# Patient Record
Sex: MALE | Race: BLACK OR AFRICAN AMERICAN | Employment: OTHER | URBAN - METROPOLITAN AREA
[De-identification: names, ages, dates, MRNs, and addresses within clinical notes are randomized per-mention and may not be internally consistent; named-entity substitution may affect disease eponyms.]

---

## 2017-02-09 ENCOUNTER — TELEPHONE (OUTPATIENT)
Dept: FAMILY MEDICINE CLINIC | Age: 62
End: 2017-02-09

## 2017-02-09 NOTE — TELEPHONE ENCOUNTER
Referral Request Telephone Call      Insurance Name:     Phani Ricks [address] ID:  [id number]   Specialist Name: Dr. Effie Gilbert   Type of Specialty:  [facility] Urology    Address of Specialist:  [facility] [address] [place] [address],[address] [address]   Phone/Fax Number of Specialist: [phone]  [facility] [phone]   Diagnosis: 790.93   Appointment Date: 2-14-17 @ 10:00   NPI    Tax ID

## 2017-02-21 ENCOUNTER — OFFICE VISIT (OUTPATIENT)
Dept: FAMILY MEDICINE CLINIC | Age: 62
End: 2017-02-21

## 2017-02-21 VITALS
BODY MASS INDEX: 33.59 KG/M2 | WEIGHT: 209 LBS | RESPIRATION RATE: 18 BRPM | HEIGHT: 66 IN | DIASTOLIC BLOOD PRESSURE: 82 MMHG | OXYGEN SATURATION: 96 % | SYSTOLIC BLOOD PRESSURE: 136 MMHG | HEART RATE: 59 BPM | TEMPERATURE: 97.2 F

## 2017-02-21 DIAGNOSIS — Z11.59 NEED FOR HEPATITIS C SCREENING TEST: ICD-10-CM

## 2017-02-21 DIAGNOSIS — N40.1 BENIGN PROSTATIC HYPERPLASIA WITH LOWER URINARY TRACT SYMPTOMS, UNSPECIFIED MORPHOLOGY: ICD-10-CM

## 2017-02-21 DIAGNOSIS — E66.9 OBESITY (BMI 30.0-34.9): ICD-10-CM

## 2017-02-21 DIAGNOSIS — Z23 ENCOUNTER FOR IMMUNIZATION: ICD-10-CM

## 2017-02-21 DIAGNOSIS — J34.89 DRY NOSE: ICD-10-CM

## 2017-02-21 DIAGNOSIS — G47.33 OSA (OBSTRUCTIVE SLEEP APNEA): ICD-10-CM

## 2017-02-21 DIAGNOSIS — E78.49 FAMILIAL HYPERLIPIDEMIA: ICD-10-CM

## 2017-02-21 DIAGNOSIS — Z78.9 VEGETARIAN: ICD-10-CM

## 2017-02-21 DIAGNOSIS — Z00.00 MEDICARE ANNUAL WELLNESS VISIT, SUBSEQUENT: Primary | ICD-10-CM

## 2017-02-21 DIAGNOSIS — I10 ESSENTIAL HYPERTENSION: ICD-10-CM

## 2017-02-21 PROBLEM — Z55.0 ILLITERACY: Status: ACTIVE | Noted: 2017-02-21

## 2017-02-21 RX ORDER — VALACYCLOVIR HYDROCHLORIDE 500 MG/1
TABLET, FILM COATED ORAL
Qty: 30 TAB | Refills: 1 | Status: SHIPPED | OUTPATIENT
Start: 2017-02-21 | End: 2017-06-12 | Stop reason: SDUPTHER

## 2017-02-21 NOTE — MR AVS SNAPSHOT
Visit Information Date & Time Provider Department Dept. Phone Encounter #  
 2/21/2017  1[phone] [id number] Follow-up Instructions Return in about 6 months (around 8/21/2017) for chronic problems. Upcoming Health Maintenance Date Due Hepatitis C Screening 1955 ZOSTER VACCINE AGE 60> 5/4/2016 COLONOSCOPY 4/9/2017 DTaP/Tdap/Td series (2 - Td) 3/19/2022 Allergies as of 2/21/2017  Review Complete On: 2/21/2017 By: Anuj Foster MD  
 No Known Allergies Current Immunizations  Reviewed on 9/26/2013 Name Date Influenza Vaccine 9/26/2013 Influenza Vaccine Aime Rueda) 10/21/2015 Influenza Vaccine (Quad) PF 2/21/2017 Influenza Vaccine Split 11/14/2012, 10/24/2011 TDAP Vaccine 3/19/2012 Not reviewed this visit You Were Diagnosed With   
  
 Codes Comments Medicare annual wellness visit, subsequent    -  Primary ICD-10-CM: Z00.00 ICD-9-CM: V70.0 Encounter for immunization     ICD-10-CM: W30 ICD-9-CM: V03.89   
 NESTOR (obstructive sleep apnea)     ICD-10-CM: G47.33 
ICD-9-CM: 327.23 Obesity (BMI 30.0-34.9)     ICD-10-CM: M60.7 ICD-9-CM: 278.00 Benign prostatic hyperplasia with lower urinary tract symptoms, unspecified morphology     ICD-10-CM: N40.1 ICD-9-CM: 600.01 Vegetarian     ICD-10-CM: Z78.9 ICD-9-CM: V49.89 Need for hepatitis C screening test     ICD-10-CM: Z11.59 
ICD-9-CM: V73.89 Familial hyperlipidemia     ICD-10-CM: E78.4 ICD-9-CM: 272.4 Vitals BP Pulse Temp Resp Height(growth percentile) Weight(growth percentile) 136/82 (BP 1 Location: Left arm, BP Patient Position: Sitting) (!) 59 97.2 °F (36.2 °C) (Oral) 18 5' 6\" (1.676 m) 209 lb (94.8 kg) SpO2 BMI Smoking Status 96% 33.73 kg/m2 Never Smoker Vitals History BMI and BSA Data  Body Mass Index Body Surface Area  
 33.73 kg/m 2 2.1 m 2  
  
  
 Preferred Pharmacy Pharmacy Name Phone [facility], [address] [place] [address] [phone] Your Updated Medication List  
  
   
This list is accurate as of: 17  1:25 PM.  Always use your most recent med list. amLODIPine 10 mg tablet Commonly known as:  Anupam Evangelistachristine TAKE 1 TABLET EVERY DAY  
  
 aspirin 81 mg chewable tablet Take 1 Tab by mouth daily. Indications: MYOCARDIAL INFARCTION PREVENTION  
  
 cholecalciferol (vitamin D3) 2,000 unit Tab Take  by mouth daily. cyanocobalamin 1,000 mcg tablet Take 1,000 mcg by mouth daily. fluticasone 50 mcg/actuation nasal spray Commonly known as:  FLONASE  
  
 losartan 100 mg tablet Commonly known as:  COZAAR  
TAKE 1 TABLET EVERY DAY FOR BLOOD PRESSURE MEN'S DAILY MULTIVIT-MINERAL 0.4-600 mg-mcg Tab Generic drug:  Multivits with Min-FA-Lycopene Take  by mouth daily. metoprolol succinate 100 mg tablet Commonly known as:  TOPROL-XL  
TAKE 1 TABLET EVERY DAY  
  
 tamsulosin 0.4 mg capsule Commonly known as:  FLOMAX TAKE 2 CAPSULES EVERY DAY  FOR  PROSTATE  
  
 valACYclovir 500 mg tablet Commonly known as:  VALTREX  
TAKE 1 TABLET BY MOUTH EVERY DAY AS NEEDED FOR BREAKOUT UNDER EYES  
  
 varicella zoster vacine live 19,400 unit/0.65 mL Susr injection Commonly known as:  varicella-zoster vacine live 1 Vial by SubCUTAneous route once for 1 dose. Prescriptions Sent to Pharmacy Refills  
 valACYclovir (VALTREX) 500 mg tablet 1 Sig: TAKE 1 TABLET BY MOUTH EVERY DAY AS NEEDED FOR BREAKOUT UNDER EYES  
 Class: Normal  
 Pharmacy: [facility], [address] Ph #: [phone]  
 varicella zoster vacine live (VARICELLA-ZOSTER VACINE LIVE) 19,400 unit/0.65 mL susr injection 0 Si Vial by SubCUTAneous route once for 1 dose.   
 Class: Normal  
 Pharmacy: [id number], [address] #: [phone] Route: SubCUTAneous We Performed the Following HEPATITIS C AB [52701 CPT(R)] INFLUENZA VIRUS VAC QUAD,SPLIT,PRESV FREE SYRINGE 3/> YRS IM [id number] CPT(R)] LIPID PANEL [51287 CPT(R)] METABOLIC PANEL, COMPREHENSIVE [77804 CPT(R)] VITAMIN B12 [id number] CPT(R)] Follow-up Instructions Return in about 6 months (around 8/21/2017) for chronic problems. Patient Instructions Well Visit, Men 48 to 72: Care Instructions Your Care Instructions Physical exams can help you stay healthy. Your doctor has checked your overall health and may have suggested ways to take good care of yourself. He or she also may have recommended tests. At home, you can help prevent illness with healthy eating, regular exercise, and other steps. Follow-up care is a key part of your treatment and safety. Be sure to make and go to all appointments, and call your doctor if you are having problems. It's also a good idea to know your test results and keep a list of the medicines you take. How can you care for yourself at home? · Reach and stay at a healthy weight. This will lower your risk for many problems, such as obesity, diabetes, heart disease, and high blood pressure. · Get at least 30 minutes of exercise on most days of the week. Walking is a good choice. You also may want to do other activities, such as running, swimming, cycling, or playing tennis or team sports. · Do not smoke. Smoking can make health problems worse. If you need help quitting, talk to your doctor about stop-smoking programs and medicines. These can increase your chances of quitting for good. · Protect your skin from too much sun.  When you're outdoors from 10 a.m. to 4 p.m., stay in the shade or cover up with clothing and a hat with a wide brim. Wear sunglasses that block UV rays. Even when it's cloudy, put broad-spectrum sunscreen (SPF 30 or higher) on any exposed skin. · See a dentist one or two times a year for checkups and to have your teeth cleaned. · Wear a seat belt in the car. · Limit alcohol to 2 drinks a day. Too much alcohol can cause health problems. Follow your doctor's advice about when to have certain tests. These tests can spot problems early. · Cholesterol. Your doctor will tell you how often to have this done based on your overall health and other things that can increase your risk for heart attack and stroke. · Blood pressure. Have your blood pressure checked during a routine doctor visit. Your doctor will tell you how often to check your blood pressure based on your age, your blood pressure results, and other factors. · Prostate exam. Talk to your doctor about whether you should have a blood test (called a PSA test) for prostate cancer. Experts disagree on whether men should have this test. Some experts recommend that you discuss the benefits and risks of the test with your doctor. · Diabetes. Ask your doctor whether you should have tests for diabetes. · Vision. Some experts recommend that you have yearly exams for glaucoma and other age-related eye problems starting at age 48. · Hearing. Tell your doctor if you notice any change in your hearing. You can have tests to find out how well you hear. · Colon cancer. You should begin tests for colon cancer at age 48. You may have one of several tests. Your doctor will tell you how often to have tests based on your age and risk. Risks include whether you already had a precancerous polyp removed from your colon or whether your parent, brother, sister, or child has had colon cancer. · Heart attack and stroke risk. At least every 4 to 6 years, you should have your risk for heart attack and stroke assessed.  Your doctor uses factors such as your age, blood pressure, cholesterol, and whether you smoke or have diabetes to show what your risk for a heart attack or stroke is over the next 10 years. · Abdominal aortic aneurysm. Ask your doctor whether you should have a test to check for an aneurysm. You may need a test if you ever smoked or if your parent, brother, sister, or child has had an aneurysm. When should you call for help? Watch closely for changes in your health, and be sure to contact your doctor if you have any problems or symptoms that concern you. Where can you learn more? Go to http://ambrose-vince.info/. Enter T370 in the search box to learn more about \"Well Visit, Men 48 to 72: Care Instructions. \" Current as of: July 19, 2016 Content Version: 11.1 © 4157-0828 [employer]. Care instructions adapted under license by [employer] (which disclaims liability or warranty for this information). If you have questions about a medical condition or this instruction, always ask your healthcare professional. [place] [address] any warranty or liability for your use of this information. (Preventive care checklist to be included in patient instructions) Discussed today Done Previously Not Needed   
  x Pneumococcal vaccines  
x   Flu vaccine  
  x Hepatitis B vaccine (if at risk) x   Shingles vaccine  
  x TDAP vaccine X urology  Digital rectal exam  
 x  PSA  
x   Colorectal cancer screening  
  x Low-dose CT for lung cancer screening  
  x Bone density test  
 x  Glaucoma screening  
x   Cholesterol test  
  x Diabetes screening test   
  x Diabetes self-management class  
  x Nutritionist referral for diabetes or renal disease Introducing [facility]! [employer] introduces [employer] patient portal. Now you can access parts of your medical record, email your doctor's office, and request medication refills online. 1. In your internet browser, go to [email or web address] 2. Click on the First Time User? Click Here link in the Sign In box. You will see the New Member Sign Up page. 3. Enter your ThinkLink Access Code exactly as it appears below. You will not need to use this code after youve completed the sign-up process. If you do not sign up before the expiration date, you must request a new code. · ThinkLink Access Code: [email or web address] Expires: 5/22/2017  1:25 PM 
 
4. Enter the last four digits of your Social Security Number (xxxx) and Date of Birth (mm/dd/yyyy) as indicated and click Submit. You will be taken to the next sign-up page. 5. Create a AskUt ID. This will be your ThinkLink login ID and cannot be changed, so think of one that is secure and easy to remember. 6. Create a ThinkLink password. You can change your password at any time. 7. Enter your Password Reset Question and Answer. This can be used at a later time if you forget your password. 8. Enter your e-mail address. You will receive e-mail notification when new information is available in [address] 9. Click Sign Up. You can now view and download portions of your medical record. 10. Click the Download Summary menu link to download a portable copy of your medical information. If you have questions, please visit the Frequently Asked Questions section of the ThinkLink website. Remember, ThinkLink is NOT to be used for urgent needs. For medical emergencies, dial 911. Now available from your iPhone and Android! Please provide this summary of care documentation to your next provider. Your primary care clinician is listed as Zhane Espinal. If you have any questions after today's visit, please call [phone].

## 2017-02-21 NOTE — PATIENT INSTRUCTIONS
Well Visit, Men 48 to 72: Care Instructions  Your Care Instructions  Physical exams can help you stay healthy. Your doctor has checked your overall health and may have suggested ways to take good care of yourself. He or she also may have recommended tests. At home, you can help prevent illness with healthy eating, regular exercise, and other steps. Follow-up care is a key part of your treatment and safety. Be sure to make and go to all appointments, and call your doctor if you are having problems. It's also a good idea to know your test results and keep a list of the medicines you take. How can you care for yourself at home? · Reach and stay at a healthy weight. This will lower your risk for many problems, such as obesity, diabetes, heart disease, and high blood pressure. · Get at least 30 minutes of exercise on most days of the week. Walking is a good choice. You also may want to do other activities, such as running, swimming, cycling, or playing tennis or team sports. · Do not smoke. Smoking can make health problems worse. If you need help quitting, talk to your doctor about stop-smoking programs and medicines. These can increase your chances of quitting for good. · Protect your skin from too much sun. When you're outdoors from 10 a.m. to 4 p.m., stay in the shade or cover up with clothing and a hat with a wide brim. Wear sunglasses that block UV rays. Even when it's cloudy, put broad-spectrum sunscreen (SPF 30 or higher) on any exposed skin. · See a dentist one or two times a year for checkups and to have your teeth cleaned. · Wear a seat belt in the car. · Limit alcohol to 2 drinks a day. Too much alcohol can cause health problems. Follow your doctor's advice about when to have certain tests. These tests can spot problems early. · Cholesterol.  Your doctor will tell you how often to have this done based on your overall health and other things that can increase your risk for heart attack and stroke. · Blood pressure. Have your blood pressure checked during a routine doctor visit. Your doctor will tell you how often to check your blood pressure based on your age, your blood pressure results, and other factors. · Prostate exam. Talk to your doctor about whether you should have a blood test (called a PSA test) for prostate cancer. Experts disagree on whether men should have this test. Some experts recommend that you discuss the benefits and risks of the test with your doctor. · Diabetes. Ask your doctor whether you should have tests for diabetes. · Vision. Some experts recommend that you have yearly exams for glaucoma and other age-related eye problems starting at age 48. · Hearing. Tell your doctor if you notice any change in your hearing. You can have tests to find out how well you hear. · Colon cancer. You should begin tests for colon cancer at age 48. You may have one of several tests. Your doctor will tell you how often to have tests based on your age and risk. Risks include whether you already had a precancerous polyp removed from your colon or whether your parent, brother, sister, or child has had colon cancer. · Heart attack and stroke risk. At least every 4 to 6 years, you should have your risk for heart attack and stroke assessed. Your doctor uses factors such as your age, blood pressure, cholesterol, and whether you smoke or have diabetes to show what your risk for a heart attack or stroke is over the next 10 years. · Abdominal aortic aneurysm. Ask your doctor whether you should have a test to check for an aneurysm. You may need a test if you ever smoked or if your parent, brother, sister, or child has had an aneurysm. When should you call for help? Watch closely for changes in your health, and be sure to contact your doctor if you have any problems or symptoms that concern you. Where can you learn more? Go to http://ambrose-vince.info/.   Enter V531 in the search box to learn more about \"Well Visit, Men 48 to 72: Care Instructions. \"  Current as of: July 19, 2016  Content Version: 11.1  © 0926-2077 [employer]. Care instructions adapted under license by [employer] (which disclaims liability or warranty for this information). If you have questions about a medical condition or this instruction, always ask your healthcare professional. [place] [address] any warranty or liability for your use of this information.       (Preventive care checklist to be included in patient instructions)  Discussed today Done Previously Not Needed      x Pneumococcal vaccines   x   Flu vaccine     x Hepatitis B vaccine (if at risk)   x   Shingles vaccine     x TDAP vaccine    X urology  Digital rectal exam    x  PSA   x   Colorectal cancer screening     x Low-dose CT for lung cancer screening     x Bone density test    x  Glaucoma screening   x   Cholesterol test     x Diabetes screening test      x Diabetes self-management class     x Nutritionist referral for diabetes or renal disease

## 2017-02-21 NOTE — PROGRESS NOTES
Chief Complaint   Patient presents with   [facility] Annual Wellness Visit     fasting other than some OJ at 7 this am    Nasal Pain     nostrils dry and sore, have bled little at times due to dryness        Reviewed Record in preparation for visit and have obtained necessary documentation. Identified pt with two pt identifiers (Name @ )    Health Maintenance Due   Topic    Hepatitis C Screening     ZOSTER VACCINE AGE 60>     INFLUENZA AGE 9 TO ADULT     COLONOSCOPY          1. Have you been to the ER, urgent care clinic since your last visit? Hospitalized since your last visit? No    2. Have you seen or consulted any other health care providers outside of the [address] [place] [address] since your last visit? Include any pap smears or colon screening.  No

## 2017-02-21 NOTE — PROGRESS NOTES
Chris [address]  [address] [facility] [address]  [phone]             Date of visit: 2/21/2017       This is a Subsequent Medicare Annual Wellness Visit (AWV), (Performed more than 12 months after effective date of Medicare Part B enrollment and 12 months after last preventive visit, Once in a lifetime)    I have reviewed the patient's medical history in detail and updated the computerized patient record. Apple Wong is a 62 y.o. male   History obtained from: patient. Concerns today   -nose with sores that never heal, both sides, does have runny nose, sometimes bleeding. Doesn't use sprays, previous allergy spray  Made him worse  -used to be on statin, not in years, does vegetarian diet  -bp controlled on usual med  -recently dx NESTOR, wearing the cpap faithfully 10-8, getting used to it, feels a little better  -scheduled for colonoscopy in March  -plans to schedule with urology soon. flomax working well to control symptoms    History     Patient Active Problem List   Diagnosis Code    Vegetarian Z78.9    Allergic rhinitis J30.9    Familial hyperlipidemia E78.4    BPH (benign prostatic hyperplasia) N40.0    Right inguinal hernia K40.90    NESTOR (obstructive sleep apnea) G47.33    Obesity (BMI 30.0-34. 9) E66.9    Illiteracy Z55.0    Essential hypertension I10     Past Medical History   Diagnosis Date    Adenomatous polyp of colon 2012    Angina pectoris     Bleeding ulcer     BPH (benign prostatic hyperplasia) 2011    Cancer [facility]) 2013     PROSTATE     Hypertension     Kidney infection     Right inguinal hernia 7/11/2016    Vegetarian       Past Surgical History   Procedure Laterality Date    Hx orthopaedic       Back surgery    Colonoscopy  2012     adenomatous polyp    Hx cholecystectomy  2010    Hx other surgical  2011     prostate biopsy - normal    Hx other surgical  2009     FATTY TUMOR REMOVED FROM BACK    Hx hernia repair Right 7/29/16     inguinal w/mesh by Dr. Nancy Spurling     No Known Allergies  Current Outpatient Prescriptions   Medication Sig Dispense Refill    valACYclovir (VALTREX) 500 mg tablet TAKE 1 TABLET BY MOUTH EVERY DAY AS NEEDED FOR BREAKOUT UNDER EYES 30 Tab 1    varicella zoster vacine live (VARICELLA-ZOSTER VACINE LIVE) 19,400 unit/0.65 mL susr injection 1 Vial by SubCUTAneous route once for 1 dose. 0.65 mL 0    metoprolol succinate (TOPROL-XL) 100 mg tablet TAKE 1 TABLET EVERY DAY 90 Tab 1    amLODIPine (NORVASC) 10 mg tablet TAKE 1 TABLET EVERY DAY 90 Tab 1    losartan (COZAAR) 100 mg tablet TAKE 1 TABLET EVERY DAY FOR BLOOD PRESSURE 90 Tab 1    tamsulosin (FLOMAX) 0.4 mg capsule TAKE 2 CAPSULES EVERY DAY  FOR  PROSTATE 180 Cap 1    fluticasone (FLONASE) 50 mcg/actuation nasal spray       cyanocobalamin 1,000 mcg tablet Take 1,000 mcg by mouth daily.  cholecalciferol, vitamin D3, 2,000 unit tab Take  by mouth daily.  aspirin 81 mg chewable tablet Take 1 Tab by mouth daily. Indications: MYOCARDIAL INFARCTION PREVENTION 90 Tab 0    Multivits with Min-FA-Lycopene (MEN'S DAILY MULTIVIT-MINERAL) 0.4-600 mg-mcg Tab Take  by mouth daily. Family History   Problem Relation Age of Onset    Hypertension Mother     Hypertension Maternal Grandmother     Hypertension Brother     Alcohol abuse Brother     Cancer Brother      pancreatic    Diabetes Brother     Hypertension Brother     Heart Disease Brother     Heart Attack Father     Anesth Problems Neg Hx      Social History   Substance Use Topics    Smoking status: Never Smoker    Smokeless tobacco: Never Used    Alcohol use 0.0 oz/week     0 Standard drinks or equivalent per week      Comment: OCCASIONALLY       Specialists/Care Team   Claudy Kirkland has established care with the following healthcare providers:  Patient Care Team:  Gaurang Siddiqui NP as PCP - General (Nurse Practitioner)  Primo Castaneda MD (General Surgery)  Nae Tsang MD (Gastroenterology)  Greer Anand MD (Urology)    Health Risk Assessment     Demographics   male  62 y.o. General Health Questions   -During the past 4 weeks:   -how would you rate your health in general? Good   -how often have you been bothered by feeling dizzy when standing up? never   -how much have you been bothered by bodily pain? not   -Have you noticed any hearing difficulties? no   -has your physical and emotional health limited your social activities with family or friends? no    Emotional Health Questions   -Do you have a history of depression, anxiety, or emotional problems? no  -Over the past 2 weeks, have you felt down, depressed or hopeless? no  -Over the past 2 weeks, have you felt little interest or pleasure in doing things? no    Health Habits   Please describe your diet habits: vegetarian, lots of veggies, tofu, almond milk, water, no soda or sugar  Do you get 5 servings of fruits or vegetables daily? yes  Do you exercise regularly? yes    Activities of Daily Living and Functional Status   -Do you need help with eating, walking, dressing, bathing, toileting, the phone, transportation, shopping, preparing meals, housework, laundry, medications or managing money? no  -In the past four weeks, was someone available to help you if you needed and wanted help with anything? yes  -Are you confident are you that you can control and manage most of your health problems? yes  -Have you been given information to help you keep track of your medications? yes  -How often do you have trouble taking your medications as prescribed? never    Fall Risk and Home Safety   Have you fallen 2 or more times in the past year? no  Does your home have rugs in the hallway, lack grab bars in the bathroom, lack handrails on the stairs or have poor lighting? no  Do you have smoke detectors and check them regularly? yes  Do you have difficulties driving a car? no  Do you always fasten your seat belt when you are in a car? yes    Review of Systems (if indicated for problems addressed today)   Card: denies chest pain  Pulm: denies shortness of breath  GI: denies hematochezia     Physical Examination     Vitals:    02/21/17 1254   BP: 136/82   Pulse: (!) 59   Resp: 18   Temp: 97.2 °F (36.2 °C)   TempSrc: Oral   SpO2: 96%   Weight: 209 lb (94.8 kg)   Height: 5' 6\" (1.676 m)     Body mass index is 33.73 kg/(m^2). No exam data present  Was the patient's timed Up & Go test unsteady or longer than 30 seconds? no    Evaluation of Cognitive Function   Mood/affect:  neutral  Orientation: normal  Appearance: age appropriate  Family member/caregiver input: none    Additional exam if indicated for problems addressed today:  General: stated age, well developed, well nourished and in NAD  Nose: turbinates normal, little dried blood on septum on right, no lesions  Neck: supple, symmetrical, trachea midline, no adenopathy and thyroid: not enlarged, symmetric, no tenderness/mass/nodules  Lungs:  clear to auscultation w/o rales, rhonchi, wheezes w/normal effort and no use of accessory muscles of respiration   Heart: regular rate and rhythm, S1, S2 normal, no murmur, click, rub or gallop  Abdomen: soft, nontender  Ext:  No edema noted.    Lymph: no cervical adenopathy appreciated  Skin:  Normal. and no rash or abnormalities   Psych: alert and oriented to person, place, time and situation and Speech: appropriate quality, quantity and organization of sentences      Advice/Referrals/Counseling (as indicated)   Education and counseling provided for any problems identified above: diet, exercise    Preventive Services     (Preventive care checklist to be included in patient instructions)  Discussed today Done Previously Not Needed      x Pneumococcal vaccines   x   Flu vaccine     x Hepatitis B vaccine (if at risk)   x   Shingles vaccine     x TDAP vaccine    X urology  Digital rectal exam    x  PSA   x   Colorectal cancer screening     x Low-dose CT for lung cancer screening     x Bone density test    x  Glaucoma screening   x   Cholesterol test     x Diabetes screening test      x Diabetes self-management class     x Nutritionist referral for diabetes or renal disease     Discussion of Advance Directive   Discussed with Carlos Hull. Keenan Mejiae his ability to prepare and advance directive in the case that an injury or illness causes him to be unable to make health care decisions. Says he already has plans to get one    Assessment/Plan   Z00.00    ICD-10-CM ICD-9-CM    1. Medicare annual wellness visit, subsequent Z00.00 V70.0    2. Encounter for immunization Z23 V03.89 INFLUENZA VIRUS VAC QUAD,SPLIT,PRESV FREE SYRINGE 3/> YRS IM   3. NESTOR (obstructive sleep apnea) G47.33 327.23    4. Obesity (BMI 30.0-34. 9) E66.9 278.00    5. Benign prostatic hyperplasia with lower urinary tract symptoms, unspecified morphology N40.1 600.01    6. Vegetarian Z78.9 V49.89 VITAMIN B12   7. Need for hepatitis C screening test Z11.59 V73.89 HEPATITIS C AB   8. Familial hyperlipidemia E78.4 272.4 LIPID PANEL      METABOLIC PANEL, COMPREHENSIVE   9. Dry nose J34.89 478.19    10. Essential hypertension I10 401.9        Orders Placed This Encounter    Influenza virus vaccine (QUADRIVALENT PRES FREE SYRINGE) IM 3 years and older    VITAMIN B12    LIPID PANEL    METABOLIC PANEL, COMPREHENSIVE    HEPATITIS C AB    valACYclovir (VALTREX) 500 mg tablet    varicella zoster vacine live (VARICELLA-ZOSTER VACINE LIVE) 19,400 unit/0.65 mL susr injection     -no change to bp meds  Advised nasal saline for nose, vaseline at bedtime  Tolerating cpap, continue, discused its importance  encoruaged continued vegetarian diet as well a walking regularly, BMI is high but doesn't actually appear overweight    Follow-up Disposition:  Return in about 6 months (around 8/21/2017) for chronic problems.     Reinaldo Rabago MD

## 2017-02-22 LAB
ALBUMIN SERPL-MCNC: 4.7 G/DL (ref 3.6–4.8)
ALBUMIN/GLOB SERPL: 1.6 {RATIO} (ref 1.1–2.5)
ALP SERPL-CCNC: 99 IU/L (ref 39–117)
ALT SERPL-CCNC: 19 IU/L (ref 0–44)
AST SERPL-CCNC: 26 IU/L (ref 0–40)
BILIRUB SERPL-MCNC: 1 MG/DL (ref 0–1.2)
BUN SERPL-MCNC: 7 MG/DL (ref 8–27)
BUN/CREAT SERPL: 8 (ref 10–22)
CALCIUM SERPL-MCNC: 10.5 MG/DL (ref 8.6–10.2)
CHLORIDE SERPL-SCNC: 101 MMOL/L (ref 96–106)
CHOLEST SERPL-MCNC: 178 MG/DL (ref 100–199)
CO2 SERPL-SCNC: 29 MMOL/L (ref 18–29)
CREAT SERPL-MCNC: 0.87 MG/DL (ref 0.76–1.27)
GLOBULIN SER CALC-MCNC: 2.9 G/DL (ref 1.5–4.5)
GLUCOSE SERPL-MCNC: 90 MG/DL (ref 65–99)
HCV AB S/CO SERPL IA: <0.1 S/CO RATIO (ref 0–0.9)
HDLC SERPL-MCNC: 51 MG/DL
INTERPRETATION, 910389: NORMAL
LDLC SERPL CALC-MCNC: 103 MG/DL (ref 0–99)
POTASSIUM SERPL-SCNC: 4.2 MMOL/L (ref 3.5–5.2)
PROT SERPL-MCNC: 7.6 G/DL (ref 6–8.5)
SODIUM SERPL-SCNC: 142 MMOL/L (ref 134–144)
TRIGL SERPL-MCNC: 119 MG/DL (ref 0–149)
VIT B12 SERPL-MCNC: 1536 PG/ML (ref 211–946)
VLDLC SERPL CALC-MCNC: 24 MG/DL (ref 5–40)

## 2017-02-23 NOTE — PROGRESS NOTES
All labs were good, including b-12, cholesterol, kidney, liver, sugar, electrolytes and routine hep C screening test. Anjali De La Fuente MD 2/23/2017 11:19 AM

## 2017-03-15 ENCOUNTER — TELEPHONE (OUTPATIENT)
Dept: FAMILY MEDICINE CLINIC | Age: 62
End: 2017-03-15

## 2017-03-15 NOTE — TELEPHONE ENCOUNTER
Referral Request Telephone Call      Insurance Name:     Mandy Robertson ID:  [id number]   Specialist Name: Erwin Madison   Type of Specialty:  Arun Garcia    Address of Specialist:  [address]   Phone/Fax Number of Specialist: Phone # [phone]  Fax # [phone]   Diagnosis: Colonoscopy   Appointment Date: 4/17/17   NPI    Tax ID

## 2017-04-18 RX ORDER — LOSARTAN POTASSIUM 100 MG/1
TABLET ORAL
Qty: 90 TAB | Refills: 1 | Status: SHIPPED | OUTPATIENT
Start: 2017-04-18 | End: 2017-08-22 | Stop reason: SDUPTHER

## 2017-04-18 RX ORDER — AMLODIPINE BESYLATE 10 MG/1
TABLET ORAL
Qty: 90 TAB | Refills: 1 | Status: SHIPPED | OUTPATIENT
Start: 2017-04-18 | End: 2017-08-22 | Stop reason: SDUPTHER

## 2017-04-18 RX ORDER — FLUTICASONE PROPIONATE 50 MCG
SPRAY, SUSPENSION (ML) NASAL
Qty: 48 G | Refills: 1 | Status: SHIPPED | OUTPATIENT
Start: 2017-04-18 | End: 2017-05-01

## 2017-04-18 RX ORDER — METOPROLOL SUCCINATE 100 MG/1
TABLET, EXTENDED RELEASE ORAL
Qty: 90 TAB | Refills: 1 | Status: SHIPPED | OUTPATIENT
Start: 2017-04-18 | End: 2017-08-22 | Stop reason: SDUPTHER

## 2017-04-18 RX ORDER — TAMSULOSIN HYDROCHLORIDE 0.4 MG/1
CAPSULE ORAL
Qty: 180 CAP | Refills: 1 | Status: SHIPPED | OUTPATIENT
Start: 2017-04-18 | End: 2017-08-24 | Stop reason: SDUPTHER

## 2017-05-01 RX ORDER — ACETAMINOPHEN 500 MG
2000 TABLET ORAL DAILY
COMMUNITY
End: 2019-08-02 | Stop reason: SDUPTHER

## 2017-05-03 ENCOUNTER — ANESTHESIA EVENT (OUTPATIENT)
Dept: ENDOSCOPY | Age: 62
End: 2017-05-03
Payer: MEDICARE

## 2017-05-03 ENCOUNTER — HOSPITAL ENCOUNTER (OUTPATIENT)
Age: 62
Setting detail: OUTPATIENT SURGERY
Discharge: HOME | End: 2017-05-03
Attending: INTERNAL MEDICINE | Admitting: INTERNAL MEDICINE
Payer: MEDICARE

## 2017-05-03 ENCOUNTER — ANESTHESIA (OUTPATIENT)
Dept: ENDOSCOPY | Age: 62
End: 2017-05-03
Payer: MEDICARE

## 2017-05-03 VITALS
WEIGHT: 200 LBS | OXYGEN SATURATION: 96 % | RESPIRATION RATE: 8 BRPM | DIASTOLIC BLOOD PRESSURE: 95 MMHG | TEMPERATURE: 97.2 F | HEIGHT: 66 IN | BODY MASS INDEX: 32.14 KG/M2 | HEART RATE: 51 BPM | SYSTOLIC BLOOD PRESSURE: 145 MMHG

## 2017-05-03 PROCEDURE — 74011000250 HC RX REV CODE- 250

## 2017-05-03 PROCEDURE — 88305 TISSUE EXAM BY PATHOLOGIST: CPT | Performed by: INTERNAL MEDICINE

## 2017-05-03 PROCEDURE — 77030027957 HC TBNG IRR ENDOGTR BUSS -B: Performed by: INTERNAL MEDICINE

## 2017-05-03 PROCEDURE — 74011250636 HC RX REV CODE- 250/636

## 2017-05-03 PROCEDURE — 76040000019: Performed by: INTERNAL MEDICINE

## 2017-05-03 PROCEDURE — 77030013992 HC SNR POLYP ENDOSC BSC -B: Performed by: INTERNAL MEDICINE

## 2017-05-03 PROCEDURE — 77030009426 HC FCPS BIOP ENDOSC BSC -B: Performed by: INTERNAL MEDICINE

## 2017-05-03 PROCEDURE — 76060000031 HC ANESTHESIA FIRST 0.5 HR: Performed by: INTERNAL MEDICINE

## 2017-05-03 RX ORDER — EPINEPHRINE 0.1 MG/ML
1 INJECTION INTRACARDIAC; INTRAVENOUS
Status: DISCONTINUED | OUTPATIENT
Start: 2017-05-03 | End: 2017-05-03 | Stop reason: HOSPADM

## 2017-05-03 RX ORDER — PROPOFOL 10 MG/ML
INJECTION, EMULSION INTRAVENOUS AS NEEDED
Status: DISCONTINUED | OUTPATIENT
Start: 2017-05-03 | End: 2017-05-03 | Stop reason: HOSPADM

## 2017-05-03 RX ORDER — DEXTROMETHORPHAN/PSEUDOEPHED 2.5-7.5/.8
1.2 DROPS ORAL
Status: DISCONTINUED | OUTPATIENT
Start: 2017-05-03 | End: 2017-05-03 | Stop reason: HOSPADM

## 2017-05-03 RX ORDER — LIDOCAINE HYDROCHLORIDE 20 MG/ML
INJECTION, SOLUTION EPIDURAL; INFILTRATION; INTRACAUDAL; PERINEURAL AS NEEDED
Status: DISCONTINUED | OUTPATIENT
Start: 2017-05-03 | End: 2017-05-03 | Stop reason: HOSPADM

## 2017-05-03 RX ORDER — SODIUM CHLORIDE 9 MG/ML
100 INJECTION, SOLUTION INTRAVENOUS CONTINUOUS
Status: DISCONTINUED | OUTPATIENT
Start: 2017-05-03 | End: 2017-05-03 | Stop reason: HOSPADM

## 2017-05-03 RX ORDER — SODIUM CHLORIDE 0.9 % (FLUSH) 0.9 %
5-10 SYRINGE (ML) INJECTION AS NEEDED
Status: DISCONTINUED | OUTPATIENT
Start: 2017-05-03 | End: 2017-05-03 | Stop reason: HOSPADM

## 2017-05-03 RX ORDER — NALOXONE HYDROCHLORIDE 0.4 MG/ML
0.4 INJECTION, SOLUTION INTRAMUSCULAR; INTRAVENOUS; SUBCUTANEOUS
Status: DISCONTINUED | OUTPATIENT
Start: 2017-05-03 | End: 2017-05-03 | Stop reason: HOSPADM

## 2017-05-03 RX ORDER — ATROPINE SULFATE 0.1 MG/ML
0.5 INJECTION INTRAVENOUS
Status: DISCONTINUED | OUTPATIENT
Start: 2017-05-03 | End: 2017-05-03 | Stop reason: HOSPADM

## 2017-05-03 RX ORDER — SODIUM CHLORIDE 9 MG/ML
INJECTION, SOLUTION INTRAVENOUS
Status: DISCONTINUED | OUTPATIENT
Start: 2017-05-03 | End: 2017-05-03 | Stop reason: HOSPADM

## 2017-05-03 RX ORDER — MIDAZOLAM HYDROCHLORIDE 1 MG/ML
.25-1 INJECTION, SOLUTION INTRAMUSCULAR; INTRAVENOUS
Status: DISCONTINUED | OUTPATIENT
Start: 2017-05-03 | End: 2017-05-03 | Stop reason: HOSPADM

## 2017-05-03 RX ORDER — GLYCOPYRROLATE 0.2 MG/ML
INJECTION INTRAMUSCULAR; INTRAVENOUS AS NEEDED
Status: DISCONTINUED | OUTPATIENT
Start: 2017-05-03 | End: 2017-05-03 | Stop reason: HOSPADM

## 2017-05-03 RX ORDER — SODIUM CHLORIDE 0.9 % (FLUSH) 0.9 %
5-10 SYRINGE (ML) INJECTION EVERY 8 HOURS
Status: DISCONTINUED | OUTPATIENT
Start: 2017-05-03 | End: 2017-05-03 | Stop reason: HOSPADM

## 2017-05-03 RX ORDER — FLUMAZENIL 0.1 MG/ML
0.2 INJECTION INTRAVENOUS
Status: DISCONTINUED | OUTPATIENT
Start: 2017-05-03 | End: 2017-05-03 | Stop reason: HOSPADM

## 2017-05-03 RX ORDER — FENTANYL CITRATE 50 UG/ML
200 INJECTION, SOLUTION INTRAMUSCULAR; INTRAVENOUS
Status: DISCONTINUED | OUTPATIENT
Start: 2017-05-03 | End: 2017-05-03 | Stop reason: HOSPADM

## 2017-05-03 RX ADMIN — PROPOFOL 50 MG: 10 INJECTION, EMULSION INTRAVENOUS at 08:41

## 2017-05-03 RX ADMIN — PROPOFOL 50 MG: 10 INJECTION, EMULSION INTRAVENOUS at 08:40

## 2017-05-03 RX ADMIN — GLYCOPYRROLATE 0.2 MG: 0.2 INJECTION INTRAMUSCULAR; INTRAVENOUS at 08:43

## 2017-05-03 RX ADMIN — PROPOFOL 50 MG: 10 INJECTION, EMULSION INTRAVENOUS at 08:43

## 2017-05-03 RX ADMIN — LIDOCAINE HYDROCHLORIDE 40 MG: 20 INJECTION, SOLUTION EPIDURAL; INFILTRATION; INTRACAUDAL; PERINEURAL at 08:40

## 2017-05-03 RX ADMIN — PROPOFOL 50 MG: 10 INJECTION, EMULSION INTRAVENOUS at 08:45

## 2017-05-03 RX ADMIN — SODIUM CHLORIDE: 9 INJECTION, SOLUTION INTRAVENOUS at 08:30

## 2017-05-03 NOTE — ANESTHESIA POSTPROCEDURE EVALUATION
Post-Anesthesia Evaluation and Assessment    Patient: Carlee Asencio MRN: [id number]  SSN: [id number]    YOB: 1955  Age: 62 y.o. Sex: male       Cardiovascular Function/Vital Signs  Visit Vitals    BP (!) 145/95    Pulse (!) 51    Temp 36.2 °C (97.2 °F)    Resp 8    Ht 5' 6\" (1.676 m)    Wt 90.7 kg (200 lb)    SpO2 96%    BMI 32.28 kg/m2       Patient is status post general, total IV anesthesia anesthesia for Procedure(s):  COLONOSCOPY  ENDOSCOPIC POLYPECTOMY. Nausea/Vomiting: None    Postoperative hydration reviewed and adequate. Pain:  Pain Scale 1: Numeric (0 - 10) (05/03/17 0925)  Pain Intensity 1: 0 (05/03/17 0925)   Managed    Neurological Status: At baseline    Mental Status and Level of Consciousness: Arousable    Pulmonary Status:   O2 Device: Room air (05/03/17 0925)   Adequate oxygenation and airway patent    Complications related to anesthesia: None    Post-anesthesia assessment completed.  No concerns    Signed By: Tracy Bassett MD     May 3, 2017

## 2017-05-03 NOTE — IP AVS SNAPSHOT
[address] 
[phone] Patient: Celia Bryant MRN: [id number] DOJ:[birth year] You are allergic to the following No active allergies Recent Documentation Height Weight BMI Smoking Status 1.676 m 90.7 kg 32.28 kg/m2 Never Smoker Emergency Contacts Name Discharge Info Relation Home Work Mobile Elaine Guerrero  Other Relative [6] [phone] Jerrell Mccoy [5] [phone] Anahi English DISCHARGE CAREGIVER [3] Spouse [3] [phone] About your hospitalization You were admitted on:  May 3, 2017 You last received care in the:  [facility] ENDOSCOPY You were discharged on:  May 3, 2017 Unit phone number:  [phone] Why you were hospitalized Your primary diagnosis was:  Not on File Providers Seen During Your Hospitalizations Provider Role Specialty Primary office phone Iris Casanova MD Attending Provider Gastroenterology [phone] Your Primary Care Physician (PCP) Primary Care Physician Office Phone Office Fax Jackie Dozier [phone] Follow-up Information None Current Discharge Medication List  
  
CONTINUE these medications which have NOT CHANGED Dose & Instructions Dispensing Information Comments Morning Noon Evening Bedtime  
 amLODIPine 10 mg tablet Commonly known as:  Cesario Shawmut Your last dose was: Your next dose is: TAKE 1 TABLET BY MOUTH EVERY DAY Quantity:  90 Tab Refills:  1  
     
   
   
   
  
 aspirin 81 mg chewable tablet Your last dose was: Your next dose is:    
   
   
 Dose:  81 mg Take 1 Tab by mouth daily. Indications: MYOCARDIAL INFARCTION PREVENTION Quantity:  90 Tab Refills:  0 Cholecalciferol (Vitamin D3) 2,000 unit Cap capsule Commonly known as:  VITAMIN D3  
   
 Your last dose was: Your next dose is:    
   
   
 Dose:  2000 Units Take 2,000 Units by mouth daily. Refills:  0  
     
   
   
   
  
 losartan 100 mg tablet Commonly known as:  COZAAR Your last dose was: Your next dose is: TAKE 1 TABLET EVERY DAY FOR BLOOD PRESSURE Quantity:  90 Tab Refills:  1  
     
   
   
   
  
 metoprolol succinate 100 mg tablet Commonly known as:  TOPROL-XL Your last dose was: Your next dose is: TAKE 1 TABLET EVERY DAY Quantity:  90 Tab Refills:  1  
     
   
   
   
  
 tamsulosin 0.4 mg capsule Commonly known as:  FLOMAX Your last dose was: Your next dose is: TAKE 2 CAPSULES EVERY DAY  FOR  PROSTATE Quantity:  180 Cap Refills:  1  
     
   
   
   
  
 valACYclovir 500 mg tablet Commonly known as:  VALTREX Your last dose was: Your next dose is: TAKE 1 TABLET BY MOUTH EVERY DAY AS NEEDED FOR BREAKOUT UNDER EYES  
 Quantity:  30 Tab Refills:  1 VITAMIN B-12 PO Your last dose was: Your next dose is:    
   
   
 Dose:  2000 mcg Take 2,000 mcg by mouth daily. Refills:  0 Discharge Instructions [address] 
[address] COLON DISCHARGE INSTRUCTIONS Salvador Herring [id number] 1955 Discomfort: 
Redness at IV site- apply warm compress to area; if redness or soreness persist- contact your physician There may be a slight amount of blood passed from the rectum Gaseous discomfort- walking, belching will help relieve any discomfort You may not operate a vehicle for 12 hours You may not engage in an occupation involving machinery or appliances for rest of today You may not drink alcoholic beverages for at least 12 hours Avoid making any critical decisions for at least 24 hour DIET: 
 You may resume your regular diet  however -  remember your colon is empty and a heavy meal will produce gas. Avoid these foods:  vegetables, fried / greasy foods, carbonated drinks ACTIVITY: 
You may  resume your normal daily activities it is recommended that you spend the remainder of the day resting -  avoid any strenuous activity. CALL M.D. ANY SIGN OF: Increasing pain, nausea, vomiting Abdominal distension (swelling) New increased bleeding (oral or rectal) Fever (chills) Pain in chest area Bloody discharge from nose or mouth Shortness of breath Follow-up Instructions: 
 Call Dr. Maggie Trejo for any questions or problems at [phone] ENDOSCOPY FINDINGS: 
 Your colonoscopy showed 4 benign looking polyps i removed. Telephone # [phone] Signed By: Maggie Trejo MD   
 5/3/2017  9:01 AM 
  
 
DISCHARGE SUMMARY from Nurse The following personal items collected during your admission are returned to you:  
Dental Appliance: Dental Appliances: None Vision: Visual Aid: None Hearing Aid:   
Jewelry:   
Clothing:   
Other Valuables:   
Valuables sent to safe:   
 
 
 
Discharge Orders None Introducing [facility]! Jorgekrissy Porras introduces [employer] patient portal. Now you can access parts of your medical record, email your doctor's office, and request medication refills online. 1. In your internet browser, go to [email or web address] 2. Click on the First Time User? Click Here link in the Sign In box. You will see the New Member Sign Up page. 3. Enter your [employer] Access Code exactly as it appears below. You will not need to use this code after youve completed the sign-up process. If you do not sign up before the expiration date, you must request a new code. · [employer] Access Code: [email or web address] Expires: 5/22/2017  2:25 PM 
 
4.  Enter the last four digits of your Social Security Number (xxxx) and Date of Birth (mm/dd/yyyy) as indicated and click Submit. You will be taken to the next sign-up page. 5. Create a [employer] ID. This will be your [employer] login ID and cannot be changed, so think of one that is secure and easy to remember. 6. Create a [employer] password. You can change your password at any time. 7. Enter your Password Reset Question and Answer. This can be used at a later time if you forget your password. 8. Enter your e-mail address. You will receive e-mail notification when new information is available in [address] 9. Click Sign Up. You can now view and download portions of your medical record. 10. Click the Download Summary menu link to download a portable copy of your medical information. If you have questions, please visit the Frequently Asked Questions section of the [employer] website. Remember, [employer] is NOT to be used for urgent needs. For medical emergencies, dial 911. Now available from your iPhone and Android! General Information Please provide this summary of care documentation to your next provider. Patient Signature:  ____________________________________________________________ Date:  ____________________________________________________________  
  
Peng Jose Provider Signature:  ____________________________________________________________ Date:  ____________________________________________________________

## 2017-05-03 NOTE — PROCEDURES
[address]  [address] [facility], [address] [place] [address]      Colonoscopy Operative Report    Raghu Reyes  [id number]  1955      Procedure Type:   Colonoscopy with polypectomy (snare cautery), polypectomy (hot biopsy)     Indications:    Personal history of colon polyps (screening only)   Date of last colonoscopy: 5 years ago, Polyps  Yes    Pre-operative Diagnosis: see indication above    Post-operative Diagnosis:  See findings below    :  Favoi Weeks MD      Referring Provider: Da Bernal NP      Sedation:  MAC anesthesia Propofol      Procedure Details:  After informed consent was obtained with all risks and benefits of procedure explained and preoperative exam completed, the patient was taken to the endoscopy suite and placed in the left lateral decubitus position. Upon sequential sedation as per above, a digital rectal exam was performed demonstrating internal hemorrhoids. The Olympus videocolonoscope  was inserted in the rectum and carefully advanced to the cecum, which was identified by the ileocecal valve and appendiceal orifice. The cecum was identified by the ileocecal valve and appendiceal orifice. The quality of preparation was good. The colonoscope was slowly withdrawn with careful evaluation between folds. Retroflexion in the rectum was completed . Findings:   Rectum: normal  Sigmoid: normal  Descending Colon: normal  Transverse Colon: 15 mm polyp removed by hot snaring  Ascending Colon: 9 mm polyp in proximal ascending colon removed by hot biopsy    2 cm pedunculated polyp removed by hot snaring  Cecum: 9 mm polyp removed by hot biopsy        Specimen Removed:  as above    Complications: None. EBL:  None. Impression:    see findings    Recommendations: --Await pathology.       Recommendation for next colonscopy in 3 years      Signed By: Favio Weeks MD     5/3/2017  8:58 AM

## 2017-05-03 NOTE — ANESTHESIA PREPROCEDURE EVALUATION
Anesthetic History               Review of Systems / Medical History  Patient summary reviewed, nursing notes reviewed and pertinent labs reviewed    Pulmonary        Sleep apnea: CPAP           Neuro/Psych              Cardiovascular    Hypertension              Exercise tolerance: >4 METS     GI/Hepatic/Renal           PUD    Comments: screening Endo/Other        Obesity     Other Findings            Physical Exam    Airway  Mallampati: II  TM Distance: > 6 cm  Neck ROM: normal range of motion   Mouth opening: Normal     Cardiovascular    Rhythm: regular  Rate: normal         Dental  No notable dental hx       Pulmonary  Breath sounds clear to auscultation               Abdominal  Abdominal exam normal       Other Findings            Anesthetic Plan    ASA: 3  Anesthesia type: MAC          Induction: Intravenous  Anesthetic plan and risks discussed with: Patient

## 2017-05-03 NOTE — DISCHARGE INSTRUCTIONS
[address] [facility]    COLON DISCHARGE INSTRUCTIONS    Kathy Underwood  [id number]  1955    Discomfort:  Redness at IV site- apply warm compress to area; if redness or soreness persist- contact your physician  There may be a slight amount of blood passed from the rectum  Gaseous discomfort- walking, belching will help relieve any discomfort  You may not operate a vehicle for 12 hours  You may not engage in an occupation involving machinery or appliances for rest of today  You may not drink alcoholic beverages for at least 12 hours  Avoid making any critical decisions for at least 24 hour  DIET:  You may resume your regular diet - however -  remember your colon is empty and a heavy meal will produce gas. Avoid these foods:  vegetables, fried / greasy foods, carbonated drinks     ACTIVITY:  You may  resume your normal daily activities it is recommended that you spend the remainder of the day resting -  avoid any strenuous activity. CALL M.D. ANY SIGN OF:   Increasing pain, nausea, vomiting  Abdominal distension (swelling)  New increased bleeding (oral or rectal)  Fever (chills)  Pain in chest area  Bloody discharge from nose or mouth  Shortness of breath      Follow-up Instructions:   Call Dr. Radha Palomino for any questions or problems at [address] [place] [address] St:   Your colonoscopy showed 4 benign looking polyps i removed.   Telephone # [phone]      Signed By: Radha Palomino MD     5/3/2017  9:01 AM       DISCHARGE SUMMARY from Nurse    The following personal items collected during your admission are returned to you:   Dental Appliance: Dental Appliances: None  Vision: Visual Aid: None  Hearing Aid:    Jewelry:    Clothing:    Other Valuables:    Valuables sent to safe:

## 2017-05-03 NOTE — H&P
[address], [address] [place] [address]      History and Physical       NAME:  Marleni Sandoval   :   1955   MRN:   [id number]             History of Present Illness:  Patient is a 62 y.o. who is seen for h/o colonic polyps. PMH:  Past Medical History:   Diagnosis Date    Adenomatous polyp of colon     Angina pectoris     Bleeding ulcer     BPH (benign prostatic hyperplasia)     Cancer [facility]) 2013    PROSTATE     Hypertension     Kidney infection     pt states he is unaware of having had this    Right inguinal hernia 2016    Sleep apnea     uses CPAP    Vegetarian        PSH:  Past Surgical History:   Procedure Laterality Date    COLONOSCOPY  2012    adenomatous polyp    HX CHOLECYSTECTOMY      HX HERNIA REPAIR Right 16    inguinal w/mesh by Dr. Man Wapiti surgery    [facility]      prostate biopsy - normal/second bx 2017 - \"tip had cancer\" - another bx is planned    HX OTHER SURGICAL  2009    FATTY TUMOR REMOVED FROM BACK    HX TONSILLECTOMY         Allergies:  No Known Allergies    Home Medications:  Prior to Admission Medications   Prescriptions Last Dose Informant Patient Reported? Taking? CYANOCOBALAMIN, VITAMIN B-12, (VITAMIN B-12 PO) 2017 at Unknown time  Yes Yes   Sig: Take 2,000 mcg by mouth daily. Cholecalciferol, Vitamin D3, (VITAMIN D3) 2,000 unit cap capsule 2017 at Unknown time  Yes Yes   Sig: Take 2,000 Units by mouth daily. amLODIPine (NORVASC) 10 mg tablet 2017 at Unknown time  No Yes   Sig: TAKE 1 TABLET BY MOUTH EVERY DAY   aspirin 81 mg chewable tablet 2017 at Unknown time  No Yes   Sig: Take 1 Tab by mouth daily.  Indications: MYOCARDIAL INFARCTION PREVENTION   losartan (COZAAR) 100 mg tablet 2017 at Unknown time  No Yes   Sig: TAKE 1 TABLET EVERY DAY FOR BLOOD PRESSURE   metoprolol succinate (TOPROL-XL) 100 mg tablet 2017 at Unknown time  No Yes Sig: TAKE 1 TABLET EVERY DAY   tamsulosin (FLOMAX) 0.4 mg capsule 5/2/2017 at Unknown time  No Yes   Sig: TAKE 2 CAPSULES EVERY DAY  FOR  PROSTATE   valACYclovir (VALTREX) 500 mg tablet Unknown at Unknown time  No No   Sig: TAKE 1 TABLET BY MOUTH EVERY DAY AS NEEDED FOR BREAKOUT UNDER EYES      Facility-Administered Medications: None       Hospital Medications:  Current Facility-Administered Medications   Medication Dose Route Frequency    0.9% sodium chloride infusion  100 mL/hr IntraVENous CONTINUOUS    sodium chloride (NS) flush 5-10 mL  5-10 mL IntraVENous Q8H    sodium chloride (NS) flush 5-10 mL  5-10 mL IntraVENous PRN    midazolam (VERSED) injection 0.25-10 mg  0.25-10 mg IntraVENous Multiple    fentaNYL citrate (PF) injection 200 mcg  200 mcg IntraVENous Multiple    naloxone (NARCAN) injection 0.4 mg  0.4 mg IntraVENous Multiple    flumazenil (ROMAZICON) 0.1 mg/mL injection 0.2 mg  0.2 mg IntraVENous Multiple    simethicone (MYLICON) 22XF/5.7XZ oral drops 80 mg  1.2 mL Oral Multiple    atropine injection 0.5 mg  0.5 mg IntraVENous ONCE PRN    EPINEPHrine (ADRENALIN) 0.1 mg/mL syringe 1 mg  1 mg Endoscopically ONCE PRN       Social History:  Social History   Substance Use Topics    Smoking status: Never Smoker    Smokeless tobacco: Never Used    Alcohol use 0.0 oz/week     0 Standard drinks or equivalent per week      Comment: OCCASIONALLY       Family History:  Family History   Problem Relation Age of Onset    Hypertension Mother     Hypertension Maternal Grandmother     Hypertension Brother     Alcohol abuse Brother     Cancer Brother      pancreatic    Diabetes Brother     Hypertension Brother     Heart Disease Brother     Heart Attack Father     Anesth Problems Neg Hx              Review of Systems:      Constitutional: negative fever, negative chills, negative weight loss  Eyes:   negative visual changes  ENT:   negative sore throat, tongue or lip swelling  Respiratory: negative cough, negative dyspnea  Cards:  negative for chest pain, palpitations, lower extremity edema  GI:   See HPI  :  negative for frequency, dysuria  Integument:  negative for rash and pruritus  Heme:  negative for easy bruising and gum/nose bleeding  Musculoskel: negative for myalgias,  back pain and muscle weakness  Neuro: negative for headaches, dizziness, vertigo  Psych:  negative for feelings of anxiety, depression       Objective:   Patient Vitals for the past 8 hrs:   BP Temp Pulse Resp SpO2 Height Weight   05/03/17 0822 (!) 145/95 97.7 °F (36.5 °C) (!) 51 18 100 % - -   05/03/17 0752 - - - - - 5' 6\" (1.676 m) 90.7 kg (200 lb)             EXAM:     NEURO-a&o   HEENT-wnl   LUNGS-clear    COR-regular rate and rhythym     ABD-soft , no tenderness, no rebound, good bs     EXT-no edema     Data Review     No results for input(s): WBC, HGB, HCT, PLT, HGBEXT, HCTEXT, PLTEXT in the last 72 hours. No results for input(s): NA, K, CL, CO2, BUN, CREA, GLU, PHOS, CA in the last 72 hours. No results for input(s): SGOT, GPT, AP, TBIL, TP, ALB, GLOB, GGT, AML, LPSE in the last 72 hours. No lab exists for component: AMYP, HLPSE  No results for input(s): INR, PTP, APTT in the last 72 hours. No lab exists for component: INREXT       Assessment:   · H/o colonic polyps     Patient Active Problem List   Diagnosis Code    Vegetarian Z78.9    Allergic rhinitis J30.9    Familial hyperlipidemia E78.4    BPH (benign prostatic hyperplasia) N40.0    Right inguinal hernia K40.90    NESTOR (obstructive sleep apnea) G47.33    Obesity (BMI 30.0-34. 9) E66.9    Illiteracy Z55.0    Essential hypertension I10           Plan:   · Endoscopic procedure with sedation     Signed By: Juan Cruz MD     5/3/2017  8:35 AM

## 2017-05-03 NOTE — ROUTINE PROCESS
Alysha Dasilva  1955  [id number]    Situation:  Verbal report received from: Corrine  Procedure: Procedure(s):  COLONOSCOPY  ENDOSCOPIC POLYPECTOMY    Background:    Preoperative diagnosis: HISTORY OF POLYPS  Postoperative diagnosis: Colon polyps    :  Dr. Tom Arora  Assistant(s): Endoscopy Technician-1: Jose Zarco  Endoscopy RN-1: Tracy Gonzales RN    Specimens:   ID Type Source Tests Collected by Time Destination   1 : Polyp - cecum Preservative   Saida Bender MD 5/3/2017 0850 Pathology   2 : Polyp - ascending colon Preservative   Saida Bender MD 5/3/2017 0850 Pathology   3 : Polyp - proximal ascending colon Preservative   Saida Bender MD 5/3/2017 0850 Pathology   4 : Polyp - transverse colon Preservative   Saida Bendre MD 5/3/2017 7697 Pathology     H. Pylori  no    Assessment:  Intra-procedure medications     Anesthesia gave intra-procedure sedation and medications, see anesthesia flow sheet yes    Intravenous fluids: NS@ KVO     Vital signs stable     Abdominal assessment: round and soft     Recommendation:  Discharge patient per MD order.   Return to floor  Family or Friend  Permission to share finding with family or friend yes

## 2017-06-12 ENCOUNTER — TELEPHONE (OUTPATIENT)
Dept: SLEEP MEDICINE | Age: 62
End: 2017-06-12

## 2017-06-12 NOTE — TELEPHONE ENCOUNTER
Adrianna Kramer at [facility] is requesting a copy of patients f/up notes after being set up on CPAP in November (any office notes after 12/22/16) be faxed to [phone]. She left a voice mail requesting these. If we do not have them, she requests a call back at [phone] to let her know we do not have them.

## 2017-06-13 RX ORDER — VALACYCLOVIR HYDROCHLORIDE 500 MG/1
TABLET, FILM COATED ORAL
Qty: 30 TAB | Refills: 1 | Status: SHIPPED | OUTPATIENT
Start: 2017-06-13 | End: 2018-03-01 | Stop reason: SDUPTHER

## 2017-06-22 ENCOUNTER — OFFICE VISIT (OUTPATIENT)
Dept: SLEEP MEDICINE | Age: 62
End: 2017-06-22

## 2017-06-22 VITALS
SYSTOLIC BLOOD PRESSURE: 142 MMHG | BODY MASS INDEX: 32.3 KG/M2 | WEIGHT: 201 LBS | TEMPERATURE: 97.8 F | OXYGEN SATURATION: 96 % | DIASTOLIC BLOOD PRESSURE: 88 MMHG | HEART RATE: 62 BPM | HEIGHT: 66 IN

## 2017-06-22 DIAGNOSIS — E66.9 OBESITY (BMI 30.0-34.9): ICD-10-CM

## 2017-06-22 DIAGNOSIS — Z87.09 H/O ALLERGIC RHINITIS: ICD-10-CM

## 2017-06-22 DIAGNOSIS — G47.33 OSA (OBSTRUCTIVE SLEEP APNEA): Primary | ICD-10-CM

## 2017-06-22 DIAGNOSIS — I10 ESSENTIAL HYPERTENSION: ICD-10-CM

## 2017-06-22 NOTE — PATIENT INSTRUCTIONS
[address] [facility] [place]. [place], [address]  Tel.  [phone]  Fax. 100 [facility] [address]  [place], [address] [facility]  Tel.  [phone]  Fax. [phone] [address]  Tel.  [phone]  Fax. [phone]     Learning About CPAP for Sleep Apnea  What is CPAP? CPAP is a small machine that you use at home every night while you sleep. It increases air pressure in your throat to keep your airway open. When you have sleep apnea, this can help you sleep better so you feel much better. CPAP stands for \"continuous positive airway pressure. \"  The CPAP machine will have one of the following:  · A mask that covers your nose and mouth  · Prongs that fit into your nose  · A mask that covers your nose only, the most common type. This type is called NCPAP. The N stands for \"nasal.\"  Why is it done? CPAP is usually the best treatment for obstructive sleep apnea. It is the first treatment choice and the most widely used. Your doctor may suggest CPAP if you have:  · Moderate to severe sleep apnea. · Sleep apnea and coronary artery disease (CAD) or heart failure. How does it help? · CPAP can help you have more normal sleep, so you feel less sleepy and more alert during the daytime. · CPAP may help keep heart failure or other heart problems from getting worse. · NCPAP may help lower your blood pressure. · If you use CPAP, your bed partner may also sleep better because you are not snoring or restless. What are the side effects? Some people who use CPAP have:  · A dry or stuffy nose and a sore throat. · Irritated skin on the face. · Sore eyes. · Bloating. If you have any of these problems, work with your doctor to fix them. Here are some things you can try:  · Be sure the mask or nasal prongs fit well. · See if your doctor can adjust the pressure of your CPAP. · If your nose is dry, try a humidifier.   · If your nose is runny or stuffy, try decongestant medicine or a steroid nasal spray. If these things do not help, you might try a different type of machine. Some machines have air pressure that adjusts on its own. Others have air pressures that are different when you breathe in than when you breathe out. This may reduce discomfort caused by too much pressure in your nose. Where can you learn more? Go to [employer]  Enter Santi Pratergiovanna in the search box to learn more about \"Learning About CPAP for Sleep Apnea. \"   © 4714-5383 Healthwise, [employer]. Care instructions adapted under license by [employer] (which disclaims liability or warranty for this information). This care instruction is for use with your licensed healthcare professional. If you have questions about a medical condition or this instruction, always ask your healthcare professional. [address] any warranty or liability for your use of this information. Content Version: 6.0.66098; Last Revised: January 11, 2010  PROPER SLEEP HYGIENE    What to avoid  · Do not have drinks with caffeine, such as coffee or black tea, for 8 hours before bed. · Do not smoke or use other types of tobacco near bedtime. Nicotine is a stimulant and can keep you awake. · Avoid drinking alcohol late in the evening, because it can cause you to wake in the middle of the night. · Do not eat a big meal close to bedtime. If you are hungry, eat a light snack. · Do not drink a lot of water close to bedtime, because the need to urinate may wake you up during the night. · Do not read or watch TV in bed. Use the bed only for sleeping and sexual activity. What to try  · Go to bed at the same time every night, and wake up at the same time every morning. Do not take naps during the day. · Keep your bedroom quiet, dark, and cool. · Get regular exercise, but not within 3 to 4 hours of your bedtime. .  · Sleep on a comfortable pillow and mattress.   · If watching the clock makes you anxious, turn it facing away from you so you cannot see the time. · If you worry when you lie down, start a worry book. Well before bedtime, write down your worries, and then set the book and your concerns aside. · Try meditation or other relaxation techniques before you go to bed. · If you cannot fall asleep, get up and go to another room until you feel sleepy. Do something relaxing. Repeat your bedtime routine before you go to bed again. · Make your house quiet and calm about an hour before bedtime. Turn down the lights, turn off the TV, log off the computer, and turn down the volume on music. This can help you relax after a busy day. Drowsy Driving: The [employer] cites drowsiness as a causing factor in more than 764,464 police reported crashes annually, resulting in 76,000 injuries and 1,500 deaths. Other surveys suggest 55% of people polled have driven while drowsy in the past year, 23% had fallen asleep but not crashed, 3% crashed, and 2% had and accident due to drowsy driving. Who is at risk? Young Drivers: One study of drowsy driving accidents states that 55% of the drivers were under 25 years. Of those, 75% were male. Shift Workers and Travelers: People who work overnight or travel across time zones frequently are at higher risk of experiencing Circadian Rhythm Disorders. They are trying to work and function when their body is programed to sleep. Sleep Deprived: Lack of sleep has a serious impact on your ability to pay attention or focus on a task. Consistently getting less than the average of 8 hours your body needs creates partial or cumulative sleep deprivation. Untreated Sleep Disorders: Sleep Apnea, Narcolepsy, R.L.S., and other sleep disorders (untreated) prevent a person from getting enough restful sleep. This leads to excessive daytime sleepiness and increases the risk for drowsy driving accidents by up to 7 times.   Medications / Alcohol: Even over the counter medications can cause drowsiness. Medications that impair a drivers attention should have a warning label. Alcohol naturally makes you sleepy and on its own can cause accidents. Combined with excessive drowsiness its effects are amplified. Signs of Drowsy Driving:   * You don't remember driving the last few miles   * You may drift out of your koko   * You are unable to focus and your thoughts wander   * You may yawn more often than normal   * You have difficulty keeping your eyes open / nodding off   * Missing traffic signs, speeding, or tailgating  Prevention-   Good sleep hygiene, lifestyle and behavioral choices have the most impact on drowsy driving. There is no substitute for sleep and the average person requires 8 hours nightly. If you find yourself driving drowsy, stop and sleep. Consider the sleep hygiene tips provided during your visit as well. Medication Refill Policy: Refills for all medications require 1 week advance notice. Please have your pharmacy fax a refill request. We are unable to fax, or call in \"controled substance\" medications and you will need to pick these prescriptions up from our office. [employer] Activation    Thank you for requesting access to [employer]. Please follow the instructions below to securely access and download your online medical record. [employer] allows you to send messages to your doctor, view your test results, renew your prescriptions, schedule appointments, and more. How Do I Sign Up? 1. In your internet browser, go to [email or web address]. 2. Click on the First Time User? Click Here link in the Sign In box. You will see the New Member Sign Up page. 3. Enter your [employer] Access Code exactly as it appears below. You will not need to use this code after youve completed the sign-up process. If you do not sign up before the expiration date, you must request a new code.     [employer] Access Code: [email or web address]  Expires: 2017 10:29 AM (This is the date your MD Insider access code will )    4. Enter the last four digits of your Social Security Number (xxxx) and Date of Birth (mm/dd/yyyy) as indicated and click Submit. You will be taken to the next sign-up page. 5. Create a MD Insider ID. This will be your MD Insider login ID and cannot be changed, so think of one that is secure and easy to remember. 6. Create a MD Insider password. You can change your password at any time. 7. Enter your Password Reset Question and Answer. This can be used at a later time if you forget your password. 8. Enter your e-mail address. You will receive e-mail notification when new information is available in [address] 9. Click Sign Up. You can now view and download portions of your medical record. 10. Click the Download Summary menu link to download a portable copy of your medical information. Additional Information    If you have questions, please call [phone]. Remember, MD Insider is NOT to be used for urgent needs. For medical emergencies, dial 911.

## 2017-06-22 NOTE — PROGRESS NOTES
217 [facility]., [facility]. [place], [address]  Tel.  [phone]  Fax. 100 [facility] 60  [place], [address] [facility]  Tel.  [phone]  Fax. [phone] [address] [address] [address]   Tel.  [phone]  Fax. [phone]     S>Colby Courtney is a 65 y.o. male seen for a positive airway pressure follow-up. He reports major problems using the device. He is 33% compliant over the past 30 days. The following problems are identified:    Drowsiness yes Problems exhaling no   Snoring yes Forget to put on no   Mask Comfortable yes Can't fall asleep no   Dry Mouth yes Mask falls off no   Air Leaking yes Frequent awakenings no       He admits that his sleep has improved on CPAP but he reports of snoring on current APAP therapy and feels that may benefit from a higher pressure. He has not used his CPAP device since 06-03-17 due to device malfunction. No Known Allergies    He has a current medication list which includes the following prescription(s): valacyclovir, cholecalciferol (vitamin d3), cyanocobalamin (vitamin b-12), amlodipine, losartan, metoprolol succinate, tamsulosin, and aspirin. Jefflucinda Vencesel He  has a past medical history of Adenomatous polyp of colon (2012); Angina pectoris; Bleeding ulcer; BPH (benign prostatic hyperplasia) (2011); Cancer [facility]) (2013); Colon polyps (05/03/2017); Hypertension; Kidney infection; Right inguinal hernia (7/11/2016); Sleep apnea; and Vegetarian. He also has no past medical history of Adverse effect of anesthesia. [place] Sleepiness Score: 14   and Modified F.O.S.Q. Score Total / 2: 20   which reflect improved sleep quality over therapy time.     O>    Visit Vitals    /88    Pulse 62    Temp 97.8 °F (36.6 °C)    Ht 5' 6\" (1.676 m)    Wt 201 lb (91.2 kg)    SpO2 96%    BMI 32.44 kg/m2         General:   Not in acute distress   Eyes:  Anicteric sclerae, no obvious strabismus   Nose:  No obvious nasal septum deviation Oropharynx:   Class 4 oropharyngeal outlet, thick tongue base, uvula not seen due to low-lying soft palate, narrow tonsilo-pharyngeal pilars   Tonsils:   tonsils are not visualized due to low-lying soft palate   Neck:   midline trachea   Chest/Lungs:  Equal lung expansion, clear on auscultation    CVS:  Normal rate, regular rhythm; no JVD   Skin:  Warm to touch; no obvious rashes   Neuro:  No focal deficits ; no obvious tremor    Psych:  Normal affect,  normal countenance;           A>    ICD-10-CM ICD-9-CM    1. NESTOR (obstructive sleep apnea) G47.33 327.23 SLEEP LAB (PAP TITRATION)      AMB SUPPLY ORDER      AMB SUPPLY ORDER   2. Obesity (BMI 30.0-34. 9) E66.9 278.00    3. Essential hypertension I10 401.9    4. H/O allergic rhinitis Z87.09 V12.69      AHI = 5.6. On CPAP :  4-20 cmH2O. Compliant:      no    Therapeutic Response:  Negative    P>  * PAP titration due to break through snoring and sensation of insufficient pressure on APAP therapy. * We have recommended a dedicated weight loss through appropriate diet and an exercise regiment as significant weight reduction has been shown to reduce severity of obstructive sleep apnea. * Follow-up Disposition:  Return if symptoms worsen or fail to improve. * He was asked to contact our office for any problems regarding PAP therapy. * Counseling was provided regarding the importance of regular PAP use and on proper sleep hygiene and safe driving. * Re-enforced proper and regular cleaning for the device. Thank you for allowing us to participate in your patient's medical care. Lotus Trinidad MD, FAASM  Electronically signed.  June 22, 2017

## 2017-06-28 ENCOUNTER — DOCUMENTATION ONLY (OUTPATIENT)
Dept: SLEEP MEDICINE | Age: 62
End: 2017-06-28

## 2017-08-22 RX ORDER — METOPROLOL SUCCINATE 100 MG/1
TABLET, EXTENDED RELEASE ORAL
Qty: 90 TAB | Refills: 0 | Status: SHIPPED | OUTPATIENT
Start: 2017-08-22 | End: 2017-08-24 | Stop reason: SDUPTHER

## 2017-08-22 RX ORDER — LOSARTAN POTASSIUM 100 MG/1
TABLET ORAL
Qty: 90 TAB | Refills: 0 | Status: SHIPPED | OUTPATIENT
Start: 2017-08-22 | End: 2017-08-24 | Stop reason: SDUPTHER

## 2017-08-22 RX ORDER — AMLODIPINE BESYLATE 10 MG/1
TABLET ORAL
Qty: 90 TAB | Refills: 0 | Status: SHIPPED | OUTPATIENT
Start: 2017-08-22 | End: 2017-08-24 | Stop reason: SDUPTHER

## 2017-08-25 RX ORDER — AMLODIPINE BESYLATE 10 MG/1
TABLET ORAL
Qty: 90 TAB | Refills: 1 | Status: SHIPPED | OUTPATIENT
Start: 2017-08-25 | End: 2018-03-30 | Stop reason: SDUPTHER

## 2017-08-25 RX ORDER — METOPROLOL SUCCINATE 100 MG/1
TABLET, EXTENDED RELEASE ORAL
Qty: 90 TAB | Refills: 1 | Status: SHIPPED | OUTPATIENT
Start: 2017-08-25 | End: 2018-03-30 | Stop reason: SDUPTHER

## 2017-08-25 RX ORDER — LOSARTAN POTASSIUM 100 MG/1
TABLET ORAL
Qty: 90 TAB | Refills: 1 | Status: SHIPPED | OUTPATIENT
Start: 2017-08-25 | End: 2018-03-30 | Stop reason: SDUPTHER

## 2017-08-25 RX ORDER — FLUTICASONE PROPIONATE 50 MCG
SPRAY, SUSPENSION (ML) NASAL
Qty: 48 G | Refills: 1 | Status: SHIPPED | OUTPATIENT
Start: 2017-08-25 | End: 2017-09-07

## 2017-08-25 RX ORDER — TAMSULOSIN HYDROCHLORIDE 0.4 MG/1
CAPSULE ORAL
Qty: 180 CAP | Refills: 1 | Status: SHIPPED | OUTPATIENT
Start: 2017-08-25 | End: 2018-03-30 | Stop reason: SDUPTHER

## 2017-09-07 ENCOUNTER — OFFICE VISIT (OUTPATIENT)
Dept: FAMILY MEDICINE CLINIC | Age: 62
End: 2017-09-07

## 2017-09-07 VITALS
TEMPERATURE: 98.2 F | WEIGHT: 199.6 LBS | BODY MASS INDEX: 37.69 KG/M2 | HEIGHT: 61 IN | RESPIRATION RATE: 18 BRPM | HEART RATE: 53 BPM | SYSTOLIC BLOOD PRESSURE: 122 MMHG | OXYGEN SATURATION: 98 % | DIASTOLIC BLOOD PRESSURE: 78 MMHG

## 2017-09-07 DIAGNOSIS — I10 ESSENTIAL HYPERTENSION: Primary | ICD-10-CM

## 2017-09-07 DIAGNOSIS — L98.9 SKIN LESIONS: ICD-10-CM

## 2017-09-07 DIAGNOSIS — Z91.89 AT RISK FOR FERTILITY PROBLEMS: ICD-10-CM

## 2017-09-07 DIAGNOSIS — R09.81 NASAL CONGESTION: ICD-10-CM

## 2017-09-07 DIAGNOSIS — Z65.8 INTERPERSONAL PROBLEM: ICD-10-CM

## 2017-09-07 RX ORDER — AZELASTINE 1 MG/ML
1 SPRAY, METERED NASAL 2 TIMES DAILY
Qty: 1 BOTTLE | Refills: 0 | Status: SHIPPED | OUTPATIENT
Start: 2017-09-07 | End: 2019-08-02 | Stop reason: SDUPTHER

## 2017-09-07 NOTE — MR AVS SNAPSHOT
Visit Information Date & Time Provider Department Dept. Phone Encounter #  
 9/7/2017  8:15 AM Florentin Simon  [facility] [phone] [id number] Follow-up Instructions Return if symptoms worsen or fail to improve. Upcoming Health Maintenance Date Due ZOSTER VACCINE AGE 60> 3/4/2016 INFLUENZA AGE 9 TO ADULT 8/1/2017 DTaP/Tdap/Td series (2 - Td) 3/19/2022 COLONOSCOPY 5/3/2022 Allergies as of 9/7/2017  Review Complete On: 9/7/2017 By: Zoran Mclean No Known Allergies Current Immunizations  Reviewed on 9/26/2013 Name Date Influenza Vaccine 9/26/2013 Influenza Vaccine Redia Rodgers) 10/21/2015 Influenza Vaccine (Quad) PF 2/21/2017 Influenza Vaccine Split 11/14/2012, 10/24/2011 TDAP Vaccine 3/19/2012 Not reviewed this visit You Were Diagnosed With   
  
 Codes Comments Nasal congestion    -  Primary ICD-10-CM: R09.81 ICD-9-CM: 478.19 Vitals BP Pulse Temp Resp Height(growth percentile) Weight(growth percentile) 122/78 (BP 1 Location: Left arm, BP Patient Position: Sitting) (!) 53 98.2 °F (36.8 °C) (Oral) 18 5' 1\" (1.549 m) 199 lb 9.6 oz (90.5 kg) SpO2 BMI Smoking Status 98% 37.71 kg/m2 Never Smoker Vitals History BMI and BSA Data Body Mass Index Body Surface Area  
 37.71 kg/m 2 1.97 m 2 Preferred Pharmacy Pharmacy Name Phone [facility] [address] [phone] Your Updated Medication List  
  
   
This list is accurate as of: 9/7/17  8:41 AM.  Always use your most recent med list. amLODIPine 10 mg tablet Commonly known as:  aitainment Bars TAKE 1 TABLET EVERY DAY  
  
 aspirin 81 mg chewable tablet Take 1 Tab by mouth daily. Indications: MYOCARDIAL INFARCTION PREVENTION  
  
 azelastine 137 mcg (0.1 %) nasal spray Commonly known as:  ASTELIN  
 1 [place] by Both Nostrils route two (2) times a day. Use in each nostril as directed Cholecalciferol (Vitamin D3) 2,000 unit Cap capsule Commonly known as:  VITAMIN D3 Take 2,000 Units by mouth daily. fluticasone 50 mcg/actuation nasal spray Commonly known as:  FLONASE  
USE 2 SPRAYS IN EACH NOSTRIL EVERY DAY  
  
 losartan 100 mg tablet Commonly known as:  COZAAR  
TAKE 1 TABLET EVERY DAY FOR BLOOD PRESSURE  
  
 metoprolol succinate 100 mg tablet Commonly known as:  TOPROL-XL  
TAKE 1 TABLET EVERY DAY  
  
 tamsulosin 0.4 mg capsule Commonly known as:  FLOMAX TAKE 2 CAPSULES EVERY DAY  FOR  PROSTATE  
  
 valACYclovir 500 mg tablet Commonly known as:  VALTREX  
TAKE 1 TABLET EVERY DAY AS NEEDED FOR BREAKOUT UNDER EYES  
  
 VITAMIN B-12 PO Take 2,000 mcg by mouth daily. Prescriptions Sent to Pharmacy Refills  
 azelastine (ASTELIN) 137 mcg (0.1 %) nasal spray 0 [place] by Both Nostrils route two (2) times a day. Use in each nostril as directed Class: Normal  
 Pharmacy: "[facility] [address] #: [phone] Route: Both Nostrils Follow-up Instructions Return if symptoms worsen or fail to improve. Patient Instructions DASH Diet: Care Instructions Your Care Instructions The DASH diet is an eating plan that can help lower your blood pressure. DASH stands for Dietary Approaches to Stop Hypertension. Hypertension is high blood pressure. The DASH diet focuses on eating foods that are high in calcium, potassium, and magnesium. These nutrients can lower blood pressure. The foods that are highest in these nutrients are fruits, vegetables, low-fat dairy products, nuts, seeds, and legumes.  But taking calcium, potassium, and magnesium supplements instead of eating foods that are high in those nutrients does not have the same effect. The DASH diet also includes whole grains, fish, and poultry. The DASH diet is one of several lifestyle changes your doctor may recommend to lower your high blood pressure. Your doctor may also want you to decrease the amount of sodium in your diet. Lowering sodium while following the DASH diet can lower blood pressure even further than just the DASH diet alone. Follow-up care is a key part of your treatment and safety. Be sure to make and go to all appointments, and call your doctor if you are having problems. It's also a good idea to know your test results and keep a list of the medicines you take. How can you care for yourself at home? Following the DASH diet · Eat 4 to 5 servings of fruit each day. A serving is 1 medium-sized piece of fruit, ½ cup chopped or canned fruit, 1/4 cup dried fruit, or 4 ounces (½ cup) of fruit juice. Choose fruit more often than fruit juice. · Eat 4 to 5 servings of vegetables each day. A serving is 1 cup of lettuce or raw leafy vegetables, ½ cup of chopped or cooked vegetables, or 4 ounces (½ cup) of vegetable juice. Choose vegetables more often than vegetable juice. · Get 2 to 3 servings of low-fat and fat-free dairy each day. A serving is 8 ounces of milk, 1 cup of yogurt, or 1 ½ ounces of cheese. · Eat 6 to 8 servings of grains each day. A serving is 1 slice of bread, 1 ounce of dry cereal, or ½ cup of cooked rice, pasta, or cooked cereal. Try to choose whole-grain products as much as possible. · Limit lean meat, poultry, and fish to 2 servings each day. A serving is 3 ounces, about the size of a deck of cards. · Eat 4 to 5 servings of nuts, seeds, and legumes (cooked dried beans, lentils, and split peas) each week. A serving is 1/3 cup of nuts, 2 tablespoons of seeds, or ½ cup of cooked beans or peas. · Limit fats and oils to 2 to 3 servings each day. A serving is 1 teaspoon of vegetable oil or 2 tablespoons of salad dressing. · Limit sweets and added sugars to 5 servings or less a week. A serving is 1 tablespoon jelly or jam, ½ cup sorbet, or 1 cup of lemonade. · Eat less than 2,300 milligrams (mg) of sodium a day. If you limit your sodium to 1,500 mg a day, you can lower your blood pressure even more. Tips for success · Start small. Do not try to make dramatic changes to your diet all at once. You might feel that you are missing out on your favorite foods and then be more likely to not follow the plan. Make small changes, and stick with them. Once those changes become habit, add a few more changes. · Try some of the following: ¨ Make it a goal to eat a fruit or vegetable at every meal and at snacks. This will make it easy to get the recommended amount of fruits and vegetables each day. ¨ Try yogurt topped with fruit and nuts for a snack or healthy dessert. ¨ Add lettuce, tomato, cucumber, and onion to sandwiches. ¨ Combine a ready-made pizza crust with low-fat mozzarella cheese and lots of vegetable toppings. Try using tomatoes, squash, spinach, broccoli, carrots, cauliflower, and onions. ¨ Have a variety of cut-up vegetables with a low-fat dip as an appetizer instead of chips and dip. ¨ Sprinkle sunflower seeds or chopped almonds over salads. Or try adding chopped walnuts or almonds to cooked vegetables. ¨ Try some vegetarian meals using beans and peas. Add garbanzo or kidney beans to salads. Make burritos and tacos with mashed corona beans or black beans. Where can you learn more? Go to http://ambrose-vince.info/. Enter O763 in the search box to learn more about \"DASH Diet: Care Instructions. \" Current as of: April 3, 2017 Content Version: 11.3 © 8987-7164 [employer]. Care instructions adapted under license by [employer] (which disclaims liability or warranty for this information).  If you have questions about a medical condition or this instruction, always ask your healthcare professional. [address]  any warranty or liability for your use of this information. Introducing [facility]! [facility] introduces [employer] patient portal. Now you can access parts of your medical record, email your doctor's office, and request medication refills online. 1. In your internet browser, go to [email or web address] 2. Click on the First Time User? Click Here link in the Sign In box. You will see the New Member Sign Up page. 3. Enter your [employer] Access Code exactly as it appears below. You will not need to use this code after youve completed the sign-up process. If you do not sign up before the expiration date, you must request a new code. · [employer] Access Code: [email or web address] Expires: 9/20/2017 10:29 AM 
 
4. Enter the last four digits of your Social Security Number (xxxx) and Date of Birth (mm/dd/yyyy) as indicated and click Submit. You will be taken to the next sign-up page. 5. Create a [employer] ID. This will be your [employer] login ID and cannot be changed, so think of one that is secure and easy to remember. 6. Create a [employer] password. You can change your password at any time. 7. Enter your Password Reset Question and Answer. This can be used at a later time if you forget your password. 8. Enter your e-mail address. You will receive e-mail notification when new information is available in [address]. 9. Click Sign Up. You can now view and download portions of your medical record. 10. Click the Download Summary menu link to download a portable copy of your medical information. If you have questions, please visit the Frequently Asked Questions section of the [employer] website. Remember, [employer] is NOT to be used for urgent needs. For medical emergencies, dial 911. Now available from your iPhone and Android! Please provide this summary of care documentation to your next provider. Your primary care clinician is listed as Shannon Darling. If you have any questions after today's visit, please call [phone].

## 2017-09-07 NOTE — PATIENT INSTRUCTIONS

## 2017-09-07 NOTE — PROGRESS NOTES
Chief Complaint   Patient presents with    Complete Physical     fasting    Rash     white spots spread all over body        1. Have you been to the ER, urgent care clinic since your last visit? Hospitalized since your last visit? No    2. Have you seen or consulted any other health care providers outside of the [address] [place] [address] since your last visit? Include any pap smears or colon screening.  No

## 2017-09-07 NOTE — Clinical Note
Emilee Henriquez is a patient who is having some relationship problems but main concern is his lack of housing. He has SSD but I'm not sure what resources he can explore that maybe helpful. He is not being abused. Would this be more appropriate to relay to social work (I'm not sure who our SW is)? Thank you!

## 2017-09-07 NOTE — PROGRESS NOTES
Patient Name: Abdi Torres   MRN: [id number]    Zaida Becerril is a 65 y.o. male who presents with the following: The patient has hypertension. He reports taking medications as instructed, no medication side effects noted, patient does not perform home BP monitoring, no TIA's, no chest pain on exertion, no dyspnea on exertion, no swelling of ankles. Diet and Lifestyle: generally follows a low fat low cholesterol diet, generally follows a low sodium diet, exercises sporadically. Lab review: no lab studies available for review at time of visit. Reports 3 year history of persistently runny nose and occasional nosebleeds. Has been taking Flonase every day and applying Vaseline at night which has not improved symptoms. Does report some nasal congestion which seems to be worse when indoors. Has concerned about his fertility. States that he had a procedure done when he was in a mental institute as a child in the 1960s but is unsure what exactly was done to him. Believes that they may have sterilized him. A former female partner of his claims that her child is his but he is unsure if that is possible. Would like to discuss his fertility potential with a specialist.    Reports several year history of lighter colored spots on his lower extremities. Denies itching, pain, prior scarring. Believes that it is spreading. Currently living at his sister's home. Is  to a woman from [facility] who has her own children. She reports that she spreads lies about and therefore he moved out. Denies any history of verbal or physical abuse but believes that she is spreading rumors about him. Is wondering if he could talk to someone regarding resources. Review of Systems   Constitutional: Negative for fever, malaise/fatigue and weight loss. HENT: Positive for congestion and nosebleeds. Negative for ear pain, hearing loss and tinnitus.     Respiratory: Negative for cough, hemoptysis, shortness of breath and wheezing. Cardiovascular: Negative for chest pain, palpitations, leg swelling and PND. Gastrointestinal: Negative for abdominal pain, constipation, diarrhea, nausea and vomiting. Skin: Positive for rash. The patient's medications, allergies, past medical history, surgical history, family history and social history were reviewed and updated where appropriate. Prior to Admission medications    Medication Sig Start Date End Date Taking? Authorizing Provider   fluticasone (FLONASE) 50 mcg/actuation nasal spray USE 2 SPRAYS IN EACH NOSTRIL EVERY DAY 8/25/17  Yes Geneva Valente NP   tamsulosin (FLOMAX) 0.4 mg capsule TAKE 2 CAPSULES EVERY DAY  FOR  PROSTATE 8/25/17  Yes Geneva Valente NP   amLODIPine (NORVASC) 10 mg tablet TAKE 1 TABLET EVERY DAY 8/25/17  Yes Geneva Valente NP   losartan (COZAAR) 100 mg tablet TAKE 1 TABLET EVERY DAY FOR BLOOD PRESSURE 8/25/17  Yes Geneva Valente NP   metoprolol succinate (TOPROL-XL) 100 mg tablet TAKE 1 TABLET EVERY DAY 8/25/17  Yes Geneva Valente NP   valACYclovir (VALTREX) 500 mg tablet TAKE 1 TABLET EVERY DAY AS NEEDED FOR BREAKOUT UNDER EYES 6/13/17  Yes Geneva Valente NP   Cholecalciferol, Vitamin D3, (VITAMIN D3) 2,000 unit cap capsule Take 2,000 Units by mouth daily. Yes Historical Provider   CYANOCOBALAMIN, VITAMIN B-12, (VITAMIN B-12 PO) Take 2,000 mcg by mouth daily. Yes Historical Provider   aspirin 81 mg chewable tablet Take 1 Tab by mouth daily. Indications: MYOCARDIAL INFARCTION PREVENTION 5/15/13  Yes Kaylin Hercules MD       No Known Allergies          OBJECTIVE      Visit Vitals    /78 (BP 1 Location: Left arm, BP Patient Position: Sitting)    Pulse (!) 53    Temp 98.2 °F (36.8 °C) (Oral)    Resp 18    Ht 5' 1\" (1.549 m)    Wt 199 lb 9.6 oz (90.5 kg)    SpO2 98%    BMI 37.71 kg/m2       Physical Exam   Constitutional: He is well-developed, well-nourished, and in no distress. No distress. HENT:   Head: Normocephalic and atraumatic. Right Ear: External ear normal.   Left Ear: External ear normal.   Nose: Nose normal. No mucosal edema, rhinorrhea, sinus tenderness, septal deviation or nasal septal hematoma. No epistaxis. Eyes: Conjunctivae and EOM are normal. Pupils are equal, round, and reactive to light. Cardiovascular: Normal rate, regular rhythm and normal heart sounds. Exam reveals no gallop and no friction rub. No murmur heard. Pulmonary/Chest: Effort normal and breath sounds normal. No respiratory distress. He has no wheezes. Skin: He is not diaphoretic. Scattered hypopigmented macules along bilateral lower extremities. No evidence of erythema, excoriation, hyperkeratosis. Psychiatric: Mood, memory, affect and judgment normal.   Nursing note and vitals reviewed. ASSESSMENT AND PLAN  Jina Kelley is a 65 y.o. male who presents today for:    1. Essential hypertension  Stable, continue current treatment. 2. Nasal congestion  Will switch from Flonase to Astelin  - azelastine (ASTELIN) 137 mcg (0.1 %) nasal spray; 1 [place] by Both Nostrils route two (2) times a day. Use in each nostril as directed  Dispense: 1 Bottle; Refill: 0    3. At risk for fertility problems  - REFERRAL TO UROLOGY    4. Skin lesions  Differential includes tinea versicolor and/or vitiligo. Recommend dermatology referral given persistence of symptoms.  - REFERRAL TO DERMATOLOGY    5. Interpersonal problem  Patiently currently is not being abused. Main concern of patient is lack of permanent housing. Will reach out to [facility] to see if there are any resources pt can explore. Medications Discontinued During This Encounter   Medication Reason    fluticasone (FLONASE) 50 mcg/actuation nasal spray Not A Current Medication       Follow-up Disposition:  Return if symptoms worsen or fail to improve.     Time: 25 minutes was spent with this patient face to face discussing test results, follow up visits, and when repeat testing. I discussed diagnoses, risk factors and treatment for each based on current recommendations and literature. Greater than 50% of total visit time was spent in counseling and coordination of care. Medication risks/benefits/costs/interactions/alternatives discussed with patient. Advised patient to call back or return to office if symptoms worsen/change/persist. If patient cannot reach us or should anything more severe/urgent arise he/she should proceed directly to the nearest emergency department. Discussed expected course/resolution/complications of diagnosis in detail with patient. Patient given a written after visit summary which includes his/her diagnoses, current medications and vitals. Patient expressed understanding with the diagnosis and plan.      Vero Kan M.D.

## 2017-09-11 ENCOUNTER — TELEPHONE (OUTPATIENT)
Dept: FAMILY MEDICINE CLINIC | Age: 62
End: 2017-09-11

## 2017-09-14 ENCOUNTER — TELEPHONE (OUTPATIENT)
Dept: FAMILY MEDICINE CLINIC | Age: 62
End: 2017-09-14

## 2017-09-14 NOTE — TELEPHONE ENCOUNTER
11:49 am, Outbound call to patient, identifiers ( & address-sisters) verified per HIPAA policy. Identified self, role and nature of the call. Provided pt w/the information for [facility] @ [phone]. City bus stop located right outside of the property; [facility] also has a courtesy Peri maryse that shuttles individuals to run errands (i.e. [employer]). Currently has a 2 bedroom apartment available for rent @ $805/month. Annual income can't exceed $27,100 (max) and $18K (minimum). Patient receives $1846/mo and meets the income requirement. Encouraged pt to call and speak w/staff @ [facility]. Provided telephone # to [facility] @ [phone]. Adra Leyden, MSW         10:41 am, Outbound call to [facility]. Identified self/role and nature of the call.  provided the following information re: their sister [facility] complex. -[facility], off of [address] directly across from [facility] @ [phone]. City bus stop located right outside of the property; [facility] also has a courtesy Peri maryse that shuttles individuals to run errands (i.e. [employer]). Currently has a 2 bedroom apartment available for rent @ $805/month. Annual income can't exceed $27,100 (max) and $18K (minimum).       Adra Leyden, MSW

## 2017-09-28 ENCOUNTER — TELEPHONE (OUTPATIENT)
Dept: FAMILY MEDICINE CLINIC | Age: 62
End: 2017-09-28

## 2017-09-28 NOTE — TELEPHONE ENCOUNTER
----- Message from Mercedes Olivo sent at 9/28/2017  3:16 PM EDT -----  Regarding: Heather Brizuela/[facility] requesting a referral for pt. An appt is scheduled for 10/4/17 Referral can be fax to [phone]. Best contact number [phone].

## 2017-09-28 NOTE — TELEPHONE ENCOUNTER
Referral Request Telephone Call      Insurance Name:     Tuan Tyler (Medicare Replacement/Advantage - HMO) (#[id number])      Insurance ID:  [id number]   Specialist Name: Dr. Sirisha Phillips of Specialty:  dermatology    Address of Specialist:  [address] [place] [address], [address], [facility] [address]   Phone/Fax Number of Specialist: [phone]  [phone]   Diagnosis: Skin lesions   Appointment Date: 10/4/2017   NPI    Tax ID                  *eduin states that patient will be calling insurance company to change PCP

## 2017-09-29 ENCOUNTER — TELEPHONE (OUTPATIENT)
Dept: FAMILY MEDICINE CLINIC | Age: 62
End: 2017-09-29

## 2017-09-29 NOTE — TELEPHONE ENCOUNTER
Outbound call to patient, identified self, role and nature of the call. Pt acknowledged understanding. Pt identifiers ( & address) verified per HIPAA policy. Pt informed writer of the updated address change:  [address] 60, [address] [facility]. Still residing w/his sister in [facility]. Chiquiblaine Askew of the call: housing. Clinician asked pt if he has submitted an application to [employer]. Pt voiced \"yes, I did submit my application w/supporting documentation. I will need to go and pay the application fee and security deposit beginning of next month\". Writer acknowledged understanding.       CAT Choi

## 2018-01-19 ENCOUNTER — TELEPHONE (OUTPATIENT)
Dept: FAMILY MEDICINE CLINIC | Age: 63
End: 2018-01-19

## 2018-01-19 NOTE — TELEPHONE ENCOUNTER
Patient states that he did not need anything anymore, and that he has an appointment tomorrow at 8:15am.

## 2018-01-19 NOTE — TELEPHONE ENCOUNTER
Patient is calling requesting medication. He states that the is very sick and wanted to be seen by a doctor today but there are no openings with anyone. Patient is requesting that NP Jad Burns prescribes him meds for sore throat, runny nose, seeing stars, wheezing in nose. Advised patient that if not feeling well he can go to Urgent Care on [address] [place] [address], Patient still wants to see if NP Jad Burns can prescribe meds. Patient would like a call back. Pharmacy on file verified.      Patients number: [id number]  1/19/18  [facility]

## 2018-01-19 NOTE — TELEPHONE ENCOUNTER
----- Message from Nadia Cruz Page sent at 1/19/2018 12:50 PM EST -----  Regarding: KENNETH Brizuela/Telephone  Pt is requesting a return call. Best contact number is [phone].

## 2018-01-19 NOTE — TELEPHONE ENCOUNTER
[phone] notified patient needs office visit to rule out Flu patient has appointment 1/20/2018 with Dr Karsten Andrews at 8:15A

## 2018-01-20 ENCOUNTER — OFFICE VISIT (OUTPATIENT)
Dept: FAMILY MEDICINE CLINIC | Age: 63
End: 2018-01-20

## 2018-01-20 VITALS
TEMPERATURE: 97.8 F | HEIGHT: 61 IN | SYSTOLIC BLOOD PRESSURE: 138 MMHG | BODY MASS INDEX: 38.29 KG/M2 | DIASTOLIC BLOOD PRESSURE: 85 MMHG | HEART RATE: 74 BPM | WEIGHT: 202.8 LBS | OXYGEN SATURATION: 96 % | RESPIRATION RATE: 18 BRPM

## 2018-01-20 DIAGNOSIS — E66.9 OBESITY (BMI 30.0-34.9): ICD-10-CM

## 2018-01-20 DIAGNOSIS — R05.9 COUGH: ICD-10-CM

## 2018-01-20 DIAGNOSIS — J06.9 UPPER RESPIRATORY TRACT INFECTION, UNSPECIFIED TYPE: Primary | ICD-10-CM

## 2018-01-20 DIAGNOSIS — R52 BODY ACHES: ICD-10-CM

## 2018-01-20 LAB
QUICKVUE INFLUENZA TEST: NEGATIVE
VALID INTERNAL CONTROL?: YES

## 2018-01-20 RX ORDER — AZITHROMYCIN 250 MG/1
TABLET, FILM COATED ORAL
Qty: 6 TAB | Refills: 0 | Status: SHIPPED | OUTPATIENT
Start: 2018-01-20 | End: 2018-01-25

## 2018-01-20 RX ORDER — BENZONATATE 200 MG/1
200 CAPSULE ORAL
Qty: 21 CAP | Refills: 0 | Status: SHIPPED | OUTPATIENT
Start: 2018-01-20 | End: 2018-01-27

## 2018-01-20 RX ORDER — ALBUTEROL SULFATE 90 UG/1
2 AEROSOL, METERED RESPIRATORY (INHALATION)
Qty: 1 INHALER | Refills: 0 | Status: SHIPPED | OUTPATIENT
Start: 2018-01-20 | End: 2018-08-14 | Stop reason: SDUPTHER

## 2018-01-20 NOTE — MR AVS SNAPSHOT
[facility] 
 
 
 [address] [place] [address] 
[phone] Patient: Polina Hernandez MRN: [id number] OQQ:[birth year] Visit Information Date & Time Provider Department Dept. Phone Encounter #  
 1/20/2018  8:15 AM Rosa Maria Corey  [facility] [phone] [id number] Follow-up Instructions Return in about 1 week (around 1/27/2018), or if symptoms worsen or fail to improve, for URI. Your Appointments 3/7/2018  2:30 PM  
ROUTINE CARE with King Gonzalez  [facility] ([facility]) Appt Note: 6 month follow up appointment/0cp 0pb clg 9/11/2017  
 [address] [address]  
[phone]  
  
   
 [address] [address] Upcoming Health Maintenance Date Due ZOSTER VACCINE AGE 60> 3/4/2016 DTaP/Tdap/Td series (2 - Td) 3/19/2022 COLONOSCOPY 5/3/2022 Allergies as of 1/20/2018  Review Complete On: 1/20/2018 By: Rosa Maria Corey MD  
 No Known Allergies Current Immunizations  Reviewed on 10/4/2017 Name Date Influenza Vaccine 10/3/2017 12:00 AM, 9/26/2013 Influenza Vaccine Viola Senna) 10/21/2015 Influenza Vaccine (Quad) PF 2/21/2017 Influenza Vaccine Split 11/14/2012, 10/24/2011 TDAP Vaccine 3/19/2012 Not reviewed this visit You Were Diagnosed With   
  
 Codes Comments Upper respiratory tract infection, unspecified type    -  Primary ICD-10-CM: J06.9 ICD-9-CM: 465.9 Cough     ICD-10-CM: R05 ICD-9-CM: 786.2 Body aches     ICD-10-CM: R52 ICD-9-CM: 780.96 Obesity (BMI 30.0-34.9)     ICD-10-CM: K59.1 ICD-9-CM: 278.00 Vitals BP Pulse Temp Resp Height(growth percentile) Weight(growth percentile) 138/85 (BP 1 Location: Left arm, BP Patient Position: Sitting) 74 97.8 °F (36.6 °C) (Oral) 18 5' 1\" (1.549 m) 202 lb 12.8 oz (92 kg) SpO2 BMI Smoking Status 96% 38.32 kg/m2 Never Smoker Vitals History BMI and BSA Data Body Mass Index Body Surface Area  
 38.32 kg/m 2 1.99 m 2 Preferred Pharmacy Pharmacy Name Phone [facility], [address] [place] [address] [phone] Your Updated Medication List  
  
   
This list is accurate as of: 1/20/18  8:48 AM.  Always use your most recent med list.  
  
  
  
  
 albuterol 90 mcg/actuation inhaler Commonly known as:  PROVENTIL HFA, VENTOLIN HFA, PROAIR HFA Take 2 Puffs by inhalation every six (6) hours as needed for Wheezing. amLODIPine 10 mg tablet Commonly known as:  Clari Fabian TAKE 1 TABLET EVERY DAY  
  
 aspirin 81 mg chewable tablet Take 1 Tab by mouth daily. Indications: MYOCARDIAL INFARCTION PREVENTION  
  
 azelastine 137 mcg (0.1 %) nasal spray Commonly known as:  ASTELIN  
1 Spray by Both Nostrils route two (2) times a day. Use in each nostril as directed  
  
 azithromycin 250 mg tablet Commonly known as:  Yvrose Wang Take 2 tablets today, then take 1 tablet daily  
  
 benzonatate 200 mg capsule Commonly known as:  TESSALON Take 1 Cap by mouth three (3) times daily as needed for Cough for up to 7 days. Cholecalciferol (Vitamin D3) 2,000 unit Cap capsule Commonly known as:  VITAMIN D3 Take 2,000 Units by mouth daily. losartan 100 mg tablet Commonly known as:  COZAAR  
TAKE 1 TABLET EVERY DAY FOR BLOOD PRESSURE  
  
 metoprolol succinate 100 mg tablet Commonly known as:  TOPROL-XL  
TAKE 1 TABLET EVERY DAY  
  
 tamsulosin 0.4 mg capsule Commonly known as:  FLOMAX TAKE 2 CAPSULES EVERY DAY  FOR  PROSTATE  
  
 valACYclovir 500 mg tablet Commonly known as:  VALTREX  
TAKE 1 TABLET EVERY DAY AS NEEDED FOR BREAKOUT UNDER EYES  
  
 VITAMIN B-12 PO Take 2,000 mcg by mouth daily. Prescriptions Sent to Pharmacy Refills azithromycin (ZITHROMAX) 250 mg tablet 0 Sig: Take 2 tablets today, then take 1 tablet daily Class: Normal  
 Pharmacy: [address] Ph #: [phone]  
 benzonatate (TESSALON) 200 mg capsule 0 Sig: Take 1 Cap by mouth three (3) times daily as needed for Cough for up to 7 days. Class: Normal  
 Pharmacy: [address] Ph #: [phone] Route: Oral  
 albuterol (PROVENTIL HFA, VENTOLIN HFA, PROAIR HFA) 90 mcg/actuation inhaler 0 Sig: Take 2 Puffs by inhalation every six (6) hours as needed for Wheezing. Class: Normal  
 Pharmacy: [address] Ph #: [phone] Route: Inhalation We Performed the Following AMB POC RAPID INFLUENZA TEST [52370 CPT(R)] Follow-up Instructions Return in about 1 week (around 1/27/2018), or if symptoms worsen or fail to improve, for URI. Patient Instructions Cough: Care Instructions Your Care Instructions A cough is your body's response to something that bothers your throat or airways. Many things can cause a cough. You might cough because of a cold or the flu, bronchitis, or asthma. Smoking, postnasal drip, allergies, and stomach acid that backs up into your throat also can cause coughs. A cough is a symptom, not a disease. Most coughs stop when the cause, such as a cold, goes away. You can take a few steps at home to cough less and feel better. Follow-up care is a key part of your treatment and safety. Be sure to make and go to all appointments, and call your doctor if you are having problems. It's also a good idea to know your test results and keep a list of the medicines you take. How can you care for yourself at home? · Drink lots of water and other fluids. This helps thin the mucus and soothes a dry or sore throat. Honey or lemon juice in hot water or tea may ease a dry cough. · Take cough medicine as directed by your doctor. · Prop up your head on pillows to help you breathe and ease a dry cough. · Try cough drops to soothe a dry or sore throat. Cough drops don't stop a cough. Medicine-flavored cough drops are no better than candy-flavored drops or hard candy. · Do not smoke. Avoid secondhand smoke. If you need help quitting, talk to your doctor about stop-smoking programs and medicines. These can increase your chances of quitting for good. When should you call for help? Call 911 anytime you think you may need emergency care. For example, call if: 
? · You have severe trouble breathing. ?Call your doctor now or seek immediate medical care if: 
? · You cough up blood. ? · You have new or worse trouble breathing. ? · You have a new or higher fever. ? · You have a new rash. ? Watch closely for changes in your health, and be sure to contact your doctor if: 
? · You cough more deeply or more often, especially if you notice more mucus or a change in the color of your mucus. ? · You have new symptoms, such as a sore throat, an earache, or sinus pain. ? · You do not get better as expected. Where can you learn more? Go to http://ambrose-vince.info/. Enter D279 in the search box to learn more about \"Cough: Care Instructions. \" Current as of: May 12, 2017 Content Version: 11.4 © 0358-6897 [employer]. Care instructions adapted under license by [employer] (which disclaims liability or warranty for this information). If you have questions about a medical condition or this instruction, always ask your healthcare professional. [place] [address] any warranty or liability for your use of this information. Introducing [address]! [facility] introduces YouRenew patient portal. Now you can access parts of your medical record, email your doctor's office, and request medication refills online. 1. In your internet browser, go to [email or web address]/Site Intelligence 2. Click on the First Time User? Click Here link in the Sign In box. You will see the New Member Sign Up page. 3. Enter your YouRenew Access Code exactly as it appears below. You will not need to use this code after youve completed the sign-up process. If you do not sign up before the expiration date, you must request a new code. · YouRenew Access Code: [email or web address] Expires: 4/20/2018  7:59 AM 
 
4. Enter the last four digits of your Social Security Number (xxxx) and Date of Birth (mm/dd/yyyy) as indicated and click Submit. You will be taken to the next sign-up page. 5. Create a YouRenew ID. This will be your YouRenew login ID and cannot be changed, so think of one that is secure and easy to remember. 6. Create a YouRenew password. You can change your password at any time. 7. Enter your Password Reset Question and Answer. This can be used at a later time if you forget your password. 8. Enter your e-mail address. You will receive e-mail notification when new information is available in [address] 9. Click Sign Up. You can now view and download portions of your medical record. 10. Click the Download Summary menu link to download a portable copy of your medical information. If you have questions, please visit the Frequently Asked Questions section of the YouRenew website. Remember, YouRenew is NOT to be used for urgent needs. For medical emergencies, dial 911. Now available from your iPhone and Android! Please provide this summary of care documentation to your next provider. Your primary care clinician is listed as Lazaro Neal. If you have any questions after today's visit, please call [phone].

## 2018-01-20 NOTE — PROGRESS NOTES
Chief Complaint   Patient presents with    Cough     x  1 week    Nasal Congestion     x 1 week    Generalized Body Aches     x 1 week    Sore Throat     x 1 week     1. Have you been to the ER, urgent care clinic since your last visit? Hospitalized since your last visit? No    2. Have you seen or consulted any other health care providers outside of the [address] [place] [address] since your last visit? Include any pap smears or colon screening.  No

## 2018-01-20 NOTE — PROGRESS NOTES
HISTORY OF PRESENT ILLNESS  Krys Dickson is a 65 y.o. male. Blood pressure 138/85, pulse 74, temperature 97.8 °F (36.6 °C), temperature source Oral, resp. rate 18, height 5' 1\" (1.549 m), weight 202 lb 12.8 oz (92 kg), SpO2 96 %. Body mass index is 38.32 kg/(m^2). Chief Complaint   Patient presents with    Cough     x  1 week    Nasal Congestion     x 1 week    Generalized Body Aches     x 1 week    Sore Throat     x 1 week        HPI  Krys Dickson 65 y.o. male  presents to the office today for cold symptoms. Cold symptoms: Pt states that for the past week he has been having a dry cough, nasal congestion, fever, chills, body aches, wheezing and a sore throat. He denies any sinus pain, chest pain, shortness of breath. He states that occasionally when he stands up he will \"see stars\" and notes that his blood pressure has been elevated recently. Will give pt Azithromycin 250mg and Tessalon for his cough. Will also give pt Proair to use 2 puffs every 6 hours PRN for his wheezes. Advised pt to follow up if his symptoms worsen or fail to improve. Health maintenance: Pt states that he has been trying to work on his weight. Current Outpatient Prescriptions   Medication Sig Dispense Refill    azithromycin (ZITHROMAX) 250 mg tablet Take 2 tablets today, then take 1 tablet daily 6 Tab 0    benzonatate (TESSALON) 200 mg capsule Take 1 Cap by mouth three (3) times daily as needed for Cough for up to 7 days. 21 Cap 0    albuterol (PROVENTIL HFA, VENTOLIN HFA, PROAIR HFA) 90 mcg/actuation inhaler Take 2 Puffs by inhalation every six (6) hours as needed for Wheezing. 1 Inhaler 0    azelastine (ASTELIN) 137 mcg (0.1 %) nasal spray 1 [place] by Both Nostrils route two (2) times a day.  Use in each nostril as directed 1 Bottle 0    tamsulosin (FLOMAX) 0.4 mg capsule TAKE 2 CAPSULES EVERY DAY  FOR  PROSTATE 180 Cap 1    amLODIPine (NORVASC) 10 mg tablet TAKE 1 TABLET EVERY DAY 90 Tab 1    losartan (COZAAR) 100 mg tablet TAKE 1 TABLET EVERY DAY FOR BLOOD PRESSURE 90 Tab 1    metoprolol succinate (TOPROL-XL) 100 mg tablet TAKE 1 TABLET EVERY DAY 90 Tab 1    valACYclovir (VALTREX) 500 mg tablet TAKE 1 TABLET EVERY DAY AS NEEDED FOR BREAKOUT UNDER EYES 30 Tab 1    Cholecalciferol, Vitamin D3, (VITAMIN D3) 2,000 unit cap capsule Take 2,000 Units by mouth daily.  CYANOCOBALAMIN, VITAMIN B-12, (VITAMIN B-12 PO) Take 2,000 mcg by mouth daily.  aspirin 81 mg chewable tablet Take 1 Tab by mouth daily.  Indications: MYOCARDIAL INFARCTION PREVENTION 90 Tab 0     No Known Allergies  Past Medical History:   Diagnosis Date    Adenomatous polyp of colon 2012    Angina pectoris     Bleeding ulcer     BPH (benign prostatic hyperplasia) 2011    Cancer [facility]) 2013    PROSTATE     Colon polyps 05/03/2017    Hypertension     Kidney infection     pt states he is unaware of having had this    Right inguinal hernia 7/11/2016    Sleep apnea     uses CPAP    Vegetarian      Past Surgical History:   Procedure Laterality Date    COLONOSCOPY  2012, 2017    adenomatous polyp    COLONOSCOPY N/A 5/3/2017    COLONOSCOPY performed [facility]evon [facility]illa [facility] at [facility] ENDOSCOPY    HX CHOLECYSTECTOMY  2010    HX HERNIA REPAIR Right 7/29/16    inguinal w/mesh by Dr. Wilfrido Hirsch surgery    HX OTHER SURGICAL  2011    prostate biopsy - normal/second bx 2/2017 - \"tip had cancer\" - another bx is planned    HX OTHER SURGICAL  2009    FATTY TUMOR REMOVED FROM BACK    HX TONSILLECTOMY       Family History   Problem Relation Age of Onset    Hypertension Mother     Hypertension Maternal Grandmother     Hypertension Brother     Alcohol abuse Brother     Cancer Brother      pancreatic    Diabetes Brother     Hypertension Brother     Heart Disease Brother     Heart Attack Father     Anesth Problems Neg Hx      Social History   Substance Use Topics    Smoking status: Never Smoker    Smokeless tobacco: Never Used  Alcohol use 0.0 oz/week     0 Standard drinks or equivalent per week      Comment: OCCASIONALLY        Review of Systems   Constitutional: Positive for chills and fever. Negative for malaise/fatigue. HENT: Positive for congestion and sore throat. Eyes: Negative for blurred vision. Respiratory: Positive for cough and wheezing. Negative for sputum production and shortness of breath. Cardiovascular: Negative for chest pain and leg swelling. Musculoskeletal: Negative. Neurological: Negative. Negative for dizziness and headaches. All other systems reviewed and are negative. Physical Exam   Constitutional: He is oriented to person, place, and time. He appears well-developed and well-nourished. HENT:   Head: Normocephalic and atraumatic. Right Ear: Hearing, tympanic membrane, external ear and ear canal normal.   Left Ear: Hearing, tympanic membrane, external ear and ear canal normal.   Nose: Nose normal. Right sinus exhibits no maxillary sinus tenderness and no frontal sinus tenderness. Left sinus exhibits no maxillary sinus tenderness and no frontal sinus tenderness. Mouth/Throat: Posterior oropharyngeal erythema present. No oropharyngeal exudate. Neck: Carotid bruit is not present. Cardiovascular: Normal rate, regular rhythm, normal heart sounds and intact distal pulses. Exam reveals no gallop and no friction rub. No murmur heard. Pulmonary/Chest: Effort normal. No respiratory distress. He has wheezes (scattered). He has no rales. He exhibits no tenderness. Lymphadenopathy:     He has no cervical adenopathy. Neurological: He is alert and oriented to person, place, and time. Psychiatric: He has a normal mood and affect. His behavior is normal. Judgment and thought content normal.   Nursing note and vitals reviewed. ASSESSMENT and PLAN  Diagnoses and all orders for this visit:    1.  Upper respiratory tract infection, unspecified type  -     azithromycin (ZITHROMAX) 250 mg tablet; Take 2 tablets today, then take 1 tablet daily  -     albuterol (PROVENTIL HFA, VENTOLIN HFA, PROAIR HFA) 90 mcg/actuation inhaler; Take 2 Puffs by inhalation every six (6) hours as needed for Wheezing.  - Will give pt Azithromycin 250mg and Tessalon for his cough. - Will also give pt Proair to use 2 puffs every 6 hours PRN for his wheezes. - Advised pt to follow up if his symptoms worsen or fail to improve. 2. Cough  -     benzonatate (TESSALON) 200 mg capsule; Take 1 Cap by mouth three (3) times daily as needed for Cough for up to 7 days. -     albuterol (PROVENTIL HFA, VENTOLIN HFA, PROAIR HFA) 90 mcg/actuation inhaler; Take 2 Puffs by inhalation every six (6) hours as needed for Wheezing.  - See above    3. Body aches  -     AMB POC RAPID INFLUENZA TEST  - Pt's rapid flu test was negative. 4. Obesity (BMI 30.0-34.9)         - I have reviewed/discussed the above normal BMI with the patient. I have recommended the following interventions: dietary management education, guidance, and counseling, encourage exercise and monitor weight . Follow-up Disposition:  Return in about 1 week (around 1/27/2018), or if symptoms worsen or fail to improve, for URI. Medication risks/benefits/costs/interactions/alternatives discussed with patient. Advised patient to call back or return to office if symptoms worsen/change/persist.  If patient cannot reach us or should anything more severe/urgent arise he/she should proceed directly to the nearest emergency department. Discussed expected course/resolution/complications of diagnosis in detail with patient. Patient given a written after visit summary which includes her diagnoses, current medications and vitals. Patient expressed understanding with the diagnosis and plan. Written by henri Kapoor, as dictated by Elyssa Robles M.D.   I have reviewed and agree with the above note and have made corrections where appropriate, Dr. Tree Marc, MD

## 2018-01-20 NOTE — PATIENT INSTRUCTIONS

## 2018-01-31 ENCOUNTER — OFFICE VISIT (OUTPATIENT)
Dept: FAMILY MEDICINE CLINIC | Age: 63
End: 2018-01-31

## 2018-01-31 VITALS
BODY MASS INDEX: 38.33 KG/M2 | TEMPERATURE: 98.6 F | SYSTOLIC BLOOD PRESSURE: 138 MMHG | RESPIRATION RATE: 18 BRPM | OXYGEN SATURATION: 97 % | WEIGHT: 203 LBS | DIASTOLIC BLOOD PRESSURE: 89 MMHG | HEART RATE: 74 BPM | HEIGHT: 61 IN

## 2018-01-31 DIAGNOSIS — M54.5 LOW BACK PAIN, UNSPECIFIED BACK PAIN LATERALITY, UNSPECIFIED CHRONICITY, WITH SCIATICA PRESENCE UNSPECIFIED: Primary | ICD-10-CM

## 2018-01-31 DIAGNOSIS — R68.83 CHILLS (WITHOUT FEVER): ICD-10-CM

## 2018-01-31 DIAGNOSIS — R13.10 DYSPHAGIA, UNSPECIFIED TYPE: ICD-10-CM

## 2018-01-31 LAB
BILIRUB UR QL STRIP: NORMAL
GLUCOSE UR-MCNC: NEGATIVE MG/DL
KETONES P FAST UR STRIP-MCNC: NEGATIVE MG/DL
PH UR STRIP: 7 [PH] (ref 4.6–8)
PROT UR QL STRIP: NORMAL
SP GR UR STRIP: 1.01 (ref 1–1.03)
UA UROBILINOGEN AMB POC: NORMAL (ref 0.2–1)
URINALYSIS CLARITY POC: CLEAR
URINALYSIS COLOR POC: NORMAL
URINE BLOOD POC: NEGATIVE
URINE LEUKOCYTES POC: NORMAL
URINE NITRITES POC: NEGATIVE

## 2018-01-31 RX ORDER — FLUTICASONE PROPIONATE 50 MCG
SPRAY, SUSPENSION (ML) NASAL
COMMUNITY
Start: 2018-01-25 | End: 2018-03-30 | Stop reason: SDUPTHER

## 2018-01-31 RX ORDER — AZITHROMYCIN 250 MG/1
250 TABLET, FILM COATED ORAL DAILY
COMMUNITY
End: 2018-01-31

## 2018-01-31 RX ORDER — BENZONATATE 200 MG/1
200 CAPSULE ORAL
COMMUNITY
End: 2018-01-31

## 2018-01-31 NOTE — PATIENT INSTRUCTIONS
Learning About Swallowing Problems  What are swallowing problems? Certain health problems that affect the nervous system can cause trouble swallowing. These conditions include stroke, ALS (also known as Krystal Gehrig's disease), Parkinson's disease, and multiple sclerosis. The muscles and nerves that help move food through the throat and esophagus may not work right. Growths, such as cancer, and other problems with your esophagus can also make it hard to swallow. The esophagus is the tube that leads from your throat to your stomach. How are swallowing problems diagnosed? A doctor or speech therapist will examine you to check for swallowing problems. You may get swallowing tests to check how well your throat muscles work. For these tests, you swallow a special liquid that helps the doctor see your throat and esophagus on an X-ray or video screen. Other tests use a thin, flexible tube called a scope to check for problems with your esophagus. The doctor puts the scope in your mouth and down your throat to look at your esophagus. What are the symptoms? Symptoms of swallowing problems may include:  · Trouble getting food or liquids to go down on the first try. · Gagging, choking, or coughing when you swallow. · Having food or liquids come back up through your throat, mouth, or nose after you swallow. · Feeling like foods or liquids are stuck in some part of your throat or chest.  · Pain when you swallow. How are swallowing problems treated? How swallowing problems are treated depends on the cause. The main goals of treatment will be to help you eat and swallow safely and get good nutrition. This is important for your health and quality of life. You may learn exercises to train your throat muscles to work together so you're able to swallow better. Learning certain ways to put food in your mouth or to position your head while eating may also help.   Your doctor or a speech therapist may recommend changes to your diet to help make it easier to swallow. You may need to avoid certain foods or liquids. You also may need to change the thickness of foods or liquids in your diet. To eat and swallow safely, follow any instructions you get from your doctor or therapist. These ideas may help:  · Sit upright when eating, drinking, and taking pills. · Take small bites of food. Chew completely and swallow before taking another bite. · Take small sips of liquids. Hold the liquid in your mouth as you prepare to swallow. · If eating makes you tired, eat smaller but more frequent meals. · If you cough or choke, lean forward and keep your chin tipped downward while you cough. Where can you learn more? Go to http://ambrose-vince.info/. Enter 054 [id number] 0218 in the search box to learn more about \"Learning About Swallowing Problems. \"  Current as of: March 20, 2017  Content Version: 11.4  © 3634-3782 [employer], [employer]. Care instructions adapted under license by [employer] (which disclaims liability or warranty for this information). If you have questions about a medical condition or this instruction, always ask your healthcare professional. [place] [address] any warranty or liability for your use of this information.

## 2018-01-31 NOTE — MR AVS SNAPSHOT
[address] [facility] 
 
 
 [address] 
[phone] Patient: Nayeli German MRN: [id number] PRH:[birth year] Visit Information Date & Time Provider Department Dept. Phone Encounter #  
 1/31/2018 11:15 AM Miko Zimmerman  [facility] [phone] [id number] Follow-up Instructions Return if symptoms worsen or fail to improve. Your Appointments 3/7/2018  2:30 PM  
ROUTINE CARE with Kevin Figueroa  [facility] ([address] [facility]) Appt Note: 6 month follow up appointment/0cp 0pb clg 9/11/2017  
 [address] [facility] [address]  
[phone]  
  
   
 [address] [facility] [address] Upcoming Health Maintenance Date Due ZOSTER VACCINE AGE 60> 3/4/2016 DTaP/Tdap/Td series (2 - Td) 3/19/2022 COLONOSCOPY 5/3/2022 Allergies as of 1/31/2018  Review Complete On: 1/31/2018 By: Miko Zimmerman NP No Known Allergies Current Immunizations  Reviewed on 10/4/2017 Name Date Influenza Vaccine 10/3/2017 12:00 AM, 9/26/2013 Influenza Vaccine Jaz Grounds) 10/21/2015 Influenza Vaccine (Quad) PF 2/21/2017 Influenza Vaccine Split 11/14/2012, 10/24/2011 TDAP Vaccine 3/19/2012 Not reviewed this visit You Were Diagnosed With   
  
 Codes Comments Low back pain, unspecified back pain laterality, unspecified chronicity, with sciatica presence unspecified    -  Primary ICD-10-CM: M54.5 ICD-9-CM: 724.2 Dysphagia, unspecified type     ICD-10-CM: R13.10 ICD-9-CM: 787.20 Chills (without fever)     ICD-10-CM: G77.14 ICD-9-CM: 780.64 Vitals BP Pulse Temp Resp Height(growth percentile) Weight(growth percentile) 138/89 (BP 1 Location: Left arm, BP Patient Position: Sitting) 74 98.6 °F (37 °C) (Oral) 18 5' 1\" (1.549 m) 203 lb (92.1 kg) SpO2 BMI Smoking Status 97% 38.36 kg/m2 Never Smoker Vitals History BMI and BSA Data Body Mass Index Body Surface Area  
 38.36 kg/m 2 1.99 m 2 Preferred Pharmacy Pharmacy Name Phone [facility], [address] [place] [address] [phone] Your Updated Medication List  
  
   
This list is accurate as of: 1/31/18 12:26 PM.  Always use your most recent med list.  
  
  
  
  
 albuterol 90 mcg/actuation inhaler Commonly known as:  PROVENTIL HFA, VENTOLIN HFA, PROAIR HFA Take 2 Puffs by inhalation every six (6) hours as needed for Wheezing. amLODIPine 10 mg tablet Commonly known as:  Ardeen Squire TAKE 1 TABLET EVERY DAY  
  
 aspirin 81 mg chewable tablet Take 1 Tab by mouth daily. Indications: MYOCARDIAL INFARCTION PREVENTION  
  
 azelastine 137 mcg (0.1 %) nasal spray Commonly known as:  ASTELIN  
1 Spray by Both Nostrils route two (2) times a day. Use in each nostril as directed Cholecalciferol (Vitamin D3) 2,000 unit Cap capsule Commonly known as:  VITAMIN D3 Take 2,000 Units by mouth daily. fluticasone 50 mcg/actuation nasal spray Commonly known as:  FLONASE  
  
 losartan 100 mg tablet Commonly known as:  COZAAR  
TAKE 1 TABLET EVERY DAY FOR BLOOD PRESSURE  
  
 metoprolol succinate 100 mg tablet Commonly known as:  TOPROL-XL  
TAKE 1 TABLET EVERY DAY  
  
 tamsulosin 0.4 mg capsule Commonly known as:  FLOMAX TAKE 2 CAPSULES EVERY DAY  FOR  PROSTATE  
  
 valACYclovir 500 mg tablet Commonly known as:  VALTREX  
TAKE 1 TABLET EVERY DAY AS NEEDED FOR BREAKOUT UNDER EYES  
  
 VITAMIN B-12 PO Take 2,000 mcg by mouth daily. We Performed the Following AMB POC URINALYSIS DIP STICK MANUAL W/O MICRO [15470 CPT(R)] CBC WITH AUTOMATED DIFF [22339 CPT(R)] METABOLIC PANEL, COMPREHENSIVE [53105 CPT(R)] REFERRAL TO GASTROENTEROLOGY [[id number] Custom] Follow-up Instructions Return if symptoms worsen or fail to improve. Referral Information Referral ID Referred By Referred To [id number] Shawna Hearn Visits Status Start Date End Date 1 New Request 1/31/18 1/31/19 If your referral has a status of pending review or denied, additional information will be sent to support the outcome of this decision. Patient Instructions Learning About Swallowing Problems What are swallowing problems? Certain health problems that affect the nervous system can cause trouble swallowing. These conditions include stroke, ALS (also known as Krystal Gehrig's disease), Parkinson's disease, and multiple sclerosis. The muscles and nerves that help move food through the throat and esophagus may not work right. Growths, such as cancer, and other problems with your esophagus can also make it hard to swallow. The esophagus is the tube that leads from your throat to your stomach. How are swallowing problems diagnosed? A doctor or speech therapist will examine you to check for swallowing problems. You may get swallowing tests to check how well your throat muscles work. For these tests, you swallow a special liquid that helps the doctor see your throat and esophagus on an X-ray or video screen. Other tests use a thin, flexible tube called a scope to check for problems with your esophagus. The doctor puts the scope in your mouth and down your throat to look at your esophagus. What are the symptoms? Symptoms of swallowing problems may include: · Trouble getting food or liquids to go down on the first try. · Gagging, choking, or coughing when you swallow. · Having food or liquids come back up through your throat, mouth, or nose after you swallow. · Feeling like foods or liquids are stuck in some part of your throat or chest. 
· Pain when you swallow. How are swallowing problems treated? How swallowing problems are treated depends on the cause. The main goals of treatment will be to help you eat and swallow safely and get good nutrition. This is important for your health and quality of life. You may learn exercises to train your throat muscles to work together so you're able to swallow better. Learning certain ways to put food in your mouth or to position your head while eating may also help. Your doctor or a speech therapist may recommend changes to your diet to help make it easier to swallow. You may need to avoid certain foods or liquids. You also may need to change the thickness of foods or liquids in your diet. To eat and swallow safely, follow any instructions you get from your doctor or therapist. These ideas may help: 
· Sit upright when eating, drinking, and taking pills. · Take small bites of food. Chew completely and swallow before taking another bite. · Take small sips of liquids. Hold the liquid in your mouth as you prepare to swallow. · If eating makes you tired, eat smaller but more frequent meals. · If you cough or choke, lean forward and keep your chin tipped downward while you cough. Where can you learn more? Go to http://ambrose-vince.info/. Enter [id number] in the search box to learn more about \"Learning About Swallowing Problems. \" Current as of: March 20, 2017 Content Version: 11.4 © 1283-9153 Healthwise, Incorporated. Care instructions adapted under license by [employer] (which disclaims liability or warranty for this information). If you have questions about a medical condition or this instruction, always ask your healthcare professional. [place] [address] any warranty or liability for your use of this information. Introducing [facility]!    
 Edgar Mccullough introduces [employer] patient portal. Now you can access parts of your medical record, email your doctor's office, and request medication refills online. 1. In your internet browser, go to [email or web address]/[email or web address] 2. Click on the First Time User? Click Here link in the Sign In box. You will see the New Member Sign Up page. 3. Enter your qualifyor Access Code exactly as it appears below. You will not need to use this code after youve completed the sign-up process. If you do not sign up before the expiration date, you must request a new code. · qualifyor Access Code: [email or web address] Expires: 4/20/2018  7:59 AM 
 
4. Enter the last four digits of your Social Security Number (xxxx) and Date of Birth (mm/dd/yyyy) as indicated and click Submit. You will be taken to the next sign-up page. 5. Create a qualifyor ID. This will be your qualifyor login ID and cannot be changed, so think of one that is secure and easy to remember. 6. Create a qualifyor password. You can change your password at any time. 7. Enter your Password Reset Question and Answer. This can be used at a later time if you forget your password. 8. Enter your e-mail address. You will receive e-mail notification when new information is available in [address] 9. Click Sign Up. You can now view and download portions of your medical record. 10. Click the Download Summary menu link to download a portable copy of your medical information. If you have questions, please visit the Frequently Asked Questions section of the qualifyor website. Remember, qualifyor is NOT to be used for urgent needs. For medical emergencies, dial 911. Now available from your iPhone and Android! Please provide this summary of care documentation to your next provider. Your primary care clinician is listed as Lazaro Neal. If you have any questions after today's visit, please call [phone].

## 2018-01-31 NOTE — PROGRESS NOTES
Assessment/Plan:     Diagnoses and all orders for this visit:    1. Low back pain, unspecified back pain laterality, unspecified chronicity, with sciatica presence unspecified  -     AMB POC URINALYSIS DIP STICK MANUAL W/O MICRO  UA unremarkable. Likely musculoskeletal.  Labs pending. 2. Dysphagia, unspecified type  -     [facility]  He will return to GI for evaluation and likely endoscopy as it appears he may have some esophageal stricture. Start otc Prilosec and take for 14 days. 3. Chills (without fever)  -     CBC WITH AUTOMATED DIFF  -     METABOLIC PANEL, COMPREHENSIVE  Unclear etiology. Labs pending. Possibly viral.  Continue symptomatic care. Follow-up Disposition:  Return if symptoms worsen or fail to improve. Discussed expected course/resolution/complications of diagnosis in detail with patient.    Medication risks/benefits/costs/interactions/alternatives discussed with patient.    Pt was given after visit summary which includes diagnoses, current medications & vitals. Pt expressed understanding with the diagnosis and plan          Subjective:      Esther Rockwell is a 65 y.o. male who presents for had concerns including Chills; LOW BACK PAIN; and Sore Throat. Here to discuss a multitude of symptoms today. Reports a recent URI which was treated by Dr. Lilli Ventura with Benzonatate and Merl Chick. He continues with low left sided back pain, worse in AM.  Reports chills and body aches, but denies fever. Denies overt cough and shortness of breath, but reports no improvement from prior exam and treatment. Reports general malaise. Appetite is normal and tolerating meals without difficulty. No recent medication changes. Reports difficulty recently with swallowing and feeling like food is \"stuck\". This happened yesterday with a turkey sandwich and was unable to swallow with water. This episode led to vomiting which resolved the sensation.   Denies recent nausea or vomiting otherwise. Bowel movements are regular. No sick contacts. Current Outpatient Prescriptions   Medication Sig Dispense Refill    fluticasone (FLONASE) 50 mcg/actuation nasal spray       albuterol (PROVENTIL HFA, VENTOLIN HFA, PROAIR HFA) 90 mcg/actuation inhaler Take 2 Puffs by inhalation every six (6) hours as needed for Wheezing. 1 Inhaler 0    azelastine (ASTELIN) 137 mcg (0.1 %) nasal spray 1 [place] by Both Nostrils route two (2) times a day. Use in each nostril as directed 1 Bottle 0    tamsulosin (FLOMAX) 0.4 mg capsule TAKE 2 CAPSULES EVERY DAY  FOR  PROSTATE 180 Cap 1    amLODIPine (NORVASC) 10 mg tablet TAKE 1 TABLET EVERY DAY 90 Tab 1    losartan (COZAAR) 100 mg tablet TAKE 1 TABLET EVERY DAY FOR BLOOD PRESSURE 90 Tab 1    metoprolol succinate (TOPROL-XL) 100 mg tablet TAKE 1 TABLET EVERY DAY 90 Tab 1    valACYclovir (VALTREX) 500 mg tablet TAKE 1 TABLET EVERY DAY AS NEEDED FOR BREAKOUT UNDER EYES 30 Tab 1    Cholecalciferol, Vitamin D3, (VITAMIN D3) 2,000 unit cap capsule Take 2,000 Units by mouth daily.  CYANOCOBALAMIN, VITAMIN B-12, (VITAMIN B-12 PO) Take 2,000 mcg by mouth daily.  aspirin 81 mg chewable tablet Take 1 Tab by mouth daily. Indications: MYOCARDIAL INFARCTION PREVENTION 90 Tab 0       No Known Allergies    ROS:   Complete review of systems was reviewed with pertinent information listed in HPI. Objective:     Visit Vitals    /89 (BP 1 Location: Left arm, BP Patient Position: Sitting)    Pulse 74    Temp 98.6 °F (37 °C) (Oral)    Resp 18    Ht 5' 1\" (1.549 m)    Wt 203 lb (92.1 kg)    SpO2 97%    BMI 38.36 kg/m2       Vitals and Nurse Documentation reviewed. Physical Exam   Constitutional: No distress. HENT:   Right Ear: Tympanic membrane is not erythematous and not bulging. No middle ear effusion. Left Ear: Tympanic membrane is not erythematous and not bulging. No middle ear effusion. Nose: No rhinorrhea.  Right sinus exhibits no maxillary sinus tenderness and no frontal sinus tenderness. Left sinus exhibits no maxillary sinus tenderness and no frontal sinus tenderness. Mouth/Throat: No oropharyngeal exudate or posterior oropharyngeal erythema. Cardiovascular: S1 normal and S2 normal.  Exam reveals no gallop and no friction rub. No murmur heard. Pulmonary/Chest: Breath sounds normal. He has no wheezes. Abdominal: Bowel sounds are normal. He exhibits no mass. There is no hepatosplenomegaly. There is no rigidity, no rebound, no guarding and no CVA tenderness. Lymphadenopathy:     He has no cervical adenopathy.    Psychiatric: Mood and affect normal.

## 2018-01-31 NOTE — LETTER
2/5/2018 11:43 AM 
 
Mr. Krys Dickson [phone] [facility] [address] Dear Krys Dickson: Please find your most recent results below. Resulted Orders AMB POC URINALYSIS DIP STICK MANUAL W/O MICRO Result Value Ref Range Color (UA POC) LoÃ­za Jf Clarity (UA POC) Clear Glucose (UA POC) Negative Negative Bilirubin (UA POC) 1+ Negative Ketones (UA POC) Negative Negative Specific gravity (UA POC) 1.010 1.001 - 1.035 Blood (UA POC) Negative Negative pH (UA POC) 7.0 4.6 - 8.0 Protein (UA POC) 1+ Negative Urobilinogen (UA POC) 0.2 mg/dL 0.2 - 1 Nitrites (UA POC) Negative Negative Leukocyte esterase (UA POC) Trace Negative CBC WITH AUTOMATED DIFF Result Value Ref Range WBC 8.7 3.4 - 10.8 x10E3/uL  
 RBC 4.54 4.14 - 5.80 x10E6/uL HGB 14.1 13.0 - 17.7 g/dL HCT 42.3 37.5 - 51.0 % MCV 93 79 - 97 fL  
 MCH 31.1 26.6 - 33.0 pg  
 MCHC 33.3 31.5 - 35.7 g/dL  
 RDW 15.0 12.3 - 15.4 % PLATELET 704 794 - 102 x10E3/uL NEUTROPHILS 81 Not Estab. % Lymphocytes 12 Not Estab. % MONOCYTES 6 Not Estab. % EOSINOPHILS 1 Not Estab. % BASOPHILS 0 Not Estab. %  
 ABS. NEUTROPHILS 7.1 (H) 1.4 - 7.0 x10E3/uL Abs Lymphocytes 1.1 0.7 - 3.1 x10E3/uL  
 ABS. MONOCYTES 0.5 0.1 - 0.9 x10E3/uL  
 ABS. EOSINOPHILS 0.1 0.0 - 0.4 x10E3/uL  
 ABS. BASOPHILS 0.0 0.0 - 0.2 x10E3/uL IMMATURE GRANULOCYTES 0 Not Estab. %  
 ABS. IMM. GRANS. 0.0 0.0 - 0.1 x10E3/uL Narrative Performed at:  [address]  [id number] : Desmond Beard MD, Phone:  [phone] METABOLIC PANEL, COMPREHENSIVE Result Value Ref Range Glucose 92 65 - 99 mg/dL BUN 11 8 - 27 mg/dL Creatinine 0.96 0.76 - 1.27 mg/dL GFR est non-AA 85 >59 mL/min/1.73 GFR est AA 98 >59 mL/min/1.73  
 BUN/Creatinine ratio 11 10 - 24 Sodium 140 134 - 144 mmol/L Potassium 3.5 3.5 - 5.2 mmol/L  Chloride 98 96 - 106 mmol/L  
 CO2 27 18 - 29 mmol/L Calcium 9.5 8.6 - 10.2 mg/dL Protein, total 7.5 6.0 - 8.5 g/dL Albumin 4.2 3.6 - 4.8 g/dL GLOBULIN, TOTAL 3.3 1.5 - 4.5 g/dL A-G Ratio 1.3 1.2 - 2.2 Bilirubin, total 1.1 0.0 - 1.2 mg/dL Alk. phosphatase 86 39 - 117 IU/L  
 AST (SGOT) 28 0 - 40 IU/L  
 ALT (SGPT) 24 0 - 44 IU/L Narrative Performed at:  [address]  [id number] : Anthony Hernadez MD, Phone:  [phone] RECOMMENDATIONS: 
The labs are normal.  Likely viral.  Let's continue with the Prilosec treatment and a bland diet.  Follow up with gastroenterology. Please call me if you have any questions: [phone] Sincerely, Елена Guthrie NP

## 2018-01-31 NOTE — PROGRESS NOTES
Chief Complaint   Patient presents with    Chills    LOW BACK PAIN     left side x 5 days    Sore Throat     x 5 days     1. Have you been to the ER, urgent care clinic since your last visit? Hospitalized since your last visit? No    2. Have you seen or consulted any other health care providers outside of the [address] [place] [address] since your last visit? Include any pap smears or colon screening.  No

## 2018-01-31 NOTE — LETTER
1/31/2018 Mr. Brittany Wang [phone] [facility] [address] Dear Brittany Wang This letter is to inform you that you have been scheduled for an appointment with Gastroenterologist per the request of Solomon Nelson NP. Your appointment is scheduled with Rick Ron NP Your appointment date is Thursday March 15, 2018 Your appointment time is 9:00am   
The practice phone number is [phone] The practice address is :  
[address] [place], [place] [id number] Please call to confirm your appointment, you can call periodically to check for cancellations to get a sooner appointment at number provided. Please bring with you a photo ID, insurance card, co-pay and if required a list of all medications you are currently taking. If for any reason you need to cancel or reschedule your appointment, please contact the specialists office and they will assist you. Sincerely, Michaela Rodriguez NP    By Willem Holguin

## 2018-02-01 LAB
ALBUMIN SERPL-MCNC: 4.2 G/DL (ref 3.6–4.8)
ALBUMIN/GLOB SERPL: 1.3 {RATIO} (ref 1.2–2.2)
ALP SERPL-CCNC: 86 IU/L (ref 39–117)
ALT SERPL-CCNC: 24 IU/L (ref 0–44)
AST SERPL-CCNC: 28 IU/L (ref 0–40)
BASOPHILS # BLD AUTO: 0 X10E3/UL (ref 0–0.2)
BASOPHILS NFR BLD AUTO: 0 %
BILIRUB SERPL-MCNC: 1.1 MG/DL (ref 0–1.2)
BUN SERPL-MCNC: 11 MG/DL (ref 8–27)
BUN/CREAT SERPL: 11 (ref 10–24)
CALCIUM SERPL-MCNC: 9.5 MG/DL (ref 8.6–10.2)
CHLORIDE SERPL-SCNC: 98 MMOL/L (ref 96–106)
CO2 SERPL-SCNC: 27 MMOL/L (ref 18–29)
CREAT SERPL-MCNC: 0.96 MG/DL (ref 0.76–1.27)
EOSINOPHIL # BLD AUTO: 0.1 X10E3/UL (ref 0–0.4)
EOSINOPHIL NFR BLD AUTO: 1 %
ERYTHROCYTE [DISTWIDTH] IN BLOOD BY AUTOMATED COUNT: 15 % (ref 12.3–15.4)
GFR SERPLBLD CREATININE-BSD FMLA CKD-EPI: 85 ML/MIN/1.73
GFR SERPLBLD CREATININE-BSD FMLA CKD-EPI: 98 ML/MIN/1.73
GLOBULIN SER CALC-MCNC: 3.3 G/DL (ref 1.5–4.5)
GLUCOSE SERPL-MCNC: 92 MG/DL (ref 65–99)
HCT VFR BLD AUTO: 42.3 % (ref 37.5–51)
HGB BLD-MCNC: 14.1 G/DL (ref 13–17.7)
IMM GRANULOCYTES # BLD: 0 X10E3/UL (ref 0–0.1)
IMM GRANULOCYTES NFR BLD: 0 %
LYMPHOCYTES # BLD AUTO: 1.1 X10E3/UL (ref 0.7–3.1)
LYMPHOCYTES NFR BLD AUTO: 12 %
MCH RBC QN AUTO: 31.1 PG (ref 26.6–33)
MCHC RBC AUTO-ENTMCNC: 33.3 G/DL (ref 31.5–35.7)
MCV RBC AUTO: 93 FL (ref 79–97)
MONOCYTES # BLD AUTO: 0.5 X10E3/UL (ref 0.1–0.9)
MONOCYTES NFR BLD AUTO: 6 %
NEUTROPHILS # BLD AUTO: 7.1 X10E3/UL (ref 1.4–7)
NEUTROPHILS NFR BLD AUTO: 81 %
PLATELET # BLD AUTO: 363 X10E3/UL (ref 150–379)
POTASSIUM SERPL-SCNC: 3.5 MMOL/L (ref 3.5–5.2)
PROT SERPL-MCNC: 7.5 G/DL (ref 6–8.5)
RBC # BLD AUTO: 4.54 X10E6/UL (ref 4.14–5.8)
SODIUM SERPL-SCNC: 140 MMOL/L (ref 134–144)
WBC # BLD AUTO: 8.7 X10E3/UL (ref 3.4–10.8)

## 2018-02-02 ENCOUNTER — TELEPHONE (OUTPATIENT)
Dept: FAMILY MEDICINE CLINIC | Age: 63
End: 2018-02-02

## 2018-02-02 NOTE — TELEPHONE ENCOUNTER
Patient is calling in regards to a missed call from [facility], tried contacting nurse, no success.     Best call back # for patient: [phone]

## 2018-02-05 NOTE — PROGRESS NOTES
Attempted to reach patient x 2. Line busy. Will send letter in mail with results and recommendations.

## 2018-03-07 ENCOUNTER — OFFICE VISIT (OUTPATIENT)
Dept: FAMILY MEDICINE CLINIC | Age: 63
End: 2018-03-07

## 2018-03-07 VITALS
HEART RATE: 62 BPM | SYSTOLIC BLOOD PRESSURE: 139 MMHG | DIASTOLIC BLOOD PRESSURE: 89 MMHG | TEMPERATURE: 98.3 F | HEIGHT: 61 IN | RESPIRATION RATE: 16 BRPM | WEIGHT: 206 LBS | OXYGEN SATURATION: 96 % | BODY MASS INDEX: 38.89 KG/M2

## 2018-03-07 DIAGNOSIS — G47.33 OSA (OBSTRUCTIVE SLEEP APNEA): ICD-10-CM

## 2018-03-07 DIAGNOSIS — R73.02 IGT (IMPAIRED GLUCOSE TOLERANCE): ICD-10-CM

## 2018-03-07 DIAGNOSIS — E78.49 FAMILIAL HYPERLIPIDEMIA: ICD-10-CM

## 2018-03-07 DIAGNOSIS — J34.89 NOSTRIL SORE: ICD-10-CM

## 2018-03-07 DIAGNOSIS — R35.0 BENIGN PROSTATIC HYPERPLASIA WITH URINARY FREQUENCY: ICD-10-CM

## 2018-03-07 DIAGNOSIS — E66.9 OBESITY (BMI 30-39.9): ICD-10-CM

## 2018-03-07 DIAGNOSIS — N40.1 BENIGN PROSTATIC HYPERPLASIA WITH URINARY FREQUENCY: ICD-10-CM

## 2018-03-07 DIAGNOSIS — I10 ESSENTIAL HYPERTENSION: Primary | ICD-10-CM

## 2018-03-07 RX ORDER — MUPIROCIN 20 MG/G
OINTMENT TOPICAL 2 TIMES DAILY
Qty: 22 G | Refills: 0 | Status: SHIPPED | OUTPATIENT
Start: 2018-03-07 | End: 2020-01-08 | Stop reason: SDUPTHER

## 2018-03-07 NOTE — MR AVS SNAPSHOT
[address] [place] [address] 
[phone] Patient: Polina Hernandez MRN: [id number] DTS:9/5/0639 Visit Information Date & Time Provider Department Dept. Phone Encounter #  
 3/7/2018 10:45 AM King Gonzalez  [place] [phone] [id number] Follow-up Instructions Return in about 6 months (around 9/7/2018) for blood pressure. Upcoming Health Maintenance Date Due ZOSTER VACCINE AGE 60> 3/4/2016 DTaP/Tdap/Td series (2 - Td) 3/19/2022 COLONOSCOPY 5/3/2022 Allergies as of 3/7/2018  Review Complete On: 3/7/2018 By: Bailey Judge LPN No Known Allergies Current Immunizations  Reviewed on 10/4/2017 Name Date Influenza Vaccine 10/3/2017 12:00 AM, 9/26/2013 Influenza Vaccine Viola Senna) 10/21/2015 Influenza Vaccine (Quad) PF 2/21/2017 Influenza Vaccine Split 11/14/2012, 10/24/2011 TDAP Vaccine 3/19/2012 Not reviewed this visit You Were Diagnosed With   
  
 Codes Comments Essential hypertension    -  Primary ICD-10-CM: I10 
ICD-9-CM: 401.9 Obesity (BMI 30-39. 9)     ICD-10-CM: E66.9 ICD-9-CM: 278.00   
 NESTOR (obstructive sleep apnea)     ICD-10-CM: G47.33 
ICD-9-CM: 327.23 Familial hyperlipidemia     ICD-10-CM: E78.4 ICD-9-CM: 272.4 Benign prostatic hyperplasia with urinary frequency     ICD-10-CM: N40.1, R35.0 ICD-9-CM: 600.01, 788.41 IGT (impaired glucose tolerance)     ICD-10-CM: R73.02 
ICD-9-CM: 790.22 Vitals BP Pulse Temp Resp Height(growth percentile) Weight(growth percentile) 139/89 (BP 1 Location: Left arm, BP Patient Position: Sitting) 62 98.3 °F (36.8 °C) (Oral) 16 5' 1\" (1.549 m) 206 lb (93.4 kg) SpO2 BMI Smoking Status 96% 38.92 kg/m2 Never Smoker Vitals History BMI and BSA Data Body Mass Index Body Surface Area  
 38.92 kg/m 2 2 m 2 Preferred Pharmacy Pharmacy Name Phone [facility], [address] [place] [address] [phone] Your Updated Medication List  
  
   
This list is accurate as of 3/7/18 11:23 AM.  Always use your most recent med list.  
  
  
  
  
 albuterol 90 mcg/actuation inhaler Commonly known as:  PROVENTIL HFA, VENTOLIN HFA, PROAIR HFA Take 2 Puffs by inhalation every six (6) hours as needed for Wheezing. amLODIPine 10 mg tablet Commonly known as:  Mirta Sanchez TAKE 1 TABLET EVERY DAY  
  
 aspirin 81 mg chewable tablet Take 1 Tab by mouth daily. Indications: MYOCARDIAL INFARCTION PREVENTION  
  
 azelastine 137 mcg (0.1 %) nasal spray Commonly known as:  ASTELIN  
1 Spray by Both Nostrils route two (2) times a day. Use in each nostril as directed Cholecalciferol (Vitamin D3) 2,000 unit Cap capsule Commonly known as:  VITAMIN D3 Take 2,000 Units by mouth daily. fluticasone 50 mcg/actuation nasal spray Commonly known as:  FLONASE  
  
 losartan 100 mg tablet Commonly known as:  COZAAR  
TAKE 1 TABLET EVERY DAY FOR BLOOD PRESSURE  
  
 metoprolol succinate 100 mg tablet Commonly known as:  TOPROL-XL  
TAKE 1 TABLET EVERY DAY  
  
 mupirocin 2 % ointment Commonly known as:  [facility] Apply  to affected area two (2) times a day. tamsulosin 0.4 mg capsule Commonly known as:  FLOMAX TAKE 2 CAPSULES EVERY DAY  FOR  PROSTATE  
  
 valACYclovir 500 mg tablet Commonly known as:  VALTREX  
TAKE 1 TABLET BY MOUTH EVERY DAY AS NEEDED FOR BREAKOUT UNDER EYES  
  
 VITAMIN B-12 PO Take 2,000 mcg by mouth daily. Prescriptions Sent to Pharmacy Refills  
 mupirocin (BACTROBAN) 2 % ointment 0 Sig: Apply  to affected area two (2) times a day. Class: Normal  
 Pharmacy: [facility], [facility] [address] Ph #: [phone]  Route: Topical  
  
 We Performed the Following HEMOGLOBIN A1C W/O EAG [14026 CPT(R)] LIPID PANEL [81937 CPT(R)] METABOLIC PANEL, COMPREHENSIVE [77623 CPT(R)] TSH AND FREE T4 [59109 CPT(R)] Follow-up Instructions Return in about 6 months (around 9/7/2018) for blood pressure. Patient Instructions Starting a Weight Loss Plan: Care Instructions Your Care Instructions If you are thinking about losing weight, it can be hard to know where to start. Your doctor can help you set up a weight loss plan that best meets your needs. You may want to take a class on nutrition or exercise, or join a weight loss support group. If you have questions about how to make changes to your eating or exercise habits, ask your doctor about seeing a registered dietitian or an exercise specialist. 
It can be a big challenge to lose weight. But you do not have to make huge changes at once. Make small changes, and stick with them. When those changes become habit, add a few more changes. If you do not think you are ready to make changes right now, try to pick a date in the future. Make an appointment to see your doctor to discuss whether the time is right for you to start a plan. Follow-up care is a key part of your treatment and safety. Be sure to make and go to all appointments, and call your doctor if you are having problems. It's also a good idea to know your test results and keep a list of the medicines you take. How can you care for yourself at home? · Set realistic goals. Many people expect to lose much more weight than is likely. A weight loss of 5% to 10% of your body weight may be enough to improve your health. · Get family and friends involved to provide support. Talk to them about why you are trying to lose weight, and ask them to help. They can help by participating in exercise and having meals with you, even if they may be eating something different. · Find what works best for you. If you do not have time or do not like to cook, a program that offers meal replacement bars or shakes may be better for you. Or if you like to prepare meals, finding a plan that includes daily menus and recipes may be best. 
· Ask your doctor about other health professionals who can help you achieve your weight loss goals. ¨ A dietitian can help you make healthy changes in your diet. ¨ An exercise specialist or  can help you develop a safe and effective exercise program. 
¨ A counselor or psychiatrist can help you cope with issues such as depression, anxiety, or family problems that can make it hard to focus on weight loss. · Consider joining a support group for people who are trying to lose weight. Your doctor can suggest groups in your area. Where can you learn more? Go to http://ambrose-vince.info/. Enter T877 in the search box to learn more about \"Starting a Weight Loss Plan: Care Instructions. \" Current as of: October 13, 2016 Content Version: 11.4 © 1963-2471 [employer]. Care instructions adapted under license by [employer] (which disclaims liability or warranty for this information). If you have questions about a medical condition or this instruction, always ask your healthcare professional. [address] any warranty or liability for your use of this information. Introducing [facility]! Edgar Mccullough introduces [facility] patient portal. Now you can access parts of your medical record, email your doctor's office, and request medication refills online. 1. In your internet browser, go to [email or web address] 2. Click on the First Time User? Click Here link in the Sign In box. You will see the New Member Sign Up page. 3. Enter your [facility] Access Code exactly as it appears below.  You will not need to use this code after youve completed the sign-up process. If you do not sign up before the expiration date, you must request a new code. · [employer] Access Code: [email or web address] Expires: 4/20/2018  7:59 AM 
 
4. Enter the last four digits of your Social Security Number (xxxx) and Date of Birth (mm/dd/yyyy) as indicated and click Submit. You will be taken to the next sign-up page. 5. Create a [employer] ID. This will be your [employer] login ID and cannot be changed, so think of one that is secure and easy to remember. 6. Create a [employer] password. You can change your password at any time. 7. Enter your Password Reset Question and Answer. This can be used at a later time if you forget your password. 8. Enter your e-mail address. You will receive e-mail notification when new information is available in [address] 9. Click Sign Up. You can now view and download portions of your medical record. 10. Click the Download Summary menu link to download a portable copy of your medical information. If you have questions, please visit the Frequently Asked Questions section of the [employer] website. Remember, [employer] is NOT to be used for urgent needs. For medical emergencies, dial 911. Now available from your iPhone and Android! Please provide this summary of care documentation to your next provider. Your primary care clinician is listed as Sylvie Grossman. If you have any questions after today's visit, please call [phone].

## 2018-03-07 NOTE — PATIENT INSTRUCTIONS

## 2018-03-07 NOTE — PROGRESS NOTES
Chief Complaint   Patient presents with    Follow-up     6 month follow up     1. Have you been to the ER, urgent care clinic since your last visit? Hospitalized since your last visit? No    2. Have you seen or consulted any other health care providers outside of the [address] [place] [address] since your last visit? Include any pap smears or colon screening.  No

## 2018-03-07 NOTE — PROGRESS NOTES
[facility] Note    Zander Arreola is a 65 y.o. male who was seen in clinic today (3/7/2018). Subjective:  Cardiovascular Review:  The patient has hypertension and hyperlipidemia. Patient has sleep apnea but not currently using CPAP. Diet and Lifestyle: generally follows a low fat low cholesterol diet, generally follows a low sodium diet, exercises regularly, nonsmoker  Home BP Monitoring: is well controlled at home, ranging 120's/80's. Pertinent ROS: taking medications as instructed, no medication side effects noted, no TIA's, no chest pain on exertion, no dyspnea on exertion, no swelling of ankles. Patient reports non-healing scabs of internal nare. Prior to Admission medications    Medication Sig Start Date End Date Taking? Authorizing Provider   mupirocin (BACTROBAN) 2 % ointment Apply  to affected area two (2) times a day. 3/7/18  Yes Chen Kang NP   valACYclovir (VALTREX) 500 mg tablet TAKE 1 TABLET BY MOUTH EVERY DAY AS NEEDED FOR BREAKOUT UNDER EYES 3/1/18  Yes Chen Kang NP   fluticasone (FLONASE) 50 mcg/actuation nasal spray  1/25/18  Yes Historical Provider   albuterol (PROVENTIL HFA, VENTOLIN HFA, PROAIR HFA) 90 mcg/actuation inhaler Take 2 Puffs by inhalation every six (6) hours as needed for Wheezing. 1/20/18  Yes Henry Palumbo MD   azelastine (ASTELIN) 137 mcg (0.1 %) nasal spray 1 [place]roix by Both Nostrils route two (2) times a day.  Use in each nostril as directed 9/7/17  Yes Osvaldo Alegria MD   tamsulosin (FLOMAX) 0.4 mg capsule TAKE 2 CAPSULES EVERY DAY  FOR  PROSTATE 8/25/17  Yes Chen Kang NP   amLODIPine (NORVASC) 10 mg tablet TAKE 1 TABLET EVERY DAY 8/25/17  Yes Chen Kang NP   losartan (COZAAR) 100 mg tablet TAKE 1 TABLET EVERY DAY FOR BLOOD PRESSURE 8/25/17  Yes Chen Kang NP   metoprolol succinate (TOPROL-XL) 100 mg tablet TAKE 1 TABLET EVERY DAY 8/25/17  Yes Chen Kang NP   Cholecalciferol, Vitamin D3, (VITAMIN D3) 2,000 unit cap capsule Take 2,000 Units by mouth daily. Yes Historical Provider   CYANOCOBALAMIN, VITAMIN B-12, (VITAMIN B-12 PO) Take 2,000 mcg by mouth daily. Yes Historical Provider   aspirin 81 mg chewable tablet Take 1 Tab by mouth daily. Indications: MYOCARDIAL INFARCTION PREVENTION 5/15/13  Yes Casey Michael MD          No Known Allergies        ROS  See HPI    Objective:   Physical Exam   Constitutional: He is oriented to person, place, and time. He appears well-developed and well-nourished. HENT:   Nose:       Neck: Normal range of motion. Neck supple. No JVD present. Carotid bruit is not present. No thyromegaly present. Cardiovascular: Normal rate, regular rhythm and intact distal pulses. Exam reveals no gallop and no friction rub. No murmur heard. Pulmonary/Chest: Effort normal and breath sounds normal. No respiratory distress. Musculoskeletal: He exhibits no edema. Lymphadenopathy:     He has no cervical adenopathy. Neurological: He is alert and oriented to person, place, and time. Psychiatric: He has a normal mood and affect. His behavior is normal.   Nursing note and vitals reviewed. Visit Vitals    /89 (BP 1 Location: Left arm, BP Patient Position: Sitting)    Pulse 62    Temp 98.3 °F (36.8 °C) (Oral)    Resp 16    Ht 5' 1\" (1.549 m)    Wt 206 lb (93.4 kg)    SpO2 96%    BMI 38.92 kg/m2       Assessment & Plan:  Diagnoses and all orders for this visit:    1. Essential hypertension  Stable, no changes to current therapy  -     TSH AND FREE T4    2. Obesity (BMI 30-39. 9)  Well controlled, no medication changes. 3. NESTOR (obstructive sleep apnea)  Discussed importance of CPAP compliance. 4. Familial hyperlipidemia  -     METABOLIC PANEL, COMPREHENSIVE  -     LIPID PANEL    5. Benign prostatic hyperplasia with urinary frequency  Managed by urology, no changes. 6. IGT (impaired glucose tolerance)  -     HEMOGLOBIN A1C W/O EAG    7. Nostril sore  -     mupirocin (BACTROBAN) 2 % ointment; Apply  to affected area two (2) times a day. I have discussed the diagnosis with the patient and the intended plan as seen in the above orders. The patient has received an after-visit summary along with patient information handout. I have discussed medication side effects and warnings with the patient as well. Follow-up Disposition:  Return in about 6 months (around 9/7/2018) for blood pressure.         Kevin Benedict NP

## 2018-03-08 LAB
ALBUMIN SERPL-MCNC: 4.3 G/DL (ref 3.6–4.8)
ALBUMIN/GLOB SERPL: 1.5 {RATIO} (ref 1.2–2.2)
ALP SERPL-CCNC: 89 IU/L (ref 39–117)
ALT SERPL-CCNC: 28 IU/L (ref 0–44)
AST SERPL-CCNC: 30 IU/L (ref 0–40)
BILIRUB SERPL-MCNC: 0.9 MG/DL (ref 0–1.2)
BUN SERPL-MCNC: 13 MG/DL (ref 8–27)
BUN/CREAT SERPL: 16 (ref 10–24)
CALCIUM SERPL-MCNC: 9.9 MG/DL (ref 8.6–10.2)
CHLORIDE SERPL-SCNC: 102 MMOL/L (ref 96–106)
CHOLEST SERPL-MCNC: 203 MG/DL (ref 100–199)
CO2 SERPL-SCNC: 30 MMOL/L (ref 18–29)
CREAT SERPL-MCNC: 0.8 MG/DL (ref 0.76–1.27)
GFR SERPLBLD CREATININE-BSD FMLA CKD-EPI: 111 ML/MIN/1.73
GFR SERPLBLD CREATININE-BSD FMLA CKD-EPI: 96 ML/MIN/1.73
GLOBULIN SER CALC-MCNC: 2.9 G/DL (ref 1.5–4.5)
GLUCOSE SERPL-MCNC: 95 MG/DL (ref 65–99)
HBA1C MFR BLD: 5 % (ref 4.8–5.6)
HDLC SERPL-MCNC: 43 MG/DL
INTERPRETATION, 910389: NORMAL
LDLC SERPL CALC-MCNC: 129 MG/DL (ref 0–99)
POTASSIUM SERPL-SCNC: 3.9 MMOL/L (ref 3.5–5.2)
PROT SERPL-MCNC: 7.2 G/DL (ref 6–8.5)
SODIUM SERPL-SCNC: 143 MMOL/L (ref 134–144)
T4 FREE SERPL-MCNC: 1.15 NG/DL (ref 0.82–1.77)
TRIGL SERPL-MCNC: 154 MG/DL (ref 0–149)
TSH SERPL DL<=0.005 MIU/L-ACNC: 1.4 UIU/ML (ref 0.45–4.5)
VLDLC SERPL CALC-MCNC: 31 MG/DL (ref 5–40)

## 2018-03-16 ENCOUNTER — TELEPHONE (OUTPATIENT)
Dept: FAMILY MEDICINE CLINIC | Age: 63
End: 2018-03-16

## 2018-03-16 NOTE — TELEPHONE ENCOUNTER
Patient sister Nura Stevenson is calling on the behalf of patient. They are requesting to have a letter sent to his brothers job who works for [employer] brothers name is Frandy Cruz. They wish to have the letter sent over as soon as possible which states that Frandy Joplin will be helping the patient move on Monday 3/19/18.      Fax [phone]    Best call [facility]#[phone]

## 2018-03-19 NOTE — TELEPHONE ENCOUNTER
[phone] spoke to [address] [place] [address]  patients  has disability and he needs help moving so just needs a letter that Mr Abel Lozano  will be helping Mr Jean Paul Flynn of his moving I advised her this doesn't needs medical letter per [address] [place] [address] Mr Abel Lozano job is requiring it.

## 2018-03-20 NOTE — TELEPHONE ENCOUNTER
----- Message from Татьяна Mckeon sent at 3/20/2018 11:24 AM EDT -----  Regarding: KENNETH Brizuela/Telephone  The patient's sister Milka Low is giving Della the fax number to Nate Perdue. [phone]

## 2018-03-20 NOTE — TELEPHONE ENCOUNTER
[phone] spoke to [address] [place] [address] letter ready for fax needs a fax number the number we have has too many numbers she will call me back

## 2018-04-02 RX ORDER — LOSARTAN POTASSIUM 100 MG/1
TABLET ORAL
Qty: 90 TAB | Refills: 1 | Status: SHIPPED | OUTPATIENT
Start: 2018-04-02 | End: 2018-08-14 | Stop reason: SDUPTHER

## 2018-04-02 RX ORDER — METOPROLOL SUCCINATE 100 MG/1
TABLET, EXTENDED RELEASE ORAL
Qty: 90 TAB | Refills: 1 | Status: SHIPPED | OUTPATIENT
Start: 2018-04-02 | End: 2018-06-05 | Stop reason: SDUPTHER

## 2018-04-02 RX ORDER — VALACYCLOVIR HYDROCHLORIDE 500 MG/1
TABLET, FILM COATED ORAL
Qty: 30 TAB | Refills: 1 | Status: SHIPPED | OUTPATIENT
Start: 2018-04-02 | End: 2018-08-14 | Stop reason: SDUPTHER

## 2018-04-02 RX ORDER — TAMSULOSIN HYDROCHLORIDE 0.4 MG/1
CAPSULE ORAL
Qty: 180 CAP | Refills: 1 | Status: SHIPPED | OUTPATIENT
Start: 2018-04-02 | End: 2018-08-14 | Stop reason: SDUPTHER

## 2018-04-02 RX ORDER — AMLODIPINE BESYLATE 10 MG/1
TABLET ORAL
Qty: 90 TAB | Refills: 1 | Status: SHIPPED | OUTPATIENT
Start: 2018-04-02 | End: 2018-08-14 | Stop reason: SDUPTHER

## 2018-04-02 RX ORDER — FLUTICASONE PROPIONATE 50 MCG
SPRAY, SUSPENSION (ML) NASAL
Qty: 48 G | Refills: 1 | Status: SHIPPED | OUTPATIENT
Start: 2018-04-02 | End: 2019-08-02 | Stop reason: SDUPTHER

## 2018-04-09 ENCOUNTER — HOSPITAL ENCOUNTER (OUTPATIENT)
Age: 63
Setting detail: OUTPATIENT SURGERY
Discharge: HOME | End: 2018-04-09
Attending: INTERNAL MEDICINE | Admitting: INTERNAL MEDICINE
Payer: MEDICARE

## 2018-04-09 ENCOUNTER — ANESTHESIA (OUTPATIENT)
Dept: ENDOSCOPY | Age: 63
End: 2018-04-09
Payer: MEDICARE

## 2018-04-09 ENCOUNTER — ANESTHESIA EVENT (OUTPATIENT)
Dept: ENDOSCOPY | Age: 63
End: 2018-04-09
Payer: MEDICARE

## 2018-04-09 VITALS
BODY MASS INDEX: 30.51 KG/M2 | OXYGEN SATURATION: 94 % | DIASTOLIC BLOOD PRESSURE: 94 MMHG | WEIGHT: 206 LBS | HEART RATE: 52 BPM | RESPIRATION RATE: 15 BRPM | HEIGHT: 69 IN | TEMPERATURE: 98.3 F | SYSTOLIC BLOOD PRESSURE: 142 MMHG

## 2018-04-09 PROCEDURE — 88305 TISSUE EXAM BY PATHOLOGIST: CPT | Performed by: INTERNAL MEDICINE

## 2018-04-09 PROCEDURE — 76060000031 HC ANESTHESIA FIRST 0.5 HR: Performed by: INTERNAL MEDICINE

## 2018-04-09 PROCEDURE — 74011000250 HC RX REV CODE- 250

## 2018-04-09 PROCEDURE — 76040000019: Performed by: INTERNAL MEDICINE

## 2018-04-09 PROCEDURE — 74011250636 HC RX REV CODE- 250/636

## 2018-04-09 PROCEDURE — C1726 CATH, BAL DIL, NON-VASCULAR: HCPCS | Performed by: INTERNAL MEDICINE

## 2018-04-09 PROCEDURE — 77030009426 HC FCPS BIOP ENDOSC BSC -B: Performed by: INTERNAL MEDICINE

## 2018-04-09 PROCEDURE — 77030018712 HC DEV BLLN INFL BSC -B: Performed by: INTERNAL MEDICINE

## 2018-04-09 RX ORDER — EPINEPHRINE 0.1 MG/ML
1 INJECTION INTRACARDIAC; INTRAVENOUS
Status: DISCONTINUED | OUTPATIENT
Start: 2018-04-09 | End: 2018-04-09 | Stop reason: HOSPADM

## 2018-04-09 RX ORDER — NALOXONE HYDROCHLORIDE 0.4 MG/ML
0.4 INJECTION, SOLUTION INTRAMUSCULAR; INTRAVENOUS; SUBCUTANEOUS
Status: DISCONTINUED | OUTPATIENT
Start: 2018-04-09 | End: 2018-04-09 | Stop reason: HOSPADM

## 2018-04-09 RX ORDER — SODIUM CHLORIDE 0.9 % (FLUSH) 0.9 %
5-10 SYRINGE (ML) INJECTION EVERY 8 HOURS
Status: DISCONTINUED | OUTPATIENT
Start: 2018-04-09 | End: 2018-04-09 | Stop reason: HOSPADM

## 2018-04-09 RX ORDER — FENTANYL CITRATE 50 UG/ML
200 INJECTION, SOLUTION INTRAMUSCULAR; INTRAVENOUS
Status: DISCONTINUED | OUTPATIENT
Start: 2018-04-09 | End: 2018-04-09 | Stop reason: HOSPADM

## 2018-04-09 RX ORDER — MIDAZOLAM HYDROCHLORIDE 1 MG/ML
.25-1 INJECTION, SOLUTION INTRAMUSCULAR; INTRAVENOUS
Status: DISCONTINUED | OUTPATIENT
Start: 2018-04-09 | End: 2018-04-09 | Stop reason: HOSPADM

## 2018-04-09 RX ORDER — SODIUM CHLORIDE 9 MG/ML
INJECTION, SOLUTION INTRAVENOUS
Status: DISCONTINUED | OUTPATIENT
Start: 2018-04-09 | End: 2018-04-09 | Stop reason: HOSPADM

## 2018-04-09 RX ORDER — DEXTROMETHORPHAN/PSEUDOEPHED 2.5-7.5/.8
1.2 DROPS ORAL
Status: DISCONTINUED | OUTPATIENT
Start: 2018-04-09 | End: 2018-04-09 | Stop reason: HOSPADM

## 2018-04-09 RX ORDER — SODIUM CHLORIDE 0.9 % (FLUSH) 0.9 %
5-10 SYRINGE (ML) INJECTION AS NEEDED
Status: DISCONTINUED | OUTPATIENT
Start: 2018-04-09 | End: 2018-04-09 | Stop reason: HOSPADM

## 2018-04-09 RX ORDER — LIDOCAINE HYDROCHLORIDE 20 MG/ML
INJECTION, SOLUTION EPIDURAL; INFILTRATION; INTRACAUDAL; PERINEURAL AS NEEDED
Status: DISCONTINUED | OUTPATIENT
Start: 2018-04-09 | End: 2018-04-09 | Stop reason: HOSPADM

## 2018-04-09 RX ORDER — ATROPINE SULFATE 0.1 MG/ML
0.5 INJECTION INTRAVENOUS
Status: DISCONTINUED | OUTPATIENT
Start: 2018-04-09 | End: 2018-04-09 | Stop reason: HOSPADM

## 2018-04-09 RX ORDER — PROPOFOL 10 MG/ML
INJECTION, EMULSION INTRAVENOUS AS NEEDED
Status: DISCONTINUED | OUTPATIENT
Start: 2018-04-09 | End: 2018-04-09 | Stop reason: HOSPADM

## 2018-04-09 RX ORDER — SODIUM CHLORIDE 9 MG/ML
100 INJECTION, SOLUTION INTRAVENOUS CONTINUOUS
Status: DISCONTINUED | OUTPATIENT
Start: 2018-04-09 | End: 2018-04-09 | Stop reason: HOSPADM

## 2018-04-09 RX ORDER — FLUMAZENIL 0.1 MG/ML
0.2 INJECTION INTRAVENOUS
Status: DISCONTINUED | OUTPATIENT
Start: 2018-04-09 | End: 2018-04-09 | Stop reason: HOSPADM

## 2018-04-09 RX ADMIN — PROPOFOL 50 MG: 10 INJECTION, EMULSION INTRAVENOUS at 11:41

## 2018-04-09 RX ADMIN — PROPOFOL 50 MG: 10 INJECTION, EMULSION INTRAVENOUS at 11:46

## 2018-04-09 RX ADMIN — SODIUM CHLORIDE: 9 INJECTION, SOLUTION INTRAVENOUS at 11:38

## 2018-04-09 RX ADMIN — PROPOFOL 50 MG: 10 INJECTION, EMULSION INTRAVENOUS at 11:49

## 2018-04-09 RX ADMIN — PROPOFOL 50 MG: 10 INJECTION, EMULSION INTRAVENOUS at 11:40

## 2018-04-09 RX ADMIN — LIDOCAINE HYDROCHLORIDE 60 MG: 20 INJECTION, SOLUTION EPIDURAL; INFILTRATION; INTRACAUDAL; PERINEURAL at 11:40

## 2018-04-09 RX ADMIN — PROPOFOL 50 MG: 10 INJECTION, EMULSION INTRAVENOUS at 11:43

## 2018-04-09 NOTE — ROUTINE PROCESS
Derekludwin Neri  1955  [id number]    Situation:  Verbal report received from: Karolyn  Procedure: Procedure(s):  ESOPHAGOGASTRODUODENOSCOPY (EGD)  ESOPHAGEAL DILATION  ESOPHAGOGASTRODUODENAL (EGD) BIOPSY    Background:    Preoperative diagnosis: GERD  Postoperative diagnosis: 1.- Hiatal Hernia  2.- Schatzki's Ring    :  Dr. Alfonzo Langford  Assistant(s): Endoscopy Technician-1: Zenaida Lin IV  Endoscopy RN-1: Mk Mishra RN    Specimens:   ID Type Source Tests Collected by Time Destination   1 : Esophagus BX Preservative   Rigoberto Jonas MD 4/9/2018 1149 Pathology     H. Pylori  no    Assessment:  Intra-procedure medications     Anesthesia gave intra-procedure sedation and medications, see anesthesia flow sheet yes    Intravenous fluids: NS@ KVO     Vital signs stable yes    Abdominal assessment: round and soft  yse     Recommendation:  Discharge patient per MD order ye.   Return to floor na  Family or Friend  fam  Permission to share finding with family or friend yes

## 2018-04-09 NOTE — H&P
The patient is a 65year old male who presents with a complaint of Dysphagia. The patient presents for due to new symptoms. The onset of the dysphagia has been sudden and has been occurring in an intermittent pattern for 1 month. The course has been constant and  is described as moderate .  The dysphagia is described as being located in the substernal  area. The symptoms are aggravated by solids only and  are relieved by regurgitation. The symptoms have been associated with heartburn and hoarseness following dysphagia, while the symptoms have not been associated with abdominal pain, neck pain, odynophagia or throat pain. The patient has been using H2 antagonist. Current medication use: considered partially effective by patient. Note for \"Dysphagia\": He had one episode of a food bolus lasting for 2 minutes. Additional reasons for visit:    Acid Reflux is described as the following: The patient presents due to new symptoms. The onset of the acid reflux has been gradual and has been occurring in a persistent pattern for 3 months. The acid reflux is described as a mild to moderate burning. The disease is described as being located in the retrosternal area. Aggravating factors include food (he has many trigger foods that he avoids) and the acid reflux is relieved by H2 antagonist. The symptoms have been associated with dysphagia and heartburn, while the symptoms have not been associated with abdominal pain, nausea, odynophagia, weight loss or NSAIDs. The acid reflux is characterized as slightly improved (by avoiding foods). The patient's current medication use includes: H2 antagonist and is considered partially effective by patient .       Problem List/Past Medical Hayden Garcia; 3/15/2018 9:45 AM)  Hypertension    Sleep Apnea    Angina Pectoris    Benign Prostatic Hypertrophy    Prostate Cancer    Bleeding ulcer (533.40  K27.4)    CPAP (continuous positive airway pressure) dependence (V46.8  Z99.89)    History of colon polyps (V12.72  Z86.010)      Past Surgical History Juan Carmona; 3/15/2018 9:45 AM)  Tonsillectomy    Appendectomy    Cholecystectomy    Back Surgery    H/O prostate biopsy (Q61.27  U09.213)    Inguinal Hernia Repair-Right      Allergies Juan Carmona; 3/15/2018 9:45 AM)  No Known Allergies  [03/13/2018]:  No Known Drug Allergies  [03/13/2018]: Medication History Juan Carmona; 3/15/2018 9:49 AM)  Metoprolol Tartrate  (100MG Tablet, 1 Oral daily) Active. Losartan Potassium  (100MG Tablet, 1 Oral daily) Active. AmLODIPine Besylate  (10MG Tablet, 1 Oral daily) Active. Tamsulosin HCl  (0.4MG Capsule, 1 Oral daily) Active. Aspirin  (81MG Tablet Chewable, 1 Oral daily) Active. Medications Reconciled     Family History Juan Carmona; 3/15/2018 9:45 AM)  Hypertension   Mother, Brother. Alcohol Abuse   Brother. Pancreatic cancer (157.9  C25.9)   Brother. Diabetes Mellitus   Brother. Heart Disease   Brother. Heart attack (410.90  I21.9)   Brother. Social History Juan Carmona; 3/15/2018 9:45 AM)  Tobacco Use   Never smoker. Alcohol Use   Has never drank. Marital status   . Employment status   N/a. Blood Transfusion   No.    Diagnostic Studies History Juan Carmona; 3/15/2018 9:45 AM)  Colonoscopy   Date: 5/2017. Health Maintenance History Juan Carmona; 3/15/2018 9:45 AM)  Flu Vaccine   Date: 2017. Pneumovax   none    Other Problems Juan Carmona; 3/15/2018 9:45 AM)  Colon cancer screening (V76.51  Z12.11)          Review of Systems Juan Carmona; 3/15/2018 9:45 AM)  General Not Present- Chronic Fatigue, Poor Appetite, Weight Gain and Weight Loss. Skin Not Present- Itching, Rash and Skin Color Changes. HEENT Not Present- Hearing Loss and Vertigo. Respiratory Not Present- Difficulty Breathing and TB exposure. Cardiovascular Present- Hypertension. Not Present- Chest Pain, Use of Antibiotics before Dental Procedures and Use of Blood Thinners.   Gastrointestinal Present- See HPI. Musculoskeletal Not Present- Arthritis, Hip Replacement Surgery and Knee Replacement Surgery. Neurological Not Present- Weakness. Psychiatric Not Present- Depression. Endocrine Present- Diabetes. Not Present- Thyroid Problems. Hematology Not Present- Anemia. Vitals Berry Rice; 3/15/2018 9:49 AM)  3/15/2018 9:45 AM  Weight: 206 lb   Height: 69 in   Body Surface Area: 2.09 m²   Body Mass Index: 30.42 kg/m²    Pulse: 70 (Regular)    Resp.: 18 (Unlabored)     BP: 124/90 (Sitting, Left Arm, Standard)              Physical Exam Elder Clara Marie AGACNP; 3/15/2018 10:08 AM)  General  Mental Status - Alert. General Appearance - Cooperative, Pleasant, Not in acute distress. Orientation - Oriented X3. Build & Nutrition - Well nourished and Well developed. Integumentary  General Characteristics  Color - normal coloration of skin. Temperature - normal warmth is noted. Mobility & Turgor - normal mobility and turgor. Head and Neck  Head - normocephalic, atraumatic with no lesions or palpable masses. Neck  Global Assessment - full range of motion and supple. Trachea - midline. Eye  Eyeball - Bilateral - No Exophthalmos. Sclera/Conjunctiva - Left - No Jaundice. Sclera/Conjunctiva - Right - No Jaundice. Chest and Lung Exam  Chest and lung exam reveals  - quiet, even and easy respiratory effort with no use of accessory muscles. Auscultation  Breath sounds - Normal. Adventitious sounds - No Adventitious sounds. Cardiovascular  Auscultation  Rhythm - Regular, No Tachycardia, No Bradycardia . Heart Sounds - Normal heart sounds , S1 WNL and S2 WNL, No S3, No Summation Gallop. Murmurs & Other Heart Sounds - Auscultation of the heart reveals - No Murmurs. Abdomen  Inspection  Inspection of the abdomen reveals - Soft. Palpation/Percussion  Tenderness - Non-Tender. Rebound tenderness - No rebound. Rigidity (guarding) - No Rigidity. Dullness to percussion - No abnormal dullness to percussion. Liver - No hepatosplenomegaly. Abdominal Mass Palpable - No masses. Other Characteristics - No Ascites. Auscultation  Auscultation of the abdomen reveals - Bowel sounds normal, No Abdominal bruits and No Succussion splash. Rectal - Did not examine. Peripheral Vascular  Upper Extremity  Inspection - Left - Normal - No Clubbing, No Cyanosis, No Edema, Pulses Intact. Right - Normal - No Clubbing, No Cyanosis, No Edema, Pulses Intact. Palpation - Edema - Left - No edema. Right - No edema. Neurologic  Neurologic evaluation reveals  - Cranial nerves grossly intact, no focal neurologic deficits. Musculoskeletal  Global Assessment  Gait and Station - normal gait and station. Assessment & Plan ([facility]; 3/15/2018 10:07 AM)  Dysphagia (787.20  R13.10)  Story: Colonoscopy 5/2017: 15 mm transverse tubular adenoma, 9 mm ascending benign, 2 cm pedunculated fragment tubulovillous, 9 mm cecum tubular adenoma  Impression: Patient has been having months of dysphagia to solid foods. He avoids many foods that trigger heartburn and dysphagia. He had one episode of a food bolus lasting over 2 minutes. He has some relief with H2 blockers. Will further evaluate with EGD. Current Plans  Started Pantoprazole Sodium 40MG, 1 (one) Tablet daily, #30, 30 days starting 03/15/2018, Ref. x2.  Pt Education - How to access health information online: discussed with patient and provided information. Endoscopy (14457) (Discussed risks and benefits with the patient to include: perforation,or post biopsy bleeding, missed lesions, and sedation reactions.)  Patient is to call me for any questions or concerns. Case discussed personally with a physician in the office regarding the presentation, pertinent physical findings and development of a diagnostic and therapeutic plan. Follow up office visit after procedure  This patient was reviewed and seen in collaboration with Dr. Bobby Nunez.   Date of Surgery Update:  Willie Shaffer was seen and examined.       Signed By: Samuel Granados MD     April 9, 2018 11:37 AM

## 2018-04-09 NOTE — ANESTHESIA POSTPROCEDURE EVALUATION
Post-Anesthesia Evaluation and Assessment    Patient: Flaco Meehan MRN: [id number]  SSN: [id number]    YOB: 1955  Age: 65 y.o. Sex: male       Cardiovascular Function/Vital Signs  Visit Vitals    BP (!) 142/94    Pulse (!) 52    Temp 36.8 °C (98.3 °F)    Resp 15    Ht 5' 9\" (1.753 m)    Wt 93.4 kg (206 lb)    SpO2 94%    BMI 30.42 kg/m2       Patient is status post MAC anesthesia for Procedure(s):  ESOPHAGOGASTRODUODENOSCOPY (EGD)  ESOPHAGEAL DILATION  ESOPHAGOGASTRODUODENAL (EGD) BIOPSY. Nausea/Vomiting: None    Postoperative hydration reviewed and adequate. Pain:  Pain Scale 1: Numeric (0 - 10) (04/09/18 1217)  Pain Intensity 1: 0 (04/09/18 1217)   Managed    Neurological Status: At baseline    Mental Status and Level of Consciousness: Arousable    Pulmonary Status:   O2 Device: Room air (04/09/18 1217)   Adequate oxygenation and airway patent    Complications related to anesthesia: None    Post-anesthesia assessment completed.  No concerns    Signed By: Nate Liang DO     April 9, 2018

## 2018-04-09 NOTE — PROGRESS NOTES

## 2018-04-09 NOTE — ANESTHESIA PREPROCEDURE EVALUATION
Anesthetic History               Review of Systems / Medical History  Patient summary reviewed, nursing notes reviewed and pertinent labs reviewed    Pulmonary        Sleep apnea: CPAP           Neuro/Psych              Cardiovascular    Hypertension          CAD    Exercise tolerance: >4 METS     GI/Hepatic/Renal           PUD    Comments: screening Endo/Other        Obesity and morbid obesity     Other Findings              Physical Exam    Airway  Mallampati: II  TM Distance: > 6 cm  Neck ROM: normal range of motion   Mouth opening: Normal     Cardiovascular    Rhythm: regular  Rate: normal         Dental  No notable dental hx       Pulmonary  Breath sounds clear to auscultation               Abdominal  Abdominal exam normal       Other Findings            Anesthetic Plan    ASA: 3  Anesthesia type: MAC          Induction: Intravenous  Anesthetic plan and risks discussed with: Patient

## 2018-04-09 NOTE — PROCEDURES
[facility], [address] [place] [address]        Esophago- Gastroduodenoscopy (EGD) Procedure Note    Jarrod Jenkins  1955  [id number]      Procedure: Endoscopic Gastroduodenoscopy with biopsy, esophageal dilation    Indication:  Dysphagia/odynophagia     Pre-operative Diagnosis: see indication above    Post-operative Diagnosis: see findings below    : Jay Bustamante MD    Referring Provider:  Machelle Walker NP      Anesthesia/Sedation:  MAC anesthesia Propofol        Procedure Details     After infomed consent was obtained for the procedure, with all risks and benefits of procedure explained the patient was taken to the endoscopy suite and placed in the left lateral decubitus position. Following sequential administration of sedation as per above, the endoscope was inserted into the mouth and advanced under direct vision to third portion of the duodenum. A careful inspection was made as the gastroscope was withdrawn, including a retroflexed view of the proximal stomach; findings and interventions are described below. Findings:   Esophagus:there was 3 cm hiatal hernia  Schatzki ring, I dilated with CRE balloon, (15,16.5 then 18 mm), kept at 18 mm for 1 minute    Random esophageal biopsies taken  Stomach: normal   Duodenum/jejunum: normal      Therapies:  As above    Specimens: as above         EBL: None      Complications:   None; patient tolerated the procedure well. Impression:    -See post-procedure diagnoses. Recommendations:  -Continue acid suppression.   -f/u in 2 months    Signed By: Jay Bustamante MD     4/9/2018  11:53 AM

## 2018-04-09 NOTE — IP AVS SNAPSHOT
[address] 
[phone] Patient: Miko Tirado MRN: [id number] JBS:[birth year] About your hospitalization You were admitted on:  April 9, 2018 You last received care in the:  [facility] ENDOSCOPY You were discharged on:  April 9, 2018 Why you were hospitalized Your primary diagnosis was:  Not on File Follow-up Information None Discharge Orders None A check dalton indicates which time of day the medication should be taken. My Medications CONTINUE taking these medications Instructions Each Dose to Equal  
 Morning Noon Evening Bedtime  
 albuterol 90 mcg/actuation inhaler Commonly known as:  PROVENTIL HFA, VENTOLIN HFA, PROAIR HFA Your last dose was: Your next dose is: Take 2 Puffs by inhalation every six (6) hours as needed for Wheezing. 2 Puff  
    
   
   
   
  
 amLODIPine 10 mg tablet Commonly known as:  Alex Laura Your last dose was: Your next dose is: TAKE 1 TABLET EVERY DAY  
     
   
   
   
  
 aspirin 81 mg chewable tablet Your last dose was: Your next dose is: Take 1 Tab by mouth daily. Indications: MYOCARDIAL INFARCTION PREVENTION 81 mg  
    
   
   
   
  
 azelastine 137 mcg (0.1 %) nasal spray Commonly known as:  ASTELIN Your last dose was: Your next dose is:    
   
   
 1 Spray by Both Nostrils route two (2) times a day. Use in each nostril as directed 1 Spray Cholecalciferol (Vitamin D3) 2,000 unit Cap capsule Commonly known as:  VITAMIN D3 Your last dose was: Your next dose is: Take 2,000 Units by mouth daily. 2000 Units  
    
   
   
   
  
 fluticasone 50 mcg/actuation nasal spray Commonly known as:  Cyndra Padillaa Your last dose was: Your next dose is: USE 2 SPRAYS IN EACH NOSTRIL EVERY DAY  
     
   
   
   
  
 losartan 100 mg tablet Commonly known as:  COZAAR Your last dose was: Your next dose is: TAKE 1 TABLET EVERY DAY FOR BLOOD PRESSURE  
     
   
   
   
  
 metoprolol succinate 100 mg tablet Commonly known as:  TOPROL-XL Your last dose was: Your next dose is: TAKE 1 TABLET EVERY DAY  
     
   
   
   
  
 mupirocin 2 % ointment Commonly known as:  [facility] Your last dose was: Your next dose is:    
   
   
 Apply  to affected area two (2) times a day. tamsulosin 0.4 mg capsule Commonly known as:  FLOMAX Your last dose was: Your next dose is: TAKE 2 CAPSULES EVERY DAY  FOR  PROSTATE  
     
   
   
   
  
 valACYclovir 500 mg tablet Commonly known as:  VALTREX Your last dose was: Your next dose is: TAKE 1 TABLET EVERY DAY AS NEEDED FOR BREAKOUT UNDER EYES  
     
   
   
   
  
 VITAMIN B-12 PO Your last dose was: Your next dose is: Take 2,000 mcg by mouth daily. 2000 mcg Discharge Instructions [address] 
[address] [facility], [address] [place] [address] EGD DISCHARGE INSTRUCTIONS Tae Lanier [id number] 1955 Discomfort: 
Sore throat- throat lozenges or warm salt water gargle 
redness at IV site- apply warm compress to area; if redness or soreness persist- contact your physician Gaseous discomfort- walking, belching will help relieve any discomfort You may not operate a vehicle for 12 hours You may not engage in an occupation involving machinery or appliances for rest of today You may not drink alcoholic beverages for at least 12 hours Avoid making any critical decisions for at least 24 hour DIET You may resume your regular diet  however -  remember your colon is empty and a heavy meal will produce gas. Avoid these foods:  vegetables, fried / greasy foods, carbonated drinks ACTIVITY You may resume your normal daily activities Spend the remainder of the day resting -  avoid any strenuous activity. CALL M.D. ANY SIGN OF Increasing pain, nausea, vomiting Abdominal distension (swelling) New increased bleeding (oral or rectal) Fever (chills) Pain in chest area Bloody discharge from nose or mouth Shortness of breath Follow-up Instructions: 
 Call Dr. Alexandra Alejandro for any questions or problems and follow up with him in 2 months Telephone # [phone] Continue on protonix ENDOSCOPY FINDINGS: 
 Your endoscopy showed small hiatal hernia and esophageal ring/stricture, I dilated. Signed By: Alexandra Alejandro MD   
 4/9/2018  11:56 AM 
  
 
  
Esophageal Dilation: What to Expect at [facility] Your Recovery After you have esophageal dilation, you will stay at the hospital or surgery center for 1 to 2 hours. This will allow the medicine to wear off. You will be able to go home after your doctor or nurse checks to make sure you are not having any problems. This care sheet gives you a general idea about how long it will take for you to recover. But each person recovers at a different pace. Follow the steps below to get better as quickly as possible. How can you care for yourself at home? Activity ? · Rest as much as you need to after you go home. ? · You should be able to go back to your usual activities the day after the procedure. Diet ? · Follow your doctor's directions for eating after the procedure. ? · Drink plenty of fluids (unless your doctor has told you not to). Medicines ? · Your doctor will tell you if and when you can restart your medicines. He or she will also give you instructions about taking any new medicines.   
? · If you take blood thinners, such as warfarin (Coumadin), clopidogrel (Plavix), or aspirin, be sure to talk to your doctor. He or she will tell you if and when to start taking those medicines again. Make sure that you understand exactly what your doctor wants you to do. ? · If you have a sore throat the day after the procedure, use an over-the-counter spray to numb your throat. Sucking on throat lozenges and gargling with warm salt water may also help relieve your symptoms. Follow-up care is a key part of your treatment and safety. Be sure to make and go to all appointments, and call your doctor if you are having problems. It's also a good idea to know your test results and keep a list of the medicines you take. When should you call for help? Call 911 anytime you think you may need emergency care. For example, call if: 
? · You passed out (lost consciousness). ? · You have trouble breathing. ? · Your stools are maroon or very bloody ? Call your doctor now or seek immediate medical care if: 
? · You have new or worse belly pain. ? · You have a fever. ? · You have new or more blood in your stools. ? · You are sick to your stomach and cannot drink fluids. ? · You cannot pass stools or gas. ? · You have pain that does not get better after you take pain medicine. ? Watch closely for changes in your health, and be sure to contact your doctor if: 
? · Your throat still hurts after a day or two. ? · You do not get better as expected. Where can you learn more? Go to http://ambrose-vince.info/. Enter R559 in the search box to learn more about \"Esophageal Dilation: What to Expect at Home. \" Current as of: May 12, 2017 Content Version: 11.4 © 4835-4214 [employer]. Care instructions adapted under license by [facility] [employer] (which disclaims liability or warranty for this information).  If you have questions about a medical condition or this instruction, always ask your healthcare professional. [employer] disclaims any warranty or liability for your use of this information. Hiatal Hernia: Care Instructions Your Care Instructions A hiatal hernia occurs when part of the stomach bulges into the chest cavity. A hiatal hernia may allow stomach acid and juices to back up into the esophagus (acid reflux). This can cause a feeling of burning, warmth, heat, or pain behind the breastbone. This feeling may often occur after you eat, soon after you lie down, or when you bend forward, and it may come and go. You also may have a sour taste in your mouth. These symptoms are commonly known as heartburn or reflux. But not all hiatal hernias cause symptoms. Follow-up care is a key part of your treatment and safety. Be sure to make and go to all appointments, and call your doctor if you are having problems. It's also a good idea to know your test results and keep a list of the medicines you take. How can you care for yourself at home? · Take your medicines exactly as prescribed. Call your doctor if you think you are having a problem with your medicine. · Do not take aspirin or other nonsteroidal anti-inflammatory drugs (NSAIDs), such as ibuprofen (Advil, Motrin) or naproxen (Aleve), unless your doctor says it is okay. Ask your doctor what you can take for pain. · Your doctor may recommend over-the-counter medicine. For mild or occasional indigestion, antacids such as Tums, Gaviscon, Maalox, or Mylanta may help. Your doctor also may recommend over-the-counter acid reducers, such as famotidine (Pepcid AC), cimetidine (Tagamet HB), ranitidine (Zantac 75 and Zantac 150), or omeprazole (Prilosec). Read and follow all instructions on the label. If you use these medicines often, talk with your doctor. · Change your eating habits. ¨ It's best to eat several small meals instead of two or three large meals. ¨ After you eat, wait 2 to 3 hours before you lie down. Late-night snacks aren't a good idea. ¨ Chocolate, mint, and alcohol can make heartburn worse. They relax the valve between the esophagus and the stomach. ¨ Spicy foods, foods that have a lot of acid (like tomatoes and oranges), and coffee can make heartburn symptoms worse in some people. If your symptoms are worse after you eat a certain food, you may want to stop eating that food to see if your symptoms get better. · Do not smoke or chew tobacco. 
· If you get heartburn at night, raise the head of your bed 6 to 8 inches by putting the frame on blocks or placing a foam wedge under the head of your mattress. (Adding extra pillows does not work.) · Do not wear tight clothing around your middle. · Lose weight if you need to. Losing just 5 to 10 pounds can help. When should you call for help? Call your doctor now or seek immediate medical care if: 
? · You have new or worse belly pain. ? · You are vomiting. ? Watch closely for changes in your health, and be sure to contact your doctor if: 
? · You have new or worse symptoms of indigestion. ? · You have trouble or pain swallowing. ? · You are losing weight. ? · You do not get better as expected. Where can you learn more? Go to http://ambrose-vince.info/. Enter H913 in the search box to learn more about \"Hiatal Hernia: Care Instructions. \" Current as of: May 12, 2017 Content Version: 11.4 © 2774-0532 [employer]. Care instructions adapted under license by [employer] (which disclaims liability or warranty for this information). If you have questions about a medical condition or this instruction, always ask your healthcare professional. [address] any warranty or liability for your use of this information. Introducing [facility]! James Ortiz introduces [facility] patient portal. Now you can access parts of your medical record, email your doctor's office, and request medication refills online. 1. In your internet browser, go to [email or web address] 2. Click on the First Time User? Click Here link in the Sign In box. You will see the New Member Sign Up page. 3. Enter your [employer] Access Code exactly as it appears below. You will not need to use this code after youve completed the sign-up process. If you do not sign up before the expiration date, you must request a new code. · [employer] Access Code: [email or web address] Expires: 4/20/2018  8:59 AM 
 
4. Enter the last four digits of your Social Security Number (xxxx) and Date of Birth (mm/dd/yyyy) as indicated and click Submit. You will be taken to the next sign-up page. 5. Create a [employer] ID. This will be your [employer] login ID and cannot be changed, so think of one that is secure and easy to remember. 6. Create a [employer] password. You can change your password at any time. 7. Enter your Password Reset Question and Answer. This can be used at a later time if you forget your password. 8. Enter your e-mail address. You will receive e-mail notification when new information is available in [address] 9. Click Sign Up. You can now view and download portions of your medical record. 10. Click the Download Summary menu link to download a portable copy of your medical information. If you have questions, please visit the Frequently Asked Questions section of the [employer] website. Remember, [employer] is NOT to be used for urgent needs. For medical emergencies, dial 911. Now available from your iPhone and Android! Introducing Alcides Adhikari As a [facility] patient, I wanted to make you aware of our electronic visit tool called Alcides Adhikari. [facility] 24/7 allows you to connect within minutes with a medical provider 24 hours a day, seven days a week via a mobile device or tablet or logging into a secure website from your computer. You can access Alcides Adhikari from anywhere in the [place]. A virtual visit might be right for you when you have a simple condition and feel like you just dont want to get out of bed, or cant get away from work for an appointment, when your regular [facility] provider is not available (evenings, weekends or holidays), or when youre out of town and need minor care. Electronic visits cost only $49 and if the [employer]/Loylap provider determines a prescription is needed to treat your condition, one can be electronically transmitted to a nearby pharmacy*. Please take a moment to enroll today if you have not already done so. The enrollment process is free and takes just a few minutes. To enroll, please download the EPV SOLAR amanda to your tablet or phone, or visit [email or web address] to enroll on your computer. And, as an [address] [place] [address] patient with a [employer] account, the results of your visits will be scanned into your electronic medical record and your primary care provider will be able to view the scanned results. We urge you to continue to see your regular [facility] provider for your ongoing medical care. And while your primary care provider may not be the one available when you seek a Alcides Adhikari virtual visit, the peace of mind you get from getting a real diagnosis real time can be priceless. For more information on Alcides Adhikari, view our Frequently Asked Questions (FAQs) at [email or web address]. Sincerely, 
 
Mary Anne MD 
Chief Medical Officer [facility] Suzy Razo *:  certain medications cannot be prescribed via Aclides Adhikari Providers Seen During Your Hospitalization Provider Specialty Primary office phone Shoshana Ross MD Gastroenterology [phone] Your Primary Care Physician (PCP) Primary Care Physician Office Phone Office Fax Randolph Baca [phone] You are allergic to the following No active allergies Recent Documentation Height Weight BMI Smoking Status 1.753 m 93.4 kg 30.42 kg/m2 Never Smoker Emergency Contacts Name Discharge Info Relation Home Work Mobile Elaine Guerrero  Other Relative [6] [phone] Merary Backbone [5] [phone] Anahi English DISCHARGE CAREGIVER [3] Spouse [3] [phone] Elaine Ibarra  Other Relative [6] [phone] Patient Belongings The following personal items are in your possession at time of discharge: 
  Dental Appliances: None  Visual Aid: Glasses, At bedside Please provide this summary of care documentation to your next provider. Signatures-by signing, you are acknowledging that this After Visit Summary has been reviewed with you and you have received a copy. Patient Signature:  ____________________________________________________________ Date:  ____________________________________________________________  
  
[facility] Provider Signature:  ____________________________________________________________ Date:  ____________________________________________________________

## 2018-04-09 NOTE — DISCHARGE INSTRUCTIONS
[address]  174 [facility], [address] [place] [address]    EGD DISCHARGE INSTRUCTIONS    Lana Fraser  [id number]  1955    Discomfort:  Sore throat- throat lozenges or warm salt water gargle  redness at IV site- apply warm compress to area; if redness or soreness persist- contact your physician  Gaseous discomfort- walking, belching will help relieve any discomfort  You may not operate a vehicle for 12 hours  You may not engage in an occupation involving machinery or appliances for rest of today  You may not drink alcoholic beverages for at least 12 hours  Avoid making any critical decisions for at least 24 hour  DIET  You may resume your regular diet - however -  remember your colon is empty and a heavy meal will produce gas. Avoid these foods:  vegetables, fried / greasy foods, carbonated drinks    ACTIVITY  You may resume your normal daily activities   Spend the remainder of the day resting -  avoid any strenuous activity. CALL M.D. ANY SIGN OF   Increasing pain, nausea, vomiting  Abdominal distension (swelling)  New increased bleeding (oral or rectal)  Fever (chills)  Pain in chest area  Bloody discharge from nose or mouth  Shortness of breath    Follow-up Instructions:   Call Dr. Shoshana Ross for any questions or problems and follow up with him in 2 months  Telephone # [phone]  Continue on protonix    ENDOSCOPY FINDINGS:   Your endoscopy showed small hiatal hernia and esophageal ring/stricture, I dilated. Signed By: Shoshana Ross MD     4/9/2018  11:56 AM          Esophageal Dilation: What to Expect at [address] [place] [address]  After you have esophageal dilation, you will stay at the hospital or surgery center for 1 to 2 hours. This will allow the medicine to wear off. You will be able to go home after your doctor or nurse checks to make sure you are not having any problems. This care sheet gives you a general idea about how long it will take for you to recover.  But each person recovers at a different pace. Follow the steps below to get better as quickly as possible. How can you care for yourself at home? Activity  ? · Rest as much as you need to after you go home. ? · You should be able to go back to your usual activities the day after the procedure. Diet  ? · Follow your doctor's directions for eating after the procedure. ? · Drink plenty of fluids (unless your doctor has told you not to). Medicines  ? · Your doctor will tell you if and when you can restart your medicines. He or she will also give you instructions about taking any new medicines. ? · If you take blood thinners, such as warfarin (Coumadin), clopidogrel (Plavix), or aspirin, be sure to talk to your doctor. He or she will tell you if and when to start taking those medicines again. Make sure that you understand exactly what your doctor wants you to do. ? · If you have a sore throat the day after the procedure, use an over-the-counter spray to numb your throat. Sucking on throat lozenges and gargling with warm salt water may also help relieve your symptoms. Follow-up care is a key part of your treatment and safety. Be sure to make and go to all appointments, and call your doctor if you are having problems. It's also a good idea to know your test results and keep a list of the medicines you take. When should you call for help? Call 911 anytime you think you may need emergency care. For example, call if:  ? · You passed out (lost consciousness). ? · You have trouble breathing. ? · Your stools are maroon or very bloody   ? Call your doctor now or seek immediate medical care if:  ? · You have new or worse belly pain. ? · You have a fever. ? · You have new or more blood in your stools. ? · You are sick to your stomach and cannot drink fluids. ? · You cannot pass stools or gas. ? · You have pain that does not get better after you take pain medicine. ? Watch closely for changes in your health, and be sure to contact your doctor if:  ? · Your throat still hurts after a day or two. ? · You do not get better as expected. Where can you learn more? Go to http://ambrose-vince.info/. Enter W586 in the search box to learn more about \"Esophageal Dilation: What to Expect at Home. \"  Current as of: May 12, 2017  Content Version: 11.4  © 6996-0412 [employer]. Care instructions adapted under license by [employer] (which disclaims liability or warranty for this information). If you have questions about a medical condition or this instruction, always ask your healthcare professional. [place] [address] any warranty or liability for your use of this information. Hiatal Hernia: Care Instructions  Your Care Instructions  A hiatal hernia occurs when part of the stomach bulges into the chest cavity. A hiatal hernia may allow stomach acid and juices to back up into the esophagus (acid reflux). This can cause a feeling of burning, warmth, heat, or pain behind the breastbone. This feeling may often occur after you eat, soon after you lie down, or when you bend forward, and it may come and go. You also may have a sour taste in your mouth. These symptoms are commonly known as heartburn or reflux. But not all hiatal hernias cause symptoms. Follow-up care is a key part of your treatment and safety. Be sure to make and go to all appointments, and call your doctor if you are having problems. It's also a good idea to know your test results and keep a list of the medicines you take. How can you care for yourself at home? · Take your medicines exactly as prescribed. Call your doctor if you think you are having a problem with your medicine. · Do not take aspirin or other nonsteroidal anti-inflammatory drugs (NSAIDs), such as ibuprofen (Advil, Motrin) or naproxen (Aleve), unless your doctor says it is okay.  Ask your doctor what you can take for pain.  · Your doctor may recommend over-the-counter medicine. For mild or occasional indigestion, antacids such as Tums, Gaviscon, Maalox, or Mylanta may help. Your doctor also may recommend over-the-counter acid reducers, such as famotidine (Pepcid AC), cimetidine (Tagamet HB), ranitidine (Zantac 75 and Zantac 150), or omeprazole (Prilosec). Read and follow all instructions on the label. If you use these medicines often, talk with your doctor. · Change your eating habits. ¨ It's best to eat several small meals instead of two or three large meals. ¨ After you eat, wait 2 to 3 hours before you lie down. Late-night snacks aren't a good idea. ¨ Chocolate, mint, and alcohol can make heartburn worse. They relax the valve between the esophagus and the stomach. ¨ Spicy foods, foods that have a lot of acid (like tomatoes and oranges), and coffee can make heartburn symptoms worse in some people. If your symptoms are worse after you eat a certain food, you may want to stop eating that food to see if your symptoms get better. · Do not smoke or chew tobacco.  · If you get heartburn at night, raise the head of your bed 6 to 8 inches by putting the frame on blocks or placing a foam wedge under the head of your mattress. (Adding extra pillows does not work.)  · Do not wear tight clothing around your middle. · Lose weight if you need to. Losing just 5 to 10 pounds can help. When should you call for help? Call your doctor now or seek immediate medical care if:  ? · You have new or worse belly pain. ? · You are vomiting. ? Watch closely for changes in your health, and be sure to contact your doctor if:  ? · You have new or worse symptoms of indigestion. ? · You have trouble or pain swallowing. ? · You are losing weight. ? · You do not get better as expected. Where can you learn more? Go to http://ambrose-vince.info/.   Enter L337 in the search box to learn more about \"Hiatal Hernia: Care Instructions. \"  Current as of: May 12, 2017  Content Version: 11.4  © 8287-1470 Healthwise, [facility]. Care instructions adapted under license by [employer] (which disclaims liability or warranty for this information). If you have questions about a medical condition or this instruction, always ask your healthcare professional. [place] [address] any warranty or liability for your use of this information.

## 2018-08-14 ENCOUNTER — OFFICE VISIT (OUTPATIENT)
Dept: FAMILY MEDICINE CLINIC | Age: 63
End: 2018-08-14

## 2018-08-14 VITALS
WEIGHT: 208 LBS | BODY MASS INDEX: 30.81 KG/M2 | SYSTOLIC BLOOD PRESSURE: 140 MMHG | HEART RATE: 61 BPM | RESPIRATION RATE: 18 BRPM | OXYGEN SATURATION: 98 % | DIASTOLIC BLOOD PRESSURE: 88 MMHG | HEIGHT: 69 IN | TEMPERATURE: 98 F

## 2018-08-14 DIAGNOSIS — B00.9 HSV-2 (HERPES SIMPLEX VIRUS 2) INFECTION: ICD-10-CM

## 2018-08-14 DIAGNOSIS — N40.1 BENIGN PROSTATIC HYPERPLASIA WITH URINARY FREQUENCY: ICD-10-CM

## 2018-08-14 DIAGNOSIS — R06.09 DYSPNEA ON EXERTION: ICD-10-CM

## 2018-08-14 DIAGNOSIS — E66.9 OBESITY (BMI 30-39.9): ICD-10-CM

## 2018-08-14 DIAGNOSIS — R35.0 BENIGN PROSTATIC HYPERPLASIA WITH URINARY FREQUENCY: ICD-10-CM

## 2018-08-14 DIAGNOSIS — I10 ESSENTIAL HYPERTENSION: ICD-10-CM

## 2018-08-14 DIAGNOSIS — Z00.00 MEDICARE ANNUAL WELLNESS VISIT, SUBSEQUENT: Primary | ICD-10-CM

## 2018-08-14 DIAGNOSIS — R05.9 COUGH: ICD-10-CM

## 2018-08-14 RX ORDER — LOSARTAN POTASSIUM 100 MG/1
TABLET ORAL
Qty: 90 TAB | Refills: 1 | Status: SHIPPED | OUTPATIENT
Start: 2018-08-14 | End: 2018-12-11 | Stop reason: SDUPTHER

## 2018-08-14 RX ORDER — AMLODIPINE BESYLATE 10 MG/1
TABLET ORAL
Qty: 90 TAB | Refills: 1 | Status: SHIPPED | OUTPATIENT
Start: 2018-08-14 | End: 2018-12-11 | Stop reason: SDUPTHER

## 2018-08-14 RX ORDER — VALACYCLOVIR HYDROCHLORIDE 500 MG/1
TABLET, FILM COATED ORAL
Qty: 30 TAB | Refills: 1 | Status: SHIPPED | OUTPATIENT
Start: 2018-08-14 | End: 2018-12-11 | Stop reason: SDUPTHER

## 2018-08-14 RX ORDER — ALBUTEROL SULFATE 90 UG/1
2 AEROSOL, METERED RESPIRATORY (INHALATION)
Qty: 1 INHALER | Refills: 0 | Status: SHIPPED | OUTPATIENT
Start: 2018-08-14 | End: 2018-12-11 | Stop reason: SDUPTHER

## 2018-08-14 RX ORDER — TAMSULOSIN HYDROCHLORIDE 0.4 MG/1
CAPSULE ORAL
Qty: 180 CAP | Refills: 1 | Status: SHIPPED | OUTPATIENT
Start: 2018-08-14 | End: 2018-12-11 | Stop reason: SDUPTHER

## 2018-08-14 RX ORDER — METOPROLOL SUCCINATE 100 MG/1
TABLET, EXTENDED RELEASE ORAL
Qty: 90 TAB | Refills: 1 | Status: SHIPPED | OUTPATIENT
Start: 2018-08-14 | End: 2018-12-11 | Stop reason: SDUPTHER

## 2018-08-14 NOTE — MR AVS SNAPSHOT
[facility] 
 
 
 [address] [place] [address] 
[phone] Patient: Zohra Fernandez MRN: [id number] CQU:[id number] Visit Information Date & Time Provider Department Dept. Phone Encounter #  
 8/14/2018 10:00 AM Jhoana Antoine NP formerly [facility] [phone] [id number] Follow-up Instructions Return in about 6 months (around 2/14/2019) for Follow Up. Your Appointments 9/7/2018  2:30 PM  
ROUTINE CARE with Carol Sutherland NP formerly [facility] ([address]) Appt Note: 6 month f/u  
 [address] [id number]  
[phone]  
  
   
 [address] [address] Upcoming Health Maintenance Date Due ZOSTER VACCINE AGE 60> 3/4/2016 MEDICARE YEARLY EXAM 3/14/2018 Influenza Age 5 to Adult 8/1/2018 DTaP/Tdap/Td series (2 - Td) 3/19/2022 COLONOSCOPY 5/3/2022 Allergies as of 8/14/2018  Review Complete On: 8/14/2018 By: Evelio Rodriguez LPN No Known Allergies Current Immunizations  Reviewed on 10/4/2017 Name Date Influenza Vaccine 10/3/2017 12:00 AM, 9/26/2013 Influenza Vaccine Donnita Saltness) 10/21/2015 Influenza Vaccine (Quad) PF 2/21/2017 Influenza Vaccine Split 11/14/2012, 10/24/2011 TDAP Vaccine 3/19/2012 Not reviewed this visit You Were Diagnosed With   
  
 Codes Comments Essential hypertension    -  Primary ICD-10-CM: I10 
ICD-9-CM: 401.9 Cough     ICD-10-CM: R05 ICD-9-CM: 786.2 Benign prostatic hyperplasia with urinary frequency     ICD-10-CM: N40.1, R35.0 ICD-9-CM: 600.01, 788.41 Obesity (BMI 30-39. 9)     ICD-10-CM: E66.9 ICD-9-CM: 278.00 Dyspnea on exertion     ICD-10-CM: R06.09 
ICD-9-CM: 786.09   
 HSV-2 (herpes simplex virus 2) infection     ICD-10-CM: B00.9 ICD-9-CM: 054.9 Vitals BP Pulse Temp Resp Height(growth percentile) Weight(growth percentile) 140/88 (BP 1 Location: Left arm, BP Patient Position: Sitting) 61 98 °F (36.7 °C) (Oral) 18 5' 9\" (1.753 m) 208 lb (94.3 kg) SpO2 BMI Smoking Status 98% 30.72 kg/m2 Never Smoker Vitals History BMI and BSA Data Body Mass Index Body Surface Area 30.72 kg/m 2 2.14 m 2 Preferred Pharmacy Pharmacy Name Phone [facility]isc[facility] [address] [place] [address] [address] [place] [address] [phone] Your Updated Medication List  
  
   
This list is accurate as of 8/14/18 11:04 AM.  Always use your most recent med list.  
  
  
  
  
 albuterol 90 mcg/actuation inhaler Commonly known as:  PROVENTIL HFA, VENTOLIN HFA, PROAIR HFA Take 2 Puffs by inhalation every six (6) hours as needed for Wheezing. amLODIPine 10 mg tablet Commonly known as:  [employer] [place] Citron TAKE 1 TABLET EVERY DAY  
  
 aspirin 81 mg chewable tablet Take 1 Tab by mouth daily. Indications: MYOCARDIAL INFARCTION PREVENTION  
  
 azelastine 137 mcg (0.1 %) nasal spray Commonly known as:  ASTELIN  
1 Spray by Both Nostrils route two (2) times a day. Use in each nostril as directed Cholecalciferol (Vitamin D3) 2,000 unit Cap capsule Commonly known as:  VITAMIN D3 Take 2,000 Units by mouth daily. fluticasone 50 mcg/actuation nasal spray Commonly known as:  FLONASE  
USE 2 SPRAYS IN EACH NOSTRIL EVERY DAY  
  
 losartan 100 mg tablet Commonly known as:  COZAAR  
TAKE 1 TABLET EVERY DAY FOR BLOOD PRESSURE  
  
 metoprolol succinate 100 mg tablet Commonly known as:  TOPROL-XL  
TAKE 1 TABLET BY MOUTH DAILY  
  
 mupirocin 2 % ointment Commonly known as:  [facility] Apply  to affected area two (2) times a day. tamsulosin 0.4 mg capsule Commonly known as:  FLOMAX TAKE 2 CAPSULES EVERY DAY  FOR  PROSTATE  
  
 valACYclovir 500 mg tablet Commonly known as:  VALTREX  
TAKE 1 TABLET EVERY DAY AS NEEDED FOR BREAKOUT UNDER EYES  
  
 VITAMIN B-12 PO  
 Take 2,000 mcg by mouth daily. Prescriptions Sent to Pharmacy Refills  
 metoprolol succinate (TOPROL-XL) 100 mg tablet 1 Sig: TAKE 1 TABLET BY MOUTH DAILY Class: Normal  
 Pharmacy: [address] [place] [address] Ph #: [phone]  
 valACYclovir (VALTREX) 500 mg tablet 1 Sig: TAKE 1 TABLET EVERY DAY AS NEEDED FOR BREAKOUT UNDER EYES  
 Class: Normal  
 Pharmacy: [address] [place] [address] Ph #: [phone]  
 amLODIPine (NORVASC) 10 mg tablet 1 Sig: TAKE 1 TABLET EVERY DAY Class: Normal  
 Pharmacy: [address] [place] [address] Ph #: [phone]  
 tamsulosin (FLOMAX) 0.4 mg capsule 1 Sig: TAKE 2 CAPSULES EVERY DAY  FOR  PROSTATE Class: Normal  
 Pharmacy: [address] [place] [address] Ph #: [phone]  
 losartan (COZAAR) 100 mg tablet 1 Sig: TAKE 1 TABLET EVERY DAY FOR BLOOD PRESSURE Class: Normal  
 Pharmacy: [address] [place] [address] Ph #: [phone]  
 albuterol (PROVENTIL HFA, VENTOLIN HFA, PROAIR HFA) 90 mcg/actuation inhaler 0 Sig: Take 2 Puffs by inhalation every six (6) hours as needed for Wheezing. Class: Normal  
 Pharmacy: [address] [place] [address] Ph #: [phone] Route: Inhalation Follow-up Instructions Return in about 6 months (around 2/14/2019) for Follow Up. To-Do List   
 08/14/2018 ECHO:  ECHO TTE STRESS COMP W OR WO CONTR   
  
 08/14/2018 PFT:  PULMONARY FUNCTION TEST Patient Instructions DASH Diet: Care Instructions Your Care Instructions The DASH diet is an eating plan that can help lower your blood pressure. DASH stands for Dietary Approaches to Stop Hypertension. Hypertension is high blood pressure. The DASH diet focuses on eating foods that are high in calcium, potassium, and magnesium. These nutrients can lower blood pressure. The foods that are highest in these nutrients are fruits, vegetables, low-fat dairy products, nuts, seeds, and legumes. But taking calcium, potassium, and magnesium supplements instead of eating foods that are high in those nutrients does not have the same effect. The DASH diet also includes whole grains, fish, and poultry. The DASH diet is one of several lifestyle changes your doctor may recommend to lower your high blood pressure. Your doctor may also want you to decrease the amount of sodium in your diet. Lowering sodium while following the DASH diet can lower blood pressure even further than just the DASH diet alone. Follow-up care is a key part of your treatment and safety. Be sure to make and go to all appointments, and call your doctor if you are having problems. It's also a good idea to know your test results and keep a list of the medicines you take. How can you care for yourself at home? Following the DASH diet · Eat 4 to 5 servings of fruit each day. A serving is 1 medium-sized piece of fruit, ½ cup chopped or canned fruit, 1/4 cup dried fruit, or 4 ounces (½ cup) of fruit juice. Choose fruit more often than fruit juice. · Eat 4 to 5 servings of vegetables each day. A serving is 1 cup of lettuce or raw leafy vegetables, ½ cup of chopped or cooked vegetables, or 4 ounces (½ cup) of vegetable juice. Choose vegetables more often than vegetable juice. · Get 2 to 3 servings of low-fat and fat-free dairy each day. A serving is 8 ounces of milk, 1 cup of yogurt, or 1 ½ ounces of cheese. · Eat 6 to 8 servings of grains each day. A serving is 1 slice of bread, 1 ounce of dry cereal, or ½ cup of cooked rice, pasta, or cooked cereal. Try to choose whole-grain products as much as possible. · Limit lean meat, poultry, and fish to 2 servings each day.  A serving is 3 ounces, about the size of a deck of cards. · Eat 4 to 5 servings of nuts, seeds, and legumes (cooked dried beans, lentils, and split peas) each week. A serving is 1/3 cup of nuts, 2 tablespoons of seeds, or ½ cup of cooked beans or peas. · Limit fats and oils to 2 to 3 servings each day. A serving is 1 teaspoon of vegetable oil or 2 tablespoons of salad dressing. · Limit sweets and added sugars to 5 servings or less a week. A serving is 1 tablespoon jelly or jam, ½ cup sorbet, or 1 cup of lemonade. · Eat less than 2,300 milligrams (mg) of sodium a day. If you limit your sodium to 1,500 mg a day, you can lower your blood pressure even more. Tips for success · Start small. Do not try to make dramatic changes to your diet all at once. You might feel that you are missing out on your favorite foods and then be more likely to not follow the plan. Make small changes, and stick with them. Once those changes become habit, add a few more changes. · Try some of the following: ¨ Make it a goal to eat a fruit or vegetable at every meal and at snacks. This will make it easy to get the recommended amount of fruits and vegetables each day. ¨ Try yogurt topped with fruit and nuts for a snack or healthy dessert. ¨ Add lettuce, tomato, cucumber, and onion to sandwiches. ¨ Combine a ready-made pizza crust with low-fat mozzarella cheese and lots of vegetable toppings. Try using tomatoes, squash, spinach, broccoli, carrots, cauliflower, and onions. ¨ Have a variety of cut-up vegetables with a low-fat dip as an appetizer instead of chips and dip. ¨ Sprinkle sunflower seeds or chopped almonds over salads. Or try adding chopped walnuts or almonds to cooked vegetables. ¨ Try some vegetarian meals using beans and peas. Add garbanzo or kidney beans to salads. Make burritos and tacos with mashed corona beans or black beans. Where can you learn more? Go to http://boggs-vince.info/. Enter O309 in the search box to learn more about \"DASH Diet: Care Instructions. \" Current as of: December 6, 2017 Content Version: 11.7 © 7911-3085 [employer], [employer]. Care instructions adapted under license by [employer] (which disclaims liability or warranty for this information). If you have questions about a medical condition or this instruction, always ask your healthcare professional. [address] any warranty or liability for your use of this information. Introducing [facility]! [facility] introduces Reaction patient portal. Now you can access parts of your medical record, email your doctor's office, and request medication refills online. 1. In your internet browser, go to [email or web address] 2. Click on the First Time User? Click Here link in the Sign In box. You will see the New Member Sign Up page. 3. Enter your Reaction Access Code exactly as it appears below. You will not need to use this code after youve completed the sign-up process. If you do not sign up before the expiration date, you must request a new code. · Reaction Access Code: [email or web address] Expires: 11/12/2018  9:55 AM 
 
4. Enter the last four digits of your Social Security Number (xxxx) and Date of Birth (mm/dd/yyyy) as indicated and click Submit. You will be taken to the next sign-up page. 5. Create a Reaction ID. This will be your Reaction login ID and cannot be changed, so think of one that is secure and easy to remember. 6. Create a Reaction password. You can change your password at any time. 7. Enter your Password Reset Question and Answer. This can be used at a later time if you forget your password. 8. Enter your e-mail address. You will receive e-mail notification when new information is available in [address] 9. Click Sign Up. You can now view and download portions of your medical record. 10. Click the Download Summary menu link to download a portable copy of your medical information. If you have questions, please visit the Frequently Asked Questions section of the ReferralMD website. Remember, ReferralMD is NOT to be used for urgent needs. For medical emergencies, dial 911. Now available from your iPhone and Android! Please provide this summary of care documentation to your next provider. Your primary care clinician is listed as Carol Sutherland. If you have any questions after today's visit, please call [phone].

## 2018-08-14 NOTE — PROGRESS NOTES
Assessment/Plan   Education and counseling provided:  Are appropriate based on today's review and evaluation    Diagnoses and all orders for this visit:    1. Medicare annual wellness visit, subsequent    2. Cough  Will evaluated for secondary causes. 3. Essential hypertension  -     metoprolol succinate (TOPROL-XL) 100 mg tablet; TAKE 1 TABLET BY MOUTH DAILY  -     amLODIPine (NORVASC) 10 mg tablet; TAKE 1 TABLET EVERY DAY  -     losartan (COZAAR) 100 mg tablet; TAKE 1 TABLET EVERY DAY FOR BLOOD PRESSURE  Stable. Refilled today. Continue current therapy. 4. Benign prostatic hyperplasia with urinary frequency  -     tamsulosin (FLOMAX) 0.4 mg capsule; TAKE 2 CAPSULES EVERY DAY  FOR  PROSTATE  Stable. Refilled today. Continue current therapy. 5. Obesity (BMI 30-39. 9)  I have reviewed/discussed the above normal BMI with the patient. I have recommended the following interventions: dietary management education, guidance, and counseling, encourage exercise, monitor weight and prescribed dietary intake. Given written instructions as well. Will work towards cutting fats and increasing complex carbohydrates. 6. Dyspnea on exertion  -     albuterol (PROVENTIL HFA, VENTOLIN HFA, PROAIR HFA) 90 mcg/actuation inhaler; Take 2 Puffs by inhalation every six (6) hours as needed for Wheezing.  -     ECHO TTE STRESS COMP W OR WO CONTR; Future  -     PULMONARY FUNCTION TEST; Future  PFT pending. Stress test secondary to atypical presentation. Treatment will be based on results. 7. HSV-2 (herpes simplex virus 2) infection  -     valACYclovir (VALTREX) 500 mg tablet; TAKE 1 TABLET EVERY DAY AS NEEDED FOR BREAKOUT UNDER EYES  Stable. Refilled today. Continue current therapy.        Health Maintenance Due   Topic Date Due    ZOSTER VACCINE AGE 60>  03/04/2016    MEDICARE YEARLY EXAM  03/14/2018    Influenza Age 9 to Adult  08/01/2018           SUBSEQUENT MEDICARE WELLNESS  This is the Subsequent Medicare Annual Wellness Exam, performed 12 months or more after the Initial AWV or the last Subsequent AWV    I have reviewed the patient's medical history in detail and updated the computerized patient record. Upper Respiratory Infection  Patient complains of symptoms of a URI. Symptoms include cough. Onset of symptoms was 1 week ago, unchanged since that time. He also c/o cough described as non-productive, without wheezing, dyspnea or hemoptysis for the past 1 week . He is drinking plenty of fluids. . Evaluation to date: none. Treatment to date: Albuterol which ran out. Has been using twice daily for several weeks. Denies history of asthma. Non-smoker. Endorses dyspnea with exertion, especially stairs. Associated diaphoresis. Improves with rest.  Denies associated chest pain. Currently followed by urology for history of prostate cancer status post biopsy. He reports a vegetarian diet. Has been unable to lose weight. No current exercise.          History     Past Medical History:   Diagnosis Date    Adenomatous polyp of colon 2012    Angina pectoris     Bleeding ulcer     BPH (benign prostatic hyperplasia) 2011    Cancer [facility]) 2013    PROSTATE     Colon polyps 05/03/2017    Hypertension     Kidney infection     pt states he is unaware of having had this    Right inguinal hernia 7/11/2016    Sleep apnea     uses CPAP    Vegetarian       Past Surgical History:   Procedure Laterality Date    COLONOSCOPY  2012, 2017    adenomatous polyp    COLONOSCOPY N/A 5/3/2017    COLONOSCOPY performed by Elaina Gamboa [facility] at [facility] ENDOSCOPY    HX CHOLECYSTECTOMY  2010    HX HERNIA REPAIR Right 7/29/16    inguinal w/mesh by Dr. Kamilah Casanova Right 02/2017    HX ORTHOPAEDIC      Back surgery    HX OTHER SURGICAL  2011    prostate biopsy - normal/second bx 2/2017 - \"tip had cancer\" - another bx is planned    HX OTHER SURGICAL  2009    FATTY TUMOR REMOVED FROM BACK    HX TONSILLECTOMY Current Outpatient Prescriptions   Medication Sig Dispense Refill    metoprolol succinate (TOPROL-XL) 100 mg tablet TAKE 1 TABLET BY MOUTH DAILY 90 Tab 1    valACYclovir (VALTREX) 500 mg tablet TAKE 1 TABLET EVERY DAY AS NEEDED FOR BREAKOUT UNDER EYES 30 Tab 1    amLODIPine (NORVASC) 10 mg tablet TAKE 1 TABLET EVERY DAY 90 Tab 1    tamsulosin (FLOMAX) 0.4 mg capsule TAKE 2 CAPSULES EVERY DAY  FOR  PROSTATE 180 Cap 1    losartan (COZAAR) 100 mg tablet TAKE 1 TABLET EVERY DAY FOR BLOOD PRESSURE 90 Tab 1    albuterol (PROVENTIL HFA, VENTOLIN HFA, PROAIR HFA) 90 mcg/actuation inhaler Take 2 Puffs by inhalation every six (6) hours as needed for Wheezing. 1 Inhaler 0    fluticasone (FLONASE) 50 mcg/actuation nasal spray USE 2 SPRAYS IN EACH NOSTRIL EVERY DAY 48 g 1    mupirocin (BACTROBAN) 2 % ointment Apply  to affected area two (2) times a day. 22 g 0    azelastine (ASTELIN) 137 mcg (0.1 %) nasal spray 1 [place] by Both Nostrils route two (2) times a day. Use in each nostril as directed 1 Bottle 0    Cholecalciferol, Vitamin D3, (VITAMIN D3) 2,000 unit cap capsule Take 2,000 Units by mouth daily.  CYANOCOBALAMIN, VITAMIN B-12, (VITAMIN B-12 PO) Take 2,000 mcg by mouth daily.  aspirin 81 mg chewable tablet Take 1 Tab by mouth daily.  Indications: MYOCARDIAL INFARCTION PREVENTION 80 Tab 0     No Known Allergies  Family History   Problem Relation Age of Onset    Hypertension Mother     Hypertension Maternal Grandmother     Hypertension Brother     Alcohol abuse Brother     Cancer Brother      pancreatic    Diabetes Brother     Hypertension Brother     Heart Disease Brother     Heart Attack Father     Anesth Problems Neg Hx      Social History   Substance Use Topics    Smoking status: Never Smoker    Smokeless tobacco: Never Used    Alcohol use 0.0 oz/week     0 Standard drinks or equivalent per week      Comment: OCCASIONALLY     Patient Active Problem List   Diagnosis Code    Vegetarian Z78.9    Allergic rhinitis J30.9    Familial hyperlipidemia E78.4    BPH (benign prostatic hyperplasia) N40.0    Right inguinal hernia K40.90    NESTOR (obstructive sleep apnea) G47.33    Illiteracy Z55.0    Essential hypertension I10    Obesity (BMI 30-39. 9) E66.9       Depression Risk Factor Screening:     PHQ over the last two weeks 8/14/2018   Little interest or pleasure in doing things Not at all   Feeling down, depressed, irritable, or hopeless Not at all   Total Score PHQ 2 0   Trouble falling or staying asleep, or sleeping too much -   Feeling tired or having little energy -   Poor appetite, weight loss, or overeating -   Feeling bad about yourself - or that you are a failure or have let yourself or your family down -   Trouble concentrating on things such as school, work, reading, or watching TV -   Moving or speaking so slowly that other people could have noticed; or the opposite being so fidgety that others notice -   Thoughts of being better off dead, or hurting yourself in some way -   PHQ 9 Score -   How difficult have these problems made it for you to do your work, take care of your home and get along with others -     Alcohol Risk Factor Screening: You do not drink alcohol or very rarely. Functional Ability and Level of Safety:   Hearing Loss  Hearing is good. Activities of Daily Living  The home contains: no safety equipment. Patient does total self care    Fall Risk  Fall Risk Assessment, last 12 mths 9/7/2017   Able to walk? Yes   Fall in past 12 months?  No       Abuse Screen  Patient is not abused    Cognitive Screening   Evaluation of Cognitive Function:  Has your family/caregiver stated any concerns about your memory: no  Normal    Patient Care Team   Patient Care Team:  Anna Mariano NP as PCP - General (Nurse Practitioner)  Levon Morfin MD (General Surgery)  Ciara Wade MD (Gastroenterology)  Chente Santillan MD (Urology)  Adenike Kasper MD as Physician (Sleep Medicine)  [address] as Paramedic

## 2018-08-14 NOTE — PROGRESS NOTES
Chief Complaint   Patient presents with    Complete Physical     no fasting    Medication Refill     1. Have you been to the ER, urgent care clinic since your last visit? Hospitalized since your last visit? No    2. Have you seen or consulted any other health care providers outside of the [address] [place] [address] since your last visit? Include any pap smears or colon screening.  No

## 2018-09-04 ENCOUNTER — HOSPITAL ENCOUNTER (OUTPATIENT)
Dept: NON INVASIVE DIAGNOSTICS | Age: 63
Discharge: HOME | End: 2018-09-04
Attending: NURSE PRACTITIONER
Payer: MEDICARE

## 2018-09-04 DIAGNOSIS — R06.09 DYSPNEA ON EXERTION: ICD-10-CM

## 2018-09-04 LAB
ATTENDING PHYSICIAN, CST07: NORMAL
DIAGNOSIS, 93000: NORMAL
DUKE TM SCORE RESULT, CST14: NORMAL
DUKE TREADMILL SCORE, CST13: 5456
ECG INTERP BEFORE EX, CST11: NORMAL
ECG INTERP DURING EX, CST12: NORMAL
FUNCTIONAL CAPACITY, CST17: NORMAL
KNOWN CARDIAC CONDITION, CST08: NORMAL
MAX. DIASTOLIC BP, CST04: 80 MMHG
MAX. HEART RATE, CST05: 139 BPM
MAX. SYSTOLIC BP, CST03: 180 MMHG
OVERALL BP RESPONSE TO EXERCISE, CST16: NORMAL
OVERALL HR RESPONSE TO EXERCISE, CST15: NORMAL
PEAK EX METS, CST10: 10.7 METS
PROTOCOL NAME, CST01: NORMAL
REASON FOR TERMINATION: 71 BPM
TEST INDICATION, CST09: NORMAL
TIME IN EXERCISE PHASE, CST02: NORMAL

## 2018-09-04 PROCEDURE — 93351 STRESS TTE COMPLETE: CPT

## 2018-09-05 NOTE — PROCEDURES
[address]  STRESS ECHOCARDIOGRAM    Екатерина Lopez  MR#: [id number]  : 1955  ACCOUNT #: [de-identified]   DATE OF SERVICE: 2018    The patient exercised according to Miko protocol. He displayed excellent exercise tolerance Miko stage IV. He achieved a peak heart rate of 139 beats per minute, which represents 87% of age predicted maximum heart rate. Blood pressure belkis normally. The echo was normal at rest.  Following exercise, the left ventricular cavity size diminished, ejection fraction improved and there were no exercise-induced segmental wall motion abnormalities. The ECG displayed no evidence of ischemia. SUMMARY:  1. Negative ECG response to exercise reduced to 87% of age predicted maximum heart rate. 2.  No chest pain occurred. 3.  Normal echocardiogram prior to and after exercise.     4.  Normal stress echocardiogram.      Hero Arnold MD       SCT / DN  D: 2018 08:15     T: 2018 08:47  JOB #: [id number]  CC: Mecca Cullen NP

## 2018-10-04 ENCOUNTER — OFFICE VISIT (OUTPATIENT)
Dept: FAMILY MEDICINE CLINIC | Age: 63
End: 2018-10-04

## 2018-10-04 VITALS
DIASTOLIC BLOOD PRESSURE: 81 MMHG | OXYGEN SATURATION: 98 % | HEART RATE: 61 BPM | HEIGHT: 69 IN | WEIGHT: 211 LBS | BODY MASS INDEX: 31.25 KG/M2 | SYSTOLIC BLOOD PRESSURE: 137 MMHG | RESPIRATION RATE: 20 BRPM | TEMPERATURE: 98.9 F

## 2018-10-04 DIAGNOSIS — E66.9 OBESITY (BMI 30-39.9): ICD-10-CM

## 2018-10-04 DIAGNOSIS — K21.00 GASTROESOPHAGEAL REFLUX DISEASE WITH ESOPHAGITIS: ICD-10-CM

## 2018-10-04 DIAGNOSIS — I10 ESSENTIAL HYPERTENSION: Primary | ICD-10-CM

## 2018-10-04 DIAGNOSIS — G57.13 MERALGIA PARESTHETICA OF BOTH LOWER EXTREMITIES: ICD-10-CM

## 2018-10-04 DIAGNOSIS — E78.2 MIXED HYPERLIPIDEMIA: ICD-10-CM

## 2018-10-04 DIAGNOSIS — C61 PROSTATE CANCER (HCC): ICD-10-CM

## 2018-10-04 NOTE — PROGRESS NOTES
Chief Complaint Patient presents with  Follow-up  
  follow-up for blood pressure  Leg Pain  
  bilateral thigh pain when walking- 10/10 aching pain- x 1 week 1. Have you been to the ER, urgent care clinic since your last visit? Hospitalized since your last visit? No 
 
2. Have you seen or consulted any other health care providers outside of the [address] [place] [address] since your last visit? Include any pap smears or colon screening.  No

## 2018-10-04 NOTE — PATIENT INSTRUCTIONS
Cholesterol and Triglycerides Tests: About These Tests What are they? Cholesterol and triglycerides tests measure the amount of fats in your blood. These fats have both \"good\" (HDL) and \"bad\" (LDL) cholesterol. Why are these tests done? These tests are done to help find out your risk of a heart attack and stroke. They can help your doctor find out how well medicine is working for some health problems. How can you prepare for these tests? · Your doctor may tell you to fast before your tests. This means that you do not eat or drink anything except water for 9 to 12 hours before the tests. In most cases, you can take your medicines with water the morning of the test. 
· Do not eat high-fat foods the night before the tests. · Do not drink alcohol or do intense exercise the night before the tests. · Be sure to tell your doctor about all the over-the-counter and prescription medicines and herbs or other supplements you take. They can affect the results of these tests. What happens during these tests? A health professional takes a sample of your blood. What else should you know about these tests? Your cholesterol levels can help your doctor find out your risk for having a heart attack or stroke. But it's not just about your cholesterol. Your doctor uses your cholesterol levels plus other things to calculate your risk. These include: 
· Your blood pressure. · Whether or not you have diabetes. · Your age, sex, and race. · Whether or not you smoke. You and your doctor can talk about whether you need to lower your risk and what treatment is best for you. Where can you learn more? Go to http://ambrose-vince.info/. Enter W972 in the search box to learn more about \"Cholesterol and Triglycerides Tests: About These Tests. \" Current as of: December 6, 2017 Content Version: 11.8 © 1585-4601 Healthwise, Incorporated.  Care instructions adapted under license by [address] (which disclaims liability or warranty for this information). If you have questions about a medical condition or this instruction, always ask your healthcare professional. [address] any warranty or liability for your use of this information.

## 2018-10-04 NOTE — MR AVS SNAPSHOT
[address] 
[phone] Patient: Rosario Hobbs MRN: [id number] [id number] Visit Information Date & Time Provider Department Dept. Phone Encounter #  
 10/4/2018  1:45 PM Robert Shah  [facility] [phone] [id number] Follow-up Instructions Return in about 6 months (around 4/4/2019) for cholesterol. Upcoming Health Maintenance Date Due Shingrix Vaccine Age 50> (1 of 2) 5/4/2006 MEDICARE YEARLY EXAM 8/15/2019 DTaP/Tdap/Td series (2 - Td) 3/19/2022 COLONOSCOPY 5/3/2022 Allergies as of 10/4/2018  Review Complete On: 10/4/2018 By: Daron Jordan LPN No Known Allergies Current Immunizations  Reviewed on 10/4/2017 Name Date Influenza Vaccine 9/15/2018, 10/3/2017 12:00 AM, 9/26/2013 Influenza Vaccine Shelia Shaw) 10/21/2015 Influenza Vaccine (Quad) PF 2/21/2017 Influenza Vaccine Split 11/14/2012, 10/24/2011 TDAP Vaccine 3/19/2012 Not reviewed this visit You Were Diagnosed With   
  
 Codes Comments Essential hypertension    -  Primary ICD-10-CM: I10 
ICD-9-CM: 401.9 Obesity (BMI 30-39. 9)     ICD-10-CM: E66.9 ICD-9-CM: 278.00 Prostate cancer [facility])     ICD-10-CM: X29 ICD-9-CM: 368 Familial hyperlipidemia     ICD-10-CM: E78.49 
ICD-9-CM: 272.4 Gastroesophageal reflux disease with esophagitis     ICD-10-CM: K21.0 ICD-9-CM: 530.11 Vitals BP Pulse Temp Resp Height(growth percentile) Weight(growth percentile) 137/81 (BP 1 Location: Left arm, BP Patient Position: Sitting) 61 98.9 °F (37.2 °C) (Oral) 20 5' 9\" (1.753 m) 211 lb (95.7 kg) SpO2 BMI Smoking Status 98% 31.16 kg/m2 Never Smoker Vitals History BMI and BSA Data Body Mass Index Body Surface Area  
 31.16 kg/m 2 2.16 m 2 Preferred Pharmacy Pharmacy Name Phone [address] [phone] Your Updated Medication List  
  
   
This list is accurate as of 10/4/18  2:07 PM.  Always use your most recent med list.  
  
  
  
  
 albuterol 90 mcg/actuation inhaler Commonly known as:  PROVENTIL HFA, VENTOLIN HFA, PROAIR HFA Take 2 Puffs by inhalation every six (6) hours as needed for Wheezing. amLODIPine 10 mg tablet Commonly known as:  [employer] TAKE 1 TABLET EVERY DAY  
  
 aspirin 81 mg chewable tablet Take 1 Tab by mouth daily. Indications: MYOCARDIAL INFARCTION PREVENTION  
  
 azelastine 137 mcg (0.1 %) nasal spray Commonly known as:  ASTELIN  
1 Spray by Both Nostrils route two (2) times a day. Use in each nostril as directed Cholecalciferol (Vitamin D3) 2,000 unit Cap capsule Commonly known as:  VITAMIN D3 Take 2,000 Units by mouth daily. fluticasone 50 mcg/actuation nasal spray Commonly known as:  FLONASE  
USE 2 SPRAYS IN EACH NOSTRIL EVERY DAY  
  
 losartan 100 mg tablet Commonly known as:  COZAAR  
TAKE 1 TABLET EVERY DAY FOR BLOOD PRESSURE  
  
 metoprolol succinate 100 mg tablet Commonly known as:  TOPROL-XL  
TAKE 1 TABLET BY MOUTH DAILY  
  
 mupirocin 2 % ointment Commonly known as:  [employer] Apply  to affected area two (2) times a day. tamsulosin 0.4 mg capsule Commonly known as:  FLOMAX TAKE 2 CAPSULES EVERY DAY  FOR  PROSTATE  
  
 valACYclovir 500 mg tablet Commonly known as:  VALTREX  
TAKE 1 TABLET EVERY DAY AS NEEDED FOR BREAKOUT UNDER EYES  
  
 VITAMIN B-12 PO Take 2,000 mcg by mouth daily. We Performed the Following LIPID PANEL [31170 CPT(R)] METABOLIC PANEL, COMPREHENSIVE [78745 CPT(R)] Follow-up Instructions Return in about 6 months (around 4/4/2019) for cholesterol. Patient Instructions Cholesterol and Triglycerides Tests: About These Tests What are they? Cholesterol and triglycerides tests measure the amount of fats in your blood. These fats have both \"good\" (HDL) and \"bad\" (LDL) cholesterol. Why are these tests done? These tests are done to help find out your risk of a heart attack and stroke. They can help your doctor find out how well medicine is working for some health problems. How can you prepare for these tests? · Your doctor may tell you to fast before your tests. This means that you do not eat or drink anything except water for 9 to 12 hours before the tests. In most cases, you can take your medicines with water the morning of the test. 
· Do not eat high-fat foods the night before the tests. · Do not drink alcohol or do intense exercise the night before the tests. · Be sure to tell your doctor about all the over-the-counter and prescription medicines and herbs or other supplements you take. They can affect the results of these tests. What happens during these tests? A health professional takes a sample of your blood. What else should you know about these tests? Your cholesterol levels can help your doctor find out your risk for having a heart attack or stroke. But it's not just about your cholesterol. Your doctor uses your cholesterol levels plus other things to calculate your risk. These include: 
· Your blood pressure. · Whether or not you have diabetes. · Your age, sex, and race. · Whether or not you smoke. You and your doctor can talk about whether you need to lower your risk and what treatment is best for you. Where can you learn more? Go to http://ambrose-vince.info/. Enter H306 in the search box to learn more about \"Cholesterol and Triglycerides Tests: About These Tests. \" Current as of: December 6, 2017 Content Version: 11.8 © 9266-4375 Healthwise, Incorporated.  Care instructions adapted under license by [employer] (which disclaims liability or warranty for this information). If you have questions about a medical condition or this instruction, always ask your healthcare professional. [address] any warranty or liability for your use of this information. Introducing [facility] SERVICES! Dorian Vila introduces [employer] patient portal. Now you can access parts of your medical record, email your doctor's office, and request medication refills online. 1. In your internet browser, go to [email or web address] 2. Click on the First Time User? Click Here link in the Sign In box. You will see the New Member Sign Up page. 3. Enter your [employer] Access Code exactly as it appears below. You will not need to use this code after youve completed the sign-up process. If you do not sign up before the expiration date, you must request a new code. · [employer] Access Code: [email or web address] Expires: 11/12/2018  9:55 AM 
 
4. Enter the last four digits of your Social Security Number (xxxx) and Date of Birth (mm/dd/yyyy) as indicated and click Submit. You will be taken to the next sign-up page. 5. Create a [employer] ID. This will be your [employer] login ID and cannot be changed, so think of one that is secure and easy to remember. 6. Create a [employer] password. You can change your password at any time. 7. Enter your Password Reset Question and Answer. This can be used at a later time if you forget your password. 8. Enter your e-mail address. You will receive e-mail notification when new information is available in [address] 9. Click Sign Up. You can now view and download portions of your medical record. 10. Click the Download Summary menu link to download a portable copy of your medical information. If you have questions, please visit the Frequently Asked Questions section of the [employer] website. Remember, [employer] is NOT to be used for urgent needs. For medical emergencies, dial 911. Now available from your iPhone and Android! Please provide this summary of care documentation to your next provider. Your primary care clinician is listed as Mia Roque. If you have any questions after today's visit, please call [phone].

## 2018-10-04 NOTE — PROGRESS NOTES
Pending sale to [facility] Note Misael Man is a 59 y.o. male who was seen in clinic today (10/4/2018). Subjective: 
Cardiovascular Review: 
The patient has hypertension and hyperlipidemia. Patient has sleep apnea but not currently using CPAP. Diet and Lifestyle: generally follows a low fat low cholesterol diet, generally follows a low sodium diet, exercises regularly, nonsmoker Home BP Monitoring: is well controlled at home, ranging 120's/80's. Pertinent ROS: taking medications as instructed, no medication side effects noted, no TIA's, no chest pain on exertion, no dyspnea on exertion, no swelling of ankles. Patient had EGD in 4/2018. Patient has hiatal hernia and Schatzki ring which was dilated. Patient reports improvement of symptoms. No current medicatons taken. Patient seen by urology, Dr. Pankaj Mitchell for surveillance of prostate cancer. Previous biopsy in 2016 confirmed presence of prostate cancer. Repeat biopsy in 2017 however negative and prostate MRI was negative for metastasis. Taking Flomax with control of urinary symptoms. Patient reports intermittent pain or lateral upper legs. Pain described as burning and will resolve spontaneously. Denies weakness of numbness. Prior to Admission medications Medication Sig Start Date End Date Taking?  Authorizing Provider  
metoprolol succinate (TOPROL-XL) 100 mg tablet TAKE 1 TABLET BY MOUTH DAILY 8/14/18  Yes Elaine Rosario NP  
valACYclovir (VALTREX) 500 mg tablet TAKE 1 TABLET EVERY DAY AS NEEDED FOR BREAKOUT UNDER EYES 8/14/18  Yes Ap Rosario NP  
amLODIPine (NORVASC) 10 mg tablet TAKE 1 TABLET EVERY DAY 8/14/18  Yes Ap Rosario NP  
tamsulosin (FLOMAX) 0.4 mg capsule TAKE 2 CAPSULES EVERY DAY  FOR  PROSTATE 8/14/18  Yes Ap Rosario NP  
losartan (COZAAR) 100 mg tablet TAKE 1 TABLET EVERY DAY FOR BLOOD PRESSURE 8/14/18  Yes Aiyana Crouch NP  
 albuterol (PROVENTIL HFA, VENTOLIN HFA, PROAIR HFA) 90 mcg/actuation inhaler Take 2 Puffs by inhalation every six (6) hours as needed for Wheezing. 8/14/18  Yes Elaine Rosario NP  
fluticasone (FLONASE) 50 mcg/actuation nasal spray USE 2 SPRAYS IN EACH NOSTRIL EVERY DAY 4/2/18  Yes Mia Roque NP  
mupirocin (BACTROBAN) 2 % ointment Apply  to affected area two (2) times a day. 3/7/18  Yes Mia Roque NP  
azelastine (ASTELIN) 137 mcg (0.1 %) nasal spray 1 [place] by Both Nostrils route two (2) times a day. Use in each nostril as directed 9/7/17  Yes Corey Anderson MD  
Cholecalciferol, Vitamin D3, (VITAMIN D3) 2,000 unit cap capsule Take 2,000 Units by mouth daily. Yes Historical Provider CYANOCOBALAMIN, VITAMIN B-12, (VITAMIN B-12 PO) Take 2,000 mcg by mouth daily. Yes Historical Provider  
aspirin 81 mg chewable tablet Take 1 Tab by mouth daily. Indications: MYOCARDIAL INFARCTION PREVENTION 5/15/13  Yes Mikayla Bhandari MD  
  
 
 
No Known Allergies ROS See HPI Objective:  
Physical Exam  
Constitutional: He is oriented to person, place, and time. He appears well-developed and well-nourished. Neck: Normal range of motion. Neck supple. No JVD present. Carotid bruit is not present. No thyromegaly present. Cardiovascular: Normal rate, regular rhythm and intact distal pulses. Exam reveals no gallop and no friction rub. No murmur heard. Pulmonary/Chest: Effort normal and breath sounds normal. No respiratory distress. Musculoskeletal: He exhibits no edema. Lymphadenopathy:  
  He has no cervical adenopathy. Neurological: He is alert and oriented to person, place, and time. Psychiatric: He has a normal mood and affect. His behavior is normal.  
Nursing note and vitals reviewed. Visit Vitals  /81 (BP 1 Location: Left arm, BP Patient Position: Sitting)  Pulse 61  Temp 98.9 °F (37.2 °C) (Oral)  Resp 20  
 Ht 5' 9\" (1.753 m)  Wt 211 lb (95.7 kg)  SpO2 98%  BMI 31.16 kg/m2 Assessment & Plan: 
Diagnoses and all orders for this visit: 1. Essential hypertension Well controlled, no medication changes. 2. Prostate cancer ([address] Managed by urology, no changes. 3. Meralgia paresthetica of both lower extremities Recommended weight loss and loose waist band. Referral to neurology for continued symptoms. 4. Obesity (BMI 30-39. 9) Discussed need for weight loss through diet and exercise. Reviewed decreased caloric intake and increased activity. 5. Mixed hyperlipidemia ACC/AHA 10 year CVD risk calculation: 17.1%. Statin therapy deferred at presnt. Reviewed diet and lifestyle changes. 
-     METABOLIC PANEL, COMPREHENSIVE 
-     LIPID PANEL 6. Gastroesophageal reflux disease with esophagitis Stable, no changes to current therapy I have discussed the diagnosis with the patient and the intended plan as seen in the above orders. The patient has received an after-visit summary along with patient information handout. I have discussed medication side effects and warnings with the patient as well. Follow-up Disposition: 
Return in about 6 months (around 4/4/2019) for cholesterol.  
 
 
 
Gabriella Keating NP

## 2018-10-05 LAB
ALBUMIN SERPL-MCNC: 4.5 G/DL (ref 3.6–4.8)
ALBUMIN/GLOB SERPL: 1.6 {RATIO} (ref 1.2–2.2)
ALP SERPL-CCNC: 112 IU/L (ref 39–117)
ALT SERPL-CCNC: 13 IU/L (ref 0–44)
AST SERPL-CCNC: 19 IU/L (ref 0–40)
BILIRUB SERPL-MCNC: 1.3 MG/DL (ref 0–1.2)
BUN SERPL-MCNC: 8 MG/DL (ref 8–27)
BUN/CREAT SERPL: 9 (ref 10–24)
CALCIUM SERPL-MCNC: 9.8 MG/DL (ref 8.6–10.2)
CHLORIDE SERPL-SCNC: 101 MMOL/L (ref 96–106)
CHOLEST SERPL-MCNC: 170 MG/DL (ref 100–199)
CO2 SERPL-SCNC: 27 MMOL/L (ref 20–29)
CREAT SERPL-MCNC: 0.89 MG/DL (ref 0.76–1.27)
GLOBULIN SER CALC-MCNC: 2.9 G/DL (ref 1.5–4.5)
GLUCOSE SERPL-MCNC: 91 MG/DL (ref 65–99)
HDLC SERPL-MCNC: 43 MG/DL
INTERPRETATION, 910389: NORMAL
LDLC SERPL CALC-MCNC: 111 MG/DL (ref 0–99)
POTASSIUM SERPL-SCNC: 3.6 MMOL/L (ref 3.5–5.2)
PROT SERPL-MCNC: 7.4 G/DL (ref 6–8.5)
SODIUM SERPL-SCNC: 142 MMOL/L (ref 134–144)
TRIGL SERPL-MCNC: 78 MG/DL (ref 0–149)
VLDLC SERPL CALC-MCNC: 16 MG/DL (ref 5–40)

## 2018-12-11 ENCOUNTER — TELEPHONE (OUTPATIENT)
Dept: FAMILY MEDICINE CLINIC | Age: 63
End: 2018-12-11

## 2018-12-11 DIAGNOSIS — R06.09 DYSPNEA ON EXERTION: ICD-10-CM

## 2018-12-11 DIAGNOSIS — N40.1 BENIGN PROSTATIC HYPERPLASIA WITH URINARY FREQUENCY: ICD-10-CM

## 2018-12-11 DIAGNOSIS — B00.9 HSV-2 (HERPES SIMPLEX VIRUS 2) INFECTION: ICD-10-CM

## 2018-12-11 DIAGNOSIS — R35.0 BENIGN PROSTATIC HYPERPLASIA WITH URINARY FREQUENCY: ICD-10-CM

## 2018-12-11 DIAGNOSIS — I10 ESSENTIAL HYPERTENSION: ICD-10-CM

## 2018-12-11 NOTE — TELEPHONE ENCOUNTER
----- Message from Inez Dunlap sent at 12/11/2018  9:03 AM EST -----  Regarding: Catia Brizuela/Refill   Pt is requesting Rx refill for \"Amlodipine 10 mg\", \"Losartan 100 mg\" \"Tamsulosin 0.4 mg\", \"Valacyclocir 500 mg\", \"Metoprolol 100 mg\" and \" Inhaler\" to be called into [facility] on file.      Best [phone]

## 2018-12-11 NOTE — TELEPHONE ENCOUNTER
----- Message from Rogelio Covert sent at 12/11/2018  8:58 AM EST -----  Regarding: Catia Brizuela/Referral   Pt is requesting a referral be sent to [address] [facility] at [phone] prior to schedule appointment on 1/7/19.     Best [phone]

## 2018-12-12 RX ORDER — ALBUTEROL SULFATE 90 UG/1
2 AEROSOL, METERED RESPIRATORY (INHALATION)
Qty: 2 INHALER | Refills: 3 | Status: SHIPPED | COMMUNITY
Start: 2018-12-12 | End: 2019-08-02 | Stop reason: SDUPTHER

## 2018-12-12 RX ORDER — AMLODIPINE BESYLATE 10 MG/1
TABLET ORAL
Qty: 90 TAB | Refills: 1 | Status: SHIPPED | COMMUNITY
Start: 2018-12-12 | End: 2019-08-02 | Stop reason: SDUPTHER

## 2018-12-12 RX ORDER — LOSARTAN POTASSIUM 100 MG/1
TABLET ORAL
Qty: 90 TAB | Refills: 1 | Status: SHIPPED | COMMUNITY
Start: 2018-12-12 | End: 2019-08-02 | Stop reason: SDUPTHER

## 2018-12-12 RX ORDER — METOPROLOL SUCCINATE 100 MG/1
TABLET, EXTENDED RELEASE ORAL
Qty: 90 TAB | Refills: 1 | Status: SHIPPED | COMMUNITY
Start: 2018-12-12 | End: 2019-08-02 | Stop reason: SDUPTHER

## 2018-12-12 RX ORDER — VALACYCLOVIR HYDROCHLORIDE 500 MG/1
TABLET, FILM COATED ORAL
Qty: 90 TAB | Refills: 1 | Status: SHIPPED | COMMUNITY
Start: 2018-12-12 | End: 2019-08-02 | Stop reason: SDUPTHER

## 2018-12-12 RX ORDER — TAMSULOSIN HYDROCHLORIDE 0.4 MG/1
CAPSULE ORAL
Qty: 180 CAP | Refills: 1 | Status: SHIPPED | COMMUNITY
Start: 2018-12-12 | End: 2019-08-02 | Stop reason: SDUPTHER

## 2019-08-02 ENCOUNTER — OFFICE VISIT (OUTPATIENT)
Dept: FAMILY MEDICINE CLINIC | Age: 64
End: 2019-08-02

## 2019-08-02 VITALS
TEMPERATURE: 97.6 F | RESPIRATION RATE: 18 BRPM | SYSTOLIC BLOOD PRESSURE: 140 MMHG | DIASTOLIC BLOOD PRESSURE: 92 MMHG | BODY MASS INDEX: 30.48 KG/M2 | OXYGEN SATURATION: 97 % | HEIGHT: 69 IN | WEIGHT: 205.8 LBS | HEART RATE: 68 BPM

## 2019-08-02 DIAGNOSIS — C61 PROSTATE CANCER (HCC): ICD-10-CM

## 2019-08-02 DIAGNOSIS — R35.0 BENIGN PROSTATIC HYPERPLASIA WITH URINARY FREQUENCY: ICD-10-CM

## 2019-08-02 DIAGNOSIS — E78.2 MIXED HYPERLIPIDEMIA: ICD-10-CM

## 2019-08-02 DIAGNOSIS — Z91.09 ENVIRONMENTAL ALLERGIES: ICD-10-CM

## 2019-08-02 DIAGNOSIS — Z00.00 MEDICARE ANNUAL WELLNESS VISIT, SUBSEQUENT: ICD-10-CM

## 2019-08-02 DIAGNOSIS — E53.8 VITAMIN B12 DEFICIENCY: ICD-10-CM

## 2019-08-02 DIAGNOSIS — E66.9 OBESITY (BMI 30-39.9): ICD-10-CM

## 2019-08-02 DIAGNOSIS — E55.9 VITAMIN D DEFICIENCY: ICD-10-CM

## 2019-08-02 DIAGNOSIS — N40.1 BENIGN PROSTATIC HYPERPLASIA WITH URINARY FREQUENCY: ICD-10-CM

## 2019-08-02 DIAGNOSIS — B00.9 HSV-2 (HERPES SIMPLEX VIRUS 2) INFECTION: ICD-10-CM

## 2019-08-02 DIAGNOSIS — I10 ESSENTIAL HYPERTENSION: Primary | ICD-10-CM

## 2019-08-02 DIAGNOSIS — K21.00 GASTROESOPHAGEAL REFLUX DISEASE WITH ESOPHAGITIS: ICD-10-CM

## 2019-08-02 DIAGNOSIS — J45.20 MILD INTERMITTENT REACTIVE AIRWAY DISEASE WITHOUT COMPLICATION: ICD-10-CM

## 2019-08-02 DIAGNOSIS — Z71.89 ACP (ADVANCE CARE PLANNING): ICD-10-CM

## 2019-08-02 RX ORDER — METOPROLOL SUCCINATE 100 MG/1
TABLET, EXTENDED RELEASE ORAL
Qty: 90 TAB | Refills: 1 | Status: SHIPPED | OUTPATIENT
Start: 2019-08-02 | End: 2020-01-31 | Stop reason: SDUPTHER

## 2019-08-02 RX ORDER — VALACYCLOVIR HYDROCHLORIDE 500 MG/1
TABLET, FILM COATED ORAL
Qty: 90 TAB | Refills: 1 | Status: SHIPPED | OUTPATIENT
Start: 2019-08-02 | End: 2020-01-31 | Stop reason: SDUPTHER

## 2019-08-02 RX ORDER — LOSARTAN POTASSIUM 100 MG/1
TABLET ORAL
Qty: 90 TAB | Refills: 1 | Status: SHIPPED | OUTPATIENT
Start: 2019-08-02 | End: 2020-01-31 | Stop reason: SDUPTHER

## 2019-08-02 RX ORDER — ALBUTEROL SULFATE 90 UG/1
2 AEROSOL, METERED RESPIRATORY (INHALATION)
Qty: 2 INHALER | Refills: 3 | Status: SHIPPED | OUTPATIENT
Start: 2019-08-02 | End: 2020-08-12

## 2019-08-02 RX ORDER — AMLODIPINE BESYLATE 10 MG/1
TABLET ORAL
Qty: 90 TAB | Refills: 1 | Status: SHIPPED | OUTPATIENT
Start: 2019-08-02 | End: 2019-11-21 | Stop reason: SDUPTHER

## 2019-08-02 RX ORDER — WITCH HAZEL 50 %
2000 PADS, MEDICATED (EA) TOPICAL DAILY
Qty: 90 TAB | Refills: 1 | Status: SHIPPED | OUTPATIENT
Start: 2019-08-02 | End: 2020-01-31 | Stop reason: SDUPTHER

## 2019-08-02 RX ORDER — AZELASTINE 1 MG/ML
1 SPRAY, METERED NASAL 2 TIMES DAILY
Qty: 3 BOTTLE | Refills: 4 | Status: SHIPPED | OUTPATIENT
Start: 2019-08-02 | End: 2020-01-31 | Stop reason: SDUPTHER

## 2019-08-02 RX ORDER — TAMSULOSIN HYDROCHLORIDE 0.4 MG/1
CAPSULE ORAL
Qty: 180 CAP | Refills: 1 | Status: SHIPPED | OUTPATIENT
Start: 2019-08-02 | End: 2020-08-12

## 2019-08-02 RX ORDER — ACETAMINOPHEN 500 MG
2000 TABLET ORAL DAILY
Qty: 90 CAP | Refills: 1 | Status: SHIPPED | OUTPATIENT
Start: 2019-08-02 | End: 2020-01-31 | Stop reason: SDUPTHER

## 2019-08-02 RX ORDER — FLUTICASONE PROPIONATE 50 MCG
SPRAY, SUSPENSION (ML) NASAL
Qty: 3 BOTTLE | Refills: 4 | Status: SHIPPED | OUTPATIENT
Start: 2019-08-02 | End: 2020-01-31 | Stop reason: SDUPTHER

## 2019-08-02 NOTE — PROGRESS NOTES
[facility] Note    Montez Stoner is a 62 y.o. male who was seen in clinic today (8/2/2019). Subjective:  Cardiovascular Review:  The patient has hypertension and hyperlipidemia. Patient has sleep apnea but not currently using CPAP. Diet and Lifestyle: generally follows a low fat low cholesterol diet, generally follows a low sodium diet, exercises regularly, nonsmoker  Home BP Monitoring: is well controlled at home, ranging 120's/80's. Pertinent ROS: taking medications as instructed, no medication side effects noted, no TIA's, no chest pain on exertion, no dyspnea on exertion, no swelling of ankles. GERD  Patient had EGD in 4/2018. Patient has hiatal hernia and Schatzki ring which was dilated. Patient reports improvement of symptoms. Taking OTC medications with improvement of symptoms. Prostate Cancer  Patient seen by urology, Dr. Shira Moore for surveillance of prostate cancer. Previous biopsy in 2016 confirmed presence of prostate cancer. Repeat biopsy in 2017 however negative and prostate MRI was negative for metastasis. Taking Flomax with control of urinary symptoms. Prior to Admission medications    Medication Sig Start Date End Date Taking? Authorizing Provider   valACYclovir (VALTREX) 500 mg tablet TAKE 1 TABLET EVERY DAY AS NEEDED FOR BREAKOUT UNDER EYES 8/2/19  Yes Deo Brizuela NP   tamsulosin (FLOMAX) 0.4 mg capsule TAKE 2 CAPSULES EVERY DAY  FOR  PROSTATE 8/2/19  Yes Ambrocio Diego NP   metoprolol succinate (TOPROL-XL) 100 mg tablet TAKE 1 TABLET BY MOUTH DAILY 8/2/19  Yes Ambrocio Diego NP   losartan (COZAAR) 100 mg tablet TAKE 1 TABLET EVERY DAY FOR BLOOD PRESSURE 8/2/19  Yes Deo Brizuela NP   fluticasone propionate (FLONASE) 50 mcg/actuation nasal spray USE 2 SPRAYS IN EACH NOSTRIL EVERY DAY 8/2/19  Yes Deo Brizuela NP   cyanocobalamin (VITAMIN B-12) 2,000 mcg TbER Take 2,000 mcg by mouth daily.  8/2/19  Yes Chata Bruce Callejas NP   cholecalciferol (VITAMIN D3) 2,000 unit cap capsule Take 2,000 Units by mouth daily. 8/2/19  Yes Bruce Brizuela NP   azelastine (ASTELIN) 137 mcg (0.1 %) nasal spray 1 [place] by Both Nostrils route two (2) times a day. Use in each nostril as directed 8/2/19  Yes Bruce Brizuela NP   amLODIPine (NORVASC) 10 mg tablet TAKE 1 TABLET EVERY DAY 8/2/19  Yes Bruce Brizuela NP   albuterol (PROVENTIL HFA, VENTOLIN HFA, PROAIR HFA) 90 mcg/actuation inhaler Take 2 Puffs by inhalation every six (6) hours as needed for Wheezing. 8/2/19  Yes Meryl Voss NP   aspirin 81 mg chewable tablet Take 1 Tab by mouth daily. Indications: MYOCARDIAL INFARCTION PREVENTION 5/15/13  Yes Dianna Michaels MD   mupirocin (BACTROBAN) 2 % ointment Apply  to affected area two (2) times a day. 3/7/18   Meryl Voss NP          No Known Allergies        Review of Systems   Constitutional: Negative for malaise/fatigue and weight loss. HENT: Negative for hearing loss. Eyes: Negative for blurred vision. Respiratory: Negative for shortness of breath. Cardiovascular: Negative for chest pain, palpitations and leg swelling. Gastrointestinal: Negative for abdominal pain, constipation, diarrhea and heartburn. Genitourinary: Negative for frequency and urgency. Musculoskeletal: Negative for back pain, joint pain and myalgias. Neurological: Negative for dizziness, weakness and headaches. Endo/Heme/Allergies: Does not bruise/bleed easily. Psychiatric/Behavioral: Negative for depression. Objective:   Physical Exam   Constitutional: He is oriented to person, place, and time. He appears well-developed and well-nourished. No distress. HENT:   Right Ear: Tympanic membrane and ear canal normal.   Left Ear: Tympanic membrane and ear canal normal.   Nose: No mucosal edema. Right sinus exhibits no maxillary sinus tenderness and no frontal sinus tenderness.  Left sinus exhibits no maxillary sinus tenderness and no frontal sinus tenderness. Mouth/Throat: Oropharynx is clear and moist.   Eyes: Pupils are equal, round, and reactive to light. EOM are normal.   Neck: Normal range of motion. Neck supple. No JVD present. Carotid bruit is not present. No thyromegaly present. Cardiovascular: Normal rate, regular rhythm, normal heart sounds and intact distal pulses. Exam reveals no gallop and no friction rub. No murmur heard. Pulmonary/Chest: Effort normal and breath sounds normal. No respiratory distress. He has no decreased breath sounds. He has no wheezes. He has no rhonchi. Abdominal: Soft. Bowel sounds are normal. He exhibits no distension. There is no tenderness. Musculoskeletal: He exhibits no edema. Lymphadenopathy:     He has no cervical adenopathy. Neurological: He is alert and oriented to person, place, and time. Psychiatric: He has a normal mood and affect. His behavior is normal.   Nursing note and vitals reviewed. Visit Vitals  BP (!) 140/92 (BP 1 Location: Right arm)   Pulse 68   Temp 97.6 °F (36.4 °C) (Oral)   Resp 18   Ht 5' 9\" (1.753 m)   Wt 205 lb 12.8 oz (93.4 kg)   SpO2 97%   BMI 30.39 kg/m²       Assessment & Plan:  Diagnoses and all orders for this visit:    1. Essential hypertension  Borderline control. Resume Amlodipine. Reviewed low sodium diet and weight loss. -     metoprolol succinate (TOPROL-XL) 100 mg tablet; TAKE 1 TABLET BY MOUTH DAILY  -     losartan (COZAAR) 100 mg tablet; TAKE 1 TABLET EVERY DAY FOR BLOOD PRESSURE  -     amLODIPine (NORVASC) 10 mg tablet; TAKE 1 TABLET EVERY DAY  -     CBC W/O DIFF  -     METABOLIC PANEL, COMPREHENSIVE    2. Obesity (BMI 30-39. 9)  Discussed need for weight loss through diet and exercise. Reviewed decreased caloric intake and increased activity. 3. Mixed hyperlipidemia  Recheck labs. -     LIPID PANEL    4. Prostate cancer ([facility] [address]  Managed by urology, no changes    5.  Gastroesophageal reflux disease with esophagitis  Stable, no changes to current therapy    6. HSV-2 (herpes simplex virus 2) infection  -     Refill valACYclovir (VALTREX) 500 mg tablet; TAKE 1 TABLET EVERY DAY AS NEEDED FOR BREAKOUT UNDER EYES    7. Benign prostatic hyperplasia with urinary frequency  Stable, no changes to current therapy  -     tamsulosin (FLOMAX) 0.4 mg capsule; TAKE 2 CAPSULES EVERY DAY  FOR  PROSTATE    8. Environmental allergies  -     fluticasone propionate (FLONASE) 50 mcg/actuation nasal spray; USE 2 SPRAYS IN EACH NOSTRIL EVERY DAY  -     azelastine (ASTELIN) 137 mcg (0.1 %) nasal spray; 1 [place] by Both Nostrils route two (2) times a day. Use in each nostril as directed    9. Mild intermittent reactive airway disease without complication  -     albuterol (PROVENTIL HFA, VENTOLIN HFA, PROAIR HFA) 90 mcg/actuation inhaler; Take 2 Puffs by inhalation every six (6) hours as needed for Wheezing. 10. Vitamin D deficiency  -     cholecalciferol (VITAMIN D3) 2,000 unit cap capsule; Take 2,000 Units by mouth daily. -     VITAMIN D, 25 HYDROXY    11. Vitamin B12 deficiency  -     cyanocobalamin (VITAMIN B-12) 2,000 mcg TbER; Take 2,000 mcg by mouth daily. -     VITAMIN B12    12. Medicare annual wellness visit, subsequent    13. ACP (advance care planning)        I have discussed the diagnosis with the patient and the intended plan as seen in the above orders. The patient has received an after-visit summary along with patient information handout. I have discussed medication side effects and warnings with the patient as well. Follow-up and Dispositions    · Return in about 6 months (around 2/2/2020) for blood pressure, medication management.            Courtney Sotomayor NP

## 2019-08-02 NOTE — PATIENT INSTRUCTIONS
Well Visit, Men 48 to 72: Care Instructions  Your Care Instructions    Physical exams can help you stay healthy. Your doctor has checked your overall health and may have suggested ways to take good care of yourself. He or she also may have recommended tests. At home, you can help prevent illness with healthy eating, regular exercise, and other steps. Follow-up care is a key part of your treatment and safety. Be sure to make and go to all appointments, and call your doctor if you are having problems. It's also a good idea to know your test results and keep a list of the medicines you take. How can you care for yourself at home? · Reach and stay at a healthy weight. This will lower your risk for many problems, such as obesity, diabetes, heart disease, and high blood pressure. · Get at least 30 minutes of exercise on most days of the week. Walking is a good choice. You also may want to do other activities, such as running, swimming, cycling, or playing tennis or team sports. · Do not smoke. Smoking can make health problems worse. If you need help quitting, talk to your doctor about stop-smoking programs and medicines. These can increase your chances of quitting for good. · Protect your skin from too much sun. When you're outdoors from 10 a.m. to 4 p.m., stay in the shade or cover up with clothing and a hat with a wide brim. Wear sunglasses that block UV rays. Even when it's cloudy, put broad-spectrum sunscreen (SPF 30 or higher) on any exposed skin. · See a dentist one or two times a year for checkups and to have your teeth cleaned. · Wear a seat belt in the car. Follow your doctor's advice about when to have certain tests. These tests can spot problems early. · Cholesterol. Your doctor will tell you how often to have this done based on your overall health and other things that can increase your risk for heart attack and stroke. · Blood pressure.  Have your blood pressure checked during a routine doctor visit. Your doctor will tell you how often to check your blood pressure based on your age, your blood pressure results, and other factors. · Prostate exam. Talk to your doctor about whether you should have a blood test (called a PSA test) for prostate cancer. Experts recommend that you discuss the benefits and risks of the test with your doctor before you decide whether to have this test.  · Diabetes. Ask your doctor whether you should have tests for diabetes. · Vision. Some experts recommend that you have yearly exams for glaucoma and other age-related eye problems starting at age 48. · Hearing. Tell your doctor if you notice any change in your hearing. You can have tests to find out how well you hear. · Colorectal cancer. Your risk for colorectal cancer gets higher as you get older. Some experts say that adults should start regular screening at age 48 and stop at age 76. Others say to start before age 48 or continue after age 76. Talk with your doctor about your risk and when to start and stop screening. · Heart attack and stroke risk. At least every 4 to 6 years, you should have your risk for heart attack and stroke assessed. Your doctor uses factors such as your age, blood pressure, cholesterol, and whether you smoke or have diabetes to show what your risk for a heart attack or stroke is over the next 10 years. · Abdominal aortic aneurysm. Ask your doctor whether you should have a test to check for an aneurysm. You may need a test if you ever smoked or if your parent, brother, sister, or child has had an aneurysm. When should you call for help? Watch closely for changes in your health, and be sure to contact your doctor if you have any problems or symptoms that concern you. Where can you learn more? Go to http://ambrose-vince.info/. Enter A898 in the search box to learn more about \"Well Visit, Men 48 to 72: Care Instructions. \"    n

## 2019-08-02 NOTE — PROGRESS NOTES
Chief Complaint   Patient presents with    Cholesterol Problem    Medication Refill       Reviewed Record in preparation for visit and have obtained necessary documentation. Identified pt with two pt identifiers (Name @ )    Health Maintenance Due   Topic    Shingrix Vaccine Age 50> (2 of 2)    Influenza Age 5 to Adult     MEDICARE YEARLY EXAM          1. Have you been to the ER, urgent care clinic since your last visit? Hospitalized since your last visit? no    2. Have you seen or consulted any other health care providers outside of the [address] [place] [address] since your last visit? Include any pap smears or colon screening.   no

## 2019-08-02 NOTE — PROGRESS NOTES
This is the Subsequent Medicare Annual Wellness Exam, performed 12 months or more after the Initial AWV or the last Subsequent AWV    I have reviewed the patient's medical history in detail and updated the computerized patient record. History     Past Medical History:   Diagnosis Date    Adenomatous polyp of colon 2012    Angina pectoris     Bleeding ulcer     BPH (benign prostatic hyperplasia) 2011    Cancer [facility]) 2013    PROSTATE     Colon polyps 05/03/2017    Hypertension     Kidney infection     pt states he is unaware of having had this    Right inguinal hernia 7/11/2016    Sleep apnea     uses CPAP    Vegetarian       Past Surgical History:   Procedure Laterality Date    COLONOSCOPY  2012, 2017    adenomatous polyp    COLONOSCOPY N/A 5/3/2017    COLONOSCOPY performed by Anh Ortiz MD at [address] [place] [address] ENDOSCOPY    HX CHOLECYSTECTOMY  2010    HX HERNIA REPAIR Right 7/29/16    inguinal w/mesh by Dr. Philip Bradford Right 02/2017    HX ORTHOPAEDIC      Back surgery    HX OTHER SURGICAL  2011    prostate biopsy - normal/second bx 2/2017 - \"tip had cancer\" - another bx is planned    HX OTHER SURGICAL  2009    FATTY TUMOR REMOVED FROM BACK    HX TONSILLECTOMY       Current Outpatient Medications   Medication Sig Dispense Refill    valACYclovir (VALTREX) 500 mg tablet TAKE 1 TABLET EVERY DAY AS NEEDED FOR BREAKOUT UNDER EYES 90 Tab 1    tamsulosin (FLOMAX) 0.4 mg capsule TAKE 2 CAPSULES EVERY DAY  FOR  PROSTATE 180 Cap 1    metoprolol succinate (TOPROL-XL) 100 mg tablet TAKE 1 TABLET BY MOUTH DAILY 90 Tab 1    losartan (COZAAR) 100 mg tablet TAKE 1 TABLET EVERY DAY FOR BLOOD PRESSURE 90 Tab 1    fluticasone propionate (FLONASE) 50 mcg/actuation nasal spray USE 2 SPRAYS IN EACH NOSTRIL EVERY DAY 3 Bottle 4    cyanocobalamin (VITAMIN B-12) 2,000 mcg TbER Take 2,000 mcg by mouth daily. 90 Tab 1    cholecalciferol (VITAMIN D3) 2,000 unit cap capsule Take 2,000 Units by mouth daily. 90 Cap 1    azelastine (ASTELIN) 137 mcg (0.1 %) nasal spray 1 [place] by Both Nostrils route two (2) times a day. Use in each nostril as directed 3 Bottle 4    amLODIPine (NORVASC) 10 mg tablet TAKE 1 TABLET EVERY DAY 90 Tab 1    albuterol (PROVENTIL HFA, VENTOLIN HFA, PROAIR HFA) 90 mcg/actuation inhaler Take 2 Puffs by inhalation every six (6) hours as needed for Wheezing. 2 Inhaler 3    aspirin 81 mg chewable tablet Take 1 Tab by mouth daily. Indications: MYOCARDIAL INFARCTION PREVENTION 90 Tab 0    mupirocin (BACTROBAN) 2 % ointment Apply  to affected area two (2) times a day. 22 g 0     No Known Allergies  Family History   Problem Relation Age of Onset    Hypertension Mother     Hypertension Maternal Grandmother     Hypertension Brother     Alcohol abuse Brother     Cancer Brother         pancreatic    Diabetes Brother     Hypertension Brother     Heart Disease Brother     Heart Attack Father     Anesth Problems Neg Hx      Social History     Tobacco Use    Smoking status: Never Smoker    Smokeless tobacco: Never Used   Substance Use Topics    Alcohol use: Yes     Alcohol/week: 0.0 standard drinks     Comment: OCCASIONALLY     Patient Active Problem List   Diagnosis Code    Vegetarian Z78.9    Allergic rhinitis J30.9    BPH (benign prostatic hyperplasia) N40.0    Right inguinal hernia K40.90    NESTOR (obstructive sleep apnea) G47.33    Illiteracy Z55.0    Essential hypertension I10    Obesity (BMI 30-39. 9) E66.9    Prostate cancer ([facility] [address] C61    Gastroesophageal reflux disease with esophagitis K21.0    Mixed hyperlipidemia E78.2       Depression Risk Factor Screening:     3 most recent PHQ Screens 8/2/2019   Little interest or pleasure in doing things Not at all   Feeling down, depressed, irritable, or hopeless Not at all   Total Score PHQ 2 0   Trouble falling or staying asleep, or sleeping too much -   Feeling tired or having little energy -   Poor appetite, weight loss, or overeating -   Feeling bad about yourself - or that you are a failure or have let yourself or your family down -   Trouble concentrating on things such as school, work, reading, or watching TV -   Moving or speaking so slowly that other people could have noticed; or the opposite being so fidgety that others notice -   Thoughts of being better off dead, or hurting yourself in some way -   PHQ 9 Score -   How difficult have these problems made it for you to do your work, take care of your home and get along with others -     Alcohol Risk Factor Screening: On any occasion in the past three months you have had more than 7 drinks containing alcohol    Functional Ability and Level of Safety:   Hearing Loss  Hearing is good. Activities of Daily Living  The home contains: no safety equipment. Patient does total self care    Fall Risk  Fall Risk Assessment, last 12 mths 9/7/2017   Able to walk? Yes   Fall in past 12 months? No       Abuse Screen  Patient is not abused    Cognitive Screening   Evaluation of Cognitive Function:  Has your family/caregiver stated any concerns about your memory: no  Normal    Patient Care Team   Patient Care Team:  Duane Knight NP as PCP - General (Nurse Practitioner)  Kevin Coyne MD (General Surgery)  Carol Lewis MD (Gastroenterology)  Aren Dykes MD (Urology)  Betina Peters MD as Physician (Sleep Medicine)  Chao Redd as Paramedic    Assessment/Plan   Education and counseling provided:  Are appropriate based on today's review and evaluation    Diagnoses and all orders for this visit:    1. Essential hypertension  -     metoprolol succinate (TOPROL-XL) 100 mg tablet; TAKE 1 TABLET BY MOUTH DAILY  -     losartan (COZAAR) 100 mg tablet; TAKE 1 TABLET EVERY DAY FOR BLOOD PRESSURE  -     amLODIPine (NORVASC) 10 mg tablet; TAKE 1 TABLET EVERY DAY  -     CBC W/O DIFF  -     METABOLIC PANEL, COMPREHENSIVE    2. Obesity (BMI 30-39.9)    3.  Mixed hyperlipidemia  -     LIPID PANEL    4. Prostate cancer ([facility] [address]    5. Gastroesophageal reflux disease with esophagitis    6. HSV-2 (herpes simplex virus 2) infection  -     valACYclovir (VALTREX) 500 mg tablet; TAKE 1 TABLET EVERY DAY AS NEEDED FOR BREAKOUT UNDER EYES    7. Benign prostatic hyperplasia with urinary frequency  -     tamsulosin (FLOMAX) 0.4 mg capsule; TAKE 2 CAPSULES EVERY DAY  FOR  PROSTATE    8. Environmental allergies  -     fluticasone propionate (FLONASE) 50 mcg/actuation nasal spray; USE 2 SPRAYS IN EACH NOSTRIL EVERY DAY  -     azelastine (ASTELIN) 137 mcg (0.1 %) nasal spray; 1 [place] by Both Nostrils route two (2) times a day. Use in each nostril as directed    9. Mild intermittent reactive airway disease without complication  -     albuterol (PROVENTIL HFA, VENTOLIN HFA, PROAIR HFA) 90 mcg/actuation inhaler; Take 2 Puffs by inhalation every six (6) hours as needed for Wheezing. 10. Vitamin D deficiency  -     cholecalciferol (VITAMIN D3) 2,000 unit cap capsule; Take 2,000 Units by mouth daily. -     VITAMIN D, 25 HYDROXY    11. Vitamin B12 deficiency  -     cyanocobalamin (VITAMIN B-12) 2,000 mcg TbER; Take 2,000 mcg by mouth daily. -     VITAMIN B12    12. Medicare annual wellness visit, subsequent    13.  ACP (advance care planning)        Health Maintenance Due   Topic Date Due    Pneumococcal 0-64 years (1 of 1 - PPSV23) 05/04/1961    Influenza Age 9 to Adult  08/01/2019    MEDICARE YEARLY EXAM  08/15/2019

## 2019-08-03 LAB
25(OH)D3+25(OH)D2 SERPL-MCNC: 89.2 NG/ML (ref 30–100)
ALBUMIN SERPL-MCNC: 4.6 G/DL (ref 3.6–4.8)
ALBUMIN/GLOB SERPL: 1.5 {RATIO} (ref 1.2–2.2)
ALP SERPL-CCNC: 98 IU/L (ref 39–117)
ALT SERPL-CCNC: 17 IU/L (ref 0–44)
AST SERPL-CCNC: 19 IU/L (ref 0–40)
BILIRUB SERPL-MCNC: 1.2 MG/DL (ref 0–1.2)
BUN SERPL-MCNC: 10 MG/DL (ref 8–27)
BUN/CREAT SERPL: 11 (ref 10–24)
CALCIUM SERPL-MCNC: 9.5 MG/DL (ref 8.6–10.2)
CHLORIDE SERPL-SCNC: 101 MMOL/L (ref 96–106)
CHOLEST SERPL-MCNC: 218 MG/DL (ref 100–199)
CO2 SERPL-SCNC: 23 MMOL/L (ref 20–29)
CREAT SERPL-MCNC: 0.94 MG/DL (ref 0.76–1.27)
ERYTHROCYTE [DISTWIDTH] IN BLOOD BY AUTOMATED COUNT: 14.8 % (ref 12.3–15.4)
GLOBULIN SER CALC-MCNC: 3.1 G/DL (ref 1.5–4.5)
GLUCOSE SERPL-MCNC: 104 MG/DL (ref 65–99)
HCT VFR BLD AUTO: 44.1 % (ref 37.5–51)
HDLC SERPL-MCNC: 47 MG/DL
HGB BLD-MCNC: 14.8 G/DL (ref 13–17.7)
INTERPRETATION, 910389: NORMAL
LDLC SERPL CALC-MCNC: 150 MG/DL (ref 0–99)
MCH RBC QN AUTO: 30.8 PG (ref 26.6–33)
MCHC RBC AUTO-ENTMCNC: 33.6 G/DL (ref 31.5–35.7)
MCV RBC AUTO: 92 FL (ref 79–97)
PLATELET # BLD AUTO: 261 X10E3/UL (ref 150–450)
POTASSIUM SERPL-SCNC: 3.5 MMOL/L (ref 3.5–5.2)
PROT SERPL-MCNC: 7.7 G/DL (ref 6–8.5)
RBC # BLD AUTO: 4.8 X10E6/UL (ref 4.14–5.8)
SODIUM SERPL-SCNC: 141 MMOL/L (ref 134–144)
TRIGL SERPL-MCNC: 107 MG/DL (ref 0–149)
VIT B12 SERPL-MCNC: 1647 PG/ML (ref 232–1245)
VLDLC SERPL CALC-MCNC: 21 MG/DL (ref 5–40)
WBC # BLD AUTO: 6.6 X10E3/UL (ref 3.4–10.8)

## 2019-09-13 ENCOUNTER — OFFICE VISIT (OUTPATIENT)
Dept: FAMILY MEDICINE CLINIC | Age: 64
End: 2019-09-13

## 2019-09-13 VITALS
RESPIRATION RATE: 18 BRPM | TEMPERATURE: 98.1 F | DIASTOLIC BLOOD PRESSURE: 90 MMHG | WEIGHT: 201.4 LBS | BODY MASS INDEX: 29.83 KG/M2 | OXYGEN SATURATION: 92 % | HEIGHT: 69 IN | HEART RATE: 64 BPM | SYSTOLIC BLOOD PRESSURE: 130 MMHG

## 2019-09-13 DIAGNOSIS — R20.2 PARESTHESIA OF LEFT LEG: Primary | ICD-10-CM

## 2019-09-13 DIAGNOSIS — M54.42 ACUTE LEFT-SIDED LOW BACK PAIN WITH LEFT-SIDED SCIATICA: ICD-10-CM

## 2019-09-13 PROBLEM — I73.9 PERIPHERAL VASCULAR DISEASE (HCC): Status: ACTIVE | Noted: 2019-09-13

## 2019-09-13 RX ORDER — PREDNISONE 20 MG/1
TABLET ORAL
Qty: 18 TAB | Refills: 0 | Status: SHIPPED | OUTPATIENT
Start: 2019-09-13 | End: 2019-09-13 | Stop reason: SDUPTHER

## 2019-09-13 RX ORDER — PREDNISONE 20 MG/1
TABLET ORAL
Qty: 18 TAB | Refills: 0 | Status: SHIPPED | OUTPATIENT
Start: 2019-09-13 | End: 2019-10-30 | Stop reason: ALTCHOICE

## 2019-09-13 NOTE — PROGRESS NOTES
Chief Complaint   Patient presents with    Leg Pain     Left leg pain x 3 weeks with consistant with numbness to bottom of foot. 1. Have you been to the ER, urgent care clinic since your last visit? Hospitalized since your last visit? No    2. Have you seen or consulted any other health care providers outside of the [address] [place] [address] since your last visit? Include any pap smears or colon screening.  No

## 2019-09-13 NOTE — PROGRESS NOTES
HISTORY OF PRESENT ILLNESS  Lakshmi Sandhu is a 62 y.o. male. HPI: Patient is complaining of left lower back pain and numbness in left leg X 3 weeks. He states that pain in not bad but is concerned about numbness and tingling. Currently he is not working and denies injury.    Past Medical History:   Diagnosis Date    Adenomatous polyp of colon 2012    Angina pectoris     Bleeding ulcer     BPH (benign prostatic hyperplasia) 2011    Cancer [facility]) 2013    PROSTATE     Colon polyps 05/03/2017    Hypertension     Kidney infection     pt states he is unaware of having had this    Right inguinal hernia 7/11/2016    Sleep apnea     uses CPAP    Vegetarian      Past Surgical History:   Procedure Laterality Date    COLONOSCOPY  2012, 2017    adenomatous polyp    COLONOSCOPY N/A 5/3/2017    COLONOSCOPY performed by Darnell Mayer [facility] at [facility] ENDOSCOPY    HX CHOLECYSTECTOMY  2010    HX HERNIA REPAIR Right 7/29/16    inguinal w/mesh by Dr. Kylie Post Right 02/2017    HX ORTHOPAEDIC      Back surgery    HX OTHER SURGICAL  2011    prostate biopsy - normal/second bx 2/2017 - \"tip had cancer\" - another bx is planned    HX OTHER SURGICAL  2009    FATTY TUMOR REMOVED FROM BACK    HX TONSILLECTOMY     No Known Allergies    Current Outpatient Medications:     predniSONE (DELTASONE) 20 mg tablet, Take 1 tab tid x 3 days, them 1 tab bid x 3 days, 1 tab daily x 3 days, Disp: 18 Tab, Rfl: 0    valACYclovir (VALTREX) 500 mg tablet, TAKE 1 TABLET EVERY DAY AS NEEDED FOR BREAKOUT UNDER EYES, Disp: 90 Tab, Rfl: 1    tamsulosin (FLOMAX) 0.4 mg capsule, TAKE 2 CAPSULES EVERY DAY  FOR  PROSTATE, Disp: 180 Cap, Rfl: 1    metoprolol succinate (TOPROL-XL) 100 mg tablet, TAKE 1 TABLET BY MOUTH DAILY, Disp: 90 Tab, Rfl: 1    losartan (COZAAR) 100 mg tablet, TAKE 1 TABLET EVERY DAY FOR BLOOD PRESSURE, Disp: 90 Tab, Rfl: 1    fluticasone propionate (FLONASE) 50 mcg/actuation nasal spray, USE 2 SPRAYS IN [facility] NOSTRIL EVERY DAY, Disp: 3 Bottle, Rfl: 4    cyanocobalamin (VITAMIN B-12) 2,000 mcg TbER, Take 2,000 mcg by mouth daily. , Disp: 90 Tab, Rfl: 1    cholecalciferol (VITAMIN D3) 2,000 unit cap capsule, Take 2,000 Units by mouth daily. , Disp: 90 Cap, Rfl: 1    azelastine (ASTELIN) 137 mcg (0.1 %) nasal spray, 1 [place] by Both Nostrils route two (2) times a day. Use in each nostril as directed, Disp: 3 Bottle, Rfl: 4    amLODIPine (NORVASC) 10 mg tablet, TAKE 1 TABLET EVERY DAY, Disp: 90 Tab, Rfl: 1    albuterol (PROVENTIL HFA, VENTOLIN HFA, PROAIR HFA) 90 mcg/actuation inhaler, Take 2 Puffs by inhalation every six (6) hours as needed for Wheezing., Disp: 2 Inhaler, Rfl: 3    mupirocin (BACTROBAN) 2 % ointment, Apply  to affected area two (2) times a day., Disp: 22 g, Rfl: 0    aspirin 81 mg chewable tablet, Take 1 Tab by mouth daily. Indications: MYOCARDIAL INFARCTION PREVENTION, Disp: 90 Tab, Rfl: 0  Review of Systems   Constitutional: Negative. Respiratory: Negative. Cardiovascular: Negative. Gastrointestinal: Negative. Musculoskeletal: Positive for back pain. Neurological: Positive for tingling and sensory change. Blood pressure 130/90, pulse 64, temperature 98.1 °F (36.7 °C), temperature source Oral, resp. rate 18, height 5' 9\" (1.753 m), weight 201 lb 6.4 oz (91.4 kg), SpO2 92 %. Physical Exam   Constitutional: No distress. HENT:   Mouth/Throat: Oropharynx is clear and moist.   Neck: Normal range of motion. Neck supple. Cardiovascular: Normal rate and regular rhythm. No murmur heard. Pulmonary/Chest: Effort normal and breath sounds normal.   Abdominal: Soft. Bowel sounds are normal.   Musculoskeletal: He exhibits tenderness. He exhibits no deformity. Left lower back tenderness, NROM   Left with normal strength, able to recognize sharp object for dull, normal pulses   Nursing note and vitals reviewed. ASSESSMENT and PLAN  Diagnoses and all orders for this visit:    1. Paresthesia of left leg  -     predniSONE (DELTASONE) 20 mg tablet; Take 1 tab tid x 3 days, them 1 tab bid x 3 days, 1 tab daily x 3 days    2.  Acute left-sided low back pain with left-sided sciatica  -     XR SPINE LUMB MIN 4 V; Future  Pt was given an after visit summary which includes diagnosis, current medicines and vital and voiced understanding of treatment plan

## 2019-10-30 ENCOUNTER — TELEPHONE (OUTPATIENT)
Dept: FAMILY MEDICINE CLINIC | Age: 64
End: 2019-10-30

## 2019-10-30 ENCOUNTER — OFFICE VISIT (OUTPATIENT)
Dept: FAMILY MEDICINE CLINIC | Age: 64
End: 2019-10-30

## 2019-10-30 VITALS
HEART RATE: 60 BPM | OXYGEN SATURATION: 96 % | SYSTOLIC BLOOD PRESSURE: 138 MMHG | DIASTOLIC BLOOD PRESSURE: 77 MMHG | HEIGHT: 69 IN | BODY MASS INDEX: 30.27 KG/M2 | RESPIRATION RATE: 16 BRPM | WEIGHT: 204.4 LBS | TEMPERATURE: 98.7 F

## 2019-10-30 DIAGNOSIS — Z90.79 STATUS POST PROSTATECTOMY: ICD-10-CM

## 2019-10-30 DIAGNOSIS — K59.00 CONSTIPATION, UNSPECIFIED CONSTIPATION TYPE: ICD-10-CM

## 2019-10-30 DIAGNOSIS — M54.16 LUMBAR BACK PAIN WITH RADICULOPATHY AFFECTING LEFT LOWER EXTREMITY: ICD-10-CM

## 2019-10-30 DIAGNOSIS — E87.5 HYPERKALEMIA: ICD-10-CM

## 2019-10-30 DIAGNOSIS — Z90.79 STATUS POST PROSTATECTOMY: Primary | ICD-10-CM

## 2019-10-30 RX ORDER — OXYCODONE HYDROCHLORIDE 5 MG/1
5 TABLET ORAL
Qty: 15 TAB | Refills: 0 | Status: SHIPPED | OUTPATIENT
Start: 2019-10-30 | End: 2019-10-30 | Stop reason: SDUPTHER

## 2019-10-30 RX ORDER — CEFUROXIME AXETIL 250 MG/1
TABLET ORAL
Refills: 0 | COMMUNITY
Start: 2019-10-23 | End: 2020-01-08 | Stop reason: ALTCHOICE

## 2019-10-30 RX ORDER — OXYCODONE HYDROCHLORIDE 5 MG/1
TABLET ORAL
Refills: 0 | COMMUNITY
Start: 2019-10-23 | End: 2019-10-30 | Stop reason: SDUPTHER

## 2019-10-30 RX ORDER — DOCUSATE SODIUM 100 MG/1
100 CAPSULE, LIQUID FILLED ORAL 2 TIMES DAILY
Qty: 60 CAP | Refills: 2 | Status: SHIPPED | OUTPATIENT
Start: 2019-10-30 | End: 2019-10-30 | Stop reason: SDUPTHER

## 2019-10-30 RX ORDER — OXYCODONE HYDROCHLORIDE 5 MG/1
5 TABLET ORAL
Qty: 15 TAB | Refills: 0 | Status: SHIPPED | OUTPATIENT
Start: 2019-10-30 | End: 2019-11-06

## 2019-10-30 RX ORDER — DOCUSATE SODIUM 100 MG/1
100 CAPSULE, LIQUID FILLED ORAL 2 TIMES DAILY
Qty: 60 CAP | Refills: 2 | Status: SHIPPED | OUTPATIENT
Start: 2019-10-30 | End: 2021-01-27

## 2019-10-30 NOTE — PROGRESS NOTES
[facility] Note    Vince Santacruz is a 62 y.o. male who was seen in clinic today (10/30/2019). Subjective:  Hospital Follow up  Patient underwent DaVinci prostatectomy on 10/22/19 with Dr. Sandy Washington for prostate cancer. Patient reports continued pain and pain with defecation. Denies dysuria, hematuria. Previously taking Oxycodone for pain but has run out of medication. His post op course was complication by hypokalemia. Taking KCl 20 mEq BID. Back Pain  Patient presents for evaluation of low back problems. Symptoms have been present for 4 months and include pain in left lumbar spine (sharp in character; 6/10 in severity), numbness in left leg. Initial inciting event: none. Alleviating factors identifiable by patient are sitting. Exacerbating factors identifiable by patient are standing, walking, recumbency, bending forwards. Treatments so far initiated by patient: none Previous lower back problems: none. Previous workup: none. Prior to Admission medications    Medication Sig Start Date End Date Taking? Authorizing Provider   cefUROXime (CEFTIN) 250 mg tablet TK 1 T PO  BID. STARTING MONDAY 10/23/19  Yes Provider, Historical   valACYclovir (VALTREX) 500 mg tablet TAKE 1 TABLET EVERY DAY AS NEEDED FOR BREAKOUT UNDER EYES 8/2/19  Yes William Brizuela NP   metoprolol succinate (TOPROL-XL) 100 mg tablet TAKE 1 TABLET BY MOUTH DAILY 8/2/19  Yes William Brizuela NP   losartan (COZAAR) 100 mg tablet TAKE 1 TABLET EVERY DAY FOR BLOOD PRESSURE 8/2/19  Yes William Brizuela NP   fluticasone propionate (FLONASE) 50 mcg/actuation nasal spray USE 2 SPRAYS IN EACH NOSTRIL EVERY DAY 8/2/19  Yes William Brizuela NP   cyanocobalamin (VITAMIN B-12) 2,000 mcg TbER Take 2,000 mcg by mouth daily. 8/2/19  Yes Adolfo Nash NP   cholecalciferol (VITAMIN D3) 2,000 unit cap capsule Take 2,000 Units by mouth daily.  8/2/19  Yes Adolfo Nash NP   azelastine (ASTELIN) 137 mcg (0.1 %) nasal spray 1 [place] by Both Nostrils route two (2) times a day. Use in each nostril as directed 8/2/19  Yes Claude Brizuela, NP   amLODIPine (NORVASC) 10 mg tablet TAKE 1 TABLET EVERY DAY 8/2/19  Yes Claude Brizuela NP   albuterol (PROVENTIL HFA, VENTOLIN HFA, PROAIR HFA) 90 mcg/actuation inhaler Take 2 Puffs by inhalation every six (6) hours as needed for Wheezing. 8/2/19  Yes Romy Flynn NP   aspirin 81 mg chewable tablet Take 1 Tab by mouth daily. Indications: MYOCARDIAL INFARCTION PREVENTION 5/15/13  Yes Hardy Segura MD   docusate sodium (COLACE) 100 mg capsule Take 1 Cap by mouth two (2) times a day. For constipation 10/30/19   Romy Flynn NP   oxyCODONE IR (ROXICODONE) 5 mg immediate release tablet Take 1 Tab by mouth every eight (8) hours as needed for Pain for up to 7 days. Max Daily Amount: 15 mg. 10/30/19 11/6/19  Romy Flynn NP   tamsulosin (FLOMAX) 0.4 mg capsule TAKE 2 CAPSULES EVERY DAY  FOR  PROSTATE 8/2/19   Romy Flynn NP   mupirocin (BACTROBAN) 2 % ointment Apply  to affected area two (2) times a day. 3/7/18   Romy Flynn NP          No Known Allergies        ROS  See HPI    Objective:   Physical Exam   Constitutional: He is oriented to person, place, and time. He appears well-developed and well-nourished. Neck: Normal range of motion. Neck supple. No JVD present. Carotid bruit is not present. No thyromegaly present. Cardiovascular: Normal rate, regular rhythm and intact distal pulses. Exam reveals no gallop and no friction rub. No murmur heard. Pulmonary/Chest: Effort normal and breath sounds normal. No respiratory distress. Musculoskeletal: He exhibits no edema. Lymphadenopathy:     He has no cervical adenopathy. Neurological: He is alert and oriented to person, place, and time. Weakness with flexion of left leg and dorsiflexion of left foot   Psychiatric: He has a normal mood and affect.  His behavior is normal.   Nursing note and vitals reviewed. Visit Vitals  /77 (BP 1 Location: Left arm, BP Patient Position: Sitting)   Pulse 60   Temp 98.7 °F (37.1 °C) (Oral)   Resp 16   Ht 5' 9\" (1.753 m)   Wt 204 lb 6.4 oz (92.7 kg)   SpO2 96%   BMI 30.18 kg/m²       Assessment & Plan:  Diagnoses and all orders for this visit:    1. Status post prostatectomy  Recheck labs. Refill pain medication.   -     METABOLIC PANEL, COMPREHENSIVE  -     CBC WITH AUTOMATED DIFF    2. Lumbar back pain with radiculopathy affecting left lower extremity  Patient exhibits weakness in left leg. Evaluate further with lumbar MRI.   -     MRI LUMB SPINE WO CONT; Future    3. Constipation, unspecified constipation type  Colace BID. 4. Hyperkalemia  Etiology unclear. Recheck labs. -     METABOLIC PANEL, COMPREHENSIVE  -     CBC WITH AUTOMATED DIFF        I have discussed the diagnosis with the patient and the intended plan as seen in the above orders. The patient has received an after-visit summary along with patient information handout. I have discussed medication side effects and warnings with the patient as well. Follow-up and Dispositions    · Return if symptoms worsen or fail to improve.            John Wood, KENNETH

## 2019-10-30 NOTE — TELEPHONE ENCOUNTER
[phone] notified patient his medication was resend to [facility] via  and if he has question to call us back

## 2019-10-30 NOTE — TELEPHONE ENCOUNTER
Linus  spoke to pharmacy and cancel the medication with them already.     Please resend to new pharmacy

## 2019-10-30 NOTE — TELEPHONE ENCOUNTER
Patient is calling requesting us to send his new RX with the new meds that was prescribed for him today to the [facility] on [address] Pharm on file switched to the right [facility].     Best call# [phone]

## 2019-10-30 NOTE — PATIENT INSTRUCTIONS
u     Low Back Pain: Exercises  Introduction  Here are some examples of exercises for you to try. The exercises may be suggested for a condition or for rehabilitation. Start each exercise slowly. Ease off the exercises if you start to have pain. You will be told when to start these exercises and which ones will work best for you. How to do the exercises  Press-up    1. Lie on your stomach, supporting your body with your forearms. 2. Press your elbows down into the floor to raise your upper back. As you do this, relax your stomach muscles and allow your back to arch without using your back muscles. As your press up, do not let your hips or pelvis come off the floor. 3. Hold for 15 to 30 seconds, then relax. 4. Repeat 2 to 4 times. Alternate arm and leg (bird dog) exercise    1. Start on the floor, on your hands and knees. 2. Tighten your belly muscles. 3. Raise one leg off the floor, and hold it straight out behind you. Be careful not to let your hip drop down, because that will twist your trunk. 4. Hold for about 6 seconds, then lower your leg and switch to the other leg. 5. Repeat 8 to 12 times on each leg. 6. Over time, work up to holding for 10 to 30 seconds each time. 7. If you feel stable and secure with your leg raised, try raising the opposite arm straight out in front of you at the same time. Knee-to-chest exercise    1. Lie on your back with your knees bent and your feet flat on the floor. 2. Bring one knee to your chest, keeping the other foot flat on the floor (or keeping the other leg straight, whichever feels better on your lower back). 3. Keep your lower back pressed to the floor. Hold for at least 15 to 30 seconds. 4. Relax, and lower the knee to the starting position. 5. Repeat with the other leg. Repeat 2 to 4 times with each leg. 6. To get more stretch, put your other leg flat on the floor while pulling your knee to your chest.    Curl-ups    1.  Lie on the floor on your back with your knees bent at a 90-degree angle. Your feet should be flat on the floor, about 12 inches from your buttocks. 2. Cross your arms over your chest. If this bothers your neck, try putting your hands behind your neck (not your head), with your elbows spread apart. 3. Slowly tighten your belly muscles and raise your shoulder blades off the floor. 4. Keep your head in line with your body, and do not press your chin to your chest.  5. Hold this position for 1 or 2 seconds, then slowly lower yourself back down to the floor. 6. Repeat 8 to 12 times. Pelvic tilt exercise    1. Lie on your back with your knees bent. 2. \"Brace\" your stomach. This means to tighten your muscles by pulling in and imagining your belly button moving toward your spine. You should feel like your back is pressing to the floor and your hips and pelvis are rocking back. 3. Hold for about 6 seconds while you breathe smoothly. 4. Repeat 8 to 12 times. Heel dig bridging    1. Lie on your back with both knees bent and your ankles bent so that only your heels are digging into the floor. Your knees should be bent about 90 degrees. 2. Then push your heels into the floor, squeeze your buttocks, and lift your hips off the floor until your shoulders, hips, and knees are all in a straight line. 3. Hold for about 6 seconds as you continue to breathe normally, and then slowly lower your hips back down to the floor and rest for up to 10 seconds. 4. Do 8 to 12 repetitions. Hamstring stretch in doorway    1. Lie on your back in a doorway, with one leg through the open door. 2. Slide your leg up the wall to straighten your knee. You should feel a gentle stretch down the back of your leg. 3. Hold the stretch for at least 15 to 30 seconds. Do not arch your back, point your toes, or bend either knee. Keep one heel touching the floor and the other heel touching the wall. 4. Repeat with your other leg. 5. Do 2 to 4 times for each leg.     Hip flexor stretch    1. Kneel on the floor with one knee bent and one leg behind you. Place your forward knee over your foot. Keep your other knee touching the floor. 2. Slowly push your hips forward until you feel a stretch in the upper thigh of your rear leg. 3. Hold the stretch for at least 15 to 30 seconds. Repeat with your other leg. 4. Do 2 to 4 times on each side. Wall sit    1. Stand with your back 10 to 12 inches away from a wall. 2. Lean into the wall until your back is flat against it. 3. Slowly slide down until your knees are slightly bent, pressing your lower back into the wall. 4. Hold for about 6 seconds, then slide back up the wall. 5. Repeat 8 to 12 times. Follow-up care is a key part of your treatment and safety. Be sure to make and go to all appointments, and call your doctor if you are having problems. It's also a good idea to know your test results and keep a list of the medicines you take. Where can you learn more? Go to http://ambrose-vince.info/. Enter F642 in the search box to learn more about \"Low Back Pain: Exercises. \"  Current as of: June 26, 2019  Content Version: 12.2  © 1822-0638 [employer]. Care instructions adapted under license by [employer] (which disclaims liability or warranty for this information). If you have questions about a medical condition or this instruction, always ask your healthcare professional. [address] any warranty or liability for your use of this information.

## 2019-10-30 NOTE — PROGRESS NOTES
Chief Complaint   Patient presents with    Labs     potassium level was low at the time of surgery done on 10/22/19 would like it rechecked     Leg Pain     left pain/numbness since before the surgery - leg gives out on paient sometimes when he is walking      1. Have you been to the ER, urgent care clinic since your last visit? Hospitalized since your last visit? No    2. Have you seen or consulted any other health care providers outside of the [address] [place] [address] since your last visit? Include any pap smears or colon screening.  No

## 2019-10-31 LAB
ALBUMIN SERPL-MCNC: 4.2 G/DL (ref 3.6–4.8)
ALBUMIN/GLOB SERPL: 1.5 {RATIO} (ref 1.2–2.2)
ALP SERPL-CCNC: 89 IU/L (ref 39–117)
ALT SERPL-CCNC: 62 IU/L (ref 0–44)
AST SERPL-CCNC: 55 IU/L (ref 0–40)
BASOPHILS # BLD AUTO: 0 X10E3/UL (ref 0–0.2)
BASOPHILS NFR BLD AUTO: 1 %
BILIRUB SERPL-MCNC: 1 MG/DL (ref 0–1.2)
BUN SERPL-MCNC: 8 MG/DL (ref 8–27)
BUN/CREAT SERPL: 9 (ref 10–24)
CALCIUM SERPL-MCNC: 9.5 MG/DL (ref 8.6–10.2)
CHLORIDE SERPL-SCNC: 101 MMOL/L (ref 96–106)
CO2 SERPL-SCNC: 25 MMOL/L (ref 20–29)
CREAT SERPL-MCNC: 0.92 MG/DL (ref 0.76–1.27)
EOSINOPHIL # BLD AUTO: 0.1 X10E3/UL (ref 0–0.4)
EOSINOPHIL NFR BLD AUTO: 2 %
ERYTHROCYTE [DISTWIDTH] IN BLOOD BY AUTOMATED COUNT: 14.3 % (ref 12.3–15.4)
GLOBULIN SER CALC-MCNC: 2.8 G/DL (ref 1.5–4.5)
GLUCOSE SERPL-MCNC: 89 MG/DL (ref 65–99)
HCT VFR BLD AUTO: 39 % (ref 37.5–51)
HGB BLD-MCNC: 13.6 G/DL (ref 13–17.7)
IMM GRANULOCYTES # BLD AUTO: 0 X10E3/UL (ref 0–0.1)
IMM GRANULOCYTES NFR BLD AUTO: 1 %
LYMPHOCYTES # BLD AUTO: 1.2 X10E3/UL (ref 0.7–3.1)
LYMPHOCYTES NFR BLD AUTO: 18 %
MCH RBC QN AUTO: 32.1 PG (ref 26.6–33)
MCHC RBC AUTO-ENTMCNC: 34.9 G/DL (ref 31.5–35.7)
MCV RBC AUTO: 92 FL (ref 79–97)
MONOCYTES # BLD AUTO: 0.5 X10E3/UL (ref 0.1–0.9)
MONOCYTES NFR BLD AUTO: 7 %
NEUTROPHILS # BLD AUTO: 4.7 X10E3/UL (ref 1.4–7)
NEUTROPHILS NFR BLD AUTO: 71 %
PLATELET # BLD AUTO: 264 X10E3/UL (ref 150–450)
POTASSIUM SERPL-SCNC: 4.4 MMOL/L (ref 3.5–5.2)
PROT SERPL-MCNC: 7 G/DL (ref 6–8.5)
RBC # BLD AUTO: 4.24 X10E6/UL (ref 4.14–5.8)
SODIUM SERPL-SCNC: 141 MMOL/L (ref 134–144)
WBC # BLD AUTO: 6.6 X10E3/UL (ref 3.4–10.8)

## 2019-11-05 ENCOUNTER — TELEPHONE (OUTPATIENT)
Dept: FAMILY MEDICINE CLINIC | Age: 64
End: 2019-11-05

## 2019-11-05 DIAGNOSIS — R79.89 ELEVATED LIVER FUNCTION TESTS: Primary | ICD-10-CM

## 2019-11-05 NOTE — TELEPHONE ENCOUNTER
----- Message from Prashant Funez sent at 11/5/2019  9:04 AM EST -----  Regarding: KENNETH Brizuela/Telephone  General Message/Vendor Calls    Caller's first and last name: Izabella Atkinson      Reason for call: lab results      Callback required yes/no and why: yes      Best contact number(s): [phone]      Details to clarify the request: Pt would like a call with lab results.        Prashant Funez

## 2019-11-06 ENCOUNTER — TELEPHONE (OUTPATIENT)
Dept: FAMILY MEDICINE CLINIC | Age: 64
End: 2019-11-06

## 2019-11-06 DIAGNOSIS — M54.16 LUMBAR BACK PAIN WITH RADICULOPATHY AFFECTING LEFT LOWER EXTREMITY: Primary | ICD-10-CM

## 2019-11-06 NOTE — PROGRESS NOTES
Patient's potassium level was normal. He can stop taking the supplement. His blood cells for infection and anemia were normal.    His liver enzymes were slightly elevated. This can be caused by many things including surgery, medications, diet, etc. I recommend rechecking his liver function in 4 weeks. Labs ordered.

## 2019-11-06 NOTE — TELEPHONE ENCOUNTER
Called Mr. Janeen Singh  and was informed of ortho referral to Dr. Thompson Bobo. Address, phone # provided.

## 2019-11-06 NOTE — TELEPHONE ENCOUNTER
Lumbar MRI already ordered to evaluate leg pain. Has this been scheduled? I will place order for him to see orthopedics.

## 2019-11-06 NOTE — TELEPHONE ENCOUNTER
Patient is calling stating that he is having pain in the left hip.  Pt is requesting to be referred to a specialist.      Eunice Sandifer callback:  [phone]      LOV: Wednesday, October 30, 2019

## 2019-11-06 NOTE — PROGRESS NOTES
[phone] Spoke to patient Identified pt with two pt identifiers (Name @ )  Notified patient of his results and understand

## 2019-11-06 NOTE — TELEPHONE ENCOUNTER
[phone] Spoke to patient Identified pt with two pt identifiers (Name @ )  Notified patient of his results and understand

## 2019-11-06 NOTE — TELEPHONE ENCOUNTER
----- Message from Kevin Savage sent at 11/6/2019 10:36 AM EST -----  Regarding: Chata/jamal  Pt is requesting for you to call him in regard to his left thigh pain. Pts number is [phone]. He stated he was sure what was said this morning when you called him.

## 2019-11-07 ENCOUNTER — TELEPHONE (OUTPATIENT)
Dept: FAMILY MEDICINE CLINIC | Age: 64
End: 2019-11-07

## 2019-11-07 DIAGNOSIS — M54.16 LUMBAR BACK PAIN WITH RADICULOPATHY AFFECTING LEFT LOWER EXTREMITY: Primary | ICD-10-CM

## 2019-11-07 NOTE — TELEPHONE ENCOUNTER
Patient walked in having left leg pain wanted to see if he can get pain medication.  Patient was seen 10/30/2019 I advised will send message to NP [facility]

## 2019-11-08 LAB
ALBUMIN SERPL-MCNC: 4.6 G/DL (ref 3.6–4.8)
ALP SERPL-CCNC: 80 IU/L (ref 39–117)
ALT SERPL-CCNC: 21 IU/L (ref 0–44)
AST SERPL-CCNC: 19 IU/L (ref 0–40)
BILIRUB DIRECT SERPL-MCNC: 0.22 MG/DL (ref 0–0.4)
BILIRUB SERPL-MCNC: 0.9 MG/DL (ref 0–1.2)
HAV IGM SERPL QL IA: NEGATIVE
HBV CORE IGM SERPL QL IA: NEGATIVE
HBV SURFACE AG SERPL QL IA: NEGATIVE
HCV AB S/CO SERPL IA: <0.1 S/CO RATIO (ref 0–0.9)
PROT SERPL-MCNC: 7 G/DL (ref 6–8.5)

## 2019-11-08 RX ORDER — HYDROCODONE BITARTRATE AND ACETAMINOPHEN 5; 325 MG/1; MG/1
1 TABLET ORAL
Qty: 12 TAB | Refills: 0 | Status: SHIPPED | OUTPATIENT
Start: 2019-11-08 | End: 2019-11-15

## 2019-11-08 NOTE — TELEPHONE ENCOUNTER
----- Message from Renato Valdes sent at 11/8/2019 10:35 AM EST -----  Regarding: Heather Brizuela/ Refill  Contact: [phone]  Medication Refill    Best contact number(s):[phone]      Name of medication and dosage if known:oxycodone -5mg      Is patient out of this medication (yes/no): Yes      Pharmacy name:[facility] [place], [address] listed in chart? (yes/no): no  Pharmacy phone number: [phone]      Details to clarify the request: Patient stated he put a request in yesterday when he came in to get lab work done on 11/07/2019.       Rui Lopez

## 2019-11-21 DIAGNOSIS — I10 ESSENTIAL HYPERTENSION: ICD-10-CM

## 2019-11-21 RX ORDER — AMLODIPINE BESYLATE 10 MG/1
TABLET ORAL
Qty: 90 TAB | Refills: 1 | Status: SHIPPED | OUTPATIENT
Start: 2019-11-21 | End: 2020-01-31 | Stop reason: SDUPTHER

## 2019-11-25 ENCOUNTER — TELEPHONE (OUTPATIENT)
Dept: FAMILY MEDICINE CLINIC | Age: 64
End: 2019-11-25

## 2019-11-25 NOTE — TELEPHONE ENCOUNTER
----- Message from Preston Dorene sent at 11/25/2019  3:18 PM EST -----  Regarding: HOMERO Brizuela/telephone  Contact: [phone]  Caller's first and last name: n/a  Reason for call: Pt would like nurse to give him a call with his lab results.   Callback required yes/no and why: yes  Best contact number(s): [phone]  Details to clarify the request: n/a

## 2019-11-26 NOTE — TELEPHONE ENCOUNTER
[phone] Spoke to patient Identified pt with two pt identifiers (Name @ )  Notified patient of his lab results and understand

## 2019-11-26 NOTE — TELEPHONE ENCOUNTER
His liver function was normal on repeat testing. Testing for viral hepatitis negative. No further testing needed.

## 2020-01-08 ENCOUNTER — OFFICE VISIT (OUTPATIENT)
Dept: FAMILY MEDICINE CLINIC | Age: 65
End: 2020-01-08

## 2020-01-08 VITALS
OXYGEN SATURATION: 95 % | HEIGHT: 69 IN | HEART RATE: 77 BPM | BODY MASS INDEX: 30.87 KG/M2 | RESPIRATION RATE: 18 BRPM | TEMPERATURE: 98.4 F | DIASTOLIC BLOOD PRESSURE: 92 MMHG | WEIGHT: 208.4 LBS | SYSTOLIC BLOOD PRESSURE: 128 MMHG

## 2020-01-08 DIAGNOSIS — J34.89 NOSTRIL SORE: ICD-10-CM

## 2020-01-08 DIAGNOSIS — R60.9 EDEMA, UNSPECIFIED TYPE: ICD-10-CM

## 2020-01-08 DIAGNOSIS — Z01.818 PRE-OP EXAM: Primary | ICD-10-CM

## 2020-01-08 DIAGNOSIS — Z91.89 PERSONAL HISTORY OF POISONING, PRESENTING HAZARDS TO HEALTH: ICD-10-CM

## 2020-01-08 DIAGNOSIS — R73.02 IGT (IMPAIRED GLUCOSE TOLERANCE): ICD-10-CM

## 2020-01-08 DIAGNOSIS — M48.062 SPINAL STENOSIS OF LUMBAR REGION WITH NEUROGENIC CLAUDICATION: ICD-10-CM

## 2020-01-08 DIAGNOSIS — I10 ESSENTIAL HYPERTENSION: ICD-10-CM

## 2020-01-08 RX ORDER — KETOCONAZOLE 20 MG/G
CREAM TOPICAL DAILY
Qty: 15 G | Refills: 0 | Status: SHIPPED | OUTPATIENT
Start: 2020-01-08 | End: 2020-01-31 | Stop reason: SDUPTHER

## 2020-01-08 RX ORDER — MUPIROCIN 20 MG/G
OINTMENT TOPICAL 2 TIMES DAILY
Qty: 22 G | Refills: 0 | Status: SHIPPED | OUTPATIENT
Start: 2020-01-08 | End: 2021-01-27

## 2020-01-08 RX ORDER — TRAMADOL HYDROCHLORIDE 50 MG/1
50 TABLET ORAL
COMMUNITY
End: 2020-01-23

## 2020-01-08 RX ORDER — CYCLOBENZAPRINE HCL 10 MG
10 TABLET ORAL
COMMUNITY
End: 2020-01-31 | Stop reason: SDUPTHER

## 2020-01-08 NOTE — PROGRESS NOTES
Chief Complaint   Patient presents with    Pre-op Exam     back [facility] 1/16/20     1. Have you been to the ER, urgent care clinic since your last visit? Hospitalized since your last visit? No    2. Have you seen or consulted any other health care providers outside of the [address] [place] [address] since your last visit? Include any pap smears or colon screening.  No

## 2020-01-08 NOTE — PATIENT INSTRUCTIONS
Lumbar Laminectomy: Before Your Surgery  What is a lumbar laminectomy? A lumbar laminectomy is surgery to ease pressure on the spinal cord and nerves of the lower spine. The doctor makes a 3- to 4-inch cut in the lower back. This cut is called an incision. Then the doctor takes out pieces of bone that are squeezing the spinal cord and nerves. He or she may also take out other tissues. Many people have less pain soon after surgery. But your back may feel stiff and sore for a few months. Most people go home 1 to 2 days after surgery. You will likely return to work or your normal routine in 4 to 6 weeks. Follow-up care is a key part of your treatment and safety. Be sure to make and go to all appointments, and call your doctor if you are having problems. It's also a good idea to know your test results and keep a list of the medicines you take. What happens before surgery?   Surgery can be stressful. This information will help you understand what you can expect. And it will help you safely prepare for your surgery.   Preparing for surgery    · Understand exactly what surgery is planned, along with the risks, benefits, and other options. · Tell your doctors ALL the medicines, vitamins, supplements, and herbal remedies you take. Some of these can increase the risk of bleeding or interact with anesthesia.     · If you take blood thinners, such as warfarin (Coumadin), clopidogrel (Plavix), or aspirin, be sure to talk to your doctor. He or she will tell you if you should stop taking these medicines before your surgery. Make sure that you understand exactly what your doctor wants you to do.     · Your doctor will tell you which medicines to take or stop before your surgery. You may need to stop taking certain medicines a week or more before surgery. So talk to your doctor as soon as you can.     · If you have an advance directive, let your doctor know.  It may include a living will and a durable power of  for health care. Bring a copy to the hospital. If you don't have one, you may want to prepare one. It lets your doctor and loved ones know your health care wishes. Doctors advise that everyone prepare these papers before any type of surgery or procedure. What happens on the day of surgery? · Follow the instructions exactly about when to stop eating and drinking. If you don't, your surgery may be canceled. If your doctor has told you to take your medicines on the day of surgery, take them with only a sip of water.     · Take a bath or shower before you come in for your surgery. Do not apply lotions, perfumes, deodorants, or nail polish.     · Do not shave the surgical site yourself.     · Take off all jewelry and piercings. And take out contact lenses, if you wear them.    At the hospital or surgery center   · Bring a picture ID.     · The area for surgery is often marked to make sure there are no errors.     · You will be kept comfortable and safe by your anesthesia provider. You will be asleep during the surgery.     · The surgery will take about 1 to 1½ hours. If a spinal fusion is done at the same time, surgery will last 2 to 4 hours. Going home   · Be sure you have someone to drive you home. Anesthesia and pain medicine make it unsafe for you to drive.     · You will be given more specific instructions about recovering from your surgery. They will cover things like diet, wound care, follow-up care, driving, and getting back to your normal routine. When should you call your doctor? · You have questions or concerns.     · You don't understand how to prepare for your surgery.     · You become ill before the surgery (such as fever, flu, or a cold).     · You need to reschedule or have changed your mind about having the surgery. Where can you learn more? Go to http://ambrose-vince.info/.   Enter W343 in the search box to learn more about \"Lumbar Laminectomy: Before Your Surgery. \"  Current as of: June 26, 2019  Content Version: 12.2  © 6053-4818 [employer]. Care instructions adapted under license by [employer] (which disclaims liability or warranty for this information). If you have questions about a medical condition or this instruction, always ask your healthcare professional. [address] any warranty or liability for your use of this information.

## 2020-01-08 NOTE — PROGRESS NOTES
Preoperative Evaluation    Date of Exam: 2020    Odell Almaguer is a 62 y.o. male (:1955) who presents for preoperative evaluation. Procedure/Surgery: Lumbar laminectomy L4-S1  Date of Procedure/Surgery: 2020  Surgeon: Dr. Donte Stein: [address] [facility]  Primary Physician: Zuleima Villanueva NP  Latex Allergy: no    Medical History:     Past Medical History:   Diagnosis Date    Adenomatous polyp of colon     Angina pectoris     Bleeding ulcer     BPH (benign prostatic hyperplasia)     Cancer [facility]) 2013    PROSTATE     Colon polyps 2017    Hypertension     Kidney infection     pt states he is unaware of having had this    Right inguinal hernia 2016    Sleep apnea     uses CPAP    Vegetarian      Allergies:   No Known Allergies    Medications:     Current Outpatient Medications   Medication Sig    traMADol (ULTRAM) 50 mg tablet Take 50 mg by mouth every six (6) hours as needed for Pain.  cyclobenzaprine (FLEXERIL) 10 mg tablet Take 10 mg by mouth three (3) times daily as needed for Muscle Spasm(s).  amLODIPine (NORVASC) 10 mg tablet TAKE 1 TABLET EVERY DAY    docusate sodium (COLACE) 100 mg capsule Take 1 Cap by mouth two (2) times a day. For constipation    valACYclovir (VALTREX) 500 mg tablet TAKE 1 TABLET EVERY DAY AS NEEDED FOR BREAKOUT UNDER EYES    tamsulosin (FLOMAX) 0.4 mg capsule TAKE 2 CAPSULES EVERY DAY  FOR  PROSTATE    metoprolol succinate (TOPROL-XL) 100 mg tablet TAKE 1 TABLET BY MOUTH DAILY    losartan (COZAAR) 100 mg tablet TAKE 1 TABLET EVERY DAY FOR BLOOD PRESSURE    fluticasone propionate (FLONASE) 50 mcg/actuation nasal spray USE 2 SPRAYS IN EACH NOSTRIL EVERY DAY    cyanocobalamin (VITAMIN B-12) 2,000 mcg TbER Take 2,000 mcg by mouth daily.  cholecalciferol (VITAMIN D3) 2,000 unit cap capsule Take 2,000 Units by mouth daily.     azelastine (ASTELIN) 137 mcg (0.1 %) nasal spray 1 [place] by Both Nostrils route two (2) times a day. Use in each nostril as directed    albuterol (PROVENTIL HFA, VENTOLIN HFA, PROAIR HFA) 90 mcg/actuation inhaler Take 2 Puffs by inhalation every six (6) hours as needed for Wheezing.  mupirocin (BACTROBAN) 2 % ointment Apply  to affected area two (2) times a day.  aspirin 81 mg chewable tablet Take 1 Tab by mouth daily. Indications: MYOCARDIAL INFARCTION PREVENTION     No current facility-administered medications for this visit. Surgical History:     Past Surgical History:   Procedure Laterality Date    COLONOSCOPY  2012, 2017    adenomatous polyp    COLONOSCOPY N/A 5/3/2017    COLONOSCOPY performed by [facility]us Gabriel [facility] at [facility] ENDOSCOPY    HX CHOLECYSTECTOMY  2010    HX HERNIA REPAIR Right 7/29/16    inguinal w/mesh by Dr. 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   Tobacco Use    Smoking status: Never Smoker    Smokeless tobacco: Never Used   Substance and Sexual Activity    Alcohol use:  Yes     Alcohol/week: 0.0 standard drinks     Comment: OCCASIONALLY    Drug use: No    Sexual activity: Never   Social History Narrative    Grew up in group home in [place], doesn't have close family    Active in [address] [place] [address]    Unable to read       Recent use of: No recent use of aspirin (ASA), NSAIDS or steroids    Anesthesia Complications: None  History of abnormal bleeding : None  History of Blood Transfusions: no  Health Care Directive or Living Will: no    REVIEW OF SYSTEMS:  A comprehensive review of systems was negative. Physical Exam  Visit Vitals  BP (!) 128/92 (BP 1 Location: Left arm, BP Patient Position: Sitting)   Pulse 77   Temp 98.4 °F (36.9 °C) (Oral)   Resp 18   Ht 5' 9\" (1.753 m)   Wt 208 lb 6.4 oz (94.5 kg)   SpO2 95%   BMI 30.78 kg/m²     General appearance: alert, well appearing, and in no distress. Chest: clear to auscultation, no wheezes, rales or rhonchi, symmetric air entry. CVS exam: normal rate, regular rhythm, normal S1, S2, no murmurs, rubs, clicks or gallops, normal bilateral carotid upstroke without bruits, no JVD. Exam of extremities: peripheral pulses normal, no pedal edema, no clubbing or cyanosis      DIAGNOSTICS:   1. EKG: EKG FINDINGS - normal EKG, normal sinus rhythm, unchanged from previous tracings  2. Labs: pending      IMPRESSION:   Based on the above evaluation, the patient is stable and cleared for surgery.         Savita Levine NP   1/8/2020

## 2020-01-09 LAB
BASOPHILS # BLD AUTO: 0 X10E3/UL (ref 0–0.2)
BASOPHILS NFR BLD AUTO: 0 %
BUN SERPL-MCNC: 11 MG/DL (ref 8–27)
BUN/CREAT SERPL: 13 (ref 10–24)
CALCIUM SERPL-MCNC: 10.1 MG/DL (ref 8.6–10.2)
CHLORIDE SERPL-SCNC: 102 MMOL/L (ref 96–106)
CO2 SERPL-SCNC: 27 MMOL/L (ref 20–29)
CREAT SERPL-MCNC: 0.88 MG/DL (ref 0.76–1.27)
EOSINOPHIL # BLD AUTO: 0 X10E3/UL (ref 0–0.4)
EOSINOPHIL NFR BLD AUTO: 1 %
ERYTHROCYTE [DISTWIDTH] IN BLOOD BY AUTOMATED COUNT: 13.5 % (ref 11.6–15.4)
GLUCOSE SERPL-MCNC: 84 MG/DL (ref 65–99)
HBA1C MFR BLD: 5 % (ref 4.8–5.6)
HCT VFR BLD AUTO: 41.8 % (ref 37.5–51)
HGB BLD-MCNC: 14.9 G/DL (ref 13–17.7)
IMM GRANULOCYTES # BLD AUTO: 0 X10E3/UL (ref 0–0.1)
IMM GRANULOCYTES NFR BLD AUTO: 0 %
INR PPP: 1.1 (ref 0.8–1.2)
LYMPHOCYTES # BLD AUTO: 1.5 X10E3/UL (ref 0.7–3.1)
LYMPHOCYTES NFR BLD AUTO: 19 %
MCH RBC QN AUTO: 31.5 PG (ref 26.6–33)
MCHC RBC AUTO-ENTMCNC: 35.6 G/DL (ref 31.5–35.7)
MCV RBC AUTO: 88 FL (ref 79–97)
MONOCYTES # BLD AUTO: 0.5 X10E3/UL (ref 0.1–0.9)
MONOCYTES NFR BLD AUTO: 7 %
NEUTROPHILS # BLD AUTO: 5.8 X10E3/UL (ref 1.4–7)
NEUTROPHILS NFR BLD AUTO: 73 %
PLATELET # BLD AUTO: 266 X10E3/UL (ref 150–450)
POTASSIUM SERPL-SCNC: 3.2 MMOL/L (ref 3.5–5.2)
PROTHROMBIN TIME: 11.2 SEC (ref 9.1–12)
RBC # BLD AUTO: 4.73 X10E6/UL (ref 4.14–5.8)
SODIUM SERPL-SCNC: 146 MMOL/L (ref 134–144)
WBC # BLD AUTO: 7.9 X10E3/UL (ref 3.4–10.8)

## 2020-01-15 ENCOUNTER — ANESTHESIA EVENT (OUTPATIENT)
Dept: SURGERY | Age: 65
DRG: 519 | End: 2020-01-15
Payer: MEDICARE

## 2020-01-15 NOTE — PROGRESS NOTES
Labs stable. Please fax to [facility], Attn: Dr. Dione Larson.

## 2020-01-16 ENCOUNTER — HOSPITAL ENCOUNTER (INPATIENT)
Age: 65
LOS: 1 days | Discharge: HOME HEALTH | DRG: 519 | End: 2020-01-23
Attending: ORTHOPAEDIC SURGERY | Admitting: ORTHOPAEDIC SURGERY
Payer: MEDICARE

## 2020-01-16 ENCOUNTER — ANESTHESIA (OUTPATIENT)
Dept: SURGERY | Age: 65
DRG: 519 | End: 2020-01-16
Payer: MEDICARE

## 2020-01-16 ENCOUNTER — APPOINTMENT (OUTPATIENT)
Dept: GENERAL RADIOLOGY | Age: 65
DRG: 519 | End: 2020-01-16
Attending: ORTHOPAEDIC SURGERY
Payer: MEDICARE

## 2020-01-16 DIAGNOSIS — M48.061 SPINAL STENOSIS OF LUMBAR REGION, UNSPECIFIED WHETHER NEUROGENIC CLAUDICATION PRESENT: Primary | ICD-10-CM

## 2020-01-16 PROBLEM — M48.00 SPINAL STENOSIS: Status: ACTIVE | Noted: 2020-01-16

## 2020-01-16 LAB
GLUCOSE BLD STRIP.AUTO-MCNC: 123 MG/DL (ref 65–100)
SERVICE CMNT-IMP: ABNORMAL

## 2020-01-16 PROCEDURE — 74011250636 HC RX REV CODE- 250/636: Performed by: NURSE ANESTHETIST, CERTIFIED REGISTERED

## 2020-01-16 PROCEDURE — 77030040361 HC SLV COMPR DVT MDII -B: Performed by: ORTHOPAEDIC SURGERY

## 2020-01-16 PROCEDURE — 77030013533 HC SEAL FBRN TISSL RDYTUSE 10ML BAXT -E: Performed by: ORTHOPAEDIC SURGERY

## 2020-01-16 PROCEDURE — 77030014007 HC SPNG HEMSTAT J&J -B: Performed by: ORTHOPAEDIC SURGERY

## 2020-01-16 PROCEDURE — 77030031139 HC SUT VCRL2 J&J -A: Performed by: ORTHOPAEDIC SURGERY

## 2020-01-16 PROCEDURE — 99218 HC RM OBSERVATION: CPT

## 2020-01-16 PROCEDURE — 77030038600 HC TU BPLR IRR DISP STRY -B: Performed by: ORTHOPAEDIC SURGERY

## 2020-01-16 PROCEDURE — 74011000250 HC RX REV CODE- 250: Performed by: ORTHOPAEDIC SURGERY

## 2020-01-16 PROCEDURE — 74011000250 HC RX REV CODE- 250: Performed by: NURSE ANESTHETIST, CERTIFIED REGISTERED

## 2020-01-16 PROCEDURE — 77030029099 HC BN WAX SSPC -A: Performed by: ORTHOPAEDIC SURGERY

## 2020-01-16 PROCEDURE — 77030012893

## 2020-01-16 PROCEDURE — 74011250636 HC RX REV CODE- 250/636: Performed by: PHYSICIAN ASSISTANT

## 2020-01-16 PROCEDURE — 76210000016 HC OR PH I REC 1 TO 1.5 HR: Performed by: ORTHOPAEDIC SURGERY

## 2020-01-16 PROCEDURE — 77030026438 HC STYL ET INTUB CARD -A: Performed by: NURSE ANESTHETIST, CERTIFIED REGISTERED

## 2020-01-16 PROCEDURE — 77030008684 HC TU ET CUF COVD -B: Performed by: NURSE ANESTHETIST, CERTIFIED REGISTERED

## 2020-01-16 PROCEDURE — 74011250636 HC RX REV CODE- 250/636: Performed by: ANESTHESIOLOGY

## 2020-01-16 PROCEDURE — 82962 GLUCOSE BLOOD TEST: CPT

## 2020-01-16 PROCEDURE — 74011250637 HC RX REV CODE- 250/637: Performed by: PHYSICIAN ASSISTANT

## 2020-01-16 PROCEDURE — 77030014647 HC SEAL FBRN TISSL BAXT -D: Performed by: ORTHOPAEDIC SURGERY

## 2020-01-16 PROCEDURE — 77030018836 HC SOL IRR NACL ICUM -A: Performed by: ORTHOPAEDIC SURGERY

## 2020-01-16 PROCEDURE — 74011000272 HC RX REV CODE- 272: Performed by: ORTHOPAEDIC SURGERY

## 2020-01-16 PROCEDURE — 72020 X-RAY EXAM OF SPINE 1 VIEW: CPT

## 2020-01-16 PROCEDURE — 76010000172 HC OR TIME 2.5 TO 3 HR INTENSV-TIER 1: Performed by: ORTHOPAEDIC SURGERY

## 2020-01-16 PROCEDURE — 77030039267 HC ADH SKN EXOFIN S2SG -B: Performed by: ORTHOPAEDIC SURGERY

## 2020-01-16 PROCEDURE — 77030038552 HC DRN WND MDII -A: Performed by: ORTHOPAEDIC SURGERY

## 2020-01-16 PROCEDURE — 76060000036 HC ANESTHESIA 2.5 TO 3 HR: Performed by: ORTHOPAEDIC SURGERY

## 2020-01-16 PROCEDURE — 74011000250 HC RX REV CODE- 250: Performed by: PHYSICIAN ASSISTANT

## 2020-01-16 PROCEDURE — 74011250637 HC RX REV CODE- 250/637: Performed by: ANESTHESIOLOGY

## 2020-01-16 PROCEDURE — 77030002924 HC SUT GORTX WLGO -B: Performed by: ORTHOPAEDIC SURGERY

## 2020-01-16 PROCEDURE — 77030005513 HC CATH URETH FOL11 MDII -B: Performed by: ORTHOPAEDIC SURGERY

## 2020-01-16 PROCEDURE — 77030040922 HC BLNKT HYPOTHRM STRY -A

## 2020-01-16 PROCEDURE — 00QT0ZZ REPAIR SPINAL MENINGES, OPEN APPROACH: ICD-10-PCS | Performed by: ORTHOPAEDIC SURGERY

## 2020-01-16 PROCEDURE — 01NB0ZZ RELEASE LUMBAR NERVE, OPEN APPROACH: ICD-10-PCS | Performed by: ORTHOPAEDIC SURGERY

## 2020-01-16 RX ORDER — SODIUM CHLORIDE 9 MG/ML
25 INJECTION, SOLUTION INTRAVENOUS CONTINUOUS
Status: DISCONTINUED | OUTPATIENT
Start: 2020-01-16 | End: 2020-01-16 | Stop reason: HOSPADM

## 2020-01-16 RX ORDER — FENTANYL CITRATE 50 UG/ML
INJECTION, SOLUTION INTRAMUSCULAR; INTRAVENOUS AS NEEDED
Status: DISCONTINUED | OUTPATIENT
Start: 2020-01-16 | End: 2020-01-16 | Stop reason: HOSPADM

## 2020-01-16 RX ORDER — ONDANSETRON 4 MG/1
4 TABLET, ORALLY DISINTEGRATING ORAL
Status: DISCONTINUED | OUTPATIENT
Start: 2020-01-16 | End: 2020-01-23 | Stop reason: HOSPADM

## 2020-01-16 RX ORDER — MIDAZOLAM HYDROCHLORIDE 1 MG/ML
0.5 INJECTION, SOLUTION INTRAMUSCULAR; INTRAVENOUS
Status: DISCONTINUED | OUTPATIENT
Start: 2020-01-16 | End: 2020-01-16 | Stop reason: HOSPADM

## 2020-01-16 RX ORDER — OXYCODONE AND ACETAMINOPHEN 5; 325 MG/1; MG/1
1 TABLET ORAL AS NEEDED
Status: DISCONTINUED | OUTPATIENT
Start: 2020-01-16 | End: 2020-01-16 | Stop reason: HOSPADM

## 2020-01-16 RX ORDER — ALBUTEROL SULFATE 90 UG/1
2 AEROSOL, METERED RESPIRATORY (INHALATION)
Status: DISCONTINUED | OUTPATIENT
Start: 2020-01-16 | End: 2020-01-16 | Stop reason: CLARIF

## 2020-01-16 RX ORDER — DIPHENHYDRAMINE HYDROCHLORIDE 50 MG/ML
12.5 INJECTION, SOLUTION INTRAMUSCULAR; INTRAVENOUS
Status: ACTIVE | OUTPATIENT
Start: 2020-01-16 | End: 2020-01-20

## 2020-01-16 RX ORDER — SODIUM CHLORIDE 0.9 % (FLUSH) 0.9 %
5-40 SYRINGE (ML) INJECTION EVERY 8 HOURS
Status: DISCONTINUED | OUTPATIENT
Start: 2020-01-16 | End: 2020-01-16 | Stop reason: HOSPADM

## 2020-01-16 RX ORDER — SODIUM CHLORIDE, SODIUM LACTATE, POTASSIUM CHLORIDE, CALCIUM CHLORIDE 600; 310; 30; 20 MG/100ML; MG/100ML; MG/100ML; MG/100ML
125 INJECTION, SOLUTION INTRAVENOUS CONTINUOUS
Status: DISCONTINUED | OUTPATIENT
Start: 2020-01-16 | End: 2020-01-16 | Stop reason: HOSPADM

## 2020-01-16 RX ORDER — OXYCODONE HYDROCHLORIDE 5 MG/1
5 TABLET ORAL
Status: DISCONTINUED | OUTPATIENT
Start: 2020-01-16 | End: 2020-01-23 | Stop reason: HOSPADM

## 2020-01-16 RX ORDER — AMLODIPINE BESYLATE 5 MG/1
10 TABLET ORAL DAILY
Status: DISCONTINUED | OUTPATIENT
Start: 2020-01-17 | End: 2020-01-23 | Stop reason: HOSPADM

## 2020-01-16 RX ORDER — SODIUM CHLORIDE 0.9 % (FLUSH) 0.9 %
5-40 SYRINGE (ML) INJECTION EVERY 8 HOURS
Status: DISCONTINUED | OUTPATIENT
Start: 2020-01-16 | End: 2020-01-23 | Stop reason: HOSPADM

## 2020-01-16 RX ORDER — CEFAZOLIN SODIUM 1 G/3ML
INJECTION, POWDER, FOR SOLUTION INTRAMUSCULAR; INTRAVENOUS AS NEEDED
Status: DISCONTINUED | OUTPATIENT
Start: 2020-01-16 | End: 2020-01-16 | Stop reason: HOSPADM

## 2020-01-16 RX ORDER — AMOXICILLIN 250 MG
1 CAPSULE ORAL 2 TIMES DAILY
Status: DISCONTINUED | OUTPATIENT
Start: 2020-01-16 | End: 2020-01-23 | Stop reason: HOSPADM

## 2020-01-16 RX ORDER — METOPROLOL SUCCINATE 50 MG/1
100 TABLET, EXTENDED RELEASE ORAL DAILY
Status: DISCONTINUED | OUTPATIENT
Start: 2020-01-17 | End: 2020-01-23 | Stop reason: HOSPADM

## 2020-01-16 RX ORDER — DEXAMETHASONE SODIUM PHOSPHATE 4 MG/ML
INJECTION, SOLUTION INTRA-ARTICULAR; INTRALESIONAL; INTRAMUSCULAR; INTRAVENOUS; SOFT TISSUE AS NEEDED
Status: DISCONTINUED | OUTPATIENT
Start: 2020-01-16 | End: 2020-01-16 | Stop reason: HOSPADM

## 2020-01-16 RX ORDER — TAMSULOSIN HYDROCHLORIDE 0.4 MG/1
0.4 CAPSULE ORAL DAILY
Status: DISCONTINUED | OUTPATIENT
Start: 2020-01-17 | End: 2020-01-23 | Stop reason: HOSPADM

## 2020-01-16 RX ORDER — DIAZEPAM 5 MG/1
TABLET ORAL
Status: DISPENSED
Start: 2020-01-16 | End: 2020-01-17

## 2020-01-16 RX ORDER — ONDANSETRON 2 MG/ML
INJECTION INTRAMUSCULAR; INTRAVENOUS AS NEEDED
Status: DISCONTINUED | OUTPATIENT
Start: 2020-01-16 | End: 2020-01-16 | Stop reason: HOSPADM

## 2020-01-16 RX ORDER — MUPIROCIN 20 MG/G
OINTMENT TOPICAL 2 TIMES DAILY
Status: DISCONTINUED | OUTPATIENT
Start: 2020-01-16 | End: 2020-01-23 | Stop reason: HOSPADM

## 2020-01-16 RX ORDER — OXYCODONE HYDROCHLORIDE 5 MG/1
10 TABLET ORAL
Status: DISCONTINUED | OUTPATIENT
Start: 2020-01-16 | End: 2020-01-19 | Stop reason: SDUPTHER

## 2020-01-16 RX ORDER — FENTANYL CITRATE 50 UG/ML
25 INJECTION, SOLUTION INTRAMUSCULAR; INTRAVENOUS
Status: DISCONTINUED | OUTPATIENT
Start: 2020-01-16 | End: 2020-01-16 | Stop reason: HOSPADM

## 2020-01-16 RX ORDER — FACIAL-BODY WIPES
10 EACH TOPICAL DAILY PRN
Status: DISCONTINUED | OUTPATIENT
Start: 2020-01-18 | End: 2020-01-23 | Stop reason: HOSPADM

## 2020-01-16 RX ORDER — LOSARTAN POTASSIUM 50 MG/1
100 TABLET ORAL DAILY
Status: DISCONTINUED | OUTPATIENT
Start: 2020-01-17 | End: 2020-01-23 | Stop reason: HOSPADM

## 2020-01-16 RX ORDER — SODIUM CHLORIDE 0.9 % (FLUSH) 0.9 %
5-40 SYRINGE (ML) INJECTION AS NEEDED
Status: DISCONTINUED | OUTPATIENT
Start: 2020-01-16 | End: 2020-01-23 | Stop reason: HOSPADM

## 2020-01-16 RX ORDER — SODIUM CHLORIDE 0.9 % (FLUSH) 0.9 %
5-40 SYRINGE (ML) INJECTION AS NEEDED
Status: DISCONTINUED | OUTPATIENT
Start: 2020-01-16 | End: 2020-01-16 | Stop reason: HOSPADM

## 2020-01-16 RX ORDER — FENTANYL CITRATE 50 UG/ML
50 INJECTION, SOLUTION INTRAMUSCULAR; INTRAVENOUS AS NEEDED
Status: DISCONTINUED | OUTPATIENT
Start: 2020-01-16 | End: 2020-01-16 | Stop reason: HOSPADM

## 2020-01-16 RX ORDER — HYDROMORPHONE HCL/0.9% NACL/PF 0.5 MG/ML
PLASTIC BAG, INJECTION (ML) INTRAVENOUS
Status: DISCONTINUED | OUTPATIENT
Start: 2020-01-16 | End: 2020-01-17

## 2020-01-16 RX ORDER — LIDOCAINE HYDROCHLORIDE 20 MG/ML
INJECTION, SOLUTION EPIDURAL; INFILTRATION; INTRACAUDAL; PERINEURAL AS NEEDED
Status: DISCONTINUED | OUTPATIENT
Start: 2020-01-16 | End: 2020-01-16 | Stop reason: HOSPADM

## 2020-01-16 RX ORDER — HYDROMORPHONE HYDROCHLORIDE 1 MG/ML
0.2 INJECTION, SOLUTION INTRAMUSCULAR; INTRAVENOUS; SUBCUTANEOUS
Status: DISCONTINUED | OUTPATIENT
Start: 2020-01-16 | End: 2020-01-16 | Stop reason: HOSPADM

## 2020-01-16 RX ORDER — AZELASTINE 1 MG/ML
1 SPRAY, METERED NASAL 2 TIMES DAILY
Status: DISCONTINUED | OUTPATIENT
Start: 2020-01-16 | End: 2020-01-23 | Stop reason: HOSPADM

## 2020-01-16 RX ORDER — MORPHINE SULFATE 10 MG/ML
2 INJECTION, SOLUTION INTRAMUSCULAR; INTRAVENOUS
Status: DISCONTINUED | OUTPATIENT
Start: 2020-01-16 | End: 2020-01-16 | Stop reason: HOSPADM

## 2020-01-16 RX ORDER — MIDAZOLAM HYDROCHLORIDE 1 MG/ML
1 INJECTION, SOLUTION INTRAMUSCULAR; INTRAVENOUS AS NEEDED
Status: DISCONTINUED | OUTPATIENT
Start: 2020-01-16 | End: 2020-01-16 | Stop reason: HOSPADM

## 2020-01-16 RX ORDER — PROPOFOL 10 MG/ML
INJECTION, EMULSION INTRAVENOUS AS NEEDED
Status: DISCONTINUED | OUTPATIENT
Start: 2020-01-16 | End: 2020-01-16 | Stop reason: HOSPADM

## 2020-01-16 RX ORDER — DIPHENHYDRAMINE HYDROCHLORIDE 50 MG/ML
12.5 INJECTION, SOLUTION INTRAMUSCULAR; INTRAVENOUS AS NEEDED
Status: DISCONTINUED | OUTPATIENT
Start: 2020-01-16 | End: 2020-01-16 | Stop reason: HOSPADM

## 2020-01-16 RX ORDER — ACETAMINOPHEN 325 MG/1
650 TABLET ORAL EVERY 6 HOURS
Status: DISCONTINUED | OUTPATIENT
Start: 2020-01-16 | End: 2020-01-23 | Stop reason: HOSPADM

## 2020-01-16 RX ORDER — ACETAMINOPHEN 325 MG/1
650 TABLET ORAL ONCE
Status: COMPLETED | OUTPATIENT
Start: 2020-01-16 | End: 2020-01-16

## 2020-01-16 RX ORDER — MIDAZOLAM HYDROCHLORIDE 1 MG/ML
INJECTION, SOLUTION INTRAMUSCULAR; INTRAVENOUS AS NEEDED
Status: DISCONTINUED | OUTPATIENT
Start: 2020-01-16 | End: 2020-01-16 | Stop reason: HOSPADM

## 2020-01-16 RX ORDER — ONDANSETRON 2 MG/ML
4 INJECTION INTRAMUSCULAR; INTRAVENOUS
Status: ACTIVE | OUTPATIENT
Start: 2020-01-16 | End: 2020-01-17

## 2020-01-16 RX ORDER — ALBUTEROL SULFATE 0.83 MG/ML
2.5 SOLUTION RESPIRATORY (INHALATION)
Status: DISCONTINUED | OUTPATIENT
Start: 2020-01-16 | End: 2020-01-23 | Stop reason: HOSPADM

## 2020-01-16 RX ORDER — ROCURONIUM BROMIDE 10 MG/ML
INJECTION, SOLUTION INTRAVENOUS AS NEEDED
Status: DISCONTINUED | OUTPATIENT
Start: 2020-01-16 | End: 2020-01-16 | Stop reason: HOSPADM

## 2020-01-16 RX ORDER — EPHEDRINE SULFATE/0.9% NACL/PF 50 MG/5 ML
SYRINGE (ML) INTRAVENOUS AS NEEDED
Status: DISCONTINUED | OUTPATIENT
Start: 2020-01-16 | End: 2020-01-16 | Stop reason: HOSPADM

## 2020-01-16 RX ORDER — NALOXONE HYDROCHLORIDE 0.4 MG/ML
0.4 INJECTION, SOLUTION INTRAMUSCULAR; INTRAVENOUS; SUBCUTANEOUS AS NEEDED
Status: DISCONTINUED | OUTPATIENT
Start: 2020-01-16 | End: 2020-01-23 | Stop reason: HOSPADM

## 2020-01-16 RX ORDER — ONDANSETRON 2 MG/ML
4 INJECTION INTRAMUSCULAR; INTRAVENOUS AS NEEDED
Status: DISCONTINUED | OUTPATIENT
Start: 2020-01-16 | End: 2020-01-16 | Stop reason: HOSPADM

## 2020-01-16 RX ORDER — CEFAZOLIN SODIUM/WATER 2 G/20 ML
2 SYRINGE (ML) INTRAVENOUS EVERY 8 HOURS
Status: COMPLETED | OUTPATIENT
Start: 2020-01-16 | End: 2020-01-17

## 2020-01-16 RX ORDER — DIAZEPAM 5 MG/1
5 TABLET ORAL
Status: DISCONTINUED | OUTPATIENT
Start: 2020-01-16 | End: 2020-01-23 | Stop reason: HOSPADM

## 2020-01-16 RX ORDER — FLUTICASONE PROPIONATE 50 MCG
2 SPRAY, SUSPENSION (ML) NASAL DAILY
Status: DISCONTINUED | OUTPATIENT
Start: 2020-01-17 | End: 2020-01-23 | Stop reason: HOSPADM

## 2020-01-16 RX ORDER — LIDOCAINE HYDROCHLORIDE 10 MG/ML
0.1 INJECTION, SOLUTION EPIDURAL; INFILTRATION; INTRACAUDAL; PERINEURAL AS NEEDED
Status: DISCONTINUED | OUTPATIENT
Start: 2020-01-16 | End: 2020-01-16 | Stop reason: HOSPADM

## 2020-01-16 RX ORDER — ATROPINE SULFATE 0.4 MG/ML
INJECTION, SOLUTION ENDOTRACHEAL; INTRAMEDULLARY; INTRAMUSCULAR; INTRAVENOUS; SUBCUTANEOUS AS NEEDED
Status: DISCONTINUED | OUTPATIENT
Start: 2020-01-16 | End: 2020-01-16 | Stop reason: HOSPADM

## 2020-01-16 RX ORDER — SODIUM CHLORIDE 9 MG/ML
125 INJECTION, SOLUTION INTRAVENOUS CONTINUOUS
Status: DISPENSED | OUTPATIENT
Start: 2020-01-16 | End: 2020-01-17

## 2020-01-16 RX ORDER — PHENYLEPHRINE HCL IN 0.9% NACL 0.4MG/10ML
SYRINGE (ML) INTRAVENOUS AS NEEDED
Status: DISCONTINUED | OUTPATIENT
Start: 2020-01-16 | End: 2020-01-16 | Stop reason: HOSPADM

## 2020-01-16 RX ADMIN — Medication 2 G: at 16:19

## 2020-01-16 RX ADMIN — CEFAZOLIN 2 G: 330 INJECTION, POWDER, FOR SOLUTION INTRAMUSCULAR; INTRAVENOUS at 08:24

## 2020-01-16 RX ADMIN — ROCURONIUM BROMIDE 30 MG: 10 SOLUTION INTRAVENOUS at 08:13

## 2020-01-16 RX ADMIN — Medication 80 MCG: at 09:11

## 2020-01-16 RX ADMIN — SENNOSIDES AND DOCUSATE SODIUM 1 TABLET: 8.6; 5 TABLET ORAL at 17:01

## 2020-01-16 RX ADMIN — SODIUM CHLORIDE 125 ML/HR: 900 INJECTION, SOLUTION INTRAVENOUS at 11:48

## 2020-01-16 RX ADMIN — ROCURONIUM BROMIDE 10 MG: 10 SOLUTION INTRAVENOUS at 09:32

## 2020-01-16 RX ADMIN — Medication 10 ML: at 22:10

## 2020-01-16 RX ADMIN — ACETAMINOPHEN 650 MG: 325 TABLET ORAL at 06:37

## 2020-01-16 RX ADMIN — MIDAZOLAM 2 MG: 1 INJECTION INTRAMUSCULAR; INTRAVENOUS at 07:57

## 2020-01-16 RX ADMIN — FENTANYL CITRATE 100 MCG: 50 INJECTION, SOLUTION INTRAMUSCULAR; INTRAVENOUS at 08:08

## 2020-01-16 RX ADMIN — SUGAMMADEX 220 MG: 100 INJECTION, SOLUTION INTRAVENOUS at 10:26

## 2020-01-16 RX ADMIN — DIAZEPAM 5 MG: 5 TABLET ORAL at 13:35

## 2020-01-16 RX ADMIN — MUPIROCIN: 20 OINTMENT TOPICAL at 17:13

## 2020-01-16 RX ADMIN — Medication 10 MG: at 08:54

## 2020-01-16 RX ADMIN — FENTANYL CITRATE 50 MCG: 50 INJECTION, SOLUTION INTRAMUSCULAR; INTRAVENOUS at 08:11

## 2020-01-16 RX ADMIN — AZELASTINE HYDROCHLORIDE 1 SPRAY: 137 SPRAY, METERED NASAL at 17:01

## 2020-01-16 RX ADMIN — Medication 0.4 MG: at 08:54

## 2020-01-16 RX ADMIN — Medication 80 MCG: at 09:09

## 2020-01-16 RX ADMIN — Medication 10 ML: at 16:19

## 2020-01-16 RX ADMIN — ACETAMINOPHEN 650 MG: 325 TABLET ORAL at 17:01

## 2020-01-16 RX ADMIN — ROCURONIUM BROMIDE 5 MG: 10 SOLUTION INTRAVENOUS at 08:08

## 2020-01-16 RX ADMIN — SODIUM CHLORIDE, SODIUM LACTATE, POTASSIUM CHLORIDE, AND CALCIUM CHLORIDE 125 ML/HR: 600; 310; 30; 20 INJECTION, SOLUTION INTRAVENOUS at 06:54

## 2020-01-16 RX ADMIN — MIDAZOLAM 1 MG: 1 INJECTION INTRAMUSCULAR; INTRAVENOUS at 07:59

## 2020-01-16 RX ADMIN — DEXAMETHASONE SODIUM PHOSPHATE 4 MG: 4 INJECTION, SOLUTION INTRAMUSCULAR; INTRAVENOUS at 08:42

## 2020-01-16 RX ADMIN — LIDOCAINE HYDROCHLORIDE 80 MG: 20 INJECTION, SOLUTION EPIDURAL; INFILTRATION; INTRACAUDAL; PERINEURAL at 08:08

## 2020-01-16 RX ADMIN — Medication 5 MG: at 08:22

## 2020-01-16 RX ADMIN — ACETAMINOPHEN 650 MG: 325 TABLET ORAL at 23:52

## 2020-01-16 RX ADMIN — ONDANSETRON HYDROCHLORIDE 4 MG: 2 INJECTION, SOLUTION INTRAMUSCULAR; INTRAVENOUS at 08:42

## 2020-01-16 RX ADMIN — MIDAZOLAM 1 MG: 1 INJECTION INTRAMUSCULAR; INTRAVENOUS at 08:04

## 2020-01-16 RX ADMIN — PROPOFOL 50 MG: 10 INJECTION, EMULSION INTRAVENOUS at 08:16

## 2020-01-16 RX ADMIN — PROPOFOL 150 MG: 10 INJECTION, EMULSION INTRAVENOUS at 08:08

## 2020-01-16 RX ADMIN — FENTANYL CITRATE 25 MCG: 50 INJECTION, SOLUTION INTRAMUSCULAR; INTRAVENOUS at 13:40

## 2020-01-16 RX ADMIN — Medication: at 11:23

## 2020-01-16 NOTE — ANESTHESIA PREPROCEDURE EVALUATION
Anesthetic History               Review of Systems / Medical History  Patient summary reviewed, nursing notes reviewed and pertinent labs reviewed    Pulmonary        Sleep apnea: CPAP           Neuro/Psych              Cardiovascular    Hypertension              Exercise tolerance: >4 METS     GI/Hepatic/Renal           PUD    Comments: screening Endo/Other        Obesity and morbid obesity     Other Findings              Physical Exam    Airway  Mallampati: II  TM Distance: > 6 cm  Neck ROM: normal range of motion   Mouth opening: Normal     Cardiovascular    Rhythm: regular  Rate: normal         Dental  No notable dental hx       Pulmonary  Breath sounds clear to auscultation               Abdominal  Abdominal exam normal       Other Findings            Anesthetic Plan    ASA: 2  Anesthesia type: general          Induction: Intravenous  Anesthetic plan and risks discussed with: Patient

## 2020-01-16 NOTE — BRIEF OP NOTE
BRIEF OPERATIVE NOTE    Date of Procedure: 1/16/2020   Preoperative Diagnosis: SEVERE SPINAL STENOSIS  Postoperative Diagnosis: SEVERE SPINAL STENOSIS    Procedure(s):  LUMBAR LAMINECTOMY L4-S1  Surgeon(s) and Role:     * Christy Henry MD - Primary         Surgical Assistant: Nena Ge PA-C    Surgical Staff:  Circ-1: Angel Hart RN  Physician Assistant: Nena Ge PA-C  Scrub Tech-1: Climmie Oiler  Event Time In Time Out   Incision Start 5240    Incision Close 1016      Anesthesia: General   Estimated Blood Loss: 75 ml  Specimens: * No specimens in log *   Findings: Severe spinal stenosis   Complications: Intraoperative dural tear -repaired  Implants: * No implants in log *

## 2020-01-16 NOTE — ANESTHESIA POSTPROCEDURE EVALUATION
Procedure(s):  LUMBAR LAMINECTOMY L4-S1.    general    Anesthesia Post Evaluation        Patient location during evaluation: PACU  Patient participation: complete - patient participated  Level of consciousness: awake  Pain management: adequate  Airway patency: patent  Anesthetic complications: no  Cardiovascular status: hemodynamically stable  Respiratory status: acceptable  Hydration status: acceptable  Comments: I have seen and evaluated the patient. The patient is ready for PACU discharge. [address], DO                         Vitals Value Taken Time   /77 1/16/2020 11:00 AM   Temp     Pulse 63 1/16/2020 11:08 AM   Resp 11 1/16/2020 11:08 AM   SpO2 100 % 1/16/2020 11:08 AM   Vitals shown include unvalidated device data.

## 2020-01-16 NOTE — PERIOP NOTES
TRANSFER - OUT REPORT:    Verbal report given to Klaudia Bell (name) on Gideon Baldwin  being transferred to  (unit) for routine post - op       Report consisted of patients Situation, Background, Assessment and   Recommendations(SBAR). Time Pre op antibiotic given:0824  Anesthesia Stop time: 1847  Hart Present on Transfer to floor:yes  Order for Hart on Chart:yes  Discharge Prescriptions with Chart:no    Information from the following report(s) SBAR, OR Summary, Procedure Summary, Intake/Output, MAR and Cardiac Rhythm NSR; Sinus Griselda Balk was reviewed with the receiving nurse. Opportunity for questions and clarification was provided. Is the patient on 02? YES       L/Min 2    Is the patient on a monitor? NO    Is the nurse transporting with the patient? NO    Surgical Waiting Area notified of patient's transfer from PACU? YES; no one checked in; [facility]" contacted from patient information card. The following personal items collected during your admission accompanied patient upon transfer:   Dental Appliance: Dental Appliances: None  Vision:    Hearing Aid:    Jewelry: Jewelry: None  Clothing: Clothing: With patient  Other Valuables:  Other Valuables: None  Valuables sent to safe:      ** Patient requests items with security to be brought directly to the room upon transfer **

## 2020-01-17 LAB — HGB BLD-MCNC: 13.4 G/DL (ref 12.1–17)

## 2020-01-17 PROCEDURE — 36415 COLL VENOUS BLD VENIPUNCTURE: CPT

## 2020-01-17 PROCEDURE — 74011250637 HC RX REV CODE- 250/637: Performed by: PHYSICIAN ASSISTANT

## 2020-01-17 PROCEDURE — 74011250637 HC RX REV CODE- 250/637: Performed by: NURSE PRACTITIONER

## 2020-01-17 PROCEDURE — 96376 TX/PRO/DX INJ SAME DRUG ADON: CPT

## 2020-01-17 PROCEDURE — 97530 THERAPEUTIC ACTIVITIES: CPT

## 2020-01-17 PROCEDURE — 85018 HEMOGLOBIN: CPT

## 2020-01-17 PROCEDURE — 96375 TX/PRO/DX INJ NEW DRUG ADDON: CPT

## 2020-01-17 PROCEDURE — 74011250636 HC RX REV CODE- 250/636: Performed by: PHYSICIAN ASSISTANT

## 2020-01-17 PROCEDURE — 97161 PT EVAL LOW COMPLEX 20 MIN: CPT

## 2020-01-17 PROCEDURE — 99218 HC RM OBSERVATION: CPT

## 2020-01-17 RX ORDER — CALCIUM CARBONATE 200(500)MG
200 TABLET,CHEWABLE ORAL
Status: DISCONTINUED | OUTPATIENT
Start: 2020-01-17 | End: 2020-01-23 | Stop reason: HOSPADM

## 2020-01-17 RX ORDER — DIAZEPAM 5 MG/1
5 TABLET ORAL
Qty: 20 TAB | Refills: 0 | Status: SHIPPED | OUTPATIENT
Start: 2020-01-17 | End: 2020-01-23

## 2020-01-17 RX ORDER — METOCLOPRAMIDE HYDROCHLORIDE 5 MG/ML
10 INJECTION INTRAMUSCULAR; INTRAVENOUS EVERY 6 HOURS
Status: DISCONTINUED | OUTPATIENT
Start: 2020-01-17 | End: 2020-01-23 | Stop reason: HOSPADM

## 2020-01-17 RX ORDER — OXYCODONE HYDROCHLORIDE 5 MG/1
5 TABLET ORAL
Qty: 40 TAB | Refills: 0 | Status: SHIPPED | OUTPATIENT
Start: 2020-01-17 | End: 2020-01-23 | Stop reason: SDUPTHER

## 2020-01-17 RX ORDER — PANTOPRAZOLE SODIUM 40 MG/1
40 TABLET, DELAYED RELEASE ORAL
Status: DISCONTINUED | OUTPATIENT
Start: 2020-01-17 | End: 2020-01-23 | Stop reason: HOSPADM

## 2020-01-17 RX ADMIN — SENNOSIDES AND DOCUSATE SODIUM 1 TABLET: 8.6; 5 TABLET ORAL at 09:23

## 2020-01-17 RX ADMIN — Medication 2 G: at 00:27

## 2020-01-17 RX ADMIN — OXYCODONE 10 MG: 5 TABLET ORAL at 17:53

## 2020-01-17 RX ADMIN — ACETAMINOPHEN 650 MG: 325 TABLET ORAL at 17:53

## 2020-01-17 RX ADMIN — TAMSULOSIN HYDROCHLORIDE 0.4 MG: 0.4 CAPSULE ORAL at 09:23

## 2020-01-17 RX ADMIN — OXYCODONE 10 MG: 5 TABLET ORAL at 23:50

## 2020-01-17 RX ADMIN — Medication 10 ML: at 21:41

## 2020-01-17 RX ADMIN — SENNOSIDES AND DOCUSATE SODIUM 1 TABLET: 8.6; 5 TABLET ORAL at 17:54

## 2020-01-17 RX ADMIN — MUPIROCIN: 20 OINTMENT TOPICAL at 17:54

## 2020-01-17 RX ADMIN — AZELASTINE HYDROCHLORIDE 1 SPRAY: 137 SPRAY, METERED NASAL at 17:55

## 2020-01-17 RX ADMIN — LOSARTAN POTASSIUM 100 MG: 50 TABLET, FILM COATED ORAL at 09:23

## 2020-01-17 RX ADMIN — SODIUM CHLORIDE 125 ML/HR: 900 INJECTION, SOLUTION INTRAVENOUS at 06:47

## 2020-01-17 RX ADMIN — OXYCODONE 10 MG: 5 TABLET ORAL at 12:46

## 2020-01-17 RX ADMIN — PANTOPRAZOLE SODIUM 40 MG: 40 TABLET, DELAYED RELEASE ORAL at 10:17

## 2020-01-17 RX ADMIN — AMLODIPINE BESYLATE 10 MG: 5 TABLET ORAL at 09:23

## 2020-01-17 RX ADMIN — OXYCODONE 10 MG: 5 TABLET ORAL at 09:23

## 2020-01-17 RX ADMIN — AZELASTINE HYDROCHLORIDE 1 SPRAY: 137 SPRAY, METERED NASAL at 09:23

## 2020-01-17 RX ADMIN — CALCIUM CARBONATE (ANTACID) CHEW TAB 500 MG 200 MG: 500 CHEW TAB at 10:17

## 2020-01-17 RX ADMIN — ACETAMINOPHEN 650 MG: 325 TABLET ORAL at 23:50

## 2020-01-17 RX ADMIN — Medication 10 ML: at 06:42

## 2020-01-17 RX ADMIN — MUPIROCIN: 20 OINTMENT TOPICAL at 10:17

## 2020-01-17 RX ADMIN — ACETAMINOPHEN 650 MG: 325 TABLET ORAL at 06:41

## 2020-01-17 RX ADMIN — METOCLOPRAMIDE 10 MG: 5 INJECTION, SOLUTION INTRAMUSCULAR; INTRAVENOUS at 17:54

## 2020-01-17 RX ADMIN — METOPROLOL SUCCINATE 100 MG: 50 TABLET, EXTENDED RELEASE ORAL at 09:22

## 2020-01-17 RX ADMIN — ACETAMINOPHEN 650 MG: 325 TABLET ORAL at 12:46

## 2020-01-17 RX ADMIN — ONDANSETRON 4 MG: 2 INJECTION INTRAMUSCULAR; INTRAVENOUS at 12:50

## 2020-01-17 RX ADMIN — METOCLOPRAMIDE 10 MG: 5 INJECTION, SOLUTION INTRAMUSCULAR; INTRAVENOUS at 12:46

## 2020-01-17 RX ADMIN — DIAZEPAM 5 MG: 5 TABLET ORAL at 03:32

## 2020-01-17 NOTE — PROGRESS NOTES
Patient discharging to his sister's house, [facility], [address]  Referral sent to [facility], [facility], and [facility] referral pending    Jamel Grey [facility]

## 2020-01-17 NOTE — OP NOTES
[address]  OPERATIVE REPORT    Name:  Marbin Olivo  MR#:  [id number]  :  1955  ACCOUNT #:  [de-identified]  DATE OF SERVICE:  2020      PREOPERATIVE DIAGNOSIS:  Lumbar spinal stenosis, L4 to S1.    POSTOPERATIVE DIAGNOSIS:  Lumbar spinal stenosis, L4 to S1.    PROCEDURES PERFORMED:  1.  Bilateral laminectomy L4, L5, and S1, bilateral decompression of the L4, L5, and S1 nerve roots. 2.  Repair of small dural tear, dorsal surface, L4-L5. SURGEON:  Shelly Malone MD    ASSISTANT:  Milton Darling PA-C    ANESTHESIA:  General.    COMPLICATIONS:  None. SPECIMENS REMOVED:  None. IMPLANTS:  None. ESTIMATED BLOOD LOSS:  75 ml. INDICATIONS:  A 80-year-old gentleman with symptomatic lumbar stenosis and associated radiculopathy. Imaging demonstrates fairly severe narrowing, particularly at the L4-L5 levels and foraminal narrowing at L5-S1. Given the level of symptomatology and failure to improve with conservative measures, lumbar decompression offered. Potential benefits and complications were reviewed. PROCEDURE:  The patient was brought to the operating room in the supine position. General anesthetic administered. The patient was placed in the prone position on the Tra-type frame. Low back region was then prepped and draped in normal fashion. Preoperative antibiotics administered. Thigh-high TERA hose and sequential pumps applied to the patient's lower extremity. An incision was made extending from L4 to S1. Subcutaneous tissues were then divided in line with the incision down to the level of the fascia. The fascia was incised in the midline and a subperiosteal dissection completed over the spinous process and laminar surfaces. An x-ray was taken to verify the level. Complete laminectomy of L4 and L5 completed. Ligamentum flavum resected. Bxjcmhe-gg-iqxmawy decompression ensued allowing full visualization of the L4, L5, and S1 nerve roots. Unfortunately, I did nick the dorsal surface of the dura kind of in the central zone at L4-L5. The nerve rootlet seemed to popup through it. The rent was no more than about 3 mm, but seemed to be an unusual amount of pressure. I was eventually able to get a stay suture into the edges of dural tear and by tenting it, was able to kind of free the rootlets back and eventually parried. A running 6-0 Jackson-Davey suture was used to eventually obtain a watertight seal.  This was tested with the Valsalva maneuver. Tisseel placed over the site of the durotomy. Ball probe was passed well over the nerve roots. There did not appear to be any further nerve root compression. The wound was irrigated. No drain was utilized. The fascia closed using #1 Vicryl . The subcutaneous tissues and skin closed using 2 and 3-0 Vicryl. The patient awoken from the anesthetic, extubated, and taken to the Recovery in stable condition.         MD MAYURI Webb/[email or web address]  D:  01/16/2020 10:07  T:  01/16/2020 19:00  JOB #:  [id number]

## 2020-01-17 NOTE — PROGRESS NOTES
Orders received, chart reviewed and patient is on flat bed rest orders. Will f/u tomorrow. Recommend mobilizing in bed, EOB, OOB, to bathroom per dural tear MD clearance and safety. Thank you for your assistance.

## 2020-01-17 NOTE — PROGRESS NOTES
CYNTHIA:    Discharge home to sister's home in [place] tomorrow. Care Management Interventions  PCP Verified by CM: Yes  Mode of Transport at Discharge: Other (see comment)(Patient's sister)  Transition of Care Consult (CM Consult): [address]: No  MyChart Signup: No  Discharge Durable Medical Equipment: No  Physical Therapy Consult: Yes  Occupational Therapy Consult: Yes  Speech Therapy Consult: No  Current Support Network: Lives Alone(staying in [place] with sister)  Confirm Follow Up Transport: Family  The Plan for Transition of Care is Related to the Following Treatment Goals : Home Health  The Patient and/or Patient Representative was Provided with a Choice of Provider and Agrees with the Discharge Plan?: Yes  Name of the Patient Representative Who was Provided with a Choice of Provider and Agrees with the Discharge Plan: Jacquelin Tijerina  Freedom of Choice List was Provided with Basic Dialogue that Supports the Patient's Individualized Plan of Care/Goals, Treatment Preferences and Shares the Quality Data Associated with the Providers?: Yes  Discharge Location  Discharge Placement: Home with home health(Home Health at [facility])    Reason for Admission:  LUMBAR LAMINECTOMY L4-S1                    RRAT Score:    9                 Plan for utilizing home health:  [facility] PT/OT                        Current Advanced Directive/Advance Care Plan: Not on File                         Transition of Care Plan:        CM met with patient to introduce CM role, verify demographics and begin discharge planning. Patient lives alone in a one story house. He will be staying with his sister for several weeks in [place] as he recovers. He will receive home health services there. Patient does not own any DME. He gets his prescriptions filled at [facility]. Observation notice provided in writing to patient and/or caregiver as well as verbal explanation of the policy.   Patients who are in outpatient status also receive the Observation notice.       Jagdeep Gaming RN/CRM        Jagdeep Gaming RN/CRM

## 2020-01-17 NOTE — OP NOTES
[facility]  OPERATIVE REPORT    Name:  Joy Terrazas  MR#:  [id number]  :  1955  ACCOUNT #:  [de-identified]  DATE OF SERVICE:  2020      ADDENDUM to job [id number]    Milton Darling was my Physician Assistant for the operation on the patient. There are no surgical residents or interns available at this facility, and there were no surgical assistants available to assist me during the operation. Modesto Mckenzie was integral in all aspects of the surgery to include the laminectomy and wound closure and initial surgical exposure.         MD MAYURI Rodriguez/[email or web address]  D:  2020 10:10  T:  2020 19:04  JOB #:  [id number]

## 2020-01-17 NOTE — PROGRESS NOTES
Orders received and acknowledged. Chart reviewed and patient is on bed rest due to a dural tear. Per MD, patient will have to slowly increase supine to sitting 15 deg every 15min if no HA. Will follow back this afternoon for eval if patient is appropriate for upright activity.  Florentina Schilder, PT

## 2020-01-17 NOTE — PROGRESS NOTES
Bedside and Verbal shift change report given to Layo Catherine (oncoming nurse) by Bhanu Liu (offgoing nurse). Report included the following information SBAR, Kardex, OR Summary, Intake/Output, MAR and Recent Results.

## 2020-01-17 NOTE — PROGRESS NOTES
Problem: Mobility Impaired (Adult and Pediatric)  Goal: *Acute Goals and Plan of Care (Insert Text)  Description  FUNCTIONAL STATUS PRIOR TO ADMISSION: Patient was independent and active without use of DME.    HOME SUPPORT PRIOR TO ADMISSION: The patient lived alone with sister to provide assistance. Physical Therapy Goals  Initiated 1/17/2020    1. Patient will move from supine to sit and sit to supine  in bed with modified independence within 4 days. 2. Patient will perform sit to stand with supervision/set-up within 4 days. 3. Patient will ambulate with supervision/set-up for 50 feet with the least restrictive device within 4 days. 4. Patient will ascend/descend 4 stairs with 1 handrail(s) with minimal assistance/contact guard assist within 4 days. 5. Patient will verbalize and demonstrate understanding of spinal precautions (No bending, lifting greater than 5 lbs, or twisting; log-roll technique; frequent repositioning as instructed) within 4 days. Outcome: Progressing Towards Goal     PHYSICAL THERAPY EVALUATION  Patient: Holli Whittaker (25 y.o. male)  Date: 1/17/2020  Primary Diagnosis: Spinal stenosis [M48.00]  Procedure(s) (LRB):  LUMBAR LAMINECTOMY L4-S1 (N/A) 1 Day Post-Op   Precautions:   Fall, Back      ASSESSMENT  Based on the objective data described below, the patient presents with BLE weakness, decreased ROM, impaired balance, pain and overall decreased functional mobility s/p L4-S1 lumbar laminectomy. Patient with dural tear and has slowly been progressing upright position with RN this morning without HA. Cleared by RN to do PT. Educated on spinal precautions and log roll technique. Able to sit up to EOB with mild lightheadedness but no c/o of HA, VSS. Attempted to stand but unable to come to full upright standing due to BLE weakness and pain. Returned to sidelying in position of comfort.     Current Level of Function Impacting Discharge (mobility/balance): min-mod A for bed mobility and transfers, no gait yet    Functional Outcome Measure: The patient scored 20/100 on the Barthel outcome measure which is indicative of 80% impairements. Other factors to consider for discharge: Dural tear, BLE weakness     Patient will benefit from skilled therapy intervention to address the above noted impairments. PLAN :  Recommendations and Planned Interventions: bed mobility training, transfer training, gait training, therapeutic exercises, and patient and family training/education      Frequency/Duration: Patient will be followed by physical therapy:  twice daily to address goals. Recommendation for discharge: (in order for the patient to meet his/her long term goals)  To be determined: This discharge recommendation:  Has been made in collaboration with the attending provider and/or case management    IF patient discharges home will need the following DME: rolling walker         SUBJECTIVE:   Patient stated I'm ok.     OBJECTIVE DATA SUMMARY:   HISTORY:    Past Medical History:   Diagnosis Date    Adenomatous polyp of colon 2012    Angina pectoris     Bleeding ulcer     BPH (benign prostatic hyperplasia) 2011    Cancer [facility]) 2013    PROSTATE     Colon polyps 05/03/2017    Hypertension     Kidney infection     pt states he is unaware of having had this    Right inguinal hernia 7/11/2016    Sleep apnea     uses CPAP    Vegetarian      Past Surgical History:   Procedure Laterality Date    COLONOSCOPY  2012, 2017    adenomatous polyp    COLONOSCOPY N/A 5/3/2017    COLONOSCOPY performed by Katina Abad MD at [facility] ENDOSCOPY    HX CHOLECYSTECTOMY  2010    [address] Right 7/29/16    inguinal w/mesh by Dr. Aguilar Dowd Right 02/2017    HX ORTHOPAEDIC      Back surgery    HX OTHER SURGICAL  2011    prostate biopsy - normal/second bx 2/2017 - \"tip had cancer\" - another bx is planned    HX OTHER SURGICAL  2009    FATTY TUMOR REMOVED FROM BACK    HX PROSTATECTOMY  10/22/2019 HX TONSILLECTOMY         Personal factors and/or comorbidities impacting plan of care: plans to stay with sister at discharge. Home Situation  Home Environment: Apartment  One/Two Story Residence: Other (Comment)  Living Alone: Yes  Support Systems: Family member(s)  Patient Expects to be Discharged to[de-identified] Apartment  Current DME Used/Available at Home: None    EXAMINATION/PRESENTATION/DECISION MAKING:   Critical Behavior:  Neurologic State: Alert  Orientation Level: Oriented X4  Cognition: Appropriate decision making     Hearing: Auditory  Auditory Impairment: None  Skin:  incision bandaged, intact  Edema:   Range Of Motion:  AROM: Generally decreased, functional                       Strength:    Strength: Generally decreased, functional                    Tone & Sensation:                  Sensation: Intact               Coordination:  Coordination: Generally decreased, functional  Vision:      Functional Mobility:  Bed Mobility:  Rolling: Minimum assistance  Supine to Sit: Moderate assistance; Additional time;Assist x1  Sit to Supine: Moderate assistance;Assist x1;Additional time     Transfers:  Sit to Stand: Moderate assistance;Assist x1(B knees buckling)  Stand to Sit: Minimum assistance;Assist x1                       Balance:   Sitting: Impaired  Sitting - Static: Good (unsupported)  Sitting - Dynamic: Fair (occasional)  Standing: Impaired  Standing - Static: Constant support  Standing - Dynamic : Constant support  Ambulation/Gait Training:      Unsafe at this time             Functional Measure:  Barthel Index:    Bathin  Bladder: 0  Bowels: 10  Groomin  Dressin  Feedin  Mobility: 0  Stairs: 0  Toilet Use: 0  Transfer (Bed to Chair and Back): 0  Total: 20/100       The Barthel ADL Index: Guidelines  1. The index should be used as a record of what a patient does, not as a record of what a patient could do.   2. The main aim is to establish degree of independence from any help, physical or verbal, however minor and for whatever reason. 3. The need for supervision renders the patient not independent. 4. A patient's performance should be established using the best available evidence. Asking the patient, friends/relatives and nurses are the usual sources, but direct observation and common sense are also important. However direct testing is not needed. 5. Usually the patient's performance over the preceding 24-48 hours is important, but occasionally longer periods will be relevant. 6. Middle categories imply that the patient supplies over 50 per cent of the effort. 7. Use of aids to be independent is allowed. Ashley Cary., Barthel, D.W. ([id number]). Functional evaluation: the Barthel Index. [address] [facility] (14)2. Santi Moses tai SHAD Velez, Mary Hui, Gary Valente., [place], [address] [place] [address] (). Measuring the change indisability after inpatient rehabilitation; comparison of the responsiveness of the Barthel Index and Functional [place] Measure. Journal of Neurology, Neurosurgery, and Psychiatry, 66(4), 094-471. COLT Bautista, TEJA Moore, & Daniel Perez, MAmyA. (2004.) Assessment of post-stroke quality of life in cost-effectiveness studies: The usefulness of the Barthel Index and the EuroQoL-5D. Quality of Life Research, 13, 548-73          Pain Ratin/10    Activity Tolerance:   Fair  Please refer to the flowsheet for vital signs taken during this treatment. After treatment patient left in no apparent distress:   Supine in bed and Call bell within reach    COMMUNICATION/EDUCATION:   The patients plan of care was discussed with: Registered Nurse. Fall prevention education was provided and the patient/caregiver indicated understanding. and Patient/family have participated as able in goal setting and plan of care.     Thank you for this referral.  Syed Butler, PT   Time Calculation: 22 mins

## 2020-01-18 LAB — HGB BLD-MCNC: 12.9 G/DL (ref 12.1–17)

## 2020-01-18 PROCEDURE — 97110 THERAPEUTIC EXERCISES: CPT

## 2020-01-18 PROCEDURE — 74011250637 HC RX REV CODE- 250/637: Performed by: PHYSICIAN ASSISTANT

## 2020-01-18 PROCEDURE — 36415 COLL VENOUS BLD VENIPUNCTURE: CPT

## 2020-01-18 PROCEDURE — 51798 US URINE CAPACITY MEASURE: CPT

## 2020-01-18 PROCEDURE — 85018 HEMOGLOBIN: CPT

## 2020-01-18 PROCEDURE — 74011250637 HC RX REV CODE- 250/637: Performed by: NURSE PRACTITIONER

## 2020-01-18 PROCEDURE — 96376 TX/PRO/DX INJ SAME DRUG ADON: CPT

## 2020-01-18 PROCEDURE — 74011250636 HC RX REV CODE- 250/636: Performed by: PHYSICIAN ASSISTANT

## 2020-01-18 PROCEDURE — 99218 HC RM OBSERVATION: CPT

## 2020-01-18 PROCEDURE — 97165 OT EVAL LOW COMPLEX 30 MIN: CPT

## 2020-01-18 PROCEDURE — 97116 GAIT TRAINING THERAPY: CPT

## 2020-01-18 RX ADMIN — OXYCODONE 10 MG: 5 TABLET ORAL at 09:56

## 2020-01-18 RX ADMIN — OXYCODONE 10 MG: 5 TABLET ORAL at 04:30

## 2020-01-18 RX ADMIN — Medication 10 ML: at 13:02

## 2020-01-18 RX ADMIN — ACETAMINOPHEN 650 MG: 325 TABLET ORAL at 05:18

## 2020-01-18 RX ADMIN — Medication 10 ML: at 05:18

## 2020-01-18 RX ADMIN — Medication 10 ML: at 21:11

## 2020-01-18 RX ADMIN — OXYCODONE 10 MG: 5 TABLET ORAL at 16:28

## 2020-01-18 RX ADMIN — MUPIROCIN: 20 OINTMENT TOPICAL at 09:55

## 2020-01-18 RX ADMIN — TAMSULOSIN HYDROCHLORIDE 0.4 MG: 0.4 CAPSULE ORAL at 08:30

## 2020-01-18 RX ADMIN — DIAZEPAM 5 MG: 5 TABLET ORAL at 23:17

## 2020-01-18 RX ADMIN — DIAZEPAM 5 MG: 5 TABLET ORAL at 04:30

## 2020-01-18 RX ADMIN — LOSARTAN POTASSIUM 100 MG: 50 TABLET, FILM COATED ORAL at 08:32

## 2020-01-18 RX ADMIN — METOCLOPRAMIDE 10 MG: 5 INJECTION, SOLUTION INTRAMUSCULAR; INTRAVENOUS at 01:02

## 2020-01-18 RX ADMIN — OXYCODONE 10 MG: 5 TABLET ORAL at 07:00

## 2020-01-18 RX ADMIN — METOCLOPRAMIDE 10 MG: 5 INJECTION, SOLUTION INTRAMUSCULAR; INTRAVENOUS at 13:01

## 2020-01-18 RX ADMIN — OXYCODONE 10 MG: 5 TABLET ORAL at 13:01

## 2020-01-18 RX ADMIN — ACETAMINOPHEN 650 MG: 325 TABLET ORAL at 23:17

## 2020-01-18 RX ADMIN — Medication 10 ML: at 18:32

## 2020-01-18 RX ADMIN — METOPROLOL SUCCINATE 100 MG: 50 TABLET, EXTENDED RELEASE ORAL at 08:32

## 2020-01-18 RX ADMIN — METOCLOPRAMIDE 10 MG: 5 INJECTION, SOLUTION INTRAMUSCULAR; INTRAVENOUS at 06:59

## 2020-01-18 RX ADMIN — OXYCODONE 10 MG: 5 TABLET ORAL at 19:34

## 2020-01-18 RX ADMIN — DIAZEPAM 5 MG: 5 TABLET ORAL at 16:28

## 2020-01-18 RX ADMIN — OXYCODONE 10 MG: 5 TABLET ORAL at 23:17

## 2020-01-18 RX ADMIN — PANTOPRAZOLE SODIUM 40 MG: 40 TABLET, DELAYED RELEASE ORAL at 06:59

## 2020-01-18 RX ADMIN — ACETAMINOPHEN 650 MG: 325 TABLET ORAL at 18:31

## 2020-01-18 RX ADMIN — METOCLOPRAMIDE 10 MG: 5 INJECTION, SOLUTION INTRAMUSCULAR; INTRAVENOUS at 18:30

## 2020-01-18 RX ADMIN — AMLODIPINE BESYLATE 10 MG: 5 TABLET ORAL at 08:32

## 2020-01-18 RX ADMIN — SENNOSIDES AND DOCUSATE SODIUM 1 TABLET: 8.6; 5 TABLET ORAL at 18:31

## 2020-01-18 RX ADMIN — ACETAMINOPHEN 650 MG: 325 TABLET ORAL at 13:01

## 2020-01-18 RX ADMIN — SENNOSIDES AND DOCUSATE SODIUM 1 TABLET: 8.6; 5 TABLET ORAL at 08:30

## 2020-01-18 RX ADMIN — AZELASTINE HYDROCHLORIDE 1 SPRAY: 137 SPRAY, METERED NASAL at 09:55

## 2020-01-18 NOTE — PROGRESS NOTES
ORTHOPAEDIC LUMBAR PROGRESS NOTE    NAME: Holli Whittaker   :  1955   MRN:  [id number]   DATE:  2020    POD: 2 Day Post-Op  S/P: Procedure(s):  LUMBAR LAMINECTOMY L4-S1    SUBJECTIVE:    Patient found lying flat in bed this morning, as ordered -awake, alert/oreinted. States back hurts at surgical site, denies leg pain/numbness. Afebrile/VSS. Denies nausea/vomiting, chest pain, headache or shortness of breath. Discussed having the patient sit up in increments this morning, monitoring for onset of headache. Recent Labs     20  0323   HGB 12.9     Patient Vitals for the past 12 hrs:   BP Temp Pulse Resp SpO2   20 0828 144/87 98.4 °F (36.9 °C) 78 18 93 %   20 0309 (!) 151/91 99.3 °F (37.4 °C) 91 18 91 %       EXAM:  Positive strength/ROM bilat lower ext. Neuro intact  Dressings clean, dry and intact     PLAN:  1) D/C PCA, change to PO pain medications as tolerated    2) Will have patient sit up with nursing in increments as 15 degrees per 15 minutes. If no headache presents, patient's bed restrictions can be lifted and he may begin working with PT/OT.   If frontal h/a develops, patient will return to flat position until tomorrow AM.    3) Advance regular diet    4) Ice/gel packs to back for  PRN pain    5) D/c planning to home tomorrow, pending overall positive progression   -Rx: Roxicodone, Valium on chart for d/c    Esha Horne MD

## 2020-01-18 NOTE — PROGRESS NOTES
Referral sent to multiple home cares and [facility] home health ([phone]) accepted via [employer]. When patient is discharged agency needs to be contacted. No other needs identified at this time.

## 2020-01-18 NOTE — PROGRESS NOTES
Orthopedic & Surgical Nursing Note      Patient Name: Anitha Chaudhary    : 1955    MRN: [id number]    Admit Diagnosis: Spinal stenosis [M48.00]    Surgery: Procedure(s):  LUMBAR LAMINECTOMY L4-S1         Patient HOB slowly raised incrementally by 15 degrees as ordered without any complaints of headache, tolerating clear liquids diet advanced.          Signed by: Noa Ortega RN, BSN, CMSRN                                            2020 at 7:02 AM

## 2020-01-18 NOTE — PROGRESS NOTES
Orthopedics Spine Incoming Shift Nursing Note        INCOMING SHIFT REPORT:    Verbal and/or Written report received from Zain Payton RN by Ni Arceo RN on Yamil Vicenta a 62 y.o. male who was admitted on 1/16/2020  5:22 AM and currently admitted to unit. Code Status: Prior     Admit Diagnosis: Spinal stenosis [M48.00]     Surgery: Procedure(s):  LUMBAR LAMINECTOMY L4-S1     Infections: No current active infections     Allergies: Patient has no known allergies. Current diet: DIET CLEAR LIQUID     Lines:   Peripheral IV 01/16/20 Left Wrist (Active)   Site Assessment Clean, dry, & intact 1/17/2020  7:57 AM   Phlebitis Assessment 0 1/17/2020  7:57 AM   Infiltration Assessment 0 1/17/2020  7:57 AM   Dressing Status Clean, dry, & intact 1/17/2020  7:57 AM   Dressing Type Transparent 1/17/2020  7:57 AM   Hub Color/Line Status Infusing 1/17/2020  7:57 AM   Action Taken Open ports on tubing capped 1/17/2020  7:57 AM   Alcohol Cap Used Yes 1/17/2020  7:57 AM          Report consisted of the following information SBAR, Kardex and MAR and the information was reviewed with the reporting nurse. Opportunity for questions and clarifications were provided. Patient's bed locked and in low position, side rails up x 2, door open PRN, call bell within patient's reach and patient is not in distress. A complete nursing assessment will be/has been  completed by the receiving nurse.       Ni Arceo RN, BSN, [facility]    1/17/2020 at 8:25 PM

## 2020-01-18 NOTE — PROGRESS NOTES
Problem: Falls - Risk of  Goal: *Absence of Falls  Description  Document Bishop Barron Fall Risk and appropriate interventions in the flowsheet. Outcome: Progressing Towards Goal  Note: Fall Risk Interventions:  Mobility Interventions: Communicate number of staff needed for ambulation/transfer, OT consult for ADLs, Patient to call before getting OOB, PT Consult for mobility concerns, PT Consult for assist device competence         Medication Interventions: Assess postural VS orthostatic hypotension, Evaluate medications/consider consulting pharmacy, Patient to call before getting OOB, Teach patient to arise slowly, Utilize gait belt for transfers/ambulation    Elimination Interventions: Call light in reach              Problem: Pressure Injury - Risk of  Goal: *Prevention of pressure injury  Description  Document Quinton Scale and appropriate interventions in the flowsheet. Outcome: Progressing Towards Goal  Note: Pressure Injury Interventions:             Activity Interventions: Increase time out of bed, Pressure redistribution bed/mattress(bed type), PT/OT evaluation    Mobility Interventions: HOB 30 degrees or less, Pressure redistribution bed/mattress (bed type), PT/OT evaluation    Nutrition Interventions: Document food/fluid/supplement intake                     Problem: Complex Spine Procedure:  Post Op Day 1  Goal: Activity/Safety  Outcome: Progressing Towards Goal  Goal: Nutrition/Diet  Outcome: Progressing Towards Goal  Goal: Medications  Outcome: Progressing Towards Goal  Goal: Respiratory  Outcome: Progressing Towards Goal

## 2020-01-18 NOTE — PROGRESS NOTES
Progressing Towards Goal  FUNCTIONAL STATUS PRIOR TO ADMISSION: Patient was independent and active without use of DME.    HOME SUPPORT: The patient lived alone with sister living locally to provide support as needed    1. Patient will perform lower body dressing with supervision/set-up using Reacher, Stocking Aid and Dressing Stick PRN within 7 days. 2. Patient will perform EOB ADLs unsupported with supervision/set-up within 7 days. 3. Patient will toilet transfer at minimal assistance/contact guard assist using least restrictive DME within 7 days. 4. Patient will perform bathing seated with minimal assistance within 7 days  5. Patient will verbalize/demonstrate 3/3 back precautions during ADL tasks without cues within 7 days. Edit Goal  Progressing Towards Goal    OCCUPATIONAL THERAPY EVALUATION  Patient: Nani Melo (89 y.o. male)  Date: 1/18/2020  Primary Diagnosis: Spinal stenosis [M48.00]  Procedure(s) (LRB):  LUMBAR LAMINECTOMY L4-S1 (N/A) 2 Days Post-Op   Precautions: no back brace per patient,   Fall, Back    ASSESSMENT: pt participated with PT yesterday, dural tear in sx. Cleared by RN this date for upright activity. Based on the objective data described below, the patient presents with grossly decreased standing balance, impaired activity tolerance, significant back pain, decreased dynamic sitting balance, and severe decline in functional status s/p POD 2 L4-S1 lumbar laminectomy. Pt educated on 3/3 back precautions, heavy UE supported sitting EOB. Pt able to maintain midline with B hands on knees for brief period of time but with increased back pain and discomfort. No c/o HA with sitting but did c/o \"seeing black spots. \" Noted EOB BP hypertensive 171/112, likely 2* pain. Attempted sit<> stand with max A x 2 from lowered bed height and RW with pt requiring increased time for B knee extension.  Pt unable to achieve full knee extension before voiding on floor and needing to sit EOB (standing tolerance <10 seconds). He is dependent for ADLs at this time and would benefit from IP rehab to address LE strength, standing, transfers and ADL independence. Current Level of Function Impacting Discharge (ADLs/self-care): needs supported sitting for ADLs, unable to perform EOB 2* heavy UE support    Functional Outcome Measure: The patient scored Total: 20/100 on the Barthel Index outcome measure which is indicative of 80% impaired ability to care for basic self needs/dependency on others; inferred 100% dependency on others for instrumental ADLs. Other factors to consider for discharge: lives alone, independent prior     Patient will benefit from skilled therapy intervention to address the above noted impairments. PLAN :  Recommendations and Planned Interventions: self care training, functional mobility training, therapeutic exercise, balance training, endurance activities, patient education and home safety training    Frequency/Duration: Patient will be followed by occupational therapy 5 times a week to address goals. Recommendation for discharge: (in order for the patient to meet his/her long term goals)  Therapy 3 hours per day 5-7 days per week    This discharge recommendation:  Has not yet been discussed the attending provider and/or case management    IF patient discharges home will need the following DME: TBD       SUBJECTIVE:   Patient stated I am not dizzy.     OBJECTIVE DATA SUMMARY:   HISTORY:   Past Medical History:   Diagnosis Date    Adenomatous polyp of colon 2012    Angina pectoris     Bleeding ulcer     BPH (benign prostatic hyperplasia) 2011    Cancer [facility]) 2013    PROSTATE     Colon polyps 05/03/2017    Hypertension     Kidney infection     pt states he is unaware of having had this    Right inguinal hernia 7/11/2016    Sleep apnea     uses CPAP    Vegetarian      Past Surgical History:   Procedure Laterality Date    COLONOSCOPY  2012, 2017    adenomatous polyp  COLONOSCOPY N/A 5/3/2017    COLONOSCOPY performed by Elmer Olea MD at [facility] ENDOSCOPY    HX CHOLECYSTECTOMY  2010    HX HERNIA REPAIR Right 7/29/16    inguinal w/mesh by Dr. Sulema Monte Right 02/2017    HX ORTHOPAEDIC      Back surgery    HX OTHER SURGICAL  2011    prostate biopsy - normal/second bx 2/2017 - \"tip had cancer\" - another bx is planned    HX OTHER SURGICAL  2009    FATTY TUMOR REMOVED FROM BACK    HX PROSTATECTOMY  10/22/2019    HX TONSILLECTOMY         Expanded or extensive additional review of patient history:     Home Situation  Home Environment: Apartment  One/Two Story Residence: Other (Comment)  Living Alone: Yes  Support Systems: Family member(s)  Patient Expects to be Discharged to[de-identified] Apartment  Current DME Used/Available at Home: None    Hand dominance: Right    EXAMINATION OF PERFORMANCE DEFICITS:  Cognitive/Behavioral Status:           Perception: Appears intact  Perseveration: No perseveration noted  Safety/Judgement: Awareness of environment    Skin: bandage intact    Edema: none noted    Hearing: Auditory  Auditory Impairment: None    Vision/Perceptual:                           Acuity: Within Defined Limits         Range of Motion:    AROM: Generally decreased, functional(WFL UEs)                         Strength:    Strength: Generally decreased, functional                Coordination:  Coordination: Generally decreased, functional  Fine Motor Skills-Upper: Left Intact; Right Intact    Gross Motor Skills-Upper: Left Intact; Right Intact    Tone & Sensation:       Sensation: Intact                      Balance:  Sitting: Impaired; With support  Sitting - Static: Supported sitting;Prop sitting  Sitting - Dynamic: Fair (occasional)  Standing: Impaired; With support  Standing - Static: Constant support;Poor    Functional Mobility and Transfers for ADLs:  Bed Mobility:  Supine to Sit: Moderate assistance;Assist x1(bed flat)  Sit to Supine:  Moderate assistance;Assist x2  Scooting: Contact guard assistance    Transfers:  Sit to Stand: Maximum assistance;Assist x2; Additional time  Stand to Sit: Moderate assistance;Assist x2; Additional time    ADL Assessment:  Feeding: Independent    Oral Facial Hygiene/Grooming: Setup;Minimum assistance(suppported sitting)    Bathing: Maximum assistance    Upper Body Dressing: Moderate assistance    Lower Body Dressing: Maximum assistance    Toileting: Maximum assistance; Total assistance                ADL Intervention and task modifications:         Pt educated on 3/3 back precautions. Pt educated on the following: no bending, no lifting greater than 5 lbs, no twisting, log-roll technique, repositioning every 20-30 min except when sleeping                               Cognitive Retraining  Safety/Judgement: Awareness of environment       Functional Measure:  Barthel Index:    Bathin  Bladder: 0  Bowels: 10  Groomin  Dressin  Feeding: 10  Mobility: 0  Stairs: 0  Toilet Use: 0  Transfer (Bed to Chair and Back): 0  Total: 20/100        The Barthel ADL Index: Guidelines  1. The index should be used as a record of what a patient does, not as a record of what a patient could do. 2. The main aim is to establish degree of independence from any help, physical or verbal, however minor and for whatever reason. 3. The need for supervision renders the patient not independent. 4. A patient's performance should be established using the best available evidence. Asking the patient, friends/relatives and nurses are the usual sources, but direct observation and common sense are also important. However direct testing is not needed. 5. Usually the patient's performance over the preceding 24-48 hours is important, but occasionally longer periods will be relevant. 6. Middle categories imply that the patient supplies over 50 per cent of the effort. 7. Use of aids to be independent is allowed. Moy Cristobal., Barthel, D.W. ([id number]). Functional evaluation: the Barthel Index. [address] [facility] (14)2. SHAD Mac, Bobby Rodriguez., [place], [address] (). Measuring the change indisability after inpatient rehabilitation; comparison of the responsiveness of the Barthel Index and Functional [place] Measure. Journal of Neurology, Neurosurgery, and Psychiatry, 66(4), 262-020. COLT Bass, TEJA Moore, & Michelle Fry M.A. (2004.) Assessment of post-stroke quality of life in cost-effectiveness studies: The usefulness of the Barthel Index and the EuroQoL-5D. Quality of Life Research, 15, 431-42         Occupational Therapy Evaluation Charge Determination   History Examination Decision-Making   LOW Complexity : Brief history review  MEDIUM Complexity : 3-5 performance deficits relating to physical, cognitive , or psychosocial skils that result in activity limitations and / or participation restrictions LOW Complexity : No comorbidities that affect functional and no verbal or physical assistance needed to complete eval tasks       Based on the above components, the patient evaluation is determined to be of the following complexity level: LOW   Pain Ratin/10 with activity    Activity Tolerance:   Fair  Please refer to the flowsheet for vital signs taken during this treatment. After treatment patient left in no apparent distress:    Supine in bed and Patient positioned in L sidelying for pressure relief    COMMUNICATION/EDUCATION:   The patients plan of care was discussed with: Physical Therapist and Registered Nurse. Patient/family have participated as able in goal setting and plan of care. This patients plan of care is appropriate for delegation to \[facility] [place] [facility]\"".     Thank you for this referral.  Galo Sales OT  Time Calculation: 24 mins

## 2020-01-18 NOTE — PROGRESS NOTES
Problem: Mobility Impaired (Adult and Pediatric)  Goal: *Acute Goals and Plan of Care (Insert Text)  Description  FUNCTIONAL STATUS PRIOR TO ADMISSION: Patient was independent and active without use of DME.    HOME SUPPORT PRIOR TO ADMISSION: The patient lived alone with sister to provide assistance. Physical Therapy Goals  Initiated 1/17/2020    1. Patient will move from supine to sit and sit to supine  in bed with modified independence within 4 days. 2. Patient will perform sit to stand with supervision/set-up within 4 days. 3. Patient will ambulate with supervision/set-up for 50 feet with the least restrictive device within 4 days. 4. Patient will ascend/descend 4 stairs with 1 handrail(s) with minimal assistance/contact guard assist within 4 days. 5. Patient will verbalize and demonstrate understanding of spinal precautions (No bending, lifting greater than 5 lbs, or twisting; log-roll technique; frequent repositioning as instructed) within 4 days. Outcome: Progressing Towards Goal     PHYSICAL THERAPY TREATMENT  Patient: Nani Melo (55 y.o. male)  Date: 1/18/2020  Diagnosis: Spinal stenosis [M48.00]   <principal problem not specified>  Procedure(s) (LRB):  LUMBAR LAMINECTOMY L4-S1 (N/A) 2 Days Post-Op  Precautions: Fall, Back No bending, no lifting greater than 5 lbs, no twisting, log-roll technique, repositioning every 20-30 min except when sleeping, brace when OOB (if ordered)  Chart, physical therapy assessment, plan of care and goals were reviewed. ASSESSMENT  Patient continues with skilled PT services and is progressing towards goals. Patient was sitting EOB with nurse attempting to urinate. Patient requested to  order to be able to better urinate. Patient required maximum assistance with cuing for proper hand placement for sit-stand transfer.  Patient required BUE support on walker with CGA while nursing assisted with urinal. Patient ambulated 3' forward/backward and 3' laterally in order to get up in bed. Patient used RW with CGA for safety. Patient reported mild dizziness, nursing aware. Patient required moderate assistance x 1 for sit-supine transfer. Patient left supine in bed, call bell within reach, no further complaints. Current Level of Function Impacting Discharge (mobility/balance): moderate to maximum assistance for bed mobility and transfers, limited walking    Other factors to consider for discharge: dural tear, BLE weakness, dizziness with standing, decreased activity tolerance         PLAN :  Patient continues to benefit from skilled intervention to address the above impairments. Continue treatment per established plan of care. to address goals. Recommendation for discharge: (in order for the patient to meet his/her long term goals)  Therapy 3 hours per day 5-7 days per week    This discharge recommendation:  Has not yet been discussed the attending provider and/or case management    IF patient discharges home will need the following DME: to be determined (TBD)       SUBJECTIVE:   Patient stated  I have to urinate.     OBJECTIVE DATA SUMMARY:   Critical Behavior:  Neurologic State: Alert, Appropriate for age  Orientation Level: Oriented X4  Cognition: Appropriate decision making  Safety/Judgement: Awareness of environment    Spinal diagnosis intervention:  The patient stated 3/3 back precautions when prompted. Reviewed all 3 back precautions, log roll technique, and sitting for 30 minutes at a time. Functional Mobility Training:    Bed Mobility:  Log    Supine to Sit: Moderate assistance;Assist x1(bed flat)  Sit to Supine:  Moderate assistance;Assist x1;Additional time  Scooting: Contact guard assistance        Transfers:  Sit to Stand: Maximum assistance;Assist x1  Stand to Sit: Minimum assistance;Assist x1                             Balance:  Sitting: Intact  Sitting - Static: Fair (occasional)  Sitting - Dynamic: Fair (occasional)  Standing: Impaired  Standing - Static: Constant support  Standing - Dynamic : Constant support  Ambulation/Gait Training:  Distance (ft): 5 Feet (ft)  Assistive Device: Gait belt;Walker, rolling  Ambulation - Level of Assistance: Contact guard assistance        Gait Abnormalities: Ataxic;Decreased step clearance        Base of Support: Widened     Speed/Dalia: Slow  Step Length: Right lengthened;Left shortened                    Stairs: Therapeutic Exercises:   none  Pain Rating:      Activity Tolerance:   Fair  Please refer to the flowsheet for vital signs taken during this treatment.     After treatment patient left in no apparent distress:   Supine in bed and Call bell within reach    COMMUNICATION/COLLABORATION:   The patients plan of care was discussed with: Registered Nurse    Quirino Gardner, PT   Time Calculation: 10 mins

## 2020-01-19 PROCEDURE — 74011250637 HC RX REV CODE- 250/637: Performed by: PHYSICIAN ASSISTANT

## 2020-01-19 PROCEDURE — 97530 THERAPEUTIC ACTIVITIES: CPT

## 2020-01-19 PROCEDURE — 74011250636 HC RX REV CODE- 250/636: Performed by: PHYSICIAN ASSISTANT

## 2020-01-19 PROCEDURE — 97116 GAIT TRAINING THERAPY: CPT

## 2020-01-19 PROCEDURE — 96376 TX/PRO/DX INJ SAME DRUG ADON: CPT

## 2020-01-19 PROCEDURE — 74011250637 HC RX REV CODE- 250/637: Performed by: ORTHOPAEDIC SURGERY

## 2020-01-19 PROCEDURE — 99218 HC RM OBSERVATION: CPT

## 2020-01-19 PROCEDURE — 74011250637 HC RX REV CODE- 250/637: Performed by: NURSE PRACTITIONER

## 2020-01-19 RX ORDER — OXYCODONE HYDROCHLORIDE 5 MG/1
10-15 TABLET ORAL
Status: DISCONTINUED | OUTPATIENT
Start: 2020-01-19 | End: 2020-01-23 | Stop reason: HOSPADM

## 2020-01-19 RX ADMIN — AZELASTINE HYDROCHLORIDE 1 SPRAY: 137 SPRAY, METERED NASAL at 09:33

## 2020-01-19 RX ADMIN — Medication 10 ML: at 05:26

## 2020-01-19 RX ADMIN — SENNOSIDES AND DOCUSATE SODIUM 1 TABLET: 8.6; 5 TABLET ORAL at 09:32

## 2020-01-19 RX ADMIN — PANTOPRAZOLE SODIUM 40 MG: 40 TABLET, DELAYED RELEASE ORAL at 07:04

## 2020-01-19 RX ADMIN — METOPROLOL SUCCINATE 100 MG: 50 TABLET, EXTENDED RELEASE ORAL at 09:32

## 2020-01-19 RX ADMIN — OXYCODONE 15 MG: 5 TABLET ORAL at 21:41

## 2020-01-19 RX ADMIN — OXYCODONE 10 MG: 5 TABLET ORAL at 17:39

## 2020-01-19 RX ADMIN — Medication 10 ML: at 13:27

## 2020-01-19 RX ADMIN — METOCLOPRAMIDE 10 MG: 5 INJECTION, SOLUTION INTRAMUSCULAR; INTRAVENOUS at 01:00

## 2020-01-19 RX ADMIN — Medication 10 ML: at 21:21

## 2020-01-19 RX ADMIN — ACETAMINOPHEN 650 MG: 325 TABLET ORAL at 17:39

## 2020-01-19 RX ADMIN — AZELASTINE HYDROCHLORIDE 1 SPRAY: 137 SPRAY, METERED NASAL at 17:39

## 2020-01-19 RX ADMIN — ACETAMINOPHEN 650 MG: 325 TABLET ORAL at 13:23

## 2020-01-19 RX ADMIN — AMLODIPINE BESYLATE 10 MG: 5 TABLET ORAL at 09:33

## 2020-01-19 RX ADMIN — METOCLOPRAMIDE 10 MG: 5 INJECTION, SOLUTION INTRAMUSCULAR; INTRAVENOUS at 13:23

## 2020-01-19 RX ADMIN — OXYCODONE 10 MG: 5 TABLET ORAL at 06:16

## 2020-01-19 RX ADMIN — OXYCODONE 10 MG: 5 TABLET ORAL at 13:23

## 2020-01-19 RX ADMIN — Medication 10 ML: at 13:28

## 2020-01-19 RX ADMIN — OXYCODONE 10 MG: 5 TABLET ORAL at 09:32

## 2020-01-19 RX ADMIN — LOSARTAN POTASSIUM 100 MG: 50 TABLET, FILM COATED ORAL at 09:32

## 2020-01-19 RX ADMIN — OXYCODONE 10 MG: 5 TABLET ORAL at 03:18

## 2020-01-19 RX ADMIN — SENNOSIDES AND DOCUSATE SODIUM 1 TABLET: 8.6; 5 TABLET ORAL at 17:39

## 2020-01-19 RX ADMIN — ACETAMINOPHEN 650 MG: 325 TABLET ORAL at 06:16

## 2020-01-19 RX ADMIN — TAMSULOSIN HYDROCHLORIDE 0.4 MG: 0.4 CAPSULE ORAL at 09:32

## 2020-01-19 RX ADMIN — MUPIROCIN: 20 OINTMENT TOPICAL at 17:41

## 2020-01-19 RX ADMIN — DIAZEPAM 5 MG: 5 TABLET ORAL at 13:26

## 2020-01-19 RX ADMIN — METOCLOPRAMIDE 10 MG: 5 INJECTION, SOLUTION INTRAMUSCULAR; INTRAVENOUS at 19:21

## 2020-01-19 RX ADMIN — DIAZEPAM 5 MG: 5 TABLET ORAL at 19:21

## 2020-01-19 RX ADMIN — METOCLOPRAMIDE 10 MG: 5 INJECTION, SOLUTION INTRAMUSCULAR; INTRAVENOUS at 06:16

## 2020-01-19 RX ADMIN — MUPIROCIN: 20 OINTMENT TOPICAL at 09:33

## 2020-01-19 NOTE — PROGRESS NOTES
Orthopedics Spine Incoming Shift Nursing Note        INCOMING SHIFT REPORT:    Verbal and/or Written report received from Zain Payton RN by Joseph Dietrich RN on Agueda Dubose a 62 y.o. male who was admitted on 1/16/2020  5:22 AM and currently admitted to unit. Code Status: Prior     Admit Diagnosis: Spinal stenosis [M48.00]     Surgery: Procedure(s):  LUMBAR LAMINECTOMY L4-S1     Infections: No current active infections     Allergies: Patient has no known allergies. Current diet: DIET FULL LIQUID     Lines:   Peripheral IV 01/16/20 Left Wrist (Active)   Site Assessment Clean, dry, & intact 1/18/2020  8:28 AM   Phlebitis Assessment 0 1/18/2020  8:28 AM   Infiltration Assessment 0 1/18/2020  8:28 AM   Dressing Status Clean, dry, & intact 1/18/2020  8:28 AM   Dressing Type Transparent 1/18/2020  8:28 AM   Hub Color/Line Status Capped 1/18/2020  8:28 AM   Action Taken Open ports on tubing capped 1/18/2020  8:28 AM   Alcohol Cap Used Yes 1/18/2020  8:28 AM          Report consisted of the following information SBAR, Kardex and MAR and the information was reviewed with the reporting nurse. Opportunity for questions and clarifications were provided. Patient's bed locked and in low position, side rails up x 2, door open PRN, call bell within patient's reach and patient is not in distress. A complete nursing assessment will be/has been  completed by the receiving nurse.       Joseph Dietrich RN, BSN, [place]    1/18/2020 at 7:24 PM

## 2020-01-19 NOTE — PROGRESS NOTES
Problem: Falls - Risk of  Goal: *Absence of Falls  Description  Document Martydiane Rivas Fall Risk and appropriate interventions in the flowsheet. Outcome: Progressing Towards Goal  Note: Fall Risk Interventions:  Mobility Interventions: Communicate number of staff needed for ambulation/transfer, OT consult for ADLs, Patient to call before getting OOB, PT Consult for mobility concerns, PT Consult for assist device competence         Medication Interventions: Assess postural VS orthostatic hypotension, Evaluate medications/consider consulting pharmacy, Patient to call before getting OOB, Teach patient to arise slowly, Utilize gait belt for transfers/ambulation    Elimination Interventions: Call light in reach              Problem: Pressure Injury - Risk of  Goal: *Prevention of pressure injury  Description  Document Quinton Scale and appropriate interventions in the flowsheet. Outcome: Progressing Towards Goal  Note: Pressure Injury Interventions:             Activity Interventions: Increase time out of bed, Pressure redistribution bed/mattress(bed type), PT/OT evaluation    Mobility Interventions: HOB 30 degrees or less, Pressure redistribution bed/mattress (bed type), PT/OT evaluation    Nutrition Interventions: Document food/fluid/supplement intake                     Problem: Complex Spine Procedure:  Day of Surgery  Goal: Activity/Safety  Outcome: Progressing Towards Goal  Goal: Nutrition/Diet  Outcome: Progressing Towards Goal  Goal: Medications  Outcome: Progressing Towards Goal

## 2020-01-19 NOTE — PROGRESS NOTES
Orthopedic & Surgical Nursing Communication Tool        Bedside and Verbal shift change report given to SOUTH Almodovar (incoming nurse) by Abril Burk RN (outgoing nurse) on Ishaan Wallace a 62 y.o. male and born 1955 who arrived at the hospital on 1/16/2020  5:22 AM. Report included the following information SBAR, Kardex and MAR. Significant changes during shift: consistent complaints of pain, urinating well      Issues for physician to address: none          Pain Controlled          [x] yes          [] no    Bowel Movement          [] yes          [x] no          Code Status: Prior     Admit Diagnosis: Spinal stenosis [M48.00]     Surgery: Procedure(s):  LUMBAR LAMINECTOMY L4-S1     Infections: No current active infections     Allergies: Patient has no known allergies. Current diet: DIET FULL LIQUID           Vital Signs:     Patient Vitals for the past 12 hrs:   Temp Pulse Resp BP SpO2   01/19/20 0320 98.6 °F (37 °C) 80 18 149/88 93 %   01/18/20 2119 98.8 °F (37.1 °C) 85 18 (!) 139/98 93 %      Intake & Output:       Intake/Output Summary (Last 24 hours) at 1/19/2020 0753  Last data filed at 1/19/2020 0318  Gross per 24 hour   Intake 120 ml   Output 1150 ml   Net -1030 ml      Laboratory Results:   No results found for this or any previous visit (from the past 12 hour(s)). Opportunity for questions and clarifications were given to the incoming nurse. Patient's bed locked and is in low position, side rails up x2, door open PRN, call bell within reach of patient and patient not in distress.       Signed by: Abril Burk RN, BSN, CMSRN                       1/19/2020 at 7:53 AM

## 2020-01-19 NOTE — PROGRESS NOTES
Problem: Self Care Deficits Care Plan (Adult)  Goal: *Acute Goals and Plan of Care (Insert Text)  Description  FUNCTIONAL STATUS PRIOR TO ADMISSION: Patient was independent and active without use of DME.    HOME SUPPORT: The patient lived alone with sister living locally to provide support as needed    1. Patient will perform lower body dressing with supervision/set-up using Reacher, Stocking Aid and Dressing Stick PRN within 7 days. 2.  Patient will perform EOB ADLs unsupported with supervision/set-up within 7 days. 3.  Patient will toilet transfer at minimal assistance/contact guard assist using least restrictive DME within 7 days. 4.  Patient will perform bathing seated with minimal assistance within 7 days  5. Patient will verbalize/demonstrate 3/3 back precautions during ADL tasks without cues within 7 days. Outcome: Progressing Towards Goal  Note:   OCCUPATIONAL THERAPY TREATMENT  Patient: Odell Almaguer (22 y.o. male)  Date: 1/19/2020  Diagnosis: Spinal stenosis [M48.00]   <principal problem not specified>  Procedure(s) (LRB):  LUMBAR LAMINECTOMY L4-S1 (N/A) 3 Days Post-Op  Precautions: Fall, Back  Chart, occupational therapy assessment, plan of care, and goals were reviewed. ASSESSMENT  Patient continues with skilled OT services and is progressing towards goals. Attempted to see patient earlier in afternoon and patient was in 10/10 pain and unable to participate. Therapist then attempted again in an hour and pain had decreased to 8/10 (at rest), and patient agreeable to therapy. Patient's level of assistance appears to fluctuate greatly throughout the day. Per nursing he is Max A x2 at times to stand and today he was Min-Mod A x2 for mobility during therapy session. Patient recalled only 1 back precaution (twisting) but was able to recall 3/3 when asked again 5 minutes later. Bed mobility performed with Min A for log roll and constant vc for technique.  He then stood and transferred to chair with Min-Mod A x2. Patient willing to sit in chair at end of session but nursing requested to return patient to bed secondary to significant difficulty with transfer earlier in day. Patient with flat affect and demonstrated slowed processing/initiation during session. He states his sister will be assisting him at home but she likely will not be able to handle him physically at his current assistance level. Patient incontinent of urine and required total as to clean up urine from floor and change sheets/gown. Patient would benefit from rehab before returning home and would likely be able to tolerate 3 hours of therapy a day. Current Level of Function Impacting Discharge (ADLs): can be up to max A x2 for transfers, total A LB dressing/toileting    Other factors to consider for discharge: Do not feel that sister can handle patient at home at his current assistance level         PLAN :  Patient continues to benefit from skilled intervention to address the above impairments. Continue treatment per established plan of care. to address goals. Recommend with staff: 2 persons A for mobility, can transfer to chair/BSC with A x2    Recommend next OT session: LB dressing with AE    Recommendation for discharge: (in order for the patient to meet his/her long term goals)  Therapy 3 hours per day 5-7 days per week    This discharge recommendation:  Has not yet been discussed the attending provider and/or case management    IF patient discharges home will need the following DME: TBD. Would likely need shower chair, raised commode/BSC       SUBJECTIVE:   Patient stated I am at [facility]    OBJECTIVE DATA SUMMARY:   Cognitive/Behavioral Status:         Cooperative, pleasant, flat affect, slowed initiation       Functional Mobility and Transfers for ADLs:  Bed Mobility:  Supine to Sit: Minimum assistance(Min A to facilitate log roll)  Sit to Supine:  Moderate assistance(A for LEs)  Scooting: Minimum assistance    Transfers:  Sit to Stand: Moderate assistance;Assist x2     Bed to Chair: Moderate assistance    Balance:   Sitting: required UE support to maintain sitting balance EOB    ADL Intervention:       Upper Body [address]: Maximum assistance         Toileting  Toileting Assistance: Total assistance(dependent)(patient incontinent of urine (states this is baseline))  Bladder Hygiene: Total assistance (dependent)      Pain:  8/10 at start of session, progressed to 7/10 with mobility, RN aware     Activity Tolerance:   Fair  Please refer to the flowsheet for vital signs taken during this treatment.     After treatment patient left in no apparent distress:   Supine in bed and Call bell within reach    COMMUNICATION/COLLABORATION:   The patients plan of care was discussed with: Physical Therapist and Registered Nurse    Lesa Soto OT  Time Calculation: 25 mins

## 2020-01-19 NOTE — PROGRESS NOTES
ORTHOPAEDIC LUMBAR PROGRESS NOTE    NAME: Cullen Angelo   :  1955   MRN:  [id number]   DATE:  2020    POD: 3 Day Post-Op  S/P: Procedure(s):  LUMBAR LAMINECTOMY L4-S1    SUBJECTIVE:    Sitting up, no headache. C/o intermittent thigh numbness but motor intact        Recent Labs     20  0323   HGB 12.9     Patient Vitals for the past 12 hrs:   BP Temp Pulse Resp SpO2   20 0914 (P) 170/87 (P) 98 °F (36.7 °C) (P) 88 (P) 18 (P) 91 %   20 0320 149/88 98.6 °F (37 °C) 80 18 93 %       EXAM:  Positive strength/ROM bilat lower ext.   Neuro intact  Dressings clean, dry and intact     PLAN:  1)PO pain    2) PT/OT    3) Advance regular diet    4) Ice/gel packs to back for  PRN pain    5) D/c planning to home tomorrow, pending overall positive progression   -Rx: Roxicodone, Valium on chart for d/c    Diann Cuba MD

## 2020-01-19 NOTE — PROGRESS NOTES
Problem: Mobility Impaired (Adult and Pediatric)  Goal: *Acute Goals and Plan of Care (Insert Text)  Description  FUNCTIONAL STATUS PRIOR TO ADMISSION: Patient was independent and active without use of DME.    HOME SUPPORT PRIOR TO ADMISSION: The patient lived alone with sister to provide assistance. Physical Therapy Goals  Initiated 1/17/2020    1. Patient will move from supine to sit and sit to supine  in bed with modified independence within 4 days. 2. Patient will perform sit to stand with supervision/set-up within 4 days. 3. Patient will ambulate with supervision/set-up for 50 feet with the least restrictive device within 4 days. 4. Patient will ascend/descend 4 stairs with 1 handrail(s) with minimal assistance/contact guard assist within 4 days. 5. Patient will verbalize and demonstrate understanding of spinal precautions (No bending, lifting greater than 5 lbs, or twisting; log-roll technique; frequent repositioning as instructed) within 4 days. Outcome: Progressing Towards Goal   PHYSICAL THERAPY TREATMENT  Patient: Jerzy Salinas (77 y.o. male)  Date: 1/19/2020  Diagnosis: Spinal stenosis [M48.00]   <principal problem not specified>  Procedure(s) (LRB):  LUMBAR LAMINECTOMY L4-S1 (N/A) 3 Days Post-Op  Precautions: Fall, Back  Chart, physical therapy assessment, plan of care and goals were reviewed. ASSESSMENT  Patient continues with skilled PT services and is progressing towards goals. Pt presents with flat affect, multiple episodes of urinary incontinence which pt reports as baseline, bilat LE weakness, impaired balance, decreased indep with mobility, increased risk for fall. Pt required increased time to complete functional tasks due to slow movement. BP stable with position change. Pt tolerated amb in room with RW with assist and brief sitting trial in chair.  Returned to bed at end of session per RN request due to prior difficulty with pain and difficult transfer back to bed earlier in the day. Pt remains below functional baseline with increased risk for fall. Concerned about sister ability to adequately assist pt in home environment. Recommend follow up rehab at d/c. Current Level of Function Impacting Discharge (mobility/balance): bed mob mod A, transfer mod A, short distance amb min A of 1-2    Other factors to consider for discharge: urinary incontinence with now decreased ability to manage indep         PLAN :  Patient continues to benefit from skilled intervention to address the above impairments. Continue treatment per established plan of care. to address goals. Recommendation for discharge: (in order for the patient to meet his/her long term goals)  Therapy 3 hours per day 5-7 days per week    This discharge recommendation:  Has not yet been discussed the attending provider and/or case management    IF patient discharges home will need the following DME: to be determined (TBD)       SUBJECTIVE:   Patient stated I see starsre supine to sit; BP stable    OBJECTIVE DATA SUMMARY:   Critical Behavior:  Neurologic State: Alert  Orientation Level: Oriented X4  Cognition: Appropriate decision making  Safety/Judgement: Awareness of environment  Functional Mobility Training:  Bed Mobility:  Rolling: Minimum assistance(for log roll)  Supine to Sit: Minimum assistance(L sidelying to sit)  Sit to Supine: Moderate assistance(assist bilat LEs)  Scooting: Minimum assistance        Transfers:  Sit to Stand: Moderate assistance;Assist x2; Additional time(assist for lift/ balance, incr time to achieve upright)  Stand to Sit: Minimum assistance(cues for hand placement)        Bed to Chair: Moderate assistance; Adaptive equipment(SPT with RW mod A for lift/ balance)                    Balance:  Sitting: Impaired  Sitting - Static: Good (unsupported)  Sitting - Dynamic: Fair (occasional)  Standing: Impaired; With support  Standing - Static: Fair;Constant support  Standing - Dynamic : Fair;Constant support  Ambulation/Gait Training:  Distance (ft): 30 Feet (ft)  Assistive Device: Gait belt;Walker, rolling  Ambulation - Level of Assistance: Minimal assistance;Assist x1;Assist x2(min A of 1-2 for balance/ safety)        Gait Abnormalities: Decreased step clearance        Base of Support: Widened     Speed/Dalia: Slow  Step Length: Left shortened;Right shortened         Pain Rating:  Pt reports 8/10 back pain    Activity Tolerance:   Fair  Please refer to the flowsheet for vital signs taken during this treatment.     After treatment patient left in no apparent distress:   Supine in bed, Call bell within reach, and Side rails x 3    COMMUNICATION/COLLABORATION:   The patients plan of care was discussed with: Occupational Therapist and Registered Nurse    Laz Fagan PT   Time Calculation: 29 mins

## 2020-01-20 PROCEDURE — 97530 THERAPEUTIC ACTIVITIES: CPT

## 2020-01-20 PROCEDURE — 74011250637 HC RX REV CODE- 250/637: Performed by: NURSE PRACTITIONER

## 2020-01-20 PROCEDURE — 74011250637 HC RX REV CODE- 250/637: Performed by: PHYSICIAN ASSISTANT

## 2020-01-20 PROCEDURE — 74011250637 HC RX REV CODE- 250/637: Performed by: ORTHOPAEDIC SURGERY

## 2020-01-20 PROCEDURE — 74011250636 HC RX REV CODE- 250/636: Performed by: PHYSICIAN ASSISTANT

## 2020-01-20 PROCEDURE — 97116 GAIT TRAINING THERAPY: CPT

## 2020-01-20 PROCEDURE — 97535 SELF CARE MNGMENT TRAINING: CPT

## 2020-01-20 PROCEDURE — 99218 HC RM OBSERVATION: CPT

## 2020-01-20 RX ADMIN — ONDANSETRON 4 MG: 4 TABLET, ORALLY DISINTEGRATING ORAL at 11:09

## 2020-01-20 RX ADMIN — METOCLOPRAMIDE 10 MG: 5 INJECTION, SOLUTION INTRAMUSCULAR; INTRAVENOUS at 06:25

## 2020-01-20 RX ADMIN — ACETAMINOPHEN 650 MG: 325 TABLET ORAL at 06:12

## 2020-01-20 RX ADMIN — METOCLOPRAMIDE 10 MG: 5 INJECTION, SOLUTION INTRAMUSCULAR; INTRAVENOUS at 00:28

## 2020-01-20 RX ADMIN — Medication 10 ML: at 06:12

## 2020-01-20 RX ADMIN — Medication 10 ML: at 06:11

## 2020-01-20 RX ADMIN — ACETAMINOPHEN 650 MG: 325 TABLET ORAL at 18:21

## 2020-01-20 RX ADMIN — PANTOPRAZOLE SODIUM 40 MG: 40 TABLET, DELAYED RELEASE ORAL at 06:54

## 2020-01-20 RX ADMIN — LOSARTAN POTASSIUM 100 MG: 50 TABLET, FILM COATED ORAL at 08:42

## 2020-01-20 RX ADMIN — OXYCODONE 15 MG: 5 TABLET ORAL at 18:21

## 2020-01-20 RX ADMIN — OXYCODONE 15 MG: 5 TABLET ORAL at 00:29

## 2020-01-20 RX ADMIN — SENNOSIDES AND DOCUSATE SODIUM 1 TABLET: 8.6; 5 TABLET ORAL at 08:43

## 2020-01-20 RX ADMIN — METOPROLOL SUCCINATE 100 MG: 50 TABLET, EXTENDED RELEASE ORAL at 08:42

## 2020-01-20 RX ADMIN — MUPIROCIN: 20 OINTMENT TOPICAL at 18:22

## 2020-01-20 RX ADMIN — ACETAMINOPHEN 650 MG: 325 TABLET ORAL at 13:56

## 2020-01-20 RX ADMIN — AZELASTINE HYDROCHLORIDE 1 SPRAY: 137 SPRAY, METERED NASAL at 18:22

## 2020-01-20 RX ADMIN — TAMSULOSIN HYDROCHLORIDE 0.4 MG: 0.4 CAPSULE ORAL at 08:42

## 2020-01-20 RX ADMIN — ACETAMINOPHEN 650 MG: 325 TABLET ORAL at 00:32

## 2020-01-20 RX ADMIN — DIAZEPAM 5 MG: 5 TABLET ORAL at 04:28

## 2020-01-20 RX ADMIN — AMLODIPINE BESYLATE 10 MG: 5 TABLET ORAL at 08:43

## 2020-01-20 RX ADMIN — OXYCODONE 15 MG: 5 TABLET ORAL at 04:28

## 2020-01-20 RX ADMIN — SENNOSIDES AND DOCUSATE SODIUM 1 TABLET: 8.6; 5 TABLET ORAL at 18:21

## 2020-01-20 RX ADMIN — OXYCODONE 15 MG: 5 TABLET ORAL at 14:17

## 2020-01-20 RX ADMIN — OXYCODONE 10 MG: 5 TABLET ORAL at 21:36

## 2020-01-20 RX ADMIN — OXYCODONE 15 MG: 5 TABLET ORAL at 07:28

## 2020-01-20 RX ADMIN — AZELASTINE HYDROCHLORIDE 1 SPRAY: 137 SPRAY, METERED NASAL at 08:43

## 2020-01-20 RX ADMIN — MUPIROCIN: 20 OINTMENT TOPICAL at 08:44

## 2020-01-20 NOTE — PROGRESS NOTES
CYNTHIA:  1. HH with [facility]. 2. PT/OT  3. Family transport      CM noted patient may need rehab before going home, left a message with Dr. Shaila Singer to see if patient is able to go to SNF.       1139: Patient will be transported by his sister today to her house, home health is setup with [facility]. CM spoke with Dr. Shaila Singer office, they would prefer the patient to go home with home health.     Kamil[facility]baSatanta [facility] Hospital

## 2020-01-20 NOTE — PROGRESS NOTES
Problem: Falls - Risk of  Goal: *Absence of Falls  Description  Document Judith Garrygiovanna Fall Risk and appropriate interventions in the flowsheet. Outcome: Progressing Towards Goal  Note: Fall Risk Interventions:  Mobility Interventions: Communicate number of staff needed for ambulation/transfer, OT consult for ADLs, Patient to call before getting OOB, PT Consult for mobility concerns, PT Consult for assist device competence         Medication Interventions: Assess postural VS orthostatic hypotension, Evaluate medications/consider consulting pharmacy, Patient to call before getting OOB, Teach patient to arise slowly, Utilize gait belt for transfers/ambulation    Elimination Interventions: Call light in reach              Problem: Pressure Injury - Risk of  Goal: *Prevention of pressure injury  Description  Document Quinton Scale and appropriate interventions in the flowsheet. Outcome: Progressing Towards Goal  Note: Pressure Injury Interventions:             Activity Interventions: Increase time out of bed, Pressure redistribution bed/mattress(bed type), PT/OT evaluation    Mobility Interventions: HOB 30 degrees or less, Pressure redistribution bed/mattress (bed type), PT/OT evaluation    Nutrition Interventions: Document food/fluid/supplement intake                     Problem: Complex Spine Procedure:  Day of Surgery  Goal: Activity/Safety  Outcome: Progressing Towards Goal  Goal: Nutrition/Diet  Outcome: Progressing Towards Goal  Goal: Medications  Outcome: Progressing Towards Goal  Goal: Respiratory  Outcome: Progressing Towards Goal

## 2020-01-20 NOTE — PROGRESS NOTES
Problem: Mobility Impaired (Adult and Pediatric)  Goal: *Acute Goals and Plan of Care (Insert Text)  Description  FUNCTIONAL STATUS PRIOR TO ADMISSION: Patient was independent and active without use of DME.    HOME SUPPORT PRIOR TO ADMISSION: The patient lived alone with sister to provide assistance. Physical Therapy Goals  Initiated 1/17/2020    1. Patient will move from supine to sit and sit to supine  in bed with modified independence within 4 days. 2. Patient will perform sit to stand with supervision/set-up within 4 days. 3. Patient will ambulate with supervision/set-up for 50 feet with the least restrictive device within 4 days. 4. Patient will ascend/descend 4 stairs with 1 handrail(s) with minimal assistance/contact guard assist within 4 days. 5. Patient will verbalize and demonstrate understanding of spinal precautions (No bending, lifting greater than 5 lbs, or twisting; log-roll technique; frequent repositioning as instructed) within 4 days. 1/20/2020 1559 by Matt Rob  Outcome: Not Progressing Towards Goal   PHYSICAL THERAPY TREATMENT  Patient: Uriel Quiñonez (17 y.o. male)  Date: 1/20/2020  Diagnosis: Spinal stenosis [M48.00]   <principal problem not specified>  Procedure(s) (LRB):  LUMBAR LAMINECTOMY L4-S1 (N/A) 4 Days Post-Op  Precautions: Fall, Back No bending, no lifting greater than 5 lbs, no twisting, log-roll technique, repositioning every 20-30 min except when sleeping  Chart, physical therapy assessment, plan of care and goals were reviewed. ASSESSMENT  Patient continues with skilled PT services and is not progressing towards goals. Per RN pt had difficulty returning to bed following am session. RN reports pt had episodes of bilateral knee buckling.   Pt presents with inconsistent mobility performance and reporting increased back pain at this time, decreased sitting balance with one episode of anterior loss of balance, decreased strength of BLE, endurance, fair standing balance, and significantly increased difficulty with ambulation at this time. Pt stands in flexed posture with bilateral knees flexed requiring verbal cueing to extend. Pt extends trunk and knees briefly however fatigues quickly and progressively has increased knee flexion and occasional bilateral knee flexion as he ambulates. Pt was followed closely with a wheelchair and only tolerated ambulation for a short distance. Current Level of Function Impacting Discharge (mobility/balance): bed mobility with minimal assistance, used bed rail, sit <> stand with minimal assist, ambulation with heavy reliance on rolling walker x 50 feet, closely followed with wheelchair     Other factors to consider for discharge: fall risk, pt plans to stay with his sister initially, her home has 15 steps to manage         PLAN :  Patient continues to benefit from skilled intervention to address the above impairments. Continue treatment per established plan of care. to address goals. Recommendation for discharge: (in order for the patient to meet his/her long term goals)  Therapy 3 hours per day 5-7 days per week versus SNF is safer at this time due to inconsistent mobility needs and fall risk, pt will require physical assistance at home and his sister reports she was not prepared for this, stated they were told he was having surgery for a pinched nerve    This discharge recommendation:  Has been made in collaboration with the attending provider and/or case management    IF patient discharges home will need the following DME: bedside commode, shower chair, and rolling walker       SUBJECTIVE:   Patient stated My back is hurting more and feeling weaker.     OBJECTIVE DATA SUMMARY:   Critical Behavior:  Neurologic State: Alert  Orientation Level: Oriented X4  Cognition: decreased insight into deficits   Safety/Judgement: Awareness of environment    Spinal diagnosis intervention:  The patient stated 3/3 back precautions with extra time and cues when prompted. Reviewed all 3 back precautions, log roll technique, and sitting for 30 minutes at a time. The patient required verbal cues to maintain back precautions during functional activity. Functional Mobility Training:    Bed Mobility:  Log Rolling: Minimum assistance; Additional time;Assist x1;Adaptive equipment  Supine to Sit: Minimum assistance; Additional time;Assist x1;Adaptive equipment, pt had one episode of anterior loss of balance while reaching out for walker     Scooting: Supervision        Transfers:  Sit to Stand: Minimum assistance; Adaptive equipment; Additional time;Assist x1(slowly stands, knees remained flexed requiring verbal cueing to straighten )  Stand to Sit: Minimum assistance;Assist x1;Adaptive equipment; Additional time                             Balance:  Sitting: Impaired  Sitting - Dynamic: Fair (occasional)(pt had anterior loss of balance while triying to reach for walker)  Standing: Impaired; With support  Standing - Static: Constant support; Fair  Standing - Dynamic : Constant support; Fair  Ambulation/Gait Training:  Distance (ft): 50 Feet (ft)  Assistive Device: Gait belt;Walker, rolling  Ambulation - Level of Assistance: Minimal assistance;Assist x1(closely followed with wheelchair for safety)        Gait Abnormalities: Antalgic; Shuffling gait(trunk and knees flexed, progressive bilateral knee flexion with fatigue, occasional soft buckling), Pt stands in flexed posture with bilateral knees flexed requiring verbal cueing to extend. Pt extends trunk and knees briefly however fatigues quickly and progressively has increased knee flexion and occasional bilateral knee flexion as he ambulates.  Pt was followed closely with a wheelchair        Base of Support: Widened     Speed/Dalia: Shuffled;Pace decreased (<100 feet/min)  Step Length: Left shortened;Right shortened        Stairs:   Pt deemed not safe to attempt steps at this time due to increased BLE weakness, progressive flexion and soft buckling of bilateral knees       Pain Rating:  Verbal: 8  Location: back    Activity Tolerance:   Poor, reported feeling dizzy following ambulation, BP stable     After treatment patient left in no apparent distress:   Sitting in chair and Call bell within reach    COMMUNICATION/COLLABORATION:   The patients plan of care was discussed with: Registered Nurse    Katlin Rice   Time Calculation: 35 mins

## 2020-01-20 NOTE — PROGRESS NOTES
Problem: Self Care Deficits Care Plan (Adult)  Goal: *Acute Goals and Plan of Care (Insert Text)  Description  FUNCTIONAL STATUS PRIOR TO ADMISSION: Patient was independent and active without use of DME.    HOME SUPPORT: The patient lived alone with sister living locally to provide support as needed    1. Patient will perform lower body dressing with supervision/set-up using Reacher, Stocking Aid and Dressing Stick PRN within 7 days. 2.  Patient will perform EOB ADLs unsupported with supervision/set-up within 7 days. 3.  Patient will toilet transfer at minimal assistance/contact guard assist using least restrictive DME within 7 days. 4.  Patient will perform bathing seated with minimal assistance within 7 days  5. Patient will verbalize/demonstrate 3/3 back precautions during ADL tasks without cues within 7 days. Outcome: Progressing Towards Goal   OCCUPATIONAL THERAPY TREATMENT  Patient: Tomasz Kramer (13 y.o. male)  Date: 1/20/2020  Diagnosis: Spinal stenosis [M48.00]   <principal problem not specified>  Procedure(s) (LRB):  LUMBAR LAMINECTOMY L4-S1 (N/A) 4 Days Post-Op  Precautions: Fall, Back  Chart, occupational therapy assessment, plan of care, and goals were reviewed. ASSESSMENT  Patient continues with skilled OT services and is progressing towards goals. Participation impacted by impaired strength and endurance for functional activity, impaired reach to LEs, impaired standing balance and tolerance, impaired dynamic standing balance, slowed processing, impaired problem solving. Sister and nephew present for session and instruction on use of AE. Once instruction given, nephew able to cue patient on steps and problem solving. Patient and family instructed in purchase information for hip kit tools and elevated toilet seat versus BSC. Handout given on both.       Current Level of Function Impacting Discharge (ADLs): UE self care with set up, LE self care with set up to min assist, functional transfers with CGA , RW, and cues for straightening knees. Other factors to consider for discharge: Lives alone with sister near by per chart         PLAN :  Patient continues to benefit from skilled intervention to address the above impairments. Continue treatment per established plan of care. to address goals. Recommend with staff: Rivas Perish in chair 3 times a day for minimum of an hour, self care with set up for UEs, max assist for LEs without adapted equipment, functional transfers with RW to chair and BSC with CGA of 1 and progression to bathroom toilet with elevated seat and grab bar. Recommend next OT session: toileting in bathroom    Recommendation for discharge: (in order for the patient to meet his/her long term goals)  Therapy 3 hours per day 5-7 days per week; if unable, HH OT and assist for all mobility, RW, hip kit, elevated toilet seat. Patient has 12 to 15 steps to bedroom per sister and is unable to manage steps at this time per PT. This discharge recommendation:  Has been made in collaboration with the attending provider and/or case management    IF patient discharges home will need the following DME: elevated toilet seat with arms versus a BSC; hip kit       SUBJECTIVE:   Patient stated My head is groggy.     OBJECTIVE DATA SUMMARY:   Cognitive/Behavioral Status:  Neurologic State: Alert  Orientation Level: Oriented X4  Cognition: Decreased attention/concentration; Follows commands(impaired problem solving, slowed processing)  Perception: Appears intact  Perseveration: No perseveration noted  Safety/Judgement: Awareness of environment    Functional Mobility and Transfers for ADLs:  Bed Mobility:  Rolling: Minimum assistance; Additional time;Assist x1;Adaptive equipment  Supine to Sit: Minimum assistance; Additional time;Assist x1;Adaptive equipment  Scooting: Supervision    Transfers:  Sit to Stand: Contact guard assistance;Assist x1          Balance:  Sitting: Intact; Without support  Sitting - Static: Good (unsupported)  Sitting - Dynamic: Good (unsupported)  Standing: Impaired; With support  Standing - Static: Fair  Standing - Dynamic : Fair    ADL Intervention:            Upper Body Bathing  Bathing Assistance: Set-up(in simulation)  Position Performed: Seated in chair    Lower Body Bathing  Adaptive Equipment: Long handled sponge(instructed in use)         Lower Body Dressing Assistance  Pants With Elastic Waist: Stand-by assistance(max cues, use of reacher) to knees only; inferred mod assist overall. Socks: Set-up(sock aide)  Slip on Shoes with Back: Set-up(long handled shoehorn)  Leg Crossed Method Used: No  Position Performed: Seated in chair;Standing  Cues: Physical assistance;Verbal cues provided;Doff;Don  Adaptive Equipment Used: Walker    Toileting  Bladder Hygiene: (incontinent)  Bowel Hygiene: Contact guard assistance(in simulation)    Cognitive Retraining  Attention to Task: Distractibility  Following Commands: Follows one step commands/directions  Safety/Judgement: Awareness of environment  Cues: Verbal cues provided;Visual cues provided    The patient recalled and demonstrated 3/3 back precautions. Reviewed all 3 with patient. Dressing lower body: Patient instructed to remain seated to don all clothing to increase independence with precautions and pain management. Instructed in and practiced use of AE. Toileting: Patient instructed on the benefits of using flushable wet wipes. Also, the benefits of a reacher to aid in clothing management. Patient instructed and indicated understanding the benefits of maintaining activity tolerance, functional mobility, and independence with self care tasks during acute stay  to ensure safe return home and to baseline.  Encouraged patient to increase frequency and duration OOB, not sitting longer than 30 mins without marching/walking with staff, be out of bed for all meals, perform daily ADLs (as approved by RN/MD regarding bathing etc), and performing functional mobility to/from bathroom. Patient instruction and indicated understanding on body mechanics, ergonomics and gravitational force on the spine during different body positions to plan activities in prep for return home to complete instrumental ADLs and back to work safely. Activity Tolerance:   Fair  Please refer to the flowsheet for vital signs taken during this treatment.     After treatment patient left in no apparent distress:   Sitting in chair and Call bell within reach    COMMUNICATION/COLLABORATION:   The patients plan of care was discussed with: Physical Therapist and Registered Nurse    JULISSA Kent  Time Calculation: 55 mins

## 2020-01-20 NOTE — ROUTINE PROCESS
The Rehabilitation department at [facility] has ordered the following durable medical equipment (DME):   rolling walker  From:  [employer] [phone]  If the rehab department or DME company is waiting for insurance approval for the equipment and the patient decides to discharge from the hospital before the medical equipment arrives, the patient may contact the company above to work out the delivery. Please keep in mind that some DME companies WILL NOT deliver to the home. Insurance companies and DME companies are not open on the weekends to approve authorization and deliver to the hospital. Therefore it is the patient's responsibility to figure out a way to access the DME medical equipment. Thank you so much for your help as we provide the equipment the patient requires.

## 2020-01-20 NOTE — PROGRESS NOTES
CYNTHIA:  1.  Rehab vs Home health. Patient was ready for discharge this morning, but did not clear stairs with pt this afternoon. His sister was present at therapy session, and does not feel comfortable at home to assist with his needs. RN notified Dr. Cristal Nj in the morning, they are canceling discharge order and will re evaluate patient in the morning. CM to follow.     Jamel Grey [facility]

## 2020-01-20 NOTE — PROGRESS NOTES
ORTHOPAEDIC LUMBAR PROGRESS NOTE    NAME: Daryle Balo   :  1955   MRN:  [id number]   DATE:  2020    POD: 4 Days Post-Op  S/P: Procedure(s):  LUMBAR LAMINECTOMY L4-S1    SUBJECTIVE:    Patient found lying supine in bed this morning -awake/alert, oriented. States back hurts at surgical site, denies leg pain/numbness but states legs feel weak, heavy and about to give way when up, working with PT/OT. States making very slow progress with PT/OT -only standing/walking in room. Afebrile/VSS. Denies nausea/vomiting, chest pain, headache or shortness of breath. Patient states he is passing gas, no BM as of yet but notes his diet has not been increased. Emphasized to patient the need to get OOB, working with PT/OT, sitting in chair and making effort. Patient worried about slow progression and being at home without assistance. Patient lives with sister who is away during the day. Discussed d/c options to include inpatient rehab -patient states he would like to go to rehab if accepted. Recent Labs     20  0323   HGB 12.9     Patient Vitals for the past 12 hrs:   BP Temp Pulse Resp SpO2   20 0304 169/90 98 °F (36.7 °C) 78 18 95 %       EXAM:  Positive strength/ROM bilat lower ext. Neuro intact  Dressings clean, dry and intact     PLAN:  1) Continue PO pain medications    2) PT/OT, OOB    3) Advance regular diet    4) Ice/gel packs to back for  PRN pain    5) In-patient rehab consult requested -patient cleared for d/c to rehab if approved and accepted.       Milton Darling PA-C

## 2020-01-20 NOTE — PROGRESS NOTES
Orthopedic Spine Nursing Communication Tool        Bedside and Verbal shift change report given to SOUTH Feng (incoming nurse) by Nikki Ingram RN (outgoing nurse) on Won Savage a 62 y.o. male and born 1955 who arrived at the hospital on 1/16/2020  5:22 AM. Report included the following information SBAR, Kardex and MAR. Significant changes during shift: consistent complaints of pain, 15mg of Brittany better controlling pain, no BM as of this time, but patient passing gas, difficulty ambulating, urinating well. Issues for physician to address: none          Pain Controlled          [x] yes          [] no    Bowel Movement          [] yes          [x] no          Code Status: Prior     Admit Diagnosis: Spinal stenosis [M48.00]     Surgery: Procedure(s):  LUMBAR LAMINECTOMY L4-S1     Infections: No current active infections     Allergies: Patient has no known allergies. Current diet: DIET REGULAR           Vital Signs:     Patient Vitals for the past 12 hrs:   Temp Pulse Resp BP SpO2   01/20/20 0824 98.3 °F (36.8 °C) 89 18 (!) 150/97 95 %   01/20/20 0304 98 °F (36.7 °C) 78 18 169/90 95 %      Intake & Output:       Intake/Output Summary (Last 24 hours) at 1/20/2020 0849  Last data filed at 1/20/2020 0125  Gross per 24 hour   Intake 300 ml   Output 1235 ml   Net -935 ml      Laboratory Results:   No results found for this or any previous visit (from the past 12 hour(s)). Opportunity for questions and clarifications were given to the incoming nurse. Patient's bed locked and is in low position, side rails up x2, door open PRN, call bell within reach of patient and patient not in distress.       Signed by: Nikki Ingram RN, BSN, CMSRN                       1/20/2020 at 8:49 AM

## 2020-01-21 LAB
ANION GAP SERPL CALC-SCNC: 3 MMOL/L (ref 5–15)
BUN SERPL-MCNC: 8 MG/DL (ref 6–20)
BUN/CREAT SERPL: 8 (ref 12–20)
CALCIUM SERPL-MCNC: 9.4 MG/DL (ref 8.5–10.1)
CHLORIDE SERPL-SCNC: 101 MMOL/L (ref 97–108)
CO2 SERPL-SCNC: 35 MMOL/L (ref 21–32)
CREAT SERPL-MCNC: 0.96 MG/DL (ref 0.7–1.3)
GLUCOSE SERPL-MCNC: 117 MG/DL (ref 65–100)
POTASSIUM SERPL-SCNC: 2.6 MMOL/L (ref 3.5–5.1)
SODIUM SERPL-SCNC: 139 MMOL/L (ref 136–145)

## 2020-01-21 PROCEDURE — 99218 HC RM OBSERVATION: CPT

## 2020-01-21 PROCEDURE — 96366 THER/PROPH/DIAG IV INF ADDON: CPT

## 2020-01-21 PROCEDURE — 96365 THER/PROPH/DIAG IV INF INIT: CPT

## 2020-01-21 PROCEDURE — 74011250636 HC RX REV CODE- 250/636: Performed by: PHYSICIAN ASSISTANT

## 2020-01-21 PROCEDURE — 96375 TX/PRO/DX INJ NEW DRUG ADDON: CPT

## 2020-01-21 PROCEDURE — 97116 GAIT TRAINING THERAPY: CPT

## 2020-01-21 PROCEDURE — 74011250637 HC RX REV CODE- 250/637: Performed by: ORTHOPAEDIC SURGERY

## 2020-01-21 PROCEDURE — 96376 TX/PRO/DX INJ SAME DRUG ADON: CPT

## 2020-01-21 PROCEDURE — 36415 COLL VENOUS BLD VENIPUNCTURE: CPT

## 2020-01-21 PROCEDURE — 74011250637 HC RX REV CODE- 250/637: Performed by: NURSE PRACTITIONER

## 2020-01-21 PROCEDURE — 74011250636 HC RX REV CODE- 250/636: Performed by: NURSE PRACTITIONER

## 2020-01-21 PROCEDURE — 80048 BASIC METABOLIC PNL TOTAL CA: CPT

## 2020-01-21 PROCEDURE — 97530 THERAPEUTIC ACTIVITIES: CPT

## 2020-01-21 PROCEDURE — 74011250637 HC RX REV CODE- 250/637: Performed by: PHYSICIAN ASSISTANT

## 2020-01-21 PROCEDURE — 97530 THERAPEUTIC ACTIVITIES: CPT | Performed by: OCCUPATIONAL THERAPIST

## 2020-01-21 PROCEDURE — 97535 SELF CARE MNGMENT TRAINING: CPT | Performed by: OCCUPATIONAL THERAPIST

## 2020-01-21 RX ORDER — POTASSIUM CHLORIDE 7.45 MG/ML
10 INJECTION INTRAVENOUS
Status: COMPLETED | OUTPATIENT
Start: 2020-01-21 | End: 2020-01-21

## 2020-01-21 RX ORDER — POTASSIUM CHLORIDE 750 MG/1
40 TABLET, FILM COATED, EXTENDED RELEASE ORAL
Status: COMPLETED | OUTPATIENT
Start: 2020-01-21 | End: 2020-01-21

## 2020-01-21 RX ORDER — KETOROLAC TROMETHAMINE 30 MG/ML
15 INJECTION, SOLUTION INTRAMUSCULAR; INTRAVENOUS
Status: DISCONTINUED | OUTPATIENT
Start: 2020-01-21 | End: 2020-01-23 | Stop reason: HOSPADM

## 2020-01-21 RX ORDER — CLONIDINE HYDROCHLORIDE 0.1 MG/1
0.1 TABLET ORAL 2 TIMES DAILY
Status: DISCONTINUED | OUTPATIENT
Start: 2020-01-21 | End: 2020-01-23 | Stop reason: HOSPADM

## 2020-01-21 RX ORDER — POLYETHYLENE GLYCOL 3350 17 G/17G
17 POWDER, FOR SOLUTION ORAL 2 TIMES DAILY
Status: DISCONTINUED | OUTPATIENT
Start: 2020-01-21 | End: 2020-01-23 | Stop reason: HOSPADM

## 2020-01-21 RX ADMIN — METOCLOPRAMIDE 10 MG: 5 INJECTION, SOLUTION INTRAMUSCULAR; INTRAVENOUS at 19:10

## 2020-01-21 RX ADMIN — CLONIDINE HYDROCHLORIDE 0.1 MG: 0.1 TABLET ORAL at 19:10

## 2020-01-21 RX ADMIN — CLONIDINE HYDROCHLORIDE 0.1 MG: 0.1 TABLET ORAL at 11:59

## 2020-01-21 RX ADMIN — ACETAMINOPHEN 650 MG: 325 TABLET ORAL at 00:44

## 2020-01-21 RX ADMIN — TAMSULOSIN HYDROCHLORIDE 0.4 MG: 0.4 CAPSULE ORAL at 09:59

## 2020-01-21 RX ADMIN — METOCLOPRAMIDE 10 MG: 5 INJECTION, SOLUTION INTRAMUSCULAR; INTRAVENOUS at 14:30

## 2020-01-21 RX ADMIN — KETOROLAC TROMETHAMINE 15 MG: 30 INJECTION, SOLUTION INTRAMUSCULAR at 14:29

## 2020-01-21 RX ADMIN — POTASSIUM CHLORIDE 10 MEQ: 7.46 INJECTION, SOLUTION INTRAVENOUS at 16:37

## 2020-01-21 RX ADMIN — ACETAMINOPHEN 650 MG: 325 TABLET ORAL at 06:10

## 2020-01-21 RX ADMIN — FLUTICASONE PROPIONATE 2 SPRAY: 50 SPRAY, METERED NASAL at 19:09

## 2020-01-21 RX ADMIN — MUPIROCIN: 20 OINTMENT TOPICAL at 19:10

## 2020-01-21 RX ADMIN — POLYETHYLENE GLYCOL 3350 17 G: 17 POWDER, FOR SOLUTION ORAL at 18:00

## 2020-01-21 RX ADMIN — DIAZEPAM 5 MG: 5 TABLET ORAL at 00:47

## 2020-01-21 RX ADMIN — MUPIROCIN: 20 OINTMENT TOPICAL at 10:05

## 2020-01-21 RX ADMIN — LOSARTAN POTASSIUM 100 MG: 50 TABLET, FILM COATED ORAL at 09:59

## 2020-01-21 RX ADMIN — Medication 10 ML: at 15:23

## 2020-01-21 RX ADMIN — Medication 10 ML: at 21:11

## 2020-01-21 RX ADMIN — ACETAMINOPHEN 650 MG: 325 TABLET ORAL at 11:59

## 2020-01-21 RX ADMIN — POTASSIUM CHLORIDE 10 MEQ: 7.46 INJECTION, SOLUTION INTRAVENOUS at 15:25

## 2020-01-21 RX ADMIN — AZELASTINE HYDROCHLORIDE 1 SPRAY: 137 SPRAY, METERED NASAL at 10:04

## 2020-01-21 RX ADMIN — ACETAMINOPHEN 650 MG: 325 TABLET ORAL at 19:10

## 2020-01-21 RX ADMIN — POTASSIUM CHLORIDE 40 MEQ: 750 TABLET, FILM COATED, EXTENDED RELEASE ORAL at 14:29

## 2020-01-21 RX ADMIN — AMLODIPINE BESYLATE 10 MG: 5 TABLET ORAL at 09:59

## 2020-01-21 RX ADMIN — SENNOSIDES AND DOCUSATE SODIUM 1 TABLET: 8.6; 5 TABLET ORAL at 19:10

## 2020-01-21 RX ADMIN — POLYETHYLENE GLYCOL 3350 17 G: 17 POWDER, FOR SOLUTION ORAL at 14:28

## 2020-01-21 RX ADMIN — OXYCODONE 10 MG: 5 TABLET ORAL at 10:00

## 2020-01-21 RX ADMIN — SENNOSIDES AND DOCUSATE SODIUM 1 TABLET: 8.6; 5 TABLET ORAL at 10:00

## 2020-01-21 RX ADMIN — AZELASTINE HYDROCHLORIDE 1 SPRAY: 137 SPRAY, METERED NASAL at 19:09

## 2020-01-21 RX ADMIN — METOPROLOL SUCCINATE 100 MG: 50 TABLET, EXTENDED RELEASE ORAL at 10:00

## 2020-01-21 NOTE — PROGRESS NOTES
Critical lab value in while writer at lunch; [address] couldn't reach Rossy Henderson ( stated MD's in transit); Fatou in meeting when he tried; [address] now back to floor and repletion orders in for K+.

## 2020-01-21 NOTE — PROGRESS NOTES
Problem: Mobility Impaired (Adult and Pediatric)  Goal: *Acute Goals and Plan of Care (Insert Text)  Description  FUNCTIONAL STATUS PRIOR TO ADMISSION: Patient was independent and active without use of DME.    HOME SUPPORT PRIOR TO ADMISSION: The patient lived alone with sister to provide assistance. Physical Therapy Goals  Reassessment 1/21/2020  1. Patient will move from supine to sit and sit to supine  in bed with modified independence within 7 days. 2. Patient will perform sit to stand with supervision/set-up within 7 days. 3. Patient will ambulate with supervision for 150 feet with the least restrictive device within 7 days. 4. Patient will ascend/descend 15 stairs with 1 handrail(s) with minimal assistance/contact guard assist within 7 days. 5. Patient will verbalize and demonstrate understanding of spinal precautions (No bending, lifting greater than 5 lbs, or twisting; log-roll technique; frequent repositioning as instructed) within 7 days. Physical Therapy Goals  Initiated 1/17/2020, goals not met    1. Patient will move from supine to sit and sit to supine  in bed with modified independence within 4 days. 2. Patient will perform sit to stand with supervision/set-up within 4 days. 3. Patient will ambulate with supervision/set-up for 50 feet with the least restrictive device within 4 days. 4. Patient will ascend/descend 4 stairs with 1 handrail(s) with minimal assistance/contact guard assist within 4 days. 5. Patient will verbalize and demonstrate understanding of spinal precautions (No bending, lifting greater than 5 lbs, or twisting; log-roll technique; frequent repositioning as instructed) within 4 days.       Outcome: Progressing Towards Goal   PHYSICAL THERAPY TREATMENT/REASSESSMENT  Patient: Silas Hicks (35 y.o. male)  Date: 1/21/2020  Diagnosis: Spinal stenosis [M48.00]   <principal problem not specified>  Procedure(s) (LRB):  LUMBAR LAMINECTOMY L4-S1 (N/A) 5 Days Post-Op  Precautions: Fall, Back No bending, no lifting greater than 5 lbs, no twisting, log-roll technique, repositioning every 20-30 min except when sleeping  Chart, physical therapy assessment, plan of care and goals were reviewed. ASSESSMENT  Patient continues with skilled PT services and is slowly progressing towards goals. Pt presents with decreased activity tolerance due to severe back pain, 9/10, decreased generalized strength, standing balance, and impaired gait requiring support of a rolling walker at this time. Pt tolerated ambulation with heavy reliance on walker with mild trunk flexion and bilateral knee flexion and occasional soft buckling of BLE. Pt reports his knee buckling occurs due to sudden increased back pain. Pt encouraged to increase time OOB in chair and reviewed importance of repositioning frequently while sitting. Pt recalls 3 of 3 back precautions. Current Level of Function Impacting Discharge (mobility/balance): sit to stand with minimal assistance, ambulation with rolling walker x 90 feet with minimal assistance, followed closely with wheelchair    Other factors to consider for discharge: fall risk, slow progress overall, lives alone, reports he can stay with his sister for a few weeks          PLAN :  Patient continues to benefit from skilled intervention to address the above impairments. Continue treatment per established plan of care. to address goals.     Recommendation for discharge: (in order for the patient to meet his/her long term goals)  Therapy 3 hours per day 5-7 days per week versus SNF    This discharge recommendation:  Has been made in collaboration with the attending provider and/or case management    IF patient discharges home will need the following DME: bedside commode, shower chair, and rolling walker       SUBJECTIVE:   Patient stated I have a HA.    OBJECTIVE DATA SUMMARY:   Critical Behavior:  Neurologic State: Alert, Appropriate for age  Orientation Level: Appropriate for age, Oriented X4  Cognition: Appropriate for age attention/concentration, Appropriate safety awareness, Follows commands, decreased insight into deficits  Safety/Judgement: Awareness of environment    Spinal diagnosis intervention:  The patient stated 3/3 back precautions when prompted. Reviewed all 3 back precautions, log roll technique, and sitting for 30 minutes at a time. The patient required verbal cues to maintain back precautions during functional activity. Functional Mobility Training:    Bed Mobility:    Not assessed, OOB in chair                  Transfers:  Sit to Stand: Minimum assistance; Additional time; Adaptive equipment;Assist x1  Stand to Sit: Adaptive equipment; Additional time;Minimum assistance;Assist x1                             Balance:  Sitting: Intact; With support(sitting in chair)  Standing: Impaired; With support  Standing - Static: Good;Constant support  Standing - Dynamic : Fair;Constant support  Ambulation/Gait Training:  Distance (ft): 90 Feet (ft)  Assistive Device: Gait belt;Walker, rolling  Ambulation - Level of Assistance: Minimal assistance;Assist x1;Adaptive equipment, followed with wheelchair due to episodes of buckling         Gait Abnormalities: Antalgic;Decreased step clearance(bilateral knee soft buckling several occasions )        Base of Support: Widened     Speed/Dalia: Pace decreased (<100 feet/min)  Step Length: Right shortened;Left shortened          Pain Rating:  Verbal: 9  Location: back    Activity Tolerance:   Fair, c/o HA, severe back pain  Please refer to the flowsheet for vital signs taken during this treatment.   Vitals:      01/21/20 1105    BP:   (!) 147/94    BP 1 Location:       BP Patient Position:   At rest;Sitting    Pulse:   92    Resp:       Temp:       SpO2:       Weight:       Height:          After treatment patient left in no apparent distress:   Sitting in chair and Call bell within reach    COMMUNICATION/COLLABORATION: The patients plan of care was discussed with: Registered Nurse    Katlin Lennon   Time Calculation: 23 mins

## 2020-01-21 NOTE — PROGRESS NOTES
Problem: Falls - Risk of  Goal: *Absence of Falls  Description  Document Rayshawn Sánchez Fall Risk and appropriate interventions in the flowsheet. Outcome: Progressing Towards Goal  Note: Fall Risk Interventions:  Mobility Interventions: Communicate number of staff needed for ambulation/transfer         Medication Interventions: Evaluate medications/consider consulting pharmacy    Elimination Interventions: Call light in reach              Problem: Patient Education: Go to Patient Education Activity  Goal: Patient/Family Education  Outcome: Progressing Towards Goal     Problem: Pressure Injury - Risk of  Goal: *Prevention of pressure injury  Description  Document Quinton Scale and appropriate interventions in the flowsheet.   Outcome: Progressing Towards Goal  Note: Pressure Injury Interventions:       Moisture Interventions: Absorbent underpads    Activity Interventions: Increase time out of bed    Mobility Interventions: PT/OT evaluation    Nutrition Interventions: Document food/fluid/supplement intake                     Problem: Patient Education: Go to Patient Education Activity  Goal: Patient/Family Education  Outcome: Progressing Towards Goal     Problem: Patient Education: Go to Patient Education Activity  Goal: Patient/Family Education  Outcome: Progressing Towards Goal     Problem: Complex Spine Procedure:  Day of Surgery  Goal: Off Pathway (Use only if patient is Off Pathway)  Outcome: Progressing Towards Goal  Goal: Activity/Safety  Outcome: Progressing Towards Goal  Goal: Consults, if ordered  Outcome: Progressing Towards Goal  Goal: Diagnostic Test/Procedures  Outcome: Progressing Towards Goal  Goal: Nutrition/Diet  Outcome: Progressing Towards Goal  Goal: Discharge Planning  Outcome: Progressing Towards Goal  Goal: Medications  Outcome: Progressing Towards Goal  Goal: Respiratory  Outcome: Progressing Towards Goal  Goal: Treatments/Interventions/Procedures  Outcome: Progressing Towards Goal  Goal: Psychosocial  Outcome: Progressing Towards Goal  Goal: *Optimal pain control at patient's stated goal  Outcome: Progressing Towards Goal  Goal: *Demonstrates progressive activity  Outcome: Progressing Towards Goal  Goal: *Respiratory status stable  Outcome: Progressing Towards Goal

## 2020-01-21 NOTE — PROGRESS NOTES
Problem: Self Care Deficits Care Plan (Adult)  Goal: *Acute Goals and Plan of Care (Insert Text)  Description  FUNCTIONAL STATUS PRIOR TO ADMISSION: Patient was independent and active without use of DME.    HOME SUPPORT: The patient lived alone with sister living locally to provide support as needed    1. Patient will perform lower body dressing with supervision/set-up using Reacher, Stocking Aid and Dressing Stick PRN within 7 days. 2.  Patient will perform EOB ADLs unsupported with supervision/set-up within 7 days. 3.  Patient will toilet transfer at minimal assistance/contact guard assist using least restrictive DME within 7 days. 4.  Patient will perform bathing seated with minimal assistance within 7 days  5. Patient will verbalize/demonstrate 3/3 back precautions during ADL tasks without cues within 7 days. Outcome: Progressing Towards Goal    OCCUPATIONAL THERAPY TREATMENT  Patient: Won Savage (89 y.o. male)  Date: 1/21/2020  Diagnosis: Spinal stenosis [M48.00]   <principal problem not specified>  Procedure(s) (LRB):  LUMBAR LAMINECTOMY L4-S1 (N/A) 5 Days Post-Op  Precautions: Fall, Back  Chart, occupational therapy assessment, plan of care, and goals were reviewed. ASSESSMENT  Patient continues with skilled OT services and is progressing towards goals. Pt continues to be limited in functional performance with self care tasks due to decreased ROM to distal LE, decreased dynamic standing balance, increased pain, and decreased endurance. Pt educated in AE LE ADLs and AE. Indicated understanding and demonstrated good usage during LE ADLs. Pt also completed functional mobility in hallway with min A x 2, and w/c follow as pt has had B knee buckling in past.  Pt noted to have two instances of knee buckling during functional mobility, able to self correct with CGA X 2 for safety.   Pt reports pain is the precipitant for knee buckling and does require consistent UE support on RW to maintain safety with standing. Pt therefore needing increased A for standing ADLs due to this UE support needs. Feel pt will benefit from skilled OT at discharge to maximize functional return and increase safety as pt is fall risk. Current Level of Function Impacting Discharge (ADLs): LE ADLs min A with AE, mod A for standing ADLs (I.e. to manage clothing over hips for toileting)    Other factors to consider for discharge: pt will return home with sister's A, however, sister does not wish to A with toileting, dressing, etc.           PLAN :  Patient continues to benefit from skilled intervention to address the above impairments. Continue treatment per established plan of care. to address goals. Recommend with staff: OOB to chair for meals, BSC use with min A. Recommend next OT session: toileting, standing ADLs with unilateral UE support. Recommendation for discharge: (in order for the patient to meet his/her long term goals)  Therapy 3 hours per day 5-7 days per week    This discharge recommendation:  Has been made in collaboration with the attending provider and/or case management    IF patient discharges home will need the following DME: AE: long handled bathing, AE: long handled dressing, bedside commode, and walker: rolling       SUBJECTIVE:   Patient stated I have a bad headache.     OBJECTIVE DATA SUMMARY:   Cognitive/Behavioral Status:  Neurologic State: Alert  Orientation Level: Appropriate for age  Cognition: Appropriate decision making; Follows commands(increased processing time)  Perception: Appears intact  Perseveration: No perseveration noted  Safety/Judgement: Awareness of environment; Fall prevention    Functional Mobility and Transfers for ADLs:  Bed Mobility:  Supine to Sit: (NT, received, returned in chair)    Transfers:  Sit to Stand: Minimum assistance; Additional time; Adaptive equipment;Assist x1  Functional Transfers  Toilet Transfer : (sit<->stand from low seat with Gin )  Cues: Physical assistance;Verbal cues provided  Adaptive Equipment: Wheelchair       Balance:  Sitting: Intact; With support(sitting in chair)  Standing: Impaired; With support  Standing - Static: Good;Constant support  Standing - Dynamic : Fair;Constant support    ADL Intervention:                           Lower Body Dressing Assistance  Dressing Assistance: Moderate assistance  Protective Undergarmet: Moderate assistance; Compensatory technique training;Proximal stability(increased A for managing pants over hips in stanidng)  Socks: Minimum assistance; Compensatory technique training  Slip on Shoes with Back: Minimum assistance; Compensatory technique training;Proximal stability  Leg Crossed Method Used: No  Position Performed: Seated in chair  Cues: Verbal cues provided;Physical assistance;Visual cues provided  Adaptive Equipment Used: Long handled shoe horn;Sock aid    Toileting  Toileting Assistance: (declined to attempt, pt sit<->stand from low seat min A)  Cues: Verbal cues provided  Adaptive Equipment: Walker    Cognitive Retraining  Safety/Judgement: Awareness of environment; Fall prevention    The patient recalled and demonstrated 3/3 back precautions. Reviewed all 3 with patient. Bathing: Patient instructed and indicated understanding when bathing to not submerge wound in water, stand to shower or sponge bathe, cover wound with plastic and tape to ensure no water reaches bandage/wound without cues. Dressing brace: Patient instructed and demonstrated to don/doff velcro on brace using dominant side, keeping non-dominant side intact. Patient instructed and demonstrated in meantime of being able to stand with back against wall to don/doff brace, to don/doff seated using lap and bed/chair surface to support brace while manipulating.   Dressing lower body: Patient instructed to don brace first and on the benefits to remain seated to don all clothing to increase independence with precautions and pain management. Toileting: Patient instructed on the benefits of using flushable wet wipes and toilet tongs if decreased reach or pain for freya care. Also, the benefits of a reacher to aid in clothing management. Home safety: Patient instructed and indicated understanding on home modifications and safety (raise height of ADL objects, appropriate height of chair surfaces, recliner safety, change of floor surfaces, clear pathways) to increase independence and fall prevention with verbal cues. Standing: Patient instructed and indicated understanding to walk up to sink/counter top/surfaces, step into walker, square off while using objects, slide objects along surfaces, to increase adherence to back precautions and fall prevention. Patient instructed to increase amount of time standing in order to increase independence and tolerance with ADLs. During prolonged standing, can open cabinet door or place foot on stool to decrease spinal pressure/increase pain. Patient instructed and indicated understanding the benefits of maintaining activity tolerance, functional mobility, and independence with self care tasks during acute stay  to ensure safe return home and to baseline. Encouraged patient to increase frequency and duration OOB, not sitting longer than 30 mins without marching/walking with staff, be out of bed for all meals, perform daily ADLs (as approved by RN/MD regarding bathing etc), and performing functional mobility to/from bathroom. Patient instruction and indicated understanding on body mechanics, ergonomics and gravitational force on the spine during different body positions to plan activities in prep for return home to complete instrumental ADLs and back to work safely. Pain:  8/10 headache, pt medicated prior to OT    Activity Tolerance: WNL  Please refer to the flowsheet for vital signs taken during this treatment.     After treatment patient left in no apparent distress:   Sitting in chair and Call bell within reach    COMMUNICATION/COLLABORATION:   The patients plan of care was discussed with: Physical Therapist and Registered Nurse    Sandrine Gil OTR/L  Time Calculation: 24 mins

## 2020-01-21 NOTE — PROGRESS NOTES
CYNTHIA:  1. [facility] accepted. 2. BLS transport. 3. He will need auth. CM notified that patient will need [facility], and met with patient at bedside. CM provided [facility] listing, and he would like referral sent to [address] [place] [address] rehab. CM obtained verbal choice for rehab. He will need authorization. CM updated his sister, Marty Kunz [phone].    Heydi Prophet: [facility] patient, [address] started.      Vernon Bethea, [facility]

## 2020-01-21 NOTE — PROGRESS NOTES
Problem: Mobility Impaired (Adult and Pediatric)  Goal: *Acute Goals and Plan of Care (Insert Text)  Description  FUNCTIONAL STATUS PRIOR TO ADMISSION: Patient was independent and active without use of DME.    HOME SUPPORT PRIOR TO ADMISSION: The patient lived alone with sister to provide assistance. Physical Therapy Goals  Reassessment 1/21/2020  1. Patient will move from supine to sit and sit to supine  in bed with modified independence within 7 days. 2. Patient will perform sit to stand with supervision/set-up within 7 days. 3. Patient will ambulate with supervision for 150 feet with the least restrictive device within 7 days. 4. Patient will ascend/descend 15 stairs with 1 handrail(s) with minimal assistance/contact guard assist within 7 days. 5. Patient will verbalize and demonstrate understanding of spinal precautions (No bending, lifting greater than 5 lbs, or twisting; log-roll technique; frequent repositioning as instructed) within 7 days. Physical Therapy Goals  Initiated 1/17/2020, goals not met    1. Patient will move from supine to sit and sit to supine  in bed with modified independence within 4 days. 2. Patient will perform sit to stand with supervision/set-up within 4 days. 3. Patient will ambulate with supervision/set-up for 50 feet with the least restrictive device within 4 days. 4. Patient will ascend/descend 4 stairs with 1 handrail(s) with minimal assistance/contact guard assist within 4 days. 5. Patient will verbalize and demonstrate understanding of spinal precautions (No bending, lifting greater than 5 lbs, or twisting; log-roll technique; frequent repositioning as instructed) within 4 days.       1/21/2020 1604 by Tiny Res  Outcome: Progressing Towards Goal   PHYSICAL THERAPY TREATMENT  Patient: Krysta Greene (20 y.o. male)  Date: 1/21/2020  Diagnosis: Spinal stenosis [M48.00]   <principal problem not specified>  Procedure(s) (LRB):  LUMBAR LAMINECTOMY L4-S1 (N/A) 5 Days Post-Op  Precautions: Fall, Back No bending, no lifting greater than 5 lbs, no twisting, log-roll technique, repositioning every 20-30 min except when sleeping  Chart, physical therapy assessment, plan of care and goals were reviewed. ASSESSMENT  Patient continues with skilled PT services and is slowly progressing towards goals. Pt presents with decreased activity tolerance due to moderate back pain, 7/10, decreased generalized strength, standing balance, and impaired gait requiring support of a rolling walker at this time. Pt reports he has been sitting up in chair since seen earlier today and states compliance with repositioning every 30 min. Pt reports decreased pain, 7/10, with ambulation currently. Pt tolerated increased ambulation distance with a rolling walker with no episodes of bilateral knee buckling. He was slow to extend bilateral knees and trunk initially and required occasional reminders for upright posture while ambulating. Reinforced importance of repositioning frequently and back precautions were reviewed with pt. Current Level of Function Impacting Discharge (mobility/balance): sit to stand with minimal assistance, ambulation with rolling walker x 150 feet with minimal assist x 1, 2nd person for safety    Other factors to consider for discharge: fall risk, below his baseline level of independence, pt reports he did not ambulate with an assistive device prior to surgery, will require assistance for ADLs and for safe mobility if he returns home         PLAN :  Patient continues to benefit from skilled intervention to address the above impairments. Continue treatment per established plan of care. to address goals.     Recommendation for discharge: (in order for the patient to meet his/her long term goals)  Therapy 3 hours per day 5-7 days per week    This discharge recommendation:  Has been made in collaboration with the attending provider and/or case management    IF patient discharges home will need the following DME: bedside commode, shower chair, rolling walker, and reacher       SUBJECTIVE:   Patient stated I want to sit up some more.     OBJECTIVE DATA SUMMARY:   Critical Behavior:  Neurologic State: Alert  Orientation Level: Appropriate for age  Cognition: Appropriate decision making, Follows commands(increased processing time)  Safety/Judgement: Awareness of environment, Fall prevention    Spinal diagnosis intervention:  The patient stated 3/3 back precautions when prompted. Reviewed all 3 back precautions, log roll technique, and sitting for 30 minutes at a time. The patient required verbal cues to maintain back precautions during functional activity. Functional Mobility Training:    Bed Mobility:     Not assessed, received and returned to chair              Transfers:  Sit to Stand: Minimum assistance; Additional time; Adaptive equipment;Assist x1  Stand to Sit: Contact guard assistance; Additional time;Assist x1;Adaptive equipment                             Balance:  Sitting: Intact; With support(sitting in chair)  Standing: Impaired; With support  Standing - Static: Good;Constant support  Standing - Dynamic : Constant support;Good  Ambulation/Gait Training:  Distance (ft): 150 Feet (ft)  Assistive Device: Gait belt;Walker, rolling  Ambulation - Level of Assistance: Minimal assistance;Assist x1;Adaptive equipment, reliance on walker        Gait Abnormalities: Antalgic;Decreased step clearance(mildly flexed posture, corrects with cueing )        Base of Support: Widened     Speed/Dalia: Slow  Step Length: Right shortened;Left shortened              Pain Rating:  Verbal: 7  Location: back    Activity Tolerance:   Good    After treatment patient left in no apparent distress:   Sitting in chair and Call bell within reach    COMMUNICATION/COLLABORATION:   The patients plan of care was discussed with: Registered Nurse    Katlin Mc   Time Calculation: 15 mins

## 2020-01-21 NOTE — PROGRESS NOTES
Orthopedic Spine Progress Note  Danny Louis AGACNP-BC  Work Cell: [phone]    Post Op Day: 5 Days Post-Op    2020 12:22 PM     Sandor Ramsey    HPI:      Sandor Ramsey is a 62 y.o. male with history of BPH, hypertension, NESTOR, and obesity who has symptomatic lumbar stenosis with associated radiculopathy. After a discussion of the risks, benefits, and alternatives, Sandor Ramsey consented to undergo a  Procedure(s):  LUMBAR LAMINECTOMY L4-S1    Subjective      Patient reports uncontrolled back pain this am. No leg pain, numbness or tingling. Making slow progress with therapy. He is tolerating a diet and passing flatus    Patient denies headache, dizziness, chest pain, sob, abdominal pain, fever, chills, or nausea/vomiting. Objective:     Physical Exam:  General:  Alert and oriented. No acute distress. Respiratory:  Breathing unlabored. Equal chest expansion   Abdomen:   CV: Soft, non-tender, non-distended   No edema appreciated in the extremities   Neurologic:    Musculoskeletal:  Speech fluent. No facial droop. Follows commands. OSEI/SILT Motor: unchanged L2-S1. Gait deferred  Calves soft, nontender upon palpation and with passive stretch. Moves both upper and lower extremities. Incision: clean, dry, and intact. No significant erythema or swelling. No active drainage noted.            Vital Signs:    Patient Vitals for the past 8 hrs:   BP Temp Pulse   20 1156 147/89 -- 97   20 1105 (!) 147/94 -- 92   20 1002 (!) 106/103 98 °F (36.7 °C) 99     Temp (24hrs), Av.2 °F (36.8 °C), Min:97.7 °F (36.5 °C), Max:98.6 °F (37 °C)      Intake/Output:  701 - 1900  In: -   Out: 312 [AAKE]  1901 -  07  In: 300 [P.O.:300]  Out: 1050 [Urine:1050]    Pain Control:   Pain Assessment  Pain Scale 1: Numeric (0 - 10)  Pain Intensity 1: 0  Pain Onset 1: postop  Pain Location 1: Back  Pain Orientation 1: Lower  Pain Description 1: Aching  Pain Intervention(s) 1: Medication (see MAR)    LAB:    No results for input(s): HCT, HGB, HCTEXT, HGBEXT in the last 72 hours. Lab Results   Component Value Date/Time    Sodium 146 (H) 01/08/2020 02:05 PM    Potassium 3.2 (L) 01/08/2020 02:05 PM    Chloride 102 01/08/2020 02:05 PM    CO2 27 01/08/2020 02:05 PM    Glucose 84 01/08/2020 02:05 PM    BUN 11 01/08/2020 02:05 PM    Creatinine 0.88 01/08/2020 02:05 PM    Calcium 10.1 01/08/2020 02:05 PM       PT/OT:   Gait:  Gait  Base of Support: Widened  Speed/Dalia: Pace decreased (<100 feet/min)  Step Length: Right shortened, Left shortened  Gait Abnormalities: Antalgic, Decreased step clearance(bilateral knee soft buckling several occasions )  Ambulation - Level of Assistance: Minimal assistance, Assist x1, Adaptive equipment  Distance (ft): 90 Feet (ft)  Assistive Device: Gait belt, Walker, rolling                   Assessment/Plan      1. 5 Days Post-Op Procedure(s):  LUMBAR LAMINECTOMY L4-S1   -Continue PT/OT- needs aggressive mobilization   -Pain control- continue prn oxycodone, prn valium. Will start toradol today if renal function stable   -Voiding   -Incentive Spirometer   -Tolerating diet. Encourage BM- add miralax, prn suppository   -VTE Prophylaxes - TEDS & SCDs  2. Hypertension   -suboptimally controlled. Will add clonidine BID and continue to manage pain. 3. BPH    -on flomax. Voiding without difficulty    Plan above discussed with Dr. Renetta Cardenas    Discharge To:   Inpatient rehab pending insurance authorization    Signed By: Emelyn Richter NP

## 2020-01-22 LAB
ANION GAP SERPL CALC-SCNC: 5 MMOL/L (ref 5–15)
BUN SERPL-MCNC: 15 MG/DL (ref 6–20)
BUN/CREAT SERPL: 15 (ref 12–20)
CALCIUM SERPL-MCNC: 9.2 MG/DL (ref 8.5–10.1)
CHLORIDE SERPL-SCNC: 101 MMOL/L (ref 97–108)
CO2 SERPL-SCNC: 33 MMOL/L (ref 21–32)
CREAT SERPL-MCNC: 0.97 MG/DL (ref 0.7–1.3)
GLUCOSE SERPL-MCNC: 95 MG/DL (ref 65–100)
POTASSIUM SERPL-SCNC: 2.8 MMOL/L (ref 3.5–5.1)
SODIUM SERPL-SCNC: 139 MMOL/L (ref 136–145)

## 2020-01-22 PROCEDURE — 74011250636 HC RX REV CODE- 250/636: Performed by: NURSE PRACTITIONER

## 2020-01-22 PROCEDURE — 96366 THER/PROPH/DIAG IV INF ADDON: CPT

## 2020-01-22 PROCEDURE — 97530 THERAPEUTIC ACTIVITIES: CPT

## 2020-01-22 PROCEDURE — 74011250637 HC RX REV CODE- 250/637: Performed by: NURSE PRACTITIONER

## 2020-01-22 PROCEDURE — 99218 HC RM OBSERVATION: CPT

## 2020-01-22 PROCEDURE — 96376 TX/PRO/DX INJ SAME DRUG ADON: CPT

## 2020-01-22 PROCEDURE — 80048 BASIC METABOLIC PNL TOTAL CA: CPT

## 2020-01-22 PROCEDURE — 74011250636 HC RX REV CODE- 250/636: Performed by: PHYSICIAN ASSISTANT

## 2020-01-22 PROCEDURE — 97116 GAIT TRAINING THERAPY: CPT

## 2020-01-22 PROCEDURE — 74011250637 HC RX REV CODE- 250/637: Performed by: PHYSICIAN ASSISTANT

## 2020-01-22 PROCEDURE — 97535 SELF CARE MNGMENT TRAINING: CPT

## 2020-01-22 PROCEDURE — 74011250637 HC RX REV CODE- 250/637: Performed by: ORTHOPAEDIC SURGERY

## 2020-01-22 PROCEDURE — 36415 COLL VENOUS BLD VENIPUNCTURE: CPT

## 2020-01-22 RX ORDER — POTASSIUM CHLORIDE 7.45 MG/ML
10 INJECTION INTRAVENOUS
Status: COMPLETED | OUTPATIENT
Start: 2020-01-22 | End: 2020-01-22

## 2020-01-22 RX ORDER — POTASSIUM CHLORIDE 750 MG/1
20 TABLET, FILM COATED, EXTENDED RELEASE ORAL 3 TIMES DAILY
Status: DISCONTINUED | OUTPATIENT
Start: 2020-01-22 | End: 2020-01-23 | Stop reason: HOSPADM

## 2020-01-22 RX ADMIN — ACETAMINOPHEN 650 MG: 325 TABLET ORAL at 18:34

## 2020-01-22 RX ADMIN — POTASSIUM CHLORIDE 20 MEQ: 750 TABLET, FILM COATED, EXTENDED RELEASE ORAL at 09:16

## 2020-01-22 RX ADMIN — POLYETHYLENE GLYCOL 3350 17 G: 17 POWDER, FOR SOLUTION ORAL at 18:33

## 2020-01-22 RX ADMIN — METOCLOPRAMIDE 10 MG: 5 INJECTION, SOLUTION INTRAMUSCULAR; INTRAVENOUS at 06:34

## 2020-01-22 RX ADMIN — POTASSIUM CHLORIDE 10 MEQ: 7.46 INJECTION, SOLUTION INTRAVENOUS at 12:15

## 2020-01-22 RX ADMIN — Medication 10 ML: at 14:00

## 2020-01-22 RX ADMIN — CLONIDINE HYDROCHLORIDE 0.1 MG: 0.1 TABLET ORAL at 09:16

## 2020-01-22 RX ADMIN — OXYCODONE 10 MG: 5 TABLET ORAL at 18:34

## 2020-01-22 RX ADMIN — ACETAMINOPHEN 650 MG: 325 TABLET ORAL at 06:34

## 2020-01-22 RX ADMIN — POTASSIUM CHLORIDE 10 MEQ: 7.46 INJECTION, SOLUTION INTRAVENOUS at 09:00

## 2020-01-22 RX ADMIN — SENNOSIDES AND DOCUSATE SODIUM 1 TABLET: 8.6; 5 TABLET ORAL at 18:34

## 2020-01-22 RX ADMIN — MUPIROCIN: 20 OINTMENT TOPICAL at 18:35

## 2020-01-22 RX ADMIN — ACETAMINOPHEN 650 MG: 325 TABLET ORAL at 00:05

## 2020-01-22 RX ADMIN — POTASSIUM CHLORIDE 20 MEQ: 750 TABLET, FILM COATED, EXTENDED RELEASE ORAL at 21:07

## 2020-01-22 RX ADMIN — MUPIROCIN: 20 OINTMENT TOPICAL at 09:00

## 2020-01-22 RX ADMIN — METOCLOPRAMIDE 10 MG: 5 INJECTION, SOLUTION INTRAMUSCULAR; INTRAVENOUS at 18:34

## 2020-01-22 RX ADMIN — Medication 10 ML: at 21:07

## 2020-01-22 RX ADMIN — TAMSULOSIN HYDROCHLORIDE 0.4 MG: 0.4 CAPSULE ORAL at 09:17

## 2020-01-22 RX ADMIN — Medication 10 ML: at 06:35

## 2020-01-22 RX ADMIN — AZELASTINE HYDROCHLORIDE 1 SPRAY: 137 SPRAY, METERED NASAL at 09:15

## 2020-01-22 RX ADMIN — SENNOSIDES AND DOCUSATE SODIUM 1 TABLET: 8.6; 5 TABLET ORAL at 09:16

## 2020-01-22 RX ADMIN — LOSARTAN POTASSIUM 100 MG: 50 TABLET, FILM COATED ORAL at 09:16

## 2020-01-22 RX ADMIN — AMLODIPINE BESYLATE 10 MG: 5 TABLET ORAL at 09:16

## 2020-01-22 RX ADMIN — POTASSIUM CHLORIDE 10 MEQ: 7.46 INJECTION, SOLUTION INTRAVENOUS at 14:00

## 2020-01-22 RX ADMIN — METOCLOPRAMIDE 10 MG: 5 INJECTION, SOLUTION INTRAMUSCULAR; INTRAVENOUS at 12:15

## 2020-01-22 RX ADMIN — OXYCODONE 10 MG: 5 TABLET ORAL at 09:16

## 2020-01-22 RX ADMIN — CLONIDINE HYDROCHLORIDE 0.1 MG: 0.1 TABLET ORAL at 18:34

## 2020-01-22 RX ADMIN — POTASSIUM CHLORIDE 20 MEQ: 750 TABLET, FILM COATED, EXTENDED RELEASE ORAL at 15:13

## 2020-01-22 RX ADMIN — ACETAMINOPHEN 650 MG: 325 TABLET ORAL at 12:15

## 2020-01-22 RX ADMIN — METOPROLOL SUCCINATE 100 MG: 50 TABLET, EXTENDED RELEASE ORAL at 09:16

## 2020-01-22 RX ADMIN — POLYETHYLENE GLYCOL 3350 17 G: 17 POWDER, FOR SOLUTION ORAL at 09:16

## 2020-01-22 RX ADMIN — PANTOPRAZOLE SODIUM 40 MG: 40 TABLET, DELAYED RELEASE ORAL at 06:34

## 2020-01-22 RX ADMIN — METOCLOPRAMIDE 10 MG: 5 INJECTION, SOLUTION INTRAMUSCULAR; INTRAVENOUS at 00:05

## 2020-01-22 NOTE — PROGRESS NOTES
Spiritual Care Assessment/Progress Note  [facility]      NAME: Salvador Bell      MRN: [id number]  AGE: 62 y.o.  SEX: male  Buddhism Affiliation: Shinto   Language: English     1/22/2020     Total Time (in minutes): 20     Spiritual Assessment begun in [facility] 5W1 ORTHO SPINE through conversation with:         [x]Patient        [] Family    [] Friend(s)        Reason for Consult: Initial/Spiritual assessment, patient floor     Spiritual beliefs: (Please include comment if needed)     [x] Identifies with a nate tradition:         [x] Supported by a nate community:            [] Claims no spiritual orientation:           [] Seeking spiritual identity:                [] Adheres to an individual form of spirituality:           [] Not able to assess:                           Identified resources for coping:      [x] Prayer                               [] Music                  [] Guided Imagery     [] Family/friends                 [] Pet visits     [] Devotional reading                         [] Unknown     [] Other:                                              Interventions offered during this visit: (See comments for more details)    Patient Interventions: Affirmation of emotions/emotional suffering, Affirmation of nate, Coping skills reviewed/reinforced, Iconic (affirming the presence of God/Higher Power), Initial/Spiritual assessment, patient floor, Normalization of emotional/spiritual concerns, Prayer (assurance of), Buddhism beliefs/image of God discussed           Plan of Care:     [x] Support spiritual and/or cultural needs    [] Support AMD and/or advance care planning process      [] Support grieving process   [] Coordinate Rites and/or Rituals    [] Coordination with community clergy   [] No spiritual needs identified at this time   [] Detailed Plan of Care below (See Comments)  [] Make referral to Music Therapy  [] Make referral to Pet Therapy     [] Make referral to Addiction services  [] Make referral to [facility]  [] Make referral to Spiritual Care Partner  [] No future visits requested        [x] Follow up visits as needed     Comments: visited pt in room 550. Pt sitting on side of bed at time of visit.  provided active listening as pt shared about his surgery. Pt is a man of nate and relies on nate and prayer to deal with difficult situations. Pt stated that he feels better when he is praying for others. He believes in the power of prayer.  offered prayer. Pt wants to be kept in prayer.  assured pt of continued prayer and spiritual support.  advised pt of  availability.  will follow up as needed.     Annelise Cisneros,  Intern, MDiv  [phone])

## 2020-01-22 NOTE — PROGRESS NOTES
Bedside and Verbal shift change report given to Mehul Ross (oncoming nurse) by Luca Cason RN (offgoing nurse). Report included the following information SBAR, Kardex, Intake/Output, MAR and Recent Results.

## 2020-01-22 NOTE — PROGRESS NOTES
Orthopedic Spine Progress Note  Anthony Bajwa, AGACNP-BC  Work Cell: [phone]    Post Op Day: 6 Days Post-Op    2020 12:22 PM     Krysta Greene    HPI:      Krysta Greene is a 62 y.o. male with history of BPH, hypertension, NESTOR, and obesity who has symptomatic lumbar stenosis with associated radiculopathy. Workup revealed severe narrowing, particularly at the L4-L5 levels and foraminal narrowing at L5-S1. After a discussion of the risks, benefits, and alternatives, Krysta Greene consented to undergo a  Procedure(s):  LUMBAR LAMINECTOMY L4-S1    Subjective      Patient comfortable on right side in bed. Intermittent left leg pain. No headaches. . Making slow progress with therapy. He is tolerating a diet and passing flatus. No bm yet    Patient denies headache, dizziness, chest pain, sob, abdominal pain, fever, chills, or nausea/vomiting. Objective:     Physical Exam:  General:  Alert and oriented. No acute distress. Respiratory:  Breathing unlabored. Equal chest expansion   Abdomen:   CV: Soft, non-tender, non-distended   No edema appreciated in the extremities   Neurologic:    Musculoskeletal:  Speech fluent. No facial droop. Follows commands. OSEI/SILT Motor: unchanged L2-S1. Gait deferred  Calves soft, nontender upon palpation and with passive stretch. Moves both upper and lower extremities. Incision: clean, dry, and intact. No significant erythema or swelling. No active drainage noted. Vital Signs:    Patient Vitals for the past 8 hrs:   BP Temp Pulse Resp SpO2   20 0209 131/75 98.3 °F (36.8 °C) 71 18 97 %     Temp (24hrs), Av.4 °F (36.9 °C), Min:98 °F (36.7 °C), Max:98.6 °F (37 °C)      Intake/Output:  No intake/output data recorded.    1901 -  0700  In: 720 [P.O.:720]  Out: 1150 [Urine:1150]    Pain Control:   Pain Assessment  Pain Scale 1: Numeric (0 - 10)  Pain Intensity 1: 0  Pain Onset 1: postop  Pain Location 1: Back  Pain Orientation 1: Lower  Pain Description 1: Aching  Pain Intervention(s) 1: Medication (see MAR)    LAB:    No results for input(s): HCT, HGB, HCTEXT, HGBEXT, HCTEXT, HGBEXT in the last 72 hours. Lab Results   Component Value Date/Time    Sodium 139 01/22/2020 04:55 AM    Potassium 2.8 (L) 01/22/2020 04:55 AM    Chloride 101 01/22/2020 04:55 AM    CO2 33 (H) 01/22/2020 04:55 AM    Glucose 95 01/22/2020 04:55 AM    BUN 15 01/22/2020 04:55 AM    Creatinine 0.97 01/22/2020 04:55 AM    Calcium 9.2 01/22/2020 04:55 AM       PT/OT:   Gait:  Gait  Base of Support: Widened  Speed/Dalia: Slow  Step Length: Right shortened, Left shortened  Gait Abnormalities: Antalgic, Decreased step clearance(mildly flexed posture, corrects with cueing )  Ambulation - Level of Assistance: Minimal assistance, Assist x1, Adaptive equipment  Distance (ft): 150 Feet (ft)  Assistive Device: Gait belt, Walker, rolling                   Assessment/Plan      1. 6 Days Post-Op Procedure(s):  LUMBAR LAMINECTOMY L4-S1   -Continue PT/OT- needs aggressive mobilization   -Pain control- continue prn oxycodone, prn valium. Will start toradol today if renal function stable   -Voiding   -Incentive Spirometer   -Tolerating diet. Encourage BM- add miralax, prn suppository   -VTE Prophylaxes - TEDS & SCDs  2. Hypertension   -BP better controlled with addition of clonidine  3. BPH    -on flomax. Voiding without difficulty  4. Hypokalemia   -K 2. 8. will give IV and oral replacement    Plan above discussed with Dr. Parham Centers    Discharge To:   Inpatient rehab pending insurance authorization    Signed By: Neo Montoya NP

## 2020-01-22 NOTE — PROGRESS NOTES
Problem: Mobility Impaired (Adult and Pediatric)  Goal: *Acute Goals and Plan of Care (Insert Text)  Description  FUNCTIONAL STATUS PRIOR TO ADMISSION: Patient was independent and active without use of DME.    HOME SUPPORT PRIOR TO ADMISSION: The patient lived alone with sister to provide assistance. Physical Therapy Goals  Reassessment 1/21/2020  1. Patient will move from supine to sit and sit to supine  in bed with modified independence within 7 days. 2. Patient will perform sit to stand with supervision/set-up within 7 days. 3. Patient will ambulate with supervision for 150 feet with the least restrictive device within 7 days. 4. Patient will ascend/descend 15 stairs with 1 handrail(s) with minimal assistance/contact guard assist within 7 days. 5. Patient will verbalize and demonstrate understanding of spinal precautions (No bending, lifting greater than 5 lbs, or twisting; log-roll technique; frequent repositioning as instructed) within 7 days. Physical Therapy Goals  Initiated 1/17/2020, goals not met    1. Patient will move from supine to sit and sit to supine  in bed with modified independence within 4 days. 2. Patient will perform sit to stand with supervision/set-up within 4 days. 3. Patient will ambulate with supervision/set-up for 50 feet with the least restrictive device within 4 days. 4. Patient will ascend/descend 4 stairs with 1 handrail(s) with minimal assistance/contact guard assist within 4 days. 5. Patient will verbalize and demonstrate understanding of spinal precautions (No bending, lifting greater than 5 lbs, or twisting; log-roll technique; frequent repositioning as instructed) within 4 days.       1/22/2020 Luciano by Sergey Cantrell  Outcome: Progressing Towards Goal   PHYSICAL THERAPY TREATMENT  Patient: Jerzy Salinas (13 y.o. male)  Date: 1/22/2020  Diagnosis: Spinal stenosis [M48.00]   <principal problem not specified>  Procedure(s) (LRB):  LUMBAR LAMINECTOMY L4-S1 (N/A) 6 Days Post-Op  Precautions: Spinal, Fall No bending, no lifting greater than 5 lbs, no twisting, log-roll technique, repositioning every 20-30 min except when sleeping, brace when OOB (if ordered)  Chart, physical therapy assessment, plan of care and goals were reviewed. ASSESSMENT  Patient continues with skilled PT services and is progressing towards goals. Pt presents with decreased activity tolerance due to moderate back pain, 6/10, decreased generalized strength, standing balance, and impaired gait requiring support of a rolling walker at this time. Pt seen BID this date. He was received sitting in chair and reports he has been standing up frequently. Pt required verbal cueing for correct hand placement to stand from chair and ambulated with rolling walker without episodes of bilateral knee buckling. Pt noted to have increased trunk flexion despite cues to correct. Pt stated this was due to urinary leakage. Pt reports this is not new and declined using the restroom after returning to his room. Pt requested to remain up in chair at end of session. Current Level of Function Impacting Discharge (mobility/balance): sit to stand transfers with CGA and verbal cues for hand placement, ambulation wtih rolling walker x 150 feet with minimal assist     Other factors to consider for discharge: lives alone, below baseline level of function, working prior to surgery, can stay with sister upon discharge, sister works during the day         PLAN :  Patient continues to benefit from skilled intervention to address the above impairments. Continue treatment per established plan of care. to address goals.     Recommendation for discharge: (in order for the patient to meet his/her long term goals)  Therapy 3 hours per day 5-7 days per week    This discharge recommendation:  Has been made in collaboration with the attending provider and/or case management    IF patient discharges home will need the following DME: bedside commode, shower chair, rolling walker, and reacher       SUBJECTIVE:   Patient stated I am leaking.     OBJECTIVE DATA SUMMARY:   Critical Behavior:  Neurologic State: Alert  Orientation Level: Oriented X4  Cognition: Appropriate decision making, Appropriate for age attention/concentration, Appropriate safety awareness  Safety/Judgement: Awareness of environment, Fall prevention    Spinal diagnosis intervention:  The patient stated 3/3 back precautions when prompted. Reviewed all 3 back precautions, log roll technique, and sitting for 30 minutes at a time. The patient required verbal cues to maintain back precautions during functional activity. Functional Mobility Training:    Bed Mobility:  Not assessed, OOB in chair         Transfers:  Sit to Stand: Contact guard assistance, verbal cues provided for correct hand placement   Stand to Sit: Contact guard assistance                             Balance:  Sitting: Intact  Standing: Impaired  Standing - Static: good; Constant support  Standing - Dynamic : good; Constant support, walker support  Ambulation/Gait Training:  Distance (ft): 150 Feet (ft)  Assistive Device: Gait belt;Walker, rolling  Ambulation - Level of Assistance: Minimal assistance;Assist x1        Gait Abnormalities: Antalgic;Decreased step clearance(flexed posture, needs occasional cues to correct), no episodes of bilateral knee buckling, increased trunk flexion noted that pt attributes to urinary leakage         Base of Support: Widened     Speed/Dalia: Slow  Step Length: Right shortened;Left shortened                  Pain Rating:  Verbal: 6  Location: back    Activity Tolerance:   Fair  Please refer to the flowsheet for vital signs taken during this treatment.     After treatment patient left in no apparent distress:   Sitting in chair and Call bell within reach    COMMUNICATION/COLLABORATION:   The patients plan of care was discussed with: Registered Nurse    Howard Valentin   Time Calculation: 16 mins

## 2020-01-22 NOTE — PROGRESS NOTES
Problem: Self Care Deficits Care Plan (Adult)  Goal: *Acute Goals and Plan of Care (Insert Text)  Description  FUNCTIONAL STATUS PRIOR TO ADMISSION: Patient was independent and active without use of DME.    HOME SUPPORT: The patient lived alone with sister living locally to provide support as needed    1. Patient will perform lower body dressing with supervision/set-up using Reacher, Stocking Aid and Dressing Stick PRN within 7 days. 2.  Patient will perform EOB ADLs unsupported with supervision/set-up within 7 days. 3.  Patient will toilet transfer at minimal assistance/contact guard assist using least restrictive DME within 7 days. 4.  Patient will perform bathing seated with minimal assistance within 7 days  5. Patient will verbalize/demonstrate 3/3 back precautions during ADL tasks without cues within 7 days. Outcome: Progressing Towards Goal    OCCUPATIONAL THERAPY TREATMENT  Patient: Agueda Dubose (34 y.o. male)  Date: 1/22/2020  Diagnosis: Spinal stenosis [M48.00]   <principal problem not specified>  Procedure(s) (LRB):  LUMBAR LAMINECTOMY L4-S1 (N/A) 6 Days Post-Op  Precautions: Spinal, Fall  Chart, occupational therapy assessment, plan of care, and goals were reviewed. ASSESSMENT  Patient continues with skilled OT services and is progressing towards goals. Pt participated in ADL related mobility and LB dressing training this date. Overall CGA/MIN A with RW for ADL mobility. Set-up up to min A for donning/doffing shoes/socks with long handled dressing equipmnet. He currently does not have equipment and requires max/total A to don shoes. Continues to be limited by decreased endurance, pain, generalized weakness and decreased ability to perform ADLs at [facility]. Continue to recommend rehab prior to d/c home to facilitate maximum functional recovery. OT to continue to follow during hospitalization for ADL/iADL training/retraining.     Current Level of Function Impacting Discharge (ADLs): up to min A for LB dressing, infer up to mod A for bathing, verbal cuing to maintain spinal precautions during ADLs, MIN A/CGA for ADL related mobility with walker    Other factors to consider for discharge: lives alone, below functional baseline, was working full time           PLAN :  Patient continues to benefit from skilled intervention to address the above impairments. Continue treatment per established plan of care. to address goals. Recommend with staff: Recommend with nursing patient to complete as able in order to maintain strength, endurance and independence: ADLs with supervision/setup, OOB to chair 3x/day and mobilizing to the bathroom for toileting with min assist and walker. Thank you for your assistance. Recommend next OT session: toileting, standing ADLs at sink    Recommendation for discharge: (in order for the patient to meet his/her long term goals)  Therapy 3 hours per day 5-7 days per week    This discharge recommendation:  Has not yet been discussed the attending provider and/or case management    IF patient discharges home will need the following DME: long handled dressing equipment, shower chair/tub transfer bench       SUBJECTIVE:   Patient stated I need to get that equipment (in reference to reacher, long handled shoe horn.     OBJECTIVE DATA SUMMARY:   Cognitive/Behavioral Status:  Neurologic State: Alert  Orientation Level: Oriented X4  Cognition: Appropriate decision making; Appropriate for age attention/concentration; Appropriate safety awareness             Functional Mobility and Transfers for ADLs:  Bed Mobility:  Supine to Sit: Minimum assistance(verbal cues for log roll technique)    Transfers:  Sit to Stand: Contact guard assistance          Balance:  Sitting - Static: Good (unsupported)  Sitting - Dynamic: Fair (occasional)  Standing: Impaired  Standing - Static: Fair;Constant support  Standing - Dynamic : Fair;Constant support    ADL Intervention: Lower Body Dressing Assistance  Socks: Maximum assistance  Slip on Shoes with Back: Set-up; Minimum assistance(with long handled shoe horn)  Cues: Physical assistance;Verbal cues provided; Don  Adaptive Equipment Used: Long handled shoe horn              The patient recalled and demonstrated 1/3 back precautions. Mod verbal cues to recall all 3. Reviewed all 3 with patient. Dressing lower body: Patient instructed on the benefits to remain seated to don all clothing to increase independence with precautions and pain management and decrease activity demands. Home safety: Patient instructed and indicated understanding on home modifications and safety (raise height of ADL objects, appropriate height of chair surfaces, recliner safety, change of floor surfaces, clear pathways) to increase independence and fall prevention with walker. Standing: Patient instructed and indicated understanding to walk up to sink/counter top/surfaces, step into walker, square off while using objects, slide objects along surfaces, to increase adherence to back precautions and fall prevention. Patient instructed to increase amount of time standing in order to increase independence and tolerance with ADLs. Patient instructed and indicated understanding the benefits of maintaining activity tolerance, functional mobility, and independence with self care tasks during acute stay  to ensure safe return home and to baseline. Encouraged patient to increase frequency and duration OOB, not sitting longer than 30 mins without marching/walking with staff, be out of bed for all meals, perform daily ADLs (as approved by RN/MD regarding bathing etc), and performing functional mobility to/from bathroom. Patient instruction and indicated understanding on body mechanics, ergonomics and gravitational force on the spine during different body positions to plan activities in prep for return home to complete instrumental ADLs and back to work safely. Pain:  6/10, Nursing informed     Activity Tolerance:   Fair  Please refer to the flowsheet for vital signs taken during this treatment.     After treatment patient left in no apparent distress:   Sitting in chair and Call bell within reach    COMMUNICATION/COLLABORATION:   The patients plan of care was discussed with: Physical Therapist and Registered Nurse    Kristy Camarillo OT  Time Calculation: 24 mins

## 2020-01-22 NOTE — PROGRESS NOTES
ORTHOPAEDIC LUMBAR PROGRESS NOTE    NAME: Richard El   :  1955   MRN:  [id number]   DATE:  2020    POD: 6 Days Post-Op  S/P: Procedure(s):  LUMBAR LAMINECTOMY L4-S1    SUBJECTIVE:    Patient laying in bed this morning, c/o pain left lower lumbar to left thigh -states leg feels as though it will give way when walking. Afebrile/VSS. Denies nausea/vomiting, chest pain, headache or shortness of breath. States has been working with PT/OT but making slow progress. Has been accepted to [facility] in-patient rehab, awaiting insurance auth at this point. Emphasized to patient the need to continue working with PT/OT, getting/being OOB, sitting up in chair. Patient agreeable. Recent Labs     20  0455      K 2.8*      CO2 33*   BUN 15   CREA 0.97   GLU 95     Patient Vitals for the past 12 hrs:   BP Temp Pulse Resp SpO2   20 0905 144/88 98.1 °F (36.7 °C) 79 18 96 %   20 0209 131/75 98.3 °F (36.8 °C) 71 18 97 %       EXAM:  Positive strength/ROM bilat lower ext. Neuro intact  Dressings clean, dry and intact     PLAN:  Continue pain management as required    Continue OT/OT -OOB    Ice/cold gel packs to back PRN pain    D/c planning to in-patient rehab at [facility], accepted -currently waiting on insurance auth. Patient is cleared for d/c to rehab when auth obtained.       Milton Darling PA-C

## 2020-01-22 NOTE — PROGRESS NOTES
Problem: Falls - Risk of  Goal: *Absence of Falls  Description  Document Tia Manus Fall Risk and appropriate interventions in the flowsheet. Outcome: Progressing Towards Goal  Note: Fall Risk Interventions:  Mobility Interventions: Patient to call before getting OOB         Medication Interventions: Patient to call before getting OOB    Elimination Interventions: Urinal in reach, Call light in reach              Problem: Pressure Injury - Risk of  Goal: *Prevention of pressure injury  Description  Document Quinton Scale and appropriate interventions in the flowsheet. Outcome: Progressing Towards Goal  Note: Pressure Injury Interventions:       Moisture Interventions: Absorbent underpads    Activity Interventions: Increase time out of bed    Mobility Interventions: PT/OT evaluation    Nutrition Interventions: Document food/fluid/supplement intake          Plan of care reviewed.

## 2020-01-22 NOTE — PHYSICIAN ADVISORY
[facility]     Physician Advisor Recommendation      The information in this document is a recommendation to be used for utilization review and utilization management purposes only. This recommendation is not an order. The recommendation is made based on the information reviewed at the time of the referral, is pursuant to the [place] [facility] Conditions of Participation (42 CFR Part 482), and is neither a judgment nor an assessment with regard to the appropriateness or quality of clinical care. Nothing in this document may be used to limit, alter, or affect clinical services provided to the patient named below. The provider of services is ultimately responsible for the submission of a claim that has met all requirements for correct coding, billing, and reimbursement. Letter of Status Determination:   Recommend hospitalization status upgraded from   OBSERVATION  to INPATIENT Status     Pt Name:  Linda Harkins   MR#    [id number]    [facility]#  [id number]   [address] [place] [address]01  @ [facility]. [facility]   Hospitalization date  1/16/2020  5:22 AM   Current Attending Physician  Yolanda Guerra MD   Principal diagnosis  Spinal stenosis [M48.00]     Alden Gray is a 62 y.o. male with history of BPH, hypertension, NESTOR, and obesity who has symptomatic lumbar stenosis with associated radiculopathy. Workup revealed severe narrowing, particularly at the L4-L5 levels and foraminal narrowing at L5-S1.  After a discussion of the risks, benefits, and alternatives, Linda Harkins consented to undergo a  Procedure(s):  LUMBAR LAMINECTOMY L4-S1     Milliman (MCG) criteria    Electrolyte Disorder: Common Complications and Conditions (CCC-007)  Potassium less than 3.0 mEq/L (mmol/L) with Weakness     STATUS DETERMINATION  OBS to INP       Additional comments     Payor: Carlos Norton / Plan: [address] [place] [address] HMO / Product Type: Managed Care Medicare /       Jermain Childers MD, GRIS   Physician Marie Wayne, [address] [facility]

## 2020-01-22 NOTE — PROGRESS NOTES
PHYSICAL THERAPY TREATMENT  Patient: Zohra Fernandez (43 y.o. male)  Date: 1/22/2020  Diagnosis: Spinal stenosis [M48.00]   <principal problem not specified>  Procedure(s) (LRB):  LUMBAR LAMINECTOMY L4-S1 (N/A) 6 Days Post-Op  Precautions: Spinal, Fall No bending, no lifting greater than 5 lbs, no twisting, log-roll technique, repositioning every 20-30 min except when sleeping, brace when OOB (if ordered)  Chart, physical therapy assessment, plan of care and goals were reviewed. ASSESSMENT  Patient continues with skilled PT services and is progressing towards goals. Pt presents with decreased activity tolerance due to moderate back pain, 6/10, decreased generalized strength, standing balance, and impaired gait requiring support of a rolling walker at this time. Pt reports he ambulated without an assistive device and was working prior to surgery. Pt recalls back precautions with some prompting. Reviewed all precautions. Pt voices he has been compliant with sitting restrictions. Pt received in left side lying. He was able to complete log roll though twisting when not using bed rail for transfer. Pt ambulated with rolling walker in hallway with no episodes of BLE buckling and report of overall less back pain this date. He occasionally requires cueing to avoid flexed posture. Pt remained up in chair at session end. Current Level of Function Impacting Discharge (mobility/balance): bed mobility with minimal assist and verbal cueing for technique and to avoid twisting, sit to stand transfers with CGA and verbal cues for hand placement, ambulation wtih rolling walker x 150 feet with minimal assist    Other factors to consider for discharge: lives alone, below baseline level of function, working prior to surgery, can stay with sister upon discharge, sister works during the day       PLAN :  Patient continues to benefit from skilled intervention to address the above impairments.   Continue treatment per established plan of care. to address goals. Recommendation for discharge: (in order for the patient to meet his/her long term goals)  Therapy 3 hours per day 5-7 days per week, has been accepted to rehab, waiting for authorization at this time    This discharge recommendation:  Has been made in collaboration with the attending provider and/or case management    IF patient discharges home will need the following DME: bedside commode, shower chair, rolling walker, and reacher       SUBJECTIVE:   Patient stated My back is feeling better.     OBJECTIVE DATA SUMMARY:   Critical Behavior:  Neurologic State: Alert  Orientation Level: Oriented X4  Cognition: Appropriate decision making, Appropriate for age attention/concentration, Appropriate safety awareness  Safety/Judgement: Awareness of environment, Fall prevention    Spinal diagnosis intervention:  The patient stated 3/3 back precautions when prompted. Reviewed all 3 back precautions, log roll technique, and sitting for 30 minutes at a time. The patient required verbal cues to maintain back precautions during functional activity. Spent increased time reviewing back precautions and modifications needed at home. Also reviewed importance of using bathroom versus urinal to maximize function. Reviewed importance of walking for recovery from back surgery for long term. Functional Mobility Training:    Bed Mobility:     Side lying to Sit: Minimum assistance(verbal cues for log roll technique and to avoid twisting)     Scooting: Supervision        Transfers:  Sit to Stand: Contact guard assistance, verbal cues for hand placement   Stand to Sit: Contact guard assistance                             Balance:  Sitting: Intact  Standing: Impaired  Standing - Static: good; Constant support  Standing - Dynamic :good; Constant support, with walker support   Ambulation/Gait Training:  Distance (ft): 180 Feet (ft)  Assistive Device: Gait belt;Walker, rolling  Ambulation - Level of Assistance: Minimal assistance;Assist x1        Gait Abnormalities: Antalgic;Decreased step clearance(flexed posture, needs occasional cues to correct)        Base of Support: Widened     Speed/Dalia: Slow  Step Length: Right shortened;Left shortened              Pain Rating:  Verbal: 6  Location: back    Activity Tolerance:   Fair    After treatment patient left in no apparent distress:   Sitting in chair and Call bell within reach    COMMUNICATION/COLLABORATION:   The patients plan of care was discussed with: Registered Nurse    Katlin Victor   Time Calculation: 26 mins

## 2020-01-23 VITALS
BODY MASS INDEX: 30.86 KG/M2 | SYSTOLIC BLOOD PRESSURE: 125 MMHG | RESPIRATION RATE: 18 BRPM | HEART RATE: 58 BPM | TEMPERATURE: 97.6 F | HEIGHT: 69 IN | OXYGEN SATURATION: 97 % | WEIGHT: 208.34 LBS | DIASTOLIC BLOOD PRESSURE: 78 MMHG

## 2020-01-23 PROBLEM — M48.061 LUMBAR STENOSIS: Status: ACTIVE | Noted: 2020-01-23

## 2020-01-23 LAB
ANION GAP SERPL CALC-SCNC: 7 MMOL/L (ref 5–15)
BUN SERPL-MCNC: 12 MG/DL (ref 6–20)
BUN/CREAT SERPL: 16 (ref 12–20)
CALCIUM SERPL-MCNC: 9.3 MG/DL (ref 8.5–10.1)
CHLORIDE SERPL-SCNC: 103 MMOL/L (ref 97–108)
CO2 SERPL-SCNC: 30 MMOL/L (ref 21–32)
CREAT SERPL-MCNC: 0.77 MG/DL (ref 0.7–1.3)
GLUCOSE SERPL-MCNC: 91 MG/DL (ref 65–100)
MAGNESIUM SERPL-MCNC: 2 MG/DL (ref 1.6–2.4)
POTASSIUM SERPL-SCNC: 2.9 MMOL/L (ref 3.5–5.1)
SODIUM SERPL-SCNC: 140 MMOL/L (ref 136–145)

## 2020-01-23 PROCEDURE — 74011250636 HC RX REV CODE- 250/636: Performed by: NURSE PRACTITIONER

## 2020-01-23 PROCEDURE — 83735 ASSAY OF MAGNESIUM: CPT

## 2020-01-23 PROCEDURE — 74011250637 HC RX REV CODE- 250/637: Performed by: NURSE PRACTITIONER

## 2020-01-23 PROCEDURE — 99218 HC RM OBSERVATION: CPT

## 2020-01-23 PROCEDURE — 65270000029 HC RM PRIVATE

## 2020-01-23 PROCEDURE — 96366 THER/PROPH/DIAG IV INF ADDON: CPT

## 2020-01-23 PROCEDURE — 74011250636 HC RX REV CODE- 250/636: Performed by: PHYSICIAN ASSISTANT

## 2020-01-23 PROCEDURE — 97530 THERAPEUTIC ACTIVITIES: CPT

## 2020-01-23 PROCEDURE — 97116 GAIT TRAINING THERAPY: CPT

## 2020-01-23 PROCEDURE — 96376 TX/PRO/DX INJ SAME DRUG ADON: CPT

## 2020-01-23 PROCEDURE — 36415 COLL VENOUS BLD VENIPUNCTURE: CPT

## 2020-01-23 PROCEDURE — 74011250637 HC RX REV CODE- 250/637: Performed by: PHYSICIAN ASSISTANT

## 2020-01-23 PROCEDURE — 80048 BASIC METABOLIC PNL TOTAL CA: CPT

## 2020-01-23 PROCEDURE — 74011250637 HC RX REV CODE- 250/637: Performed by: ORTHOPAEDIC SURGERY

## 2020-01-23 RX ORDER — POTASSIUM CHLORIDE 750 MG/1
20 TABLET, EXTENDED RELEASE ORAL DAILY
Qty: 30 TAB | Refills: 1 | Status: SHIPPED | OUTPATIENT
Start: 2020-01-23 | End: 2020-01-31 | Stop reason: SDUPTHER

## 2020-01-23 RX ORDER — BISACODYL 5 MG
5 TABLET, DELAYED RELEASE (ENTERIC COATED) ORAL ONCE
Status: COMPLETED | OUTPATIENT
Start: 2020-01-23 | End: 2020-01-23

## 2020-01-23 RX ORDER — POTASSIUM CHLORIDE 750 MG/1
20 TABLET, EXTENDED RELEASE ORAL DAILY
Qty: 30 TAB | Refills: 1 | Status: SHIPPED | OUTPATIENT
Start: 2020-01-23 | End: 2020-01-23 | Stop reason: SDUPTHER

## 2020-01-23 RX ORDER — OXYCODONE HYDROCHLORIDE 5 MG/1
5 TABLET ORAL
Qty: 30 TAB | Refills: 0 | Status: SHIPPED | OUTPATIENT
Start: 2020-01-23 | End: 2020-01-30

## 2020-01-23 RX ORDER — POTASSIUM CHLORIDE 7.45 MG/ML
10 INJECTION INTRAVENOUS
Status: COMPLETED | OUTPATIENT
Start: 2020-01-23 | End: 2020-01-23

## 2020-01-23 RX ORDER — CYCLOBENZAPRINE HCL 10 MG
10 TABLET ORAL
Qty: 21 TAB | Refills: 0 | Status: SHIPPED | OUTPATIENT
Start: 2020-01-23 | End: 2020-01-31 | Stop reason: SDUPTHER

## 2020-01-23 RX ORDER — CYCLOBENZAPRINE HCL 10 MG
10 TABLET ORAL
Status: DISCONTINUED | OUTPATIENT
Start: 2020-01-23 | End: 2020-01-23 | Stop reason: HOSPADM

## 2020-01-23 RX ORDER — AMOXICILLIN 250 MG
1 CAPSULE ORAL 2 TIMES DAILY
Qty: 30 TAB | Refills: 0 | Status: SHIPPED | OUTPATIENT
Start: 2020-01-23 | End: 2021-01-27

## 2020-01-23 RX ADMIN — Medication 10 ML: at 13:47

## 2020-01-23 RX ADMIN — BISACODYL 5 MG: 5 TABLET, COATED ORAL at 11:06

## 2020-01-23 RX ADMIN — OXYCODONE 10 MG: 5 TABLET ORAL at 13:39

## 2020-01-23 RX ADMIN — METOCLOPRAMIDE 10 MG: 5 INJECTION, SOLUTION INTRAMUSCULAR; INTRAVENOUS at 13:39

## 2020-01-23 RX ADMIN — POLYETHYLENE GLYCOL 3350 17 G: 17 POWDER, FOR SOLUTION ORAL at 08:50

## 2020-01-23 RX ADMIN — CLONIDINE HYDROCHLORIDE 0.1 MG: 0.1 TABLET ORAL at 08:50

## 2020-01-23 RX ADMIN — METOCLOPRAMIDE 10 MG: 5 INJECTION, SOLUTION INTRAMUSCULAR; INTRAVENOUS at 08:54

## 2020-01-23 RX ADMIN — POTASSIUM CHLORIDE 20 MEQ: 750 TABLET, FILM COATED, EXTENDED RELEASE ORAL at 08:50

## 2020-01-23 RX ADMIN — OXYCODONE 10 MG: 5 TABLET ORAL at 08:51

## 2020-01-23 RX ADMIN — AMLODIPINE BESYLATE 10 MG: 5 TABLET ORAL at 08:51

## 2020-01-23 RX ADMIN — Medication 10 ML: at 06:39

## 2020-01-23 RX ADMIN — METOPROLOL SUCCINATE 100 MG: 50 TABLET, EXTENDED RELEASE ORAL at 08:51

## 2020-01-23 RX ADMIN — TAMSULOSIN HYDROCHLORIDE 0.4 MG: 0.4 CAPSULE ORAL at 08:50

## 2020-01-23 RX ADMIN — AZELASTINE HYDROCHLORIDE 1 SPRAY: 137 SPRAY, METERED NASAL at 08:51

## 2020-01-23 RX ADMIN — POTASSIUM CHLORIDE 10 MEQ: 10 INJECTION, SOLUTION INTRAVENOUS at 12:00

## 2020-01-23 RX ADMIN — CYCLOBENZAPRINE 10 MG: 10 TABLET, FILM COATED ORAL at 13:39

## 2020-01-23 RX ADMIN — PANTOPRAZOLE SODIUM 40 MG: 40 TABLET, DELAYED RELEASE ORAL at 06:39

## 2020-01-23 RX ADMIN — MUPIROCIN: 20 OINTMENT TOPICAL at 08:51

## 2020-01-23 RX ADMIN — ACETAMINOPHEN 650 MG: 325 TABLET ORAL at 00:12

## 2020-01-23 RX ADMIN — POTASSIUM CHLORIDE 20 MEQ: 750 TABLET, FILM COATED, EXTENDED RELEASE ORAL at 16:06

## 2020-01-23 RX ADMIN — SENNOSIDES AND DOCUSATE SODIUM 1 TABLET: 8.6; 5 TABLET ORAL at 08:50

## 2020-01-23 RX ADMIN — METOCLOPRAMIDE 10 MG: 5 INJECTION, SOLUTION INTRAMUSCULAR; INTRAVENOUS at 00:12

## 2020-01-23 RX ADMIN — LOSARTAN POTASSIUM 100 MG: 50 TABLET, FILM COATED ORAL at 08:50

## 2020-01-23 RX ADMIN — POTASSIUM CHLORIDE 10 MEQ: 10 INJECTION, SOLUTION INTRAVENOUS at 10:30

## 2020-01-23 RX ADMIN — ACETAMINOPHEN 650 MG: 325 TABLET ORAL at 11:06

## 2020-01-23 RX ADMIN — POTASSIUM CHLORIDE 10 MEQ: 10 INJECTION, SOLUTION INTRAVENOUS at 08:55

## 2020-01-23 RX ADMIN — ACETAMINOPHEN 650 MG: 325 TABLET ORAL at 06:39

## 2020-01-23 NOTE — PROGRESS NOTES
CYNTHIA:  1. Home when stable. 2. Family transport. CM notified that his insurance denied inpatient rehab stay, attending MD team to discuss with patient about going home. CM will notified his sister and contact Shira Monique.     Thompson Rust [facility]

## 2020-01-23 NOTE — DISCHARGE INSTRUCTIONS
[facility], [facility]. Gisella Hernandez M.D. BRAULIO Ledbetter PA-C  [id number]     Lumbar Surgery Discharge Instructions    *You should follow-up with your PCP in 1 week for your high blood pressure and low potassium levels. You have been prescribed a daily potassium supplement. Avocados, strawberries, spinach and bananas are all high in potassium. **take a stool softener like docusate or pericolace daily. If you still do not have a bowel movement then take lactulose by mouth or a suppository as prescribed. **You should follow-up with your urologist in the next 1-2 weeks     Activities  1. You are going home a well person, be as active as possible. Your only exercise should be walking. Start with short frequent walks and increase your walking distance each day. Limit the amount of time you sit to 20-30 minute intervals. Sitting for prolonged periods of time will be uncomfortable for you following your surgery. 2. Do not lift anything over five pounds. 3. Do not do any straining, twisting or bending. 4. When you are in the bed, you may lay on your back or on either side. Do not lay on your stomach. Diet   You may resume your normal diet. If your throat is sore, you may want to eat soft foods for a few days. Be sure to drink plenty of fluids, it is important to keep yourself hydrated.  Avoid alcoholic beverages and tobacco products. Medications  1. Do not take anti-inflammatory medications or aspirin unless instructed by your physician. 2. Take your pain medication as directed. 3. It is important to have regular bowel movements. Pain medications may cause constipation. Stool softeners, prune juice, and increasing your water and fiber intake may help in preventing constipation. 4. Laxatives should only be used if the above preventative measures have failed and you still have not had a bowel movement after three days.        Driving  You may not drive or return to work until instructed by your physician. However, you may ride in the car for short periods of time. Brace   If you have a back brace, you should wear your brace at all times when you are out of bed. Do not wear the brace while in bed or showering.  Remember to always wear a cotton t-shirt underneath your brace.  Do not bend or twist when your brace is off. Showering  1. You may shower with the Prineo dressing in place - it is designed to be watertight. 2. Leave the dressing on during your shower allowing the water to run over it. 3. Reminder- your brace can be removed while showering. Remember to not bend or twist while your brace is off.    4. Do not take a tub bath. Caring for your incision  1. You have a new dressing applied to your incision called a Prineo dressing, covered with gauze. You should leave this dressing in place until you are seen in the office in two weeks. 2. You may have steri strips on your incision (small, white pieces of tape). Do not pull the steri strips; they will fall off on their own after several days. If you have sutures or staples, they will be removed when you see your physician. 3. Do not rub or apply any lotions or ointments to your incision site. Stockings  You have been given T.E.D. stockings to wear. Continue to wear these for 7 days after your discharge. Put them on in the morning and take them off at night. (You may require some assistance with this task as you may be restricted in your bending.)  They are used to increase your circulation and prevent blood clots from forming in your legs. Follow Up  Once you are home, call your physicians office to schedule an appointment for your two-week postoperative visit. Contact Dr. Deepthi Quintero at [phone], [phone]. Notify your physician if you develop any of the following conditions:  1. Fever above 101 degrees for 24 hours.   2. Nausea or vomiting. 3. Severe headache. 4. Inability to urinate. 5. Loss of bowel or bladder function. 6. Changes in sensation in your extremities (numbness, tingling, loss of color) that was not present before your surgery. 7. Severe pain or pain not relieved by medications. 8. Redness, swelling, or drainage from your incision. 9. Persistent pain in the chest or calf of either leg. 10. Increased weakness (if this is greater than before your surgery).       [place] [phone] (after hours call [phone])  Dr. Carlton Shipman

## 2020-01-23 NOTE — PROGRESS NOTES
Problem: Falls - Risk of  Goal: *Absence of Falls  Description  Document Garett Perla Fall Risk and appropriate interventions in the flowsheet. Progressing towards goals   Outcome: Progressing Towards Goal  Note: Fall Risk Interventions:  Mobility Interventions: Communicate number of staff needed for ambulation/transfer         Medication Interventions: Evaluate medications/consider consulting pharmacy    Elimination Interventions: Call light in reach              Problem: Pressure Injury - Risk of  Goal: *Prevention of pressure injury  Description  Document Quinton Scale and appropriate interventions in the flowsheet. Outcome: Progressing Towards Goal  Note: Pressure Injury Interventions:  Sensory Interventions: Keep linens dry and wrinkle-free    Moisture Interventions: Minimize layers    Activity Interventions: Increase time out of bed, Pressure redistribution bed/mattress(bed type)    Mobility Interventions: Pressure redistribution bed/mattress (bed type)    Nutrition Interventions: Document food/fluid/supplement intake              Plan of care reviewed.

## 2020-01-23 NOTE — PROGRESS NOTES
Orthopedic Spine Progress Note  Jazmin Hernandes AGACNP-BC  Work Cell: [phone]    Post Op Day: 7 Days Post-Op    2020 12:22 PM     Cullen Angelo    HPI:      Cullen Angelo is a 62 y.o. male with history of BPH, hypertension, NESTOR, and obesity who has symptomatic lumbar stenosis with associated radiculopathy. Workup revealed severe narrowing, particularly at the L4-L5 levels and foraminal narrowing at L5-S1. After a discussion of the risks, benefits, and alternatives, Cullen Angelo consented to undergo a  Procedure(s):  LUMBAR LAMINECTOMY L4-S1    Subjective      Patient comfortable. No paresthesias. Moving all extremities. Making slow progress with therapy. Discussed increasing activity with patient. He  denies headache, dizziness, chest pain, sob, abdominal pain, fever, chills, or nausea/vomiting. Objective:     Physical Exam:  General:  Alert and oriented. No acute distress. Respiratory:  Breathing unlabored. Equal chest expansion   Abdomen:   CV: Soft, non-tender, non-distended   No edema appreciated in the extremities   Neurologic:    Musculoskeletal:  Speech fluent. No facial droop. Follows commands. OSEI/SILT Motor: unchanged L2-S1. Gait deferred  Calves soft, nontender upon palpation and with passive stretch. Moves both upper and lower extremities. Incision: clean, dry, and intact. No significant erythema or swelling. No active drainage noted. Vital Signs:    Patient Vitals for the past 8 hrs:   BP Temp Pulse Resp SpO2   20 0841 (!) 144/94 -- 71 18 97 %   20 0336 154/89 98.1 °F (36.7 °C) 67 16 96 %     Temp (24hrs), Av °F (36.7 °C), Min:97.8 °F (36.6 °C), Max:98.1 °F (36.7 °C)      Intake/Output:  No intake/output data recorded.    1901 -  0700  In: -   Out: 800 [Urine:800]    Pain Control:   Pain Assessment  Pain Scale 1: Numeric (0 - 10)  Pain Intensity 1: 7  Pain Onset 1: postop  Pain Location 1: Back  Pain Orientation 1: Lower  Pain Description 1: Aching  Pain Intervention(s) 1: Medication (see MAR)    LAB:    No results for input(s): HCT, HGB, HCTEXT, HGBEXT, HCTEXT, HGBEXT in the last 72 hours. Lab Results   Component Value Date/Time    Sodium 140 01/23/2020 03:17 AM    Potassium 2.9 (L) 01/23/2020 03:17 AM    Chloride 103 01/23/2020 03:17 AM    CO2 30 01/23/2020 03:17 AM    Glucose 91 01/23/2020 03:17 AM    BUN 12 01/23/2020 03:17 AM    Creatinine 0.77 01/23/2020 03:17 AM    Calcium 9.3 01/23/2020 03:17 AM       PT/OT:   Gait:  Gait  Base of Support: Widened  Speed/Dalia: Slow  Step Length: Right shortened, Left shortened  Gait Abnormalities: Antalgic, Decreased step clearance(flexed posture, needs occasional cues to correct)  Ambulation - Level of Assistance: Minimal assistance, Assist x1  Distance (ft): 180 Feet (ft)  Assistive Device: Gait belt, Walker, rolling                   Assessment/Plan      1. 7 Days Post-Op Procedure(s):  LUMBAR LAMINECTOMY L4-S1   -Continue PT/OT- needs aggressive mobilization   -Pain control- continue prn oxycodone, prn valium. Will start toradol today if renal function stable   -Voiding   -Incentive Spirometer   -Tolerating diet. Encourage BM- add miralax, prn suppository   -VTE Prophylaxes - TEDS & SCDs  2. Hypertension   -BP better controlled with addition of clonidine  3. Hx of prostate ca    -s/p prostatectomy in Oct 2019 with Dr. Tenisha Henao   -patient has apparently been having episodes of incontinence with standing since surgery. Only discussed with provider today. PVR checked and he has no retention to explain an overflow incontinence situation. Likely stress incontinence. No other neurologic disturbances. -recommend f/u with Dr. Matthew Nicholson in 1 to 2 weeks  4. Hypokalemia   -K 2.9. IV and oral replacement   -this was present prior to admission. Will start patient on daily potassium supplements. Home health to check BMP tomorrow. F/u with PCP within 1 week. Discharge To:   Insurance denied inpatient rehab. Plan for home today after stairs and mobility training with PT.  He will stay with sister and have home health arranged    Plan above discussed with Dr. Shon Che and     Signed By: Joanna Paz NP

## 2020-01-23 NOTE — PROGRESS NOTES
Ortho Spine Daily Progress Note    Date of Surgery:  2020      Patient: Levy Borges   YOB: 1955  Age: 62 y.o. SUBJECTIVE:   7 Days Post-Op following LUMBAR LAMINECTOMY L4-S1    The patient states moderate back pain, lower extremity symptoms appear to be improved. Leg pain only with walking. The patient's post operative pain is adequately controlled. Gradually progressing with PT. The patient denies CP, SOB, N/V, dizziness, abdominal pain, HA. +flatus, +BM. OBJECTIVE:     Vital Signs:    Temp (24hrs), Av °F (36.7 °C), Min:97.8 °F (36.6 °C), Max:98.1 °F (36.7 °C)      Patient Vitals for the past 24 hrs:   BP Temp Pulse Resp SpO2   20 0841 (!) 144/94 -- 71 18 97 %   20 0336 154/89 98.1 °F (36.7 °C) 67 16 96 %   20 112/72 97.8 °F (36.6 °C) 68 16 96 %   20 1412 123/83 98.1 °F (36.7 °C) 72 16 98 %           Physical Exam:  General: A&Ox3, NAD. Respiratory: Respirations are unlabored. Abdomen: S/NT  Surgical site: C,D and I.  Musculoskeletal: Calves are S/NT, Joshua dorsi/plantar flexion/EHL/quads/hip flexion intact, Joshua DP 2+  Neurological:  Joshua LE's NVI    Laboratory Values:             Recent Labs     20  0317   CREA 0.77   GLU 91     Lab Results   Component Value Date/Time    Sodium 140 2020 03:17 AM    Potassium 2.9 (L) 2020 03:17 AM    Chloride 103 2020 03:17 AM    CO2 30 2020 03:17 AM    Glucose 91 2020 03:17 AM    BUN 12 2020 03:17 AM    Creatinine 0.77 2020 03:17 AM    Calcium 9.3 2020 03:17 AM         PLAN:     S/P LUMBAR LAMINECTOMY L4-S1    Hypokalemia Mobilize with PT/nursing. No bracing required. KCl IV. Hemodynamics Stable. Wound Continue dressing changes PRN. Post Operative Pain Pain Control: Adequate, continue current regimen. DVT Prophylaxis Continue with SCD'S, Encourage Ankle Pump Exercises, Mobilize.        Discharge Disposition Discharge plan: Plan on home with HH/PT today. Follow up in office in 2 weeks for post op care.                 Signed By: Juliet Saeed PA-C  January 23, 2020 12:26 PM

## 2020-01-23 NOTE — PROGRESS NOTES
Problem: Mobility Impaired (Adult and Pediatric)  Goal: *Acute Goals and Plan of Care (Insert Text)  Description  FUNCTIONAL STATUS PRIOR TO ADMISSION: Patient was independent and active without use of DME.    HOME SUPPORT PRIOR TO ADMISSION: The patient lived alone with sister to provide assistance. Physical Therapy Goals  Reassessment 1/21/2020  1. Patient will move from supine to sit and sit to supine  in bed with modified independence within 7 days. 2. Patient will perform sit to stand with supervision/set-up within 7 days. 3. Patient will ambulate with supervision for 150 feet with the least restrictive device within 7 days. 4. Patient will ascend/descend 15 stairs with 1 handrail(s) with minimal assistance/contact guard assist within 7 days. 5. Patient will verbalize and demonstrate understanding of spinal precautions (No bending, lifting greater than 5 lbs, or twisting; log-roll technique; frequent repositioning as instructed) within 7 days. Physical Therapy Goals  Initiated 1/17/2020, goals not met    1. Patient will move from supine to sit and sit to supine  in bed with modified independence within 4 days. 2. Patient will perform sit to stand with supervision/set-up within 4 days. 3. Patient will ambulate with supervision/set-up for 50 feet with the least restrictive device within 4 days. 4. Patient will ascend/descend 4 stairs with 1 handrail(s) with minimal assistance/contact guard assist within 4 days. 5. Patient will verbalize and demonstrate understanding of spinal precautions (No bending, lifting greater than 5 lbs, or twisting; log-roll technique; frequent repositioning as instructed) within 4 days.       Outcome: Progressing Towards Goal   PHYSICAL THERAPY TREATMENT  Patient: Krysta Greene (89 y.o. male)  Date: 1/23/2020  Diagnosis: Spinal stenosis [M48.00]  Lumbar stenosis [M48.061]   <principal problem not specified>  Procedure(s) (LRB):  LUMBAR LAMINECTOMY L4-S1 (N/A) 7 Days Post-Op  Precautions: Spinal, Fall No bending, no lifting greater than 5 lbs, no twisting, log-roll technique, repositioning every 20-30 min except when sleeping, brace when OOB (if ordered)  Chart, physical therapy assessment, plan of care and goals were reviewed. ASSESSMENT  Patient continues with skilled PT services and is progressing towards goals. Patient is now cleared from a therapy standpoint after successfully completing stair training. Due continued c/o pain in back/LEs with mobility and slowed gait speed with flexed trunk in prior therapy session was cleared by NP to trial a quickdraw brace for comfort and support. Pt reports improvement in pain levels and also noted less urinary incontinence volume with brace donned. SUP level for bed mobility and transfers. In standing noted incontinent of small amount of urine which pt reports he can not control and had not been present prior to admission(was present in October prior to prostate surgery). Pt ambulated 150ft with RW and SBA. Trialed gait without AD and noted improved posture and no overt LOB or buckling. Pt does show occasional decreased insight with back precautions and safety but will have support from sister at discharge. RN made aware of incontinence. Current Level of Function Impacting Discharge (mobility/balance): Cleared for discharge    Other factors to consider for discharge: decreased insight         PLAN :  Patient continues to benefit from skilled intervention to address the above impairments. Continue treatment per established plan of care. to address goals.     Recommendation for discharge: (in order for the patient to meet his/her long term goals)  Physical therapy at least 2 days/week in the home     This discharge recommendation:  Has been made in collaboration with the attending provider and/or case management    IF patient discharges home will need the following DME: patient owns DME required for discharge-RW delivered SUBJECTIVE:   Patient stated I will use the RW downstairs.     OBJECTIVE DATA SUMMARY:   Critical Behavior:  Neurologic State: Alert  Orientation Level: Oriented X4  Cognition: Appropriate decision making, Appropriate for age attention/concentration, Appropriate safety awareness  Safety/Judgement: Awareness of environment, Fall prevention    Spinal diagnosis intervention:  The patient stated 2/3 back precautions when prompted. Reviewed all 3 back precautions, log roll technique, and sitting for 30 minutes at a time. The patient required verbal cues to maintain back precautions during functional activity. Functional Mobility Training:    Bed Mobility:  Log Rolling: Supervision  Supine to Sit: Supervision              Transfers:  Sit to Stand: Supervision  Stand to Sit: Supervision                             Balance:  Sitting - Static: Good (unsupported)  Sitting - Dynamic: Good (unsupported)  Standing: Intact  Standing - Static: Good  Standing - Dynamic : Good  Ambulation/Gait Training:  Distance (ft): 250 Feet (ft)  Assistive Device: Gait belt;Walker, rolling;Brace/Splint(100ft without AD)  Ambulation - Level of Assistance: Stand-by assistance        Gait Abnormalities: Decreased step clearance              Speed/Dalia: Slow  Step Length: Right shortened;Left shortened                    Stairs:  Number of Stairs Trained: 6  Stairs - Level of Assistance: Contact guard assistance   Rail Use: Left       Activity Tolerance:   Good  Please refer to the flowsheet for vital signs taken during this treatment.     After treatment patient left in no apparent distress:   Sitting in chair and Call bell within reach    COMMUNICATION/COLLABORATION:   The patients plan of care was discussed with: Registered Nurse    Chemo Cantrell, PT   Time Calculation: 27 mins

## 2020-01-30 NOTE — DISCHARGE SUMMARY
[address] [address]    DISCHARGE SUMMARY     Patient: [address] Record Number: [id number]                : 1955  Age: 62 y.o. Admit Date: 2020  Discharge Date: 2020  Admission Diagnosis: Spinal stenosis [M48.00]  Lumbar stenosis [M48.061]  Discharge Diagnosis: SEVERE SPINAL STENOSIS  Procedures: Procedure(s):  LUMBAR LAMINECTOMY L4-S1  Surgeon: EVERARDO Almanzar MD  Assistants:  Sabine Caputo. MAARJIT Darling  Anesthesia: general  Complications: None     History of Present Illness:  Holli Whittaker is [id number] gentleman with symptomatic lumbar stenosis and associated radiculopathy. Imaging demonstrates fairly severe narrowing, particularly at the L4-L5 levels and foraminal narrowing at L5-S1. Given the level of symptomatology and failure to improve with conservative measures, lumbar decompression offered. Potential benefits and complications were reviewed.     Hospital Course:  Holli Whittaker tolerated the procedure well. He was transferred to the Spinal Unit from the recovery room in stable condition. On postoperative day 1, the dressing was noted to be clean and dry, he was neurovascularly intact and afebrile, and his vital signs were stable. Hemoglobin was noted to be   Lab Results   Component Value Date/Time    HGB 12.9 2020 03:23 AM     On postoperative day 1-6, Holli Whittaker made slow progress with physical therapy secondary to pain and limited mobility. He was recommended for in-patient rehab and was accepted at [facility]. Unfortunately, after 3 days of waiting for the patients insurance to authorize in-patient rehab, the patients insurance ultimately denied coverage. The patient had made progress to the point he was discharged to Home in stable condition on postoperative day 7 with home health and home physical therapy scheduled.   He was given routine postoperative instructions and advised to follow up in the office in 2 weeks following discharge home. He was given the following prescriptions for pain medications. Discharge Medications:   Cannot display discharge medications since this patient is not currently admitted.           Signed by: Mann June PA-C  1/30/2020

## 2020-01-31 ENCOUNTER — OFFICE VISIT (OUTPATIENT)
Dept: FAMILY MEDICINE CLINIC | Age: 65
End: 2020-01-31

## 2020-01-31 VITALS
HEART RATE: 65 BPM | WEIGHT: 202.8 LBS | TEMPERATURE: 98 F | RESPIRATION RATE: 18 BRPM | SYSTOLIC BLOOD PRESSURE: 142 MMHG | HEIGHT: 69 IN | OXYGEN SATURATION: 96 % | BODY MASS INDEX: 30.04 KG/M2 | DIASTOLIC BLOOD PRESSURE: 94 MMHG

## 2020-01-31 DIAGNOSIS — Z91.09 ENVIRONMENTAL ALLERGIES: ICD-10-CM

## 2020-01-31 DIAGNOSIS — B00.9 HSV-2 (HERPES SIMPLEX VIRUS 2) INFECTION: ICD-10-CM

## 2020-01-31 DIAGNOSIS — C61 PROSTATE CANCER (HCC): ICD-10-CM

## 2020-01-31 DIAGNOSIS — M54.16 LUMBAR RADICULOPATHY: ICD-10-CM

## 2020-01-31 DIAGNOSIS — E55.9 VITAMIN D DEFICIENCY: ICD-10-CM

## 2020-01-31 DIAGNOSIS — E53.8 VITAMIN B12 DEFICIENCY: ICD-10-CM

## 2020-01-31 DIAGNOSIS — E87.6 HYPOKALEMIA: ICD-10-CM

## 2020-01-31 DIAGNOSIS — I10 ESSENTIAL HYPERTENSION: Primary | ICD-10-CM

## 2020-01-31 RX ORDER — CYCLOBENZAPRINE HCL 10 MG
10 TABLET ORAL
Qty: 30 TAB | Refills: 1 | Status: SHIPPED | OUTPATIENT
Start: 2020-01-31 | End: 2020-02-12

## 2020-01-31 RX ORDER — TAMSULOSIN HYDROCHLORIDE 0.4 MG/1
CAPSULE ORAL
Qty: 180 CAP | Refills: 1 | Status: CANCELLED | OUTPATIENT
Start: 2020-01-31

## 2020-01-31 RX ORDER — KETOCONAZOLE 20 MG/G
CREAM TOPICAL DAILY
Qty: 15 G | Refills: 0 | Status: SHIPPED | OUTPATIENT
Start: 2020-01-31 | End: 2021-01-27

## 2020-01-31 RX ORDER — GABAPENTIN 300 MG/1
300 CAPSULE ORAL 3 TIMES DAILY
Qty: 90 CAP | Refills: 1 | Status: SHIPPED | OUTPATIENT
Start: 2020-01-31 | End: 2020-02-12 | Stop reason: ALTCHOICE

## 2020-01-31 RX ORDER — AZELASTINE 1 MG/ML
1 SPRAY, METERED NASAL 2 TIMES DAILY
Qty: 3 BOTTLE | Refills: 4 | Status: SHIPPED | OUTPATIENT
Start: 2020-01-31 | End: 2021-06-08 | Stop reason: ALTCHOICE

## 2020-01-31 RX ORDER — METOPROLOL SUCCINATE 100 MG/1
TABLET, EXTENDED RELEASE ORAL
Qty: 90 TAB | Refills: 1 | Status: SHIPPED | OUTPATIENT
Start: 2020-01-31 | End: 2020-11-24

## 2020-01-31 RX ORDER — WITCH HAZEL 50 %
2000 PADS, MEDICATED (EA) TOPICAL DAILY
Qty: 90 TAB | Refills: 1 | Status: SHIPPED | OUTPATIENT
Start: 2020-01-31

## 2020-01-31 RX ORDER — AMLODIPINE BESYLATE 10 MG/1
TABLET ORAL
Qty: 90 TAB | Refills: 1 | Status: SHIPPED | OUTPATIENT
Start: 2020-01-31 | End: 2020-11-24

## 2020-01-31 RX ORDER — LOSARTAN POTASSIUM 100 MG/1
TABLET ORAL
Qty: 90 TAB | Refills: 1 | Status: SHIPPED | OUTPATIENT
Start: 2020-01-31 | End: 2020-11-24

## 2020-01-31 RX ORDER — POTASSIUM CHLORIDE 750 MG/1
20 TABLET, EXTENDED RELEASE ORAL DAILY
Qty: 60 TAB | Refills: 1 | Status: SHIPPED | OUTPATIENT
Start: 2020-01-31 | End: 2020-08-12

## 2020-01-31 RX ORDER — FLUTICASONE PROPIONATE 50 MCG
SPRAY, SUSPENSION (ML) NASAL
Qty: 3 BOTTLE | Refills: 4 | Status: SHIPPED | OUTPATIENT
Start: 2020-01-31 | End: 2021-01-27

## 2020-01-31 RX ORDER — GABAPENTIN 300 MG/1
300 CAPSULE ORAL 3 TIMES DAILY
Qty: 90 CAP | Refills: 1 | Status: SHIPPED | OUTPATIENT
Start: 2020-01-31 | End: 2020-01-31 | Stop reason: SDUPTHER

## 2020-01-31 RX ORDER — VALACYCLOVIR HYDROCHLORIDE 500 MG/1
TABLET, FILM COATED ORAL
Qty: 90 TAB | Refills: 1 | Status: SHIPPED | OUTPATIENT
Start: 2020-01-31 | End: 2020-02-12

## 2020-01-31 RX ORDER — GUAIFENESIN 100 MG/5ML
81 LIQUID (ML) ORAL DAILY
Qty: 90 TAB | Refills: 0 | Status: SHIPPED | OUTPATIENT
Start: 2020-01-31 | End: 2020-08-12

## 2020-01-31 RX ORDER — ACETAMINOPHEN 500 MG
2000 TABLET ORAL DAILY
Qty: 90 CAP | Refills: 1 | Status: SHIPPED | OUTPATIENT
Start: 2020-01-31 | End: 2021-04-15

## 2020-01-31 NOTE — PROGRESS NOTES
[facility] Note    Deonna Joya is a 62 y.o. male who was seen in clinic today (1/31/2020). Subjective:  Cardiovascular Review:  The patient has hypertension and hyperlipidemia. Patient has sleep apnea but not currently using CPAP. Diet and Lifestyle: generally follows a low fat low cholesterol diet, generally follows a low sodium diet, exercises regularly, nonsmoker  Home BP Monitoring: is well controlled at home, ranging 120's/80's. Pertinent ROS: taking medications as instructed, no medication side effects noted, no TIA's, no chest pain on exertion, no dyspnea on exertion, no swelling of ankles. Patient underwent lumbar laminectomy at L4-S1. Taking Flexeril and Oxycodone. Patient reports continued bilateral leg pain. Potasium was low during his hospital admission. GERD  Patient had EGD in 4/2018. Patient has hiatal hernia and Schatzki ring which was dilated. Patient reports improvement of symptoms. Taking OTC medications with improvement of symptoms. Prostate Cancer  Patient seen by urology, Dr. Amey Schaumann for surveillance of prostate cancer. Previous biopsy in 2016 confirmed presence of prostate cancer. Repeat biopsy in 2017 however negative and prostate MRI was negative for metastasis. Taking Flomax with control of urinary symptoms. Prior to Admission medications    Medication Sig Start Date End Date Taking? Authorizing Provider   valACYclovir (VALTREX) 500 mg tablet TAKE 1 TABLET EVERY DAY AS NEEDED FOR BREAKOUT UNDER EYES 1/31/20  Yes Talbert, Worth Schilder, NP   potassium chloride (KLOR-CON) 10 mEq tablet Take 2 Tabs by mouth daily.  Indications: low amount of potassium in the blood 1/31/20  Yes Talbert, Worth Schilder, NP   metoprolol succinate (TOPROL-XL) 100 mg tablet TAKE 1 TABLET BY MOUTH DAILY 1/31/20  Yes Damien Leblanc NP   losartan (COZAAR) 100 mg tablet TAKE 1 TABLET EVERY DAY FOR BLOOD PRESSURE 1/31/20  Yes Damien Leblanc NP cyclobenzaprine (FLEXERIL) 10 mg tablet Take 1 Tab by mouth three (3) times daily as needed for Muscle Spasm(s). 1/31/20  Yes Tanvi Ritter NP   amLODIPine (NORVASC) 10 mg tablet TAKE 1 TABLET EVERY DAY 1/31/20  Yes Jimmy Brizuela NP   aspirin 81 mg chewable tablet Take 1 Tab by mouth daily. Indications: treatment to prevent a heart attack 1/31/20  Yes Jimmy Brizuela NP   cholecalciferol (VITAMIN D3) (2,000 UNITS /50 MCG) cap capsule Take 2,000 Units by mouth daily. 1/31/20  Yes Jimmy Brizuela NP   cyanocobalamin (VITAMIN B-12) 2,000 mcg TbER Take 2,000 mcg by mouth daily. 1/31/20  Yes Jimmy Brizuela NP   azelastine (ASTELIN) 137 mcg (0.1 %) nasal spray 1 [place] by Both Nostrils route two (2) times a day. Use in each nostril as directed 1/31/20  Yes Jimmy Brizuela NP   fluticasone propionate (FLONASE) 50 mcg/actuation nasal spray USE 2 SPRAYS IN EACH NOSTRIL EVERY DAY 1/31/20  Yes Jimmy Brizuela NP   ketoconazole (NIZORAL) 2 % topical cream Apply  to affected area daily. 1/31/20  Yes Jimmy Brizuela NP   gabapentin (NEURONTIN) 300 mg capsule Take 1 Cap by mouth three (3) times daily. Max Daily Amount: 900 mg. 1/31/20  Yes Jimmy Brizuela NP   mupirocin (BACTROBAN) 2 % ointment Apply  to affected area two (2) times a day. Apply to nose 1/8/20  Yes Jimmy Brizuela NP   docusate sodium (COLACE) 100 mg capsule Take 1 Cap by mouth two (2) times a day. For constipation 10/30/19  Yes Jimmy rBizuela NP   tamsulosin (FLOMAX) 0.4 mg capsule TAKE 2 CAPSULES EVERY DAY  FOR  PROSTATE 8/2/19  Yes Jimmy Brizuela NP   albuterol (PROVENTIL HFA, VENTOLIN HFA, PROAIR HFA) 90 mcg/actuation inhaler Take 2 Puffs by inhalation every six (6) hours as needed for Wheezing. 8/2/19  Yes Tanvi Ritter NP   gabapentin (NEURONTIN) 300 mg capsule Take 1 Cap by mouth three (3) times daily.  Max Daily Amount: 900 mg. 1/31/20 1/31/20  Tanvi Ritter NP   senna-docusate (PERICOLACE) 8.6-50 mg per tablet Take 1 Tab by mouth two (2) times a day. 1/23/20   Domingo Barksdale NP   lactulose (CHRONULAC) 10 gram/15 mL solution Take 15 mL by mouth two (2) times daily as needed (constipation). 1/23/20   Domingo Barksdale NP   oxyCODONE IR (ROXICODONE) 5 mg immediate release tablet Take 1 Tab by mouth every four (4) hours as needed for Pain for up to 7 days. Max Daily Amount: 30 mg. 1/23/20 1/30/20  Domingo Barksdale NP   potassium chloride (KLOR-CON) 10 mEq tablet Take 2 Tabs by mouth daily. Indications: low amount of potassium in the blood 1/23/20 1/31/20  Domingo Barksdale NP   cyclobenzaprine (FLEXERIL) 10 mg tablet Take 1 Tab by mouth three (3) times daily as needed for Muscle Spasm(s). 1/23/20 1/31/20  Domingo Barksdale NP   cyclobenzaprine (FLEXERIL) 10 mg tablet Take 10 mg by mouth three (3) times daily as needed for Muscle Spasm(s). 1/31/20  Provider, Historical   ketoconazole (NIZORAL) 2 % topical cream Apply  to affected area daily. 1/8/20 1/31/20  Zuleima Villanueva NP   amLODIPine (NORVASC) 10 mg tablet TAKE 1 TABLET EVERY DAY 11/21/19 1/31/20  Zuleima Villanueva NP   valACYclovir (VALTREX) 500 mg tablet TAKE 1 TABLET EVERY DAY AS NEEDED FOR BREAKOUT UNDER EYES 8/2/19 1/31/20  Zuleima Villanueva NP   metoprolol succinate (TOPROL-XL) 100 mg tablet TAKE 1 TABLET BY MOUTH DAILY 8/2/19 1/31/20  Zuleima Villanueva NP   losartan (COZAAR) 100 mg tablet TAKE 1 TABLET EVERY DAY FOR BLOOD PRESSURE 8/2/19 1/31/20  Zuleima Villanueva NP   fluticasone propionate (FLONASE) 50 mcg/actuation nasal spray USE 2 SPRAYS IN EACH NOSTRIL EVERY DAY 8/2/19 1/31/20  Zuleima Villanueva NP   cyanocobalamin (VITAMIN B-12) 2,000 mcg TbER Take 2,000 mcg by mouth daily. 8/2/19 1/31/20  Zuleima Villanueva NP   cholecalciferol (VITAMIN D3) 2,000 unit cap capsule Take 2,000 Units by mouth daily.  8/2/19 1/31/20  Zuleima Villanueva NP   azelastine (ASTELIN) 137 mcg (0.1 %) nasal spray 1 [place] by Both Nostrils route two (2) times a day. Use in each nostril as directed 8/2/19 1/31/20  Iza Coffman NP   aspirin 81 mg chewable tablet Take 1 Tab by mouth daily. Indications: MYOCARDIAL INFARCTION PREVENTION 5/15/13 1/31/20  Gloria FELIPE MD          No Known Allergies        ROS  See HPI    Objective:   Physical Exam  Vitals signs and nursing note reviewed. Constitutional:       Appearance: He is well-developed. Cardiovascular:      Rate and Rhythm: Normal rate and regular rhythm. Heart sounds: No murmur. No friction rub. No gallop. Pulmonary:      Effort: Pulmonary effort is normal. No respiratory distress. Breath sounds: Normal breath sounds. Neurological:      Mental Status: He is alert and oriented to person, place, and time. Psychiatric:         Speech: Speech normal.         Behavior: Behavior normal.         Thought Content: Thought content normal.           Visit Vitals  BP (!) 142/94 (BP 1 Location: Right arm, BP Patient Position: Sitting)   Pulse 65   Temp 98 °F (36.7 °C) (Oral)   Resp 18   Ht 5' 9\" (1.753 m)   Wt 202 lb 12.8 oz (92 kg)   SpO2 96%   BMI 29.95 kg/m²       Assessment & Plan:  Diagnoses and all orders for this visit:    1. Essential hypertension  Stable, no changes to current therapy  -     METABOLIC PANEL, BASIC  -     metoprolol succinate (TOPROL-XL) 100 mg tablet; TAKE 1 TABLET BY MOUTH DAILY  -     losartan (COZAAR) 100 mg tablet; TAKE 1 TABLET EVERY DAY FOR BLOOD PRESSURE  -     amLODIPine (NORVASC) 10 mg tablet; TAKE 1 TABLET EVERY DAY    2. Hypokalemia  Recheck labs. Continue potassium supplement   -     METABOLIC PANEL, BASIC  -     potassium chloride (KLOR-CON) 10 mEq tablet; Take 2 Tabs by mouth daily. Indications: low amount of potassium in the blood    3. HSV-2 (herpes simplex virus 2) infection  -     Refill valACYclovir (VALTREX) 500 mg tablet; TAKE 1 TABLET EVERY DAY AS NEEDED FOR BREAKOUT UNDER EYES    4. Prostate cancer  Managed by urology, no changes.      5. Vitamin D deficiency  -     cholecalciferol (VITAMIN D3) (2,000 UNITS /50 MCG) cap capsule; Take 2,000 Units by mouth daily. 6. Vitamin B12 deficiency  -     cyanocobalamin (VITAMIN B-12) 2,000 mcg TbER; Take 2,000 mcg by mouth daily. 7. Environmental allergies  -     azelastine (ASTELIN) 137 mcg (0.1 %) nasal spray; 1 [place] by Both Nostrils route two (2) times a day. Use in each nostril as directed  -     fluticasone propionate (FLONASE) 50 mcg/actuation nasal spray; USE 2 SPRAYS IN EACH NOSTRIL EVERY DAY    8. Lumbar radiculopathy  Follow up with surgeon. Add gabapentin. -     Begin gabapentin (NEURONTIN) 300 mg capsule; Take 1 Cap by mouth three (3) times daily. Max Daily Amount: 900 mg.  -     cyclobenzaprine (FLEXERIL) 10 mg tablet; Take 1 Tab by mouth three (3) times daily as needed for Muscle Spasm(s). I have discussed the diagnosis with the patient and the intended plan as seen in the above orders. The patient has received an after-visit summary along with patient information handout. I have discussed medication side effects and warnings with the patient as well. Follow-up and Dispositions    · Return in about 3 months (around 4/30/2020) for blood pressure, disease management.            Angelica Mirza, KENNETH

## 2020-01-31 NOTE — PATIENT INSTRUCTIONS
High Blood Pressure: Care Instructions Overview It's normal for blood pressure to go up and down throughout the day. But if it stays up, you have high blood pressure. Another name for high blood pressure is hypertension. Despite what a lot of people think, high blood pressure usually doesn't cause headaches or make you feel dizzy or lightheaded. It usually has no symptoms. But it does increase your risk of stroke, heart attack, and other problems. You and your doctor will talk about your risks of these problems based on your blood pressure. Your doctor will give you a goal for your blood pressure. Your goal will be based on your health and your age. Lifestyle changes, such as eating healthy and being active, are always important to help lower blood pressure. You might also take medicine to reach your blood pressure goal. 
Follow-up care is a key part of your treatment and safety. Be sure to make and go to all appointments, and call your doctor if you are having problems. It's also a good idea to know your test results and keep a list of the medicines you take. How can you care for yourself at home? Medical treatment · If you stop taking your medicine, your blood pressure will go back up. You may take one or more types of medicine to lower your blood pressure. Be safe with medicines. Take your medicine exactly as prescribed. Call your doctor if you think you are having a problem with your medicine. · Talk to your doctor before you start taking aspirin every day. Aspirin can help certain people lower their risk of a heart attack or stroke. But taking aspirin isn't right for everyone, because it can cause serious bleeding. · See your doctor regularly. You may need to see the doctor more often at first or until your blood pressure comes down. · If you are taking blood pressure medicine, talk to your doctor before you take decongestants or anti-inflammatory medicine, such as ibuprofen. Some of these medicines can raise blood pressure. · Learn how to check your blood pressure at home. Lifestyle changes · Stay at a healthy weight. This is especially important if you put on weight around the waist. Losing even 10 pounds can help you lower your blood pressure. · If your doctor recommends it, get more exercise. Walking is a good choice. Bit by bit, increase the amount you walk every day. Try for at least 30 minutes on most days of the week. You also may want to swim, bike, or do other activities. · Avoid or limit alcohol. Talk to your doctor about whether you can drink any alcohol. · Try to limit how much sodium you eat to less than 2,300 milligrams (mg) a day. Your doctor may ask you to try to eat less than 1,500 mg a day. · Eat plenty of fruits (such as bananas and oranges), vegetables, legumes, whole grains, and low-fat dairy products. · Lower the amount of saturated fat in your diet. Saturated fat is found in animal products such as milk, cheese, and meat. Limiting these foods may help you lose weight and also lower your risk for heart disease. · Do not smoke. Smoking increases your risk for heart attack and stroke. If you need help quitting, talk to your doctor about stop-smoking programs and medicines. These can increase your chances of quitting for good. When should you call for help? Call  911 anytime you think you may need emergency care. This may mean having symptoms that suggest that your blood pressure is causing a serious heart or blood vessel problem. Your blood pressure may be over 180/120. 
 For example, call  911 if: 
  · You have symptoms of a heart attack. These may include: 
? Chest pain or pressure, or a strange feeling in the chest. 
? Sweating. ? Shortness of breath. ? Nausea or vomiting. ? Pain, pressure, or a strange feeling in the back, neck, jaw, or upper belly or in one or both shoulders or arms. ? Lightheadedness or sudden weakness. ? A fast or irregular heartbeat.  
  · You have symptoms of a stroke. These may include: 
? Sudden numbness, tingling, weakness, or loss of movement in your face, arm, or leg, especially on only one side of your body. ? Sudden vision changes. ? Sudden trouble speaking. ? Sudden confusion or trouble understanding simple statements. ? Sudden problems with walking or balance. ? A sudden, severe headache that is different from past headaches.  
  · You have severe back or belly pain.  
 Do not wait until your blood pressure comes down on its own. Get help right away. 
 Call your doctor now or seek immediate care if: 
  · Your blood pressure is much higher than normal (such as 180/120 or higher), but you don't have symptoms.  
  · You think high blood pressure is causing symptoms, such as: 
? Severe headache. 
? Blurry vision.  
 Watch closely for changes in your health, and be sure to contact your doctor if: 
  · Your blood pressure measures higher than your doctor recommends at least 2 times. That means the top number is higher or the bottom number is higher, or both.  
  · You think you may be having side effects from your blood pressure medicine. Where can you learn more? Go to http://ambrose-vince.info/. Enter O616 in the search box to learn more about \"High Blood Pressure: Care Instructions. \" Current as of: April 9, 2019 Content Version: 12.2 © 3436-4524 [employer]. Care instructions adapted under license by [employer] (which disclaims liability or warranty for this information). If you have questions about a medical condition or this instruction, always ask your healthcare professional. [place] [address] any warranty or liability for your use of this information.

## 2020-01-31 NOTE — PROGRESS NOTES
Alexx Garcia is a 62 y.o. male    Chief Complaint   Patient presents with    Follow-up    Hypertension     1. Have you been to the ER, urgent care clinic since your last visit? Hospitalized since your last visit? No      2. Have you seen or consulted any other health care providers outside of the [address] [place] [address] since your last visit? Include any pap smears or colon screening.   No

## 2020-02-12 ENCOUNTER — OFFICE VISIT (OUTPATIENT)
Dept: FAMILY MEDICINE CLINIC | Age: 65
End: 2020-02-12

## 2020-02-12 VITALS
OXYGEN SATURATION: 96 % | SYSTOLIC BLOOD PRESSURE: 144 MMHG | BODY MASS INDEX: 30.07 KG/M2 | RESPIRATION RATE: 18 BRPM | HEIGHT: 69 IN | DIASTOLIC BLOOD PRESSURE: 97 MMHG | TEMPERATURE: 97.1 F | WEIGHT: 203 LBS | HEART RATE: 61 BPM

## 2020-02-12 DIAGNOSIS — M48.061 SPINAL STENOSIS OF LUMBAR REGION WITHOUT NEUROGENIC CLAUDICATION: ICD-10-CM

## 2020-02-12 DIAGNOSIS — E87.6 HYPOKALEMIA: ICD-10-CM

## 2020-02-12 DIAGNOSIS — I10 ESSENTIAL HYPERTENSION: Primary | ICD-10-CM

## 2020-02-12 NOTE — PROGRESS NOTES
[facility] Note    Shemar Noyola is a 62 y.o. male who was seen in clinic today (2/12/2020). Subjective:  Cardiovascular Review:  The patient has hypertension and hyperlipidemia. Patient has sleep apnea but not currently using CPAP. Diet and Lifestyle: generally follows a low fat low cholesterol diet, generally follows a low sodium diet, exercises regularly, nonsmoker  Home BP Monitoring: is well controlled at home, ranging 120's/80's. Pertinent ROS: taking medications as instructed, no medication side effects noted, no TIA's, no chest pain on exertion, no dyspnea on exertion, no swelling of ankles. Patient underwent lumbar laminectomy at L4-S1. Patient reports improvement of bilateral leg pain. No longer taking Gabapentin. Potasium was low during his hospital admission. Prior to Admission medications    Medication Sig Start Date End Date Taking? Authorizing Provider   potassium chloride (KLOR-CON) 10 mEq tablet Take 2 Tabs by mouth daily. Indications: low amount of potassium in the blood 1/31/20  Yes Emeka Brizuela NP   metoprolol succinate (TOPROL-XL) 100 mg tablet TAKE 1 TABLET BY MOUTH DAILY 1/31/20  Yes Sunni Keyes NP   losartan (COZAAR) 100 mg tablet TAKE 1 TABLET EVERY DAY FOR BLOOD PRESSURE 1/31/20  Yes Sunni Keyes NP   amLODIPine (NORVASC) 10 mg tablet TAKE 1 TABLET EVERY DAY 1/31/20  Yes Sunni Keyes NP   aspirin 81 mg chewable tablet Take 1 Tab by mouth daily. Indications: treatment to prevent a heart attack 1/31/20  Yes Emeka Brizuela NP   cholecalciferol (VITAMIN D3) (2,000 UNITS /50 MCG) cap capsule Take 2,000 Units by mouth daily. 1/31/20  Yes Emeka Brizuela NP   cyanocobalamin (VITAMIN B-12) 2,000 mcg TbER Take 2,000 mcg by mouth daily. 1/31/20  Yes Emeka Brizuela NP   azelastine (ASTELIN) 137 mcg (0.1 %) nasal spray 1 [place] by Both Nostrils route two (2) times a day.  Use in each nostril as directed 1/31/20  Yes Mukul Brizuela NP   fluticasone propionate (FLONASE) 50 mcg/actuation nasal spray USE 2 SPRAYS IN EACH NOSTRIL EVERY DAY 1/31/20  Yes Mukul Brizuela NP   ketoconazole (NIZORAL) 2 % topical cream Apply  to affected area daily. 1/31/20  Yes Mukul Brizuela NP   senna-docusate (PERICOLACE) 8.6-50 mg per tablet Take 1 Tab by mouth two (2) times a day. 1/23/20  Yes Elysia Davidson NP   lactulose (CHRONULAC) 10 gram/15 mL solution Take 15 mL by mouth two (2) times daily as needed (constipation). 1/23/20  Yes Elysia Davidson NP   mupirocin (BACTROBAN) 2 % ointment Apply  to affected area two (2) times a day. Apply to nose 1/8/20  Yes Mukul Brizuela NP   docusate sodium (COLACE) 100 mg capsule Take 1 Cap by mouth two (2) times a day. For constipation 10/30/19  Yes Mukul Brizuela NP   tamsulosin (FLOMAX) 0.4 mg capsule TAKE 2 CAPSULES EVERY DAY  FOR  PROSTATE 8/2/19  Yes Mukul Brizuela NP   albuterol (PROVENTIL HFA, VENTOLIN HFA, PROAIR HFA) 90 mcg/actuation inhaler Take 2 Puffs by inhalation every six (6) hours as needed for Wheezing. 8/2/19  Yes Jude Cardenas NP          No Known Allergies        ROS  See HPI    Objective:   Physical Exam  Vitals signs and nursing note reviewed. Constitutional:       Appearance: He is well-developed. Cardiovascular:      Rate and Rhythm: Normal rate and regular rhythm. Heart sounds: No murmur. No friction rub. No gallop. Pulmonary:      Effort: Pulmonary effort is normal. No respiratory distress. Breath sounds: Normal breath sounds. Neurological:      Mental Status: He is alert and oriented to person, place, and time. Psychiatric:         Speech: Speech normal.         Behavior: Behavior normal.         Thought Content:  Thought content normal.           Visit Vitals  BP (!) 144/97 (BP 1 Location: Left arm, BP Patient Position: Sitting)   Pulse 61   Temp 97.1 °F (36.2 °C) (Oral)   Resp 18   Ht 5' 9\" (1.753 m)   Wt 203 lb (92.1 kg)   SpO2 96%   BMI 29.98 kg/m²       Assessment & Plan:  Diagnoses and all orders for this visit:    1. Essential hypertension  Well controlled, no medication changes. -     CBC WITH AUTOMATED DIFF  -     TSH AND FREE T4  -     METABOLIC PANEL, COMPREHENSIVE    2. Hypokalemia  Repeat labs. -     METABOLIC PANEL, COMPREHENSIVE    3. Spinal stenosis of lumbar region without neurogenic claudication  Stable, no changes to current therapy      I have discussed the diagnosis with the patient and the intended plan as seen in the above orders. The patient has received an after-visit summary along with patient information handout. I have discussed medication side effects and warnings with the patient as well. Follow-up and Dispositions    · Return in about 4 weeks (around 3/11/2020) for blood pressure.            Pierre Alcala NP

## 2020-02-12 NOTE — PROGRESS NOTES
Holli Whittaker is a 62 y.o. male      Chief Complaint   Patient presents with    Medication Evaluation    Cold Symptoms     1. Have you been to the ER, urgent care clinic since your last visit? Hospitalized since your last visit? No      2. Have you seen or consulted any other health care providers outside of the [address] [place] [address] since your last visit? Include any pap smears or colon screening.  No

## 2020-02-13 LAB
ALBUMIN SERPL-MCNC: 4.5 G/DL (ref 3.8–4.8)
ALBUMIN/GLOB SERPL: 1.6 {RATIO} (ref 1.2–2.2)
ALP SERPL-CCNC: 102 IU/L (ref 39–117)
ALT SERPL-CCNC: 12 IU/L (ref 0–44)
AST SERPL-CCNC: 17 IU/L (ref 0–40)
BASOPHILS # BLD AUTO: 0 X10E3/UL (ref 0–0.2)
BASOPHILS NFR BLD AUTO: 0 %
BILIRUB SERPL-MCNC: 0.8 MG/DL (ref 0–1.2)
BUN SERPL-MCNC: 9 MG/DL (ref 8–27)
BUN/CREAT SERPL: 10 (ref 10–24)
CALCIUM SERPL-MCNC: 10.1 MG/DL (ref 8.6–10.2)
CHLORIDE SERPL-SCNC: 103 MMOL/L (ref 96–106)
CO2 SERPL-SCNC: 22 MMOL/L (ref 20–29)
CREAT SERPL-MCNC: 0.92 MG/DL (ref 0.76–1.27)
EOSINOPHIL # BLD AUTO: 0.1 X10E3/UL (ref 0–0.4)
EOSINOPHIL NFR BLD AUTO: 2 %
ERYTHROCYTE [DISTWIDTH] IN BLOOD BY AUTOMATED COUNT: 13.7 % (ref 11.6–15.4)
GLOBULIN SER CALC-MCNC: 2.9 G/DL (ref 1.5–4.5)
GLUCOSE SERPL-MCNC: 92 MG/DL (ref 65–99)
HCT VFR BLD AUTO: 38.7 % (ref 37.5–51)
HGB BLD-MCNC: 13.8 G/DL (ref 13–17.7)
IMM GRANULOCYTES # BLD AUTO: 0 X10E3/UL (ref 0–0.1)
IMM GRANULOCYTES NFR BLD AUTO: 0 %
LYMPHOCYTES # BLD AUTO: 1.3 X10E3/UL (ref 0.7–3.1)
LYMPHOCYTES NFR BLD AUTO: 17 %
MCH RBC QN AUTO: 31.5 PG (ref 26.6–33)
MCHC RBC AUTO-ENTMCNC: 35.7 G/DL (ref 31.5–35.7)
MCV RBC AUTO: 88 FL (ref 79–97)
MONOCYTES # BLD AUTO: 0.5 X10E3/UL (ref 0.1–0.9)
MONOCYTES NFR BLD AUTO: 7 %
NEUTROPHILS # BLD AUTO: 5.6 X10E3/UL (ref 1.4–7)
NEUTROPHILS NFR BLD AUTO: 74 %
PLATELET # BLD AUTO: 299 X10E3/UL (ref 150–450)
POTASSIUM SERPL-SCNC: 3.8 MMOL/L (ref 3.5–5.2)
PROT SERPL-MCNC: 7.4 G/DL (ref 6–8.5)
RBC # BLD AUTO: 4.38 X10E6/UL (ref 4.14–5.8)
SODIUM SERPL-SCNC: 144 MMOL/L (ref 134–144)
T4 FREE SERPL-MCNC: 1.24 NG/DL (ref 0.82–1.77)
TSH SERPL DL<=0.005 MIU/L-ACNC: 1.03 UIU/ML (ref 0.45–4.5)
WBC # BLD AUTO: 7.6 X10E3/UL (ref 3.4–10.8)

## 2020-02-24 ENCOUNTER — HOSPITAL ENCOUNTER (OUTPATIENT)
Dept: NUCLEAR MEDICINE | Age: 65
Discharge: HOME | End: 2020-02-24
Attending: UROLOGY
Payer: MEDICARE

## 2020-02-24 DIAGNOSIS — C61 MALIGNANT NEOPLASM OF PROSTATE (HCC): ICD-10-CM

## 2020-02-24 PROCEDURE — 78306 BONE IMAGING WHOLE BODY: CPT

## 2020-02-24 PROCEDURE — A9503 TC99M MEDRONATE: HCPCS

## 2020-03-11 ENCOUNTER — ANESTHESIA (OUTPATIENT)
Dept: ENDOSCOPY | Age: 65
End: 2020-03-11
Payer: MEDICARE

## 2020-03-11 ENCOUNTER — HOSPITAL ENCOUNTER (OUTPATIENT)
Age: 65
Setting detail: OUTPATIENT SURGERY
Discharge: HOME | End: 2020-03-11
Attending: INTERNAL MEDICINE | Admitting: INTERNAL MEDICINE
Payer: MEDICARE

## 2020-03-11 ENCOUNTER — ANESTHESIA EVENT (OUTPATIENT)
Dept: ENDOSCOPY | Age: 65
End: 2020-03-11
Payer: MEDICARE

## 2020-03-11 VITALS
BODY MASS INDEX: 30.07 KG/M2 | HEART RATE: 71 BPM | DIASTOLIC BLOOD PRESSURE: 89 MMHG | HEIGHT: 69 IN | SYSTOLIC BLOOD PRESSURE: 140 MMHG | OXYGEN SATURATION: 95 % | TEMPERATURE: 98.2 F | WEIGHT: 203 LBS | RESPIRATION RATE: 24 BRPM

## 2020-03-11 PROCEDURE — 74011000250 HC RX REV CODE- 250: Performed by: NURSE PRACTITIONER

## 2020-03-11 PROCEDURE — 77030026438 HC STYL ET INTUB CARD -A: Performed by: ANESTHESIOLOGY

## 2020-03-11 PROCEDURE — 77030008684 HC TU ET CUF COVD -B: Performed by: ANESTHESIOLOGY

## 2020-03-11 PROCEDURE — 88185 FLOWCYTOMETRY/TC ADD-ON: CPT

## 2020-03-11 PROCEDURE — 76060000034 HC ANESTHESIA 1.5 TO 2 HR: Performed by: INTERNAL MEDICINE

## 2020-03-11 PROCEDURE — 76040000019: Performed by: INTERNAL MEDICINE

## 2020-03-11 PROCEDURE — 88184 FLOWCYTOMETRY/ TC 1 MARKER: CPT

## 2020-03-11 PROCEDURE — 88305 TISSUE EXAM BY PATHOLOGIST: CPT

## 2020-03-11 PROCEDURE — 88112 CYTOPATH CELL ENHANCE TECH: CPT

## 2020-03-11 PROCEDURE — 76040000020: Performed by: INTERNAL MEDICINE

## 2020-03-11 PROCEDURE — 74011250636 HC RX REV CODE- 250/636: Performed by: NURSE PRACTITIONER

## 2020-03-11 PROCEDURE — 88172 CYTP DX EVAL FNA 1ST EA SITE: CPT

## 2020-03-11 PROCEDURE — 88173 CYTOPATH EVAL FNA REPORT: CPT

## 2020-03-11 RX ORDER — SODIUM CHLORIDE, SODIUM LACTATE, POTASSIUM CHLORIDE, CALCIUM CHLORIDE 600; 310; 30; 20 MG/100ML; MG/100ML; MG/100ML; MG/100ML
INJECTION, SOLUTION INTRAVENOUS
Status: DISCONTINUED | OUTPATIENT
Start: 2020-03-11 | End: 2020-03-11 | Stop reason: HOSPADM

## 2020-03-11 RX ORDER — ROCURONIUM BROMIDE 10 MG/ML
INJECTION, SOLUTION INTRAVENOUS AS NEEDED
Status: DISCONTINUED | OUTPATIENT
Start: 2020-03-11 | End: 2020-03-11 | Stop reason: HOSPADM

## 2020-03-11 RX ORDER — DEXAMETHASONE SODIUM PHOSPHATE 4 MG/ML
INJECTION, SOLUTION INTRA-ARTICULAR; INTRALESIONAL; INTRAMUSCULAR; INTRAVENOUS; SOFT TISSUE AS NEEDED
Status: DISCONTINUED | OUTPATIENT
Start: 2020-03-11 | End: 2020-03-11 | Stop reason: HOSPADM

## 2020-03-11 RX ORDER — SODIUM CHLORIDE 0.9 % (FLUSH) 0.9 %
5-40 SYRINGE (ML) INJECTION AS NEEDED
Status: DISCONTINUED | OUTPATIENT
Start: 2020-03-11 | End: 2020-03-11 | Stop reason: HOSPADM

## 2020-03-11 RX ORDER — LIDOCAINE HYDROCHLORIDE 20 MG/ML
INJECTION, SOLUTION EPIDURAL; INFILTRATION; INTRACAUDAL; PERINEURAL AS NEEDED
Status: DISCONTINUED | OUTPATIENT
Start: 2020-03-11 | End: 2020-03-11 | Stop reason: HOSPADM

## 2020-03-11 RX ORDER — PROPOFOL 10 MG/ML
INJECTION, EMULSION INTRAVENOUS AS NEEDED
Status: DISCONTINUED | OUTPATIENT
Start: 2020-03-11 | End: 2020-03-11 | Stop reason: HOSPADM

## 2020-03-11 RX ORDER — FENTANYL CITRATE 50 UG/ML
INJECTION, SOLUTION INTRAMUSCULAR; INTRAVENOUS AS NEEDED
Status: DISCONTINUED | OUTPATIENT
Start: 2020-03-11 | End: 2020-03-11 | Stop reason: HOSPADM

## 2020-03-11 RX ORDER — PHENYLEPHRINE HCL IN 0.9% NACL 0.4MG/10ML
SYRINGE (ML) INTRAVENOUS AS NEEDED
Status: DISCONTINUED | OUTPATIENT
Start: 2020-03-11 | End: 2020-03-11 | Stop reason: HOSPADM

## 2020-03-11 RX ORDER — LIDOCAINE HYDROCHLORIDE AND EPINEPHRINE 20; 5 MG/ML; UG/ML
INJECTION, SOLUTION EPIDURAL; INFILTRATION; INTRACAUDAL; PERINEURAL
Status: DISCONTINUED
Start: 2020-03-11 | End: 2020-03-11 | Stop reason: WASHOUT

## 2020-03-11 RX ORDER — SUCCINYLCHOLINE CHLORIDE 20 MG/ML
INJECTION INTRAMUSCULAR; INTRAVENOUS AS NEEDED
Status: DISCONTINUED | OUTPATIENT
Start: 2020-03-11 | End: 2020-03-11 | Stop reason: HOSPADM

## 2020-03-11 RX ORDER — NEOSTIGMINE METHYLSULFATE 1 MG/ML
INJECTION, SOLUTION INTRAVENOUS AS NEEDED
Status: DISCONTINUED | OUTPATIENT
Start: 2020-03-11 | End: 2020-03-11 | Stop reason: HOSPADM

## 2020-03-11 RX ORDER — SODIUM CHLORIDE 0.9 % (FLUSH) 0.9 %
SYRINGE (ML) INJECTION
Status: DISCONTINUED
Start: 2020-03-11 | End: 2020-03-11 | Stop reason: HOSPADM

## 2020-03-11 RX ORDER — ONDANSETRON 2 MG/ML
INJECTION INTRAMUSCULAR; INTRAVENOUS AS NEEDED
Status: DISCONTINUED | OUTPATIENT
Start: 2020-03-11 | End: 2020-03-11 | Stop reason: HOSPADM

## 2020-03-11 RX ORDER — SODIUM CHLORIDE 0.9 % (FLUSH) 0.9 %
5-40 SYRINGE (ML) INJECTION EVERY 8 HOURS
Status: DISCONTINUED | OUTPATIENT
Start: 2020-03-11 | End: 2020-03-11 | Stop reason: HOSPADM

## 2020-03-11 RX ORDER — GLYCOPYRROLATE 0.2 MG/ML
INJECTION INTRAMUSCULAR; INTRAVENOUS AS NEEDED
Status: DISCONTINUED | OUTPATIENT
Start: 2020-03-11 | End: 2020-03-11 | Stop reason: HOSPADM

## 2020-03-11 RX ADMIN — SODIUM CHLORIDE, POTASSIUM CHLORIDE, SODIUM LACTATE AND CALCIUM CHLORIDE: 600; 310; 30; 20 INJECTION, SOLUTION INTRAVENOUS at 09:51

## 2020-03-11 RX ADMIN — SUCCINYLCHOLINE CHLORIDE 200 MG: 20 INJECTION, SOLUTION INTRAMUSCULAR; INTRAVENOUS at 09:55

## 2020-03-11 RX ADMIN — Medication 120 MCG: at 10:53

## 2020-03-11 RX ADMIN — ROCURONIUM BROMIDE 10 MG: 10 SOLUTION INTRAVENOUS at 09:55

## 2020-03-11 RX ADMIN — Medication 2 MG: at 11:15

## 2020-03-11 RX ADMIN — GLYCOPYRROLATE 0.4 MG: 0.2 INJECTION, SOLUTION INTRAMUSCULAR; INTRAVENOUS at 11:15

## 2020-03-11 RX ADMIN — ONDANSETRON HYDROCHLORIDE 4 MG: 2 INJECTION, SOLUTION INTRAMUSCULAR; INTRAVENOUS at 10:00

## 2020-03-11 RX ADMIN — Medication 120 MCG: at 10:26

## 2020-03-11 RX ADMIN — GLYCOPYRROLATE 0.2 MG: 0.2 INJECTION, SOLUTION INTRAMUSCULAR; INTRAVENOUS at 10:11

## 2020-03-11 RX ADMIN — Medication 80 MCG: at 10:47

## 2020-03-11 RX ADMIN — Medication 80 MCG: at 10:22

## 2020-03-11 RX ADMIN — Medication 80 MCG: at 10:36

## 2020-03-11 RX ADMIN — LIDOCAINE HYDROCHLORIDE 100 MG: 20 INJECTION, SOLUTION EPIDURAL; INFILTRATION; INTRACAUDAL; PERINEURAL at 09:55

## 2020-03-11 RX ADMIN — Medication 80 MCG: at 10:32

## 2020-03-11 RX ADMIN — PHENYLEPHRINE HYDROCHLORIDE 80 MCG/MIN: 10 INJECTION INTRAVENOUS at 10:57

## 2020-03-11 RX ADMIN — FENTANYL CITRATE 100 MCG: 50 INJECTION, SOLUTION INTRAMUSCULAR; INTRAVENOUS at 09:55

## 2020-03-11 RX ADMIN — Medication 80 MCG: at 10:14

## 2020-03-11 RX ADMIN — ROCURONIUM BROMIDE 20 MG: 10 SOLUTION INTRAVENOUS at 09:59

## 2020-03-11 RX ADMIN — Medication 80 MCG: at 10:17

## 2020-03-11 RX ADMIN — Medication 80 MCG: at 10:39

## 2020-03-11 RX ADMIN — GLYCOPYRROLATE 0.2 MG: 0.2 INJECTION, SOLUTION INTRAMUSCULAR; INTRAVENOUS at 10:36

## 2020-03-11 RX ADMIN — DEXAMETHASONE SODIUM PHOSPHATE 8 MG: 4 INJECTION, SOLUTION INTRAMUSCULAR; INTRAVENOUS at 10:00

## 2020-03-11 RX ADMIN — PROPOFOL 150 MG: 10 INJECTION, EMULSION INTRAVENOUS at 09:55

## 2020-03-11 NOTE — PROGRESS NOTES
PCCM:  =====  Luci Mcdaniel seen and examined. History and physical has been reviewed. The patient has been examined.  There have been no significant clinical changes since the completion of the originally dated History and Physical.

## 2020-03-11 NOTE — PROGRESS NOTES
Patient Discharge Instructions    Silas Hicks / [id number] : 1955    Admitted 3/11/2020 Discharged: 3/11/2020 11:31 AM     ACUTE DIAGNOSES:  Abnormal chest imaging    CHRONIC MEDICAL DIAGNOSES:  Patient Active Problem List   Diagnosis Code    Vegetarian Z78.9    Allergic rhinitis J30.9    BPH (benign prostatic hyperplasia) N40.0    Right inguinal hernia K40.90    NESTOR (obstructive sleep apnea) G47.33    Illiteracy Z55.0    Essential hypertension I10    Obesity (BMI 30-39. 9) E66.9    Prostate cancer ([facility] C61    Gastroesophageal reflux disease with esophagitis K21.0    Mixed hyperlipidemia E78.2    [facility] I73.9    Spinal stenosis M48.00    Lumbar stenosis M48.061       DISCHARGE MEDICATIONS:   Current Discharge Medication List          · It is important that you take the medication exactly as they are prescribed. · Keep your medication in the bottles provided by the pharmacist and keep a list of the medication names, dosages, and times to be taken in your wallet. · Do not take other medications without consulting your doctor. DIET: as tolerated. ACTIVITY:  No driving, financial decisions, operating heavy machinery and alcoholic beverages for 24 hours and than as tolerated. ADDITIONAL INFORMATION: If you experience any of the following symptoms then please call your primary care physician or return to the emergency room if you cannot get hold of your doctor: Fever, chills, nausea, vomiting, diarrhea, change in mentation, falling, bleeding, shortness of breath. SPECIAL INSTRUCTIONS:    FOLLOW UP CARE:  Dr. Emily Angela you are to call and set up an appointment to see them in 2 weeks. Information obtained by :  I understand that if any problems occur once I am at home I am to contact my physician. I understand and acknowledge receipt of the instructions indicated above. Physician's or R.N.'s Signature                                                                  Date/Time                                                                                                                                              Patient or Representative Signature                                                          Date/Time

## 2020-03-11 NOTE — ANESTHESIA PREPROCEDURE EVALUATION
Relevant Problems   No relevant active problems       Anesthetic History   No history of anesthetic complications            Review of Systems / Medical History  Patient summary reviewed, nursing notes reviewed and pertinent labs reviewed    Pulmonary        Sleep apnea           Neuro/Psych   Within defined limits           Cardiovascular    Hypertension: well controlled          CAD         GI/Hepatic/Renal           PUD     Endo/Other        Obesity  Pertinent negatives: No morbid obesity   Other Findings              Physical Exam    Airway  Mallampati: II  TM Distance: > 6 cm  Neck ROM: normal range of motion   Mouth opening: Normal     Cardiovascular  Regular rate and rhythm,  S1 and S2 normal,  no murmur, click, rub, or gallop             Dental  No notable dental hx       Pulmonary  Breath sounds clear to auscultation               Abdominal  GI exam deferred       Other Findings            Anesthetic Plan    ASA: 2  Anesthesia type: general          Induction: Intravenous  Anesthetic plan and risks discussed with: Patient

## 2020-03-11 NOTE — PROCEDURES
Pre anesthesia assessment was performed. History and physical on the chart. Informed consent for bronchoscopy with general anesthesia was obtained from the patient. The proposed procedure and possible alternatives including the option of no procedure were discussed. The benefits of the proposed procedure and its alternatives were also discussed. The nature and probability of risks of the proposed procedure and its alternatives were discussed. After our discussion, the patient gave informed consent to undergo the procedure and wished to proceed. A time out was performed prior to the start of procedure and the procedure was verified in the presence of bronchoscopist, RN and anesthesiologist. ASA assessment documented by anesthesiologist. Patients heart rate, BP, respiratory rate and oxygen saturations were monitored during the procedure. RSI was performed and patient was intubated by anesthesiologist.    Assistant:  [address] [place] [address], RT  2- Elaina Lopez RN    Inspection Bronchoscopy: After patient was adequately sedated the diagnostic bronchoscope was introduced through the ETT and advanced to the thao. The left and right tracheobronchial tree was examined upto sub segmental level. No endobronchial mass seen. EBUS and TBNA station 4R LN: The EBUS was inserted and advanced to trachea. AG was intubated and the bronchoscope and EBUS activated. The scope pulled back and rotated to 3 O'clock and 4R LN identified. 2 passes were performed with 25G needle. MORAIMA negative for cancer. LN sample seen. The EBUS was advanced to the origin of BI and rotated to 3 O'clock and TBNA x 2 passes were performed follwoed by 4 passes of 22G. MORAIMA negative for cancer cells and LN sample seen. The EBUS was advanced to origin of basal segment of RLL and at 12 Oclock 12R LN identified. 4 passes of 22G needle performed. No cancer cells on MORAIMA. Possibly histiocytes seen.  The EBUS was than retracted and LMS intubated and large LN was seen at the bifrucation of LORRIE and LLL. 5-6 passes of 22G needle was performed. ROSO - no cancer cells. Some suspicion of granulomatous disease but will require further staining per Pathologist. Also request sample for flow and cultures if sarcoid confirmed. No immediate complications. Implants: None. Sample collected:  -EBUS assisted 4R LN 25G TBNA x 2  -EBUS assisted 11R LN 25G TBNA x 2, 22G TBNA x 4  -EBUS assisted 12R LN 22G TBNA x 4  -EBUS assisted 11L LN 22G TBNA x 5  -Bronchial wash for cytology.     Complication: expectant bleeding controlled with cold saline.

## 2020-03-11 NOTE — DISCHARGE INSTRUCTIONS
Patient Discharge Instructions     Agueda Dubose / [id number] : 1955     Admitted 3/11/2020 Discharged: 3/11/2020 11:31 AM      ACUTE DIAGNOSES:  Abnormal chest imaging     CHRONIC MEDICAL DIAGNOSES:       Patient Active Problem List   Diagnosis Code    Vegetarian Z78.9    Allergic rhinitis J30.9    BPH (benign prostatic hyperplasia) N40.0    Right inguinal hernia K40.90    NESTOR (obstructive sleep apnea) G47.33    Illiteracy Z55.0    Essential hypertension I10    Obesity (BMI 30-39. 9) E66.9    Prostate cancer ([facility] C61    Gastroesophageal reflux disease with esophagitis K21.0    Mixed hyperlipidemia E78.2    Per[facility] I73.9    Spinal stenosis M48.00    Lumbar stenosis M48.061         DISCHARGE MEDICATIONS:   Current Discharge Medication List             · It is important that you take the medication exactly as they are prescribed. · Keep your medication in the bottles provided by the pharmacist and keep a list of the medication names, dosages, and times to be taken in your wallet. · Do not take other medications without consulting your doctor.         DIET: as tolerated.     ACTIVITY:  No driving, financial decisions, operating heavy machinery and alcoholic beverages for 24 hours and than as tolerated.     ADDITIONAL INFORMATION: If you experience any of the following symptoms then please call your primary care physician or return to the emergency room if you cannot get hold of your doctor: Fever, chills, nausea, vomiting, diarrhea, change in mentation, falling, bleeding, shortness of breath.     SPECIAL INSTRUCTIONS:     FOLLOW UP CARE:  Dr. Emeka Ron you are to call and set up an appointment to see them in 2 weeks.

## 2020-03-11 NOTE — ANESTHESIA POSTPROCEDURE EVALUATION
Post-Anesthesia Evaluation and Assessment    Patient: Uriel Quiñonez MRN: [id number]  SSN: [id number]    YOB: 1955  Age: 62 y.o. Sex: male      I have evaluated the patient and they are stable and ready for discharge from the PACU. Cardiovascular Function/Vital Signs  Visit Vitals  /87   Pulse 70   Temp 36.8 °C (98.2 °F)   Resp 22   Ht 5' 8.5\" (1.74 m)   Wt 92.1 kg (203 lb)   SpO2 97%   BMI 30.42 kg/m²       Patient is status post General anesthesia for Procedure(s):  ENDOSCOPIC BRONCHOSCOPY ULTRASOUND (EBUS)  TRANSBRONCHIAL BIOPSY. Nausea/Vomiting: None    Postoperative hydration reviewed and adequate. Pain:  Pain Scale 1: Numeric (0 - 10) (03/11/20 1149)  Pain Intensity 1: 0 (03/11/20 1149)   Managed    Neurological Status: At baseline    Mental Status, Level of Consciousness: Alert and  oriented to person, place, and time    Pulmonary Status:   O2 Device: Room air (03/11/20 1201)   Adequate oxygenation and airway patent    Complications related to anesthesia: None    Post-anesthesia assessment completed. No concerns    Signed By: Gisselle Lowe MD     March 11, 2020              Procedure(s):  ENDOSCOPIC BRONCHOSCOPY ULTRASOUND (EBUS)  TRANSBRONCHIAL BIOPSY.     general    <BSHSIANPOST>    Vitals Value Taken Time   /87 3/11/2020 12:01 PM   Temp     Pulse 70 3/11/2020 12:01 PM   Resp 22 3/11/2020 12:01 PM   SpO2 97 % 3/11/2020 12:01 PM

## 2020-04-28 ENCOUNTER — HOSPITAL ENCOUNTER (OUTPATIENT)
Dept: CT IMAGING | Age: 65
Discharge: HOME | End: 2020-04-28
Attending: INTERNAL MEDICINE
Payer: MEDICARE

## 2020-04-28 DIAGNOSIS — R59.1 LAD (LYMPHADENOPATHY): ICD-10-CM

## 2020-04-28 PROCEDURE — 71250 CT THORAX DX C-: CPT

## 2020-06-23 ENCOUNTER — HOSPITAL ENCOUNTER (OUTPATIENT)
Dept: PULMONOLOGY | Age: 65
Discharge: HOME | End: 2020-06-23
Attending: INTERNAL MEDICINE
Payer: MEDICARE

## 2020-06-23 DIAGNOSIS — R06.02 SHORTNESS OF BREATH: ICD-10-CM

## 2020-06-23 PROCEDURE — 94729 DIFFUSING CAPACITY: CPT

## 2020-06-23 PROCEDURE — 94727 GAS DIL/WSHOT DETER LNG VOL: CPT

## 2020-06-23 PROCEDURE — 94010 BREATHING CAPACITY TEST: CPT

## 2020-06-25 NOTE — PROCEDURES
[address]   PULMONARY FUNCTION TEST    Name:  Christi Braga  MR#:  [id number]  :  1955  ACCOUNT #:  [de-identified]  DATE OF SERVICE:  2020      INDICATION:  Shortness of breath. FINDINGS:    SPIROMETRY:  Normal FVC, normal FEV1, reduced FEV1/FVC ratio. LUNG VOLUMES:  Normal TLC, normal VC, increased RV, and increased RV/TLC ratio. DIFFUSION CAPACITY:  Normal diffusion capacity. IMPRESSION:  Mild expiratory airflow limitation with FEV1 of 2.21 liters or 88% of predicted. There is more prominent limitation in mid expiratory flow rates and small airways disease cannot be excluded. Mild air trapping is present. Diffusion capacity is normal.  Clinical correlation is recommended.         Evelyn Hugo MD      [email or web address]  D:  2020 13:43  T:  2020 16:27  JOB #:  [id number]

## 2020-08-12 DIAGNOSIS — R35.0 BENIGN PROSTATIC HYPERPLASIA WITH URINARY FREQUENCY: ICD-10-CM

## 2020-08-12 DIAGNOSIS — E87.6 HYPOKALEMIA: ICD-10-CM

## 2020-08-12 DIAGNOSIS — J45.20 MILD INTERMITTENT REACTIVE AIRWAY DISEASE WITHOUT COMPLICATION: ICD-10-CM

## 2020-08-12 DIAGNOSIS — N40.1 BENIGN PROSTATIC HYPERPLASIA WITH URINARY FREQUENCY: ICD-10-CM

## 2020-08-12 RX ORDER — POTASSIUM CHLORIDE 750 MG/1
TABLET, EXTENDED RELEASE ORAL
Qty: 180 TAB | Refills: 1 | Status: SHIPPED | OUTPATIENT
Start: 2020-08-12 | End: 2021-05-25 | Stop reason: SDUPTHER

## 2020-08-12 RX ORDER — GUAIFENESIN 100 MG/5ML
LIQUID (ML) ORAL
Qty: 90 TAB | Refills: 0 | Status: SHIPPED | OUTPATIENT
Start: 2020-08-12 | End: 2021-05-25 | Stop reason: SDUPTHER

## 2020-08-12 RX ORDER — ALBUTEROL SULFATE 90 UG/1
AEROSOL, METERED RESPIRATORY (INHALATION)
Qty: 36 G | Refills: 3 | Status: SHIPPED | OUTPATIENT
Start: 2020-08-12 | End: 2021-01-27

## 2020-08-12 RX ORDER — TAMSULOSIN HYDROCHLORIDE 0.4 MG/1
CAPSULE ORAL
Qty: 180 CAP | Refills: 1 | Status: SHIPPED | OUTPATIENT
Start: 2020-08-12 | End: 2021-01-27

## 2020-08-18 ENCOUNTER — HOSPITAL ENCOUNTER (OUTPATIENT)
Dept: PET IMAGING | Age: 65
Discharge: HOME | End: 2020-08-18
Attending: UROLOGY
Payer: MEDICARE

## 2020-08-18 VITALS — BODY MASS INDEX: 32.74 KG/M2 | HEIGHT: 68 IN | WEIGHT: 216 LBS

## 2020-08-18 DIAGNOSIS — C61 PROSTATE CANCER (HCC): ICD-10-CM

## 2020-08-18 PROCEDURE — 78815 PET IMAGE W/CT SKULL-THIGH: CPT

## 2020-08-18 RX ORDER — SODIUM CHLORIDE 0.9 % (FLUSH) 0.9 %
10 SYRINGE (ML) INJECTION
Status: COMPLETED | OUTPATIENT
Start: 2020-08-18 | End: 2020-08-18

## 2020-08-18 RX ADMIN — Medication 10 ML: at 14:52

## 2020-09-15 ENCOUNTER — OFFICE VISIT (OUTPATIENT)
Dept: FAMILY MEDICINE CLINIC | Age: 65
End: 2020-09-15
Payer: MEDICARE

## 2020-09-15 VITALS
HEART RATE: 70 BPM | HEIGHT: 68 IN | BODY MASS INDEX: 32.58 KG/M2 | SYSTOLIC BLOOD PRESSURE: 148 MMHG | WEIGHT: 215 LBS | OXYGEN SATURATION: 98 % | RESPIRATION RATE: 16 BRPM | DIASTOLIC BLOOD PRESSURE: 92 MMHG

## 2020-09-15 DIAGNOSIS — E78.2 MIXED HYPERLIPIDEMIA: ICD-10-CM

## 2020-09-15 DIAGNOSIS — E66.9 OBESITY (BMI 30-39.9): ICD-10-CM

## 2020-09-15 DIAGNOSIS — C61 PRIMARY PROSTATE ADENOCARCINOMA (HCC): ICD-10-CM

## 2020-09-15 DIAGNOSIS — G47.33 OSA (OBSTRUCTIVE SLEEP APNEA): ICD-10-CM

## 2020-09-15 DIAGNOSIS — I10 ESSENTIAL HYPERTENSION: Primary | ICD-10-CM

## 2020-09-15 DIAGNOSIS — K21.00 GASTROESOPHAGEAL REFLUX DISEASE WITH ESOPHAGITIS: ICD-10-CM

## 2020-09-15 DIAGNOSIS — Z12.11 SCREEN FOR COLON CANCER: ICD-10-CM

## 2020-09-15 DIAGNOSIS — Z23 NEED FOR PROPHYLACTIC VACCINATION AGAINST STREPTOCOCCUS PNEUMONIAE (PNEUMOCOCCUS) AND INFLUENZA: ICD-10-CM

## 2020-09-15 DIAGNOSIS — D86.9 SARCOIDOSIS: ICD-10-CM

## 2020-09-15 DIAGNOSIS — Z23 ENCOUNTER FOR IMMUNIZATION: ICD-10-CM

## 2020-09-15 DIAGNOSIS — Z00.00 MEDICARE ANNUAL WELLNESS VISIT, SUBSEQUENT: ICD-10-CM

## 2020-09-15 DIAGNOSIS — Z71.89 ACP (ADVANCE CARE PLANNING): ICD-10-CM

## 2020-09-15 PROCEDURE — 99214 OFFICE O/P EST MOD 30 MIN: CPT | Performed by: NURSE PRACTITIONER

## 2020-09-15 PROCEDURE — G8427 DOCREV CUR MEDS BY ELIG CLIN: HCPCS | Performed by: NURSE PRACTITIONER

## 2020-09-15 PROCEDURE — G8432 DEP SCR NOT DOC, RNG: HCPCS | Performed by: NURSE PRACTITIONER

## 2020-09-15 PROCEDURE — G9711 PT HX TOT COL OR COLON CA: HCPCS | Performed by: NURSE PRACTITIONER

## 2020-09-15 PROCEDURE — G8753 SYS BP > OR = 140: HCPCS | Performed by: NURSE PRACTITIONER

## 2020-09-15 PROCEDURE — G0439 PPPS, SUBSEQ VISIT: HCPCS | Performed by: NURSE PRACTITIONER

## 2020-09-15 PROCEDURE — G8755 DIAS BP > OR = 90: HCPCS | Performed by: NURSE PRACTITIONER

## 2020-09-15 PROCEDURE — G8417 CALC BMI ABV UP PARAM F/U: HCPCS | Performed by: NURSE PRACTITIONER

## 2020-09-15 PROCEDURE — G0009 ADMIN PNEUMOCOCCAL VACCINE: HCPCS

## 2020-09-15 PROCEDURE — 90732 PPSV23 VACC 2 YRS+ SUBQ/IM: CPT

## 2020-09-15 RX ORDER — PREDNISONE 10 MG/1
10 TABLET ORAL
COMMUNITY
Start: 2020-07-30 | End: 2021-06-08 | Stop reason: ALTCHOICE

## 2020-09-15 RX ORDER — CHLORTHALIDONE 25 MG/1
25 TABLET ORAL DAILY
Qty: 30 TAB | Refills: 5 | Status: SHIPPED | OUTPATIENT
Start: 2020-09-15 | End: 2021-06-08 | Stop reason: ALTCHOICE

## 2020-09-15 NOTE — PATIENT INSTRUCTIONS
High Blood Pressure: Care Instructions Overview It's normal for blood pressure to go up and down throughout the day. But if it stays up, you have high blood pressure. Another name for high blood pressure is hypertension. Despite what a lot of people think, high blood pressure usually doesn't cause headaches or make you feel dizzy or lightheaded. It usually has no symptoms. But it does increase your risk of stroke, heart attack, and other problems. You and your doctor will talk about your risks of these problems based on your blood pressure. Your doctor will give you a goal for your blood pressure. Your goal will be based on your health and your age. Lifestyle changes, such as eating healthy and being active, are always important to help lower blood pressure. You might also take medicine to reach your blood pressure goal. 
Follow-up care is a key part of your treatment and safety. Be sure to make and go to all appointments, and call your doctor if you are having problems. It's also a good idea to know your test results and keep a list of the medicines you take. How can you care for yourself at home? Medical treatment · If you stop taking your medicine, your blood pressure will go back up. You may take one or more types of medicine to lower your blood pressure. Be safe with medicines. Take your medicine exactly as prescribed. Call your doctor if you think you are having a problem with your medicine. · Talk to your doctor before you start taking aspirin every day. Aspirin can help certain people lower their risk of a heart attack or stroke. But taking aspirin isn't right for everyone, because it can cause serious bleeding. · See your doctor regularly. You may need to see the doctor more often at first or until your blood pressure comes down. · If you are taking blood pressure medicine, talk to your doctor before you take decongestants or anti-inflammatory medicine, such as ibuprofen. Some of these medicines can raise blood pressure. · Learn how to check your blood pressure at home. Lifestyle changes · Stay at a healthy weight. This is especially important if you put on weight around the waist. Losing even 10 pounds can help you lower your blood pressure. · If your doctor recommends it, get more exercise. Walking is a good choice. Bit by bit, increase the amount you walk every day. Try for at least 30 minutes on most days of the week. You also may want to swim, bike, or do other activities. · Avoid or limit alcohol. Talk to your doctor about whether you can drink any alcohol. · Try to limit how much sodium you eat to less than 2,300 milligrams (mg) a day. Your doctor may ask you to try to eat less than 1,500 mg a day. · Eat plenty of fruits (such as bananas and oranges), vegetables, legumes, whole grains, and low-fat dairy products. · Lower the amount of saturated fat in your diet. Saturated fat is found in animal products such as milk, cheese, and meat. Limiting these foods may help you lose weight and also lower your risk for heart disease. · Do not smoke. Smoking increases your risk for heart attack and stroke. If you need help quitting, talk to your doctor about stop-smoking programs and medicines. These can increase your chances of quitting for good. When should you call for help? Call  911 anytime you think you may need emergency care. This may mean having symptoms that suggest that your blood pressure is causing a serious heart or blood vessel problem. Your blood pressure may be over 180/120. For example, call 911 if: 
  · You have symptoms of a heart attack. These may include: 
? Chest pain or pressure, or a strange feeling in the chest. 
? Sweating. ? Shortness of breath. ? Nausea or vomiting. ? Pain, pressure, or a strange feeling in the back, neck, jaw, or upper belly or in one or both shoulders or arms. ? Lightheadedness or sudden weakness. ? A fast or irregular heartbeat.  
  · You have symptoms of a stroke. These may include: 
? Sudden numbness, tingling, weakness, or loss of movement in your face, arm, or leg, especially on only one side of your body. ? Sudden vision changes. ? Sudden trouble speaking. ? Sudden confusion or trouble understanding simple statements. ? Sudden problems with walking or balance. ? A sudden, severe headache that is different from past headaches.  
  · You have severe back or belly pain. Do not wait until your blood pressure comes down on its own. Get help right away. Call your doctor now or seek immediate care if: 
  · Your blood pressure is much higher than normal (such as 180/120 or higher), but you don't have symptoms.  
  · You think high blood pressure is causing symptoms, such as: 
? Severe headache. 
? Blurry vision. Watch closely for changes in your health, and be sure to contact your doctor if: 
  · Your blood pressure measures higher than your doctor recommends at least 2 times. That means the top number is higher or the bottom number is higher, or both.  
  · You think you may be having side effects from your blood pressure medicine. Where can you learn more? Go to http://ambrose-vince.info/ Enter A190 in the search box to learn more about \"High Blood Pressure: Care Instructions. \" Current as of: December 16, 2019               Content Version: 12.6 © 7380-2132 [employer]. Care instructions adapted under license by [employer] (which disclaims liability or warranty for this information). If you have questions about a medical condition or this instruction, always ask your healthcare professional. [place] [address] any warranty or liability for your use of this information.

## 2020-09-15 NOTE — PROGRESS NOTES
[facility] Note    Rosa Elena Aguilar is a 60 y.o. male who was seen in clinic today (9/15/2020). Subjective:  Cardiovascular Review:  The patient has hypertension and hyperlipidemia. Patient has sleep apnea but not currently using CPAP. Diet and Lifestyle: generally follows a low fat low cholesterol diet, generally follows a low sodium diet, exercises regularly, nonsmoker  Home BP Monitoring: is well controlled at home, ranging 120's/80's. Pertinent ROS: taking medications as instructed, no medication side effects noted, no TIA's, no chest pain on exertion, no dyspnea on exertion, no swelling of ankles. Patient underwent lumbar laminectomy at L4-S1 in 1/2020. Patient reports improvement of bilateral leg pain. Patient had EGD in 4/2018. Patient has hiatal hernia and Schatzki ring which was dilated. Patient reports improvement of symptoms. No current medicatons taken.      Prostate Cancer  Patient seen by urology, Dr. Rasheeda Lovelace for prostate cancer. He underwent a prostatectomy (10/2019) after a prostate biopsy showed an adenocarcinoma of Jordyn 3+3=6 in the left medial prostate. His PSA has continued to rise following his prostatectomey. He is currently seen by oncology, Dr. Jayshree Pedro at [facility] in [place]. He is to begin radiaton and formone therapy. CT scan of the chest showed bilateral hilar adenopathy of uncertain etiology. Patient seen by pulmonology,. Dr. Denise Olmstead for ?sarcoidosis and pulmonary nodules. Currently treated with Prednisone and Symbicort. Prior to Admission medications    Medication Sig Start Date End Date Taking? Authorizing Provider   predniSONE (DELTASONE) 10 mg tablet 10 mg. 7/30/20  Yes Provider, Historical   chlorthalidone (HYGROTEN) 25 mg tablet Take 1 Tab by mouth daily.  9/15/20  Yes Bert Brizuela, NP   albuterol (PROVENTIL HFA, VENTOLIN HFA, PROAIR HFA) 90 mcg/actuation inhaler INHALE 2 PUFFS EVERY 6 HOURS AS NEEDED FOR WHEEZING 8/12/20  Yes Bert Brizuela NP   aspirin 81 mg chewable tablet CHEW AND SWALLOW 1 TABLET BY MOUTH DAILY. INDICATIONS: TREATMENT TO PREVENT A HEART ATTACK 8/12/20  Yes Raquel Lopez NP   metoprolol succinate (TOPROL-XL) 100 mg tablet TAKE 1 TABLET BY MOUTH DAILY 1/31/20  Yes Raquel Lopez NP   losartan (COZAAR) 100 mg tablet TAKE 1 TABLET EVERY DAY FOR BLOOD PRESSURE 1/31/20  Yes Raquel Lopez NP   amLODIPine (NORVASC) 10 mg tablet TAKE 1 TABLET EVERY DAY 1/31/20  Yes Bert Brizuela NP   cholecalciferol (VITAMIN D3) (2,000 UNITS /50 MCG) cap capsule Take 2,000 Units by mouth daily. 1/31/20  Yes Bert Brizuela NP   cyanocobalamin (VITAMIN B-12) 2,000 mcg TbER Take 2,000 mcg by mouth daily. 1/31/20  Yes Bert Brizuela NP   tamsulosin (FLOMAX) 0.4 mg capsule TAKE 2 CAPSULES EVERY DAY  FOR  PROSTATE 8/12/20   Raquel Lopez NP   potassium chloride (KLOR-CON) 10 mEq tablet TAKE 2 TABLETS DAILY FOR LOW AMOUNT OF POTASSIUM IN THE BLOOD 8/12/20   Raquel Lopez NP   azelastine (ASTELIN) 137 mcg (0.1 %) nasal spray 1 [place] by Both Nostrils route two (2) times a day. Use in each nostril as directed 1/31/20   Raquel Lopez NP   fluticasone propionate (FLONASE) 50 mcg/actuation nasal spray USE 2 SPRAYS IN EACH NOSTRIL EVERY DAY 1/31/20   Raquel Lopez NP   ketoconazole (NIZORAL) 2 % topical cream Apply  to affected area daily. 1/31/20   Raquel Lopez NP   senna-docusate (PERICOLACE) 8.6-50 mg per tablet Take 1 Tab by mouth two (2) times a day. 1/23/20   Peggi KENNETH Cohen   lactulose (CHRONULAC) 10 gram/15 mL solution Take 15 mL by mouth two (2) times daily as needed (constipation). 1/23/20   Pegbandar Cohen NP   mupirocin (BACTROBAN) 2 % ointment Apply  to affected area two (2) times a day. Apply to nose 1/8/20   Raquel Lopez NP   docusate sodium (COLACE) 100 mg capsule Take 1 Cap by mouth two (2) times a day.  For constipation 10/30/19 Nancy Nash NP          No Known Allergies        ROS  See HPI    Objective:   Physical Exam  Vitals signs and nursing note reviewed. Constitutional:       Appearance: He is well-developed. Neck:      Musculoskeletal: Normal range of motion and neck supple. Thyroid: No thyromegaly. Vascular: No carotid bruit or JVD. Cardiovascular:      Rate and Rhythm: Normal rate and regular rhythm. Heart sounds: No murmur. No friction rub. No gallop. Pulmonary:      Effort: Pulmonary effort is normal. No respiratory distress. Breath sounds: Normal breath sounds. Lymphadenopathy:      Cervical: No cervical adenopathy. Neurological:      Mental Status: He is alert and oriented to person, place, and time. Psychiatric:         Behavior: Behavior normal.           Visit Vitals  BP (!) 148/92 (BP 1 Location: Right arm, BP Patient Position: Sitting)   Pulse 70   Resp 16   Ht 5' 8\" (1.727 m)   Wt 215 lb (97.5 kg)   SpO2 98%   BMI 32.69 kg/m²       Assessment & Plan:  Diagnoses and all orders for this visit:    1. Essential hypertension  Remains elevated. Add diuretic. Referral to cardiology for no improvement. Reviewed low sodium diet and weight loss. -     chlorthalidone (HYGROTEN) 25 mg tablet; Take 1 Tab by mouth daily. 2. Mixed hyperlipidemia  Reviewed diet and lifestyle changes. 3. Gastroesophageal reflux disease with esophagitis  Stable, no changes to current therapy    4. Primary prostate adenocarcinoma [facility])  Managed by urology and oncology, no changes. 5. Sarcoidosis  Managed by pulmonology, no changes. 6. Obesity (BMI 30-39. 9)  Discussed need for weight loss through diet and exercise. Reviewed decreased caloric intake and increased activity. 7. NESTOR (obstructive sleep apnea)  Stable, no changes to current therapy    8.  Screen for colon cancer  Discussed need for colonoscopy as a screening for colon cancer.   -     REFERRAL TO GASTROENTEROLOGY    9. Need for prophylactic vaccination against Streptococcus pneumoniae (pneumococcus) and influenza  -     PNEUMOCOCCAL POLYSACCHARIDE VACCINE, 23-VALENT, ADULT OR IMMUNOSUPPRESSED PT DOSE,  -     ADMIN PNEUMOCOCCAL VACCINE    10. Encounter for immunization  -     PNEUMOCOCCAL POLYSACCHARIDE VACCINE, 23-VALENT, ADULT OR IMMUNOSUPPRESSED PT DOSE,  -     ADMIN PNEUMOCOCCAL VACCINE    11. Medicare annual wellness visit, subsequent    12. ACP (advance care planning)        I have discussed the diagnosis with the patient and the intended plan as seen in the above orders. The patient has received an after-visit summary along with patient information handout. I have discussed medication side effects and warnings with the patient as well.             Faith Aguilar NP

## 2020-09-20 PROBLEM — D86.9 SARCOIDOSIS: Status: ACTIVE | Noted: 2020-09-20

## 2020-09-21 NOTE — PROGRESS NOTES
This is the Subsequent Medicare Annual Wellness Exam, performed 12 months or more after the Initial AWV or the last Subsequent AWV    I have reviewed the patient's medical history in detail and updated the computerized patient record. History     Patient Active Problem List   Diagnosis Code    Vegetarian Z78.9    Allergic rhinitis J30.9    Right inguinal hernia K40.90    NESTOR (obstructive sleep apnea) G47.33    Illiteracy Z55.0    Essential hypertension I10    Obesity (BMI 30-39. 9) E66.9    Primary prostate adenocarcinoma ([facility] [address] C61    Gastroesophageal reflux disease with esophagitis K21.0    Mixed hyperlipidemia E78.2    Spinal stenosis M48.00    Lumbar stenosis M48.061    Sarcoidosis D86.9     Past Medical History:   Diagnosis Date    Adenomatous polyp of colon 2012    Angina pectoris     Bleeding ulcer     BPH (benign prostatic hyperplasia) 2011    BPH (benign prostatic hyperplasia) 12/11/2014    Cancer ([facility] [address] 2013    PROSTATE     Colon polyps 05/03/2017    Hypertension     Kidney infection     pt states he is unaware of having had this    Right inguinal hernia 7/11/2016    Sleep apnea     uses CPAP    Vegetarian       Past Surgical History:   Procedure Laterality Date    COLONOSCOPY  2012, 2017    adenomatous polyp    COLONOSCOPY N/A 5/3/2017    COLON[facility]vaez [facility] ENDOSCOPY    HX CHOLECYSTECTOMY  2010    HX HERNIA REPAIR Right 7/29/16    inguinal w/mesh by Dr. Sadie Dean Right 02/2017    HX ORTHOPAEDIC      Back surgery    HX OTHER SURGICAL  2011    prostate biopsy - normal/second bx 2/2017 - \"tip had cancer\" - another bx is planned    HX OTHER SURGICAL  2009    FATTY TUMOR REMOVED FROM BACK    HX PROSTATECTOMY  10/22/2019    HX TONSILLECTOMY       Current Outpatient Medications   Medication Sig Dispense Refill    predniSONE (DELTASONE) 10 mg tablet 10 mg.      chlorthalidone (HYGROTEN) 25 mg tablet Take 1 Tab by mouth daily. 30 Tab 5    albuterol (PROVENTIL HFA, VENTOLIN HFA, PROAIR HFA) 90 mcg/actuation inhaler INHALE 2 PUFFS EVERY 6 HOURS AS NEEDED FOR WHEEZING 36 g 3    aspirin 81 mg chewable tablet CHEW AND SWALLOW 1 TABLET BY MOUTH DAILY. INDICATIONS: TREATMENT TO PREVENT A HEART ATTACK 90 Tab 0    metoprolol succinate (TOPROL-XL) 100 mg tablet TAKE 1 TABLET BY MOUTH DAILY 90 Tab 1    losartan (COZAAR) 100 mg tablet TAKE 1 TABLET EVERY DAY FOR BLOOD PRESSURE 90 Tab 1    amLODIPine (NORVASC) 10 mg tablet TAKE 1 TABLET EVERY DAY 90 Tab 1    cholecalciferol (VITAMIN D3) (2,000 UNITS /50 MCG) cap capsule Take 2,000 Units by mouth daily. 90 Cap 1    cyanocobalamin (VITAMIN B-12) 2,000 mcg TbER Take 2,000 mcg by mouth daily. 90 Tab 1    tamsulosin (FLOMAX) 0.4 mg capsule TAKE 2 CAPSULES EVERY DAY  FOR  PROSTATE 180 Cap 1    potassium chloride (KLOR-CON) 10 mEq tablet TAKE 2 TABLETS DAILY FOR LOW AMOUNT OF POTASSIUM IN THE BLOOD 180 Tab 1    azelastine (ASTELIN) 137 mcg (0.1 %) nasal spray 1 [place] by Both Nostrils route two (2) times a day. Use in each nostril as directed 3 Bottle 4    fluticasone propionate (FLONASE) 50 mcg/actuation nasal spray USE 2 SPRAYS IN EACH NOSTRIL EVERY DAY 3 Bottle 4    ketoconazole (NIZORAL) 2 % topical cream Apply  to affected area daily. 15 g 0    senna-docusate (PERICOLACE) 8.6-50 mg per tablet Take 1 Tab by mouth two (2) times a day. 30 Tab 0    lactulose (CHRONULAC) 10 gram/15 mL solution Take 15 mL by mouth two (2) times daily as needed (constipation). 120 mL 0    mupirocin (BACTROBAN) 2 % ointment Apply  to affected area two (2) times a day. Apply to nose 22 g 0    docusate sodium (COLACE) 100 mg capsule Take 1 Cap by mouth two (2) times a day.  For constipation 60 Cap 2     No Known Allergies    Family History   Problem Relation Age of Onset    Hypertension Mother     Hypertension Maternal Grandmother     Hypertension Brother     Alcohol abuse Brother     Cancer Brother pancreatic    Diabetes Brother     Hypertension Brother     Heart Disease Brother     Heart Attack Father     Anesth Problems Neg Hx      Social History     Tobacco Use    Smoking status: Never Smoker    Smokeless tobacco: Never Used   Substance Use Topics    Alcohol use: Not Currently     Alcohol/week: 0.0 standard drinks     Comment: OCCASIONALLY       Depression Risk Factor Screening:     3 most recent PHQ Screens 9/15/2020   Little interest or pleasure in doing things Not at all   Feeling down, depressed, irritable, or hopeless Not at all   Total Score PHQ 2 0   Trouble falling or staying asleep, or sleeping too much -   Feeling tired or having little energy -   Poor appetite, weight loss, or overeating -   Feeling bad about yourself - or that you are a failure or have let yourself or your family down -   Trouble concentrating on things such as school, work, reading, or watching TV -   Moving or speaking so slowly that other people could have noticed; or the opposite being so fidgety that others notice -   Thoughts of being better off dead, or hurting yourself in some way -   PHQ 9 Score -   How difficult have these problems made it for you to do your work, take care of your home and get along with others -       Alcohol Risk Screen   Do you average more than 2 drinks per night or 14 drinks a week: No    On any one occasion in the past three months have you have had more than 4 drinks containing alcohol:  No        Functional Ability and Level of Safety:   Hearing: Hearing is good. Activities of Daily Living: The home contains: no safety equipment. Patient does total self care     Ambulation: with no difficulty     Fall Risk:  Fall Risk Assessment, last 12 mths 1/8/2020   Able to walk? Yes   Fall in past 12 months?  No     Abuse Screen:  Patient is not abused       Cognitive Screening   Has your family/caregiver stated any concerns about your memory: no       Patient Care Team   Patient Care Team:  Verna Napoles NP as PCP - General (Nurse Practitioner)  Verna Napoles NP as PCP - [facility] Empaneled Provider  Can Webb MD (General Surgery)  Lor Dooley MD (Gastroenterology)  Steph Sandoval MD (Urology)  Effie Donaldson MD as Physician (Sleep Medicine)  Katerin Kahn V as Paramedic    Assessment/Plan   Education and counseling provided:  Are appropriate based on today's review and evaluation  Colorectal cancer screening tests    Diagnoses and all orders for this visit:    1. Essential hypertension  -     chlorthalidone (HYGROTEN) 25 mg tablet; Take 1 Tab by mouth daily. 2. Mixed hyperlipidemia    3. Gastroesophageal reflux disease with esophagitis    4. Primary prostate adenocarcinoma ([address]    5. Sarcoidosis    6. Obesity (BMI 30-39.9)    7. NESTOR (obstructive sleep apnea)    8. Screen for colon cancer  -     REFERRAL TO GASTROENTEROLOGY    9. Need for prophylactic vaccination against Streptococcus pneumoniae (pneumococcus) and influenza  -     PNEUMOCOCCAL POLYSACCHARIDE VACCINE, 23-VALENT, ADULT OR IMMUNOSUPPRESSED PT DOSE,  -     ADMIN PNEUMOCOCCAL VACCINE    10. Encounter for immunization  -     PNEUMOCOCCAL POLYSACCHARIDE VACCINE, 23-VALENT, ADULT OR IMMUNOSUPPRESSED PT DOSE,  -     ADMIN PNEUMOCOCCAL VACCINE    11. Medicare annual wellness visit, subsequent    12.  ACP (advance care planning)        Health Maintenance Due   Topic Date Due    Pneumococcal 0-64 years (1 of 1 - PPSV23) 05/04/1962    Shingrix Vaccine Age 50> (2 of 2) 01/04/2019    Medicare Yearly Exam  08/02/2020    Flu Vaccine (1) 09/01/2020

## 2020-10-14 ENCOUNTER — ANESTHESIA EVENT (OUTPATIENT)
Dept: ENDOSCOPY | Age: 65
End: 2020-10-14
Payer: MEDICARE

## 2020-10-14 ENCOUNTER — ANESTHESIA (OUTPATIENT)
Dept: ENDOSCOPY | Age: 65
End: 2020-10-14
Payer: MEDICARE

## 2020-10-14 ENCOUNTER — HOSPITAL ENCOUNTER (OUTPATIENT)
Age: 65
Setting detail: OUTPATIENT SURGERY
Discharge: HOME | End: 2020-10-14
Attending: INTERNAL MEDICINE | Admitting: INTERNAL MEDICINE
Payer: MEDICARE

## 2020-10-14 VITALS
HEART RATE: 63 BPM | TEMPERATURE: 97.8 F | BODY MASS INDEX: 33.45 KG/M2 | SYSTOLIC BLOOD PRESSURE: 146 MMHG | DIASTOLIC BLOOD PRESSURE: 89 MMHG | RESPIRATION RATE: 17 BRPM | OXYGEN SATURATION: 95 % | WEIGHT: 220 LBS

## 2020-10-14 PROCEDURE — 74011250636 HC RX REV CODE- 250/636: Performed by: INTERNAL MEDICINE

## 2020-10-14 PROCEDURE — 76040000019: Performed by: INTERNAL MEDICINE

## 2020-10-14 PROCEDURE — 2709999900 HC NON-CHARGEABLE SUPPLY: Performed by: INTERNAL MEDICINE

## 2020-10-14 PROCEDURE — 76060000031 HC ANESTHESIA FIRST 0.5 HR: Performed by: INTERNAL MEDICINE

## 2020-10-14 PROCEDURE — 88305 TISSUE EXAM BY PATHOLOGIST: CPT

## 2020-10-14 PROCEDURE — 74011250636 HC RX REV CODE- 250/636: Performed by: NURSE PRACTITIONER

## 2020-10-14 PROCEDURE — 77030009426 HC FCPS BIOP ENDOSC BSC -B: Performed by: INTERNAL MEDICINE

## 2020-10-14 RX ORDER — EPINEPHRINE 0.1 MG/ML
1 INJECTION INTRACARDIAC; INTRAVENOUS
Status: DISCONTINUED | OUTPATIENT
Start: 2020-10-14 | End: 2020-10-14 | Stop reason: HOSPADM

## 2020-10-14 RX ORDER — FLUMAZENIL 0.1 MG/ML
0.2 INJECTION INTRAVENOUS
Status: DISCONTINUED | OUTPATIENT
Start: 2020-10-14 | End: 2020-10-14 | Stop reason: HOSPADM

## 2020-10-14 RX ORDER — PROPOFOL 10 MG/ML
INJECTION, EMULSION INTRAVENOUS AS NEEDED
Status: DISCONTINUED | OUTPATIENT
Start: 2020-10-14 | End: 2020-10-14 | Stop reason: HOSPADM

## 2020-10-14 RX ORDER — FENTANYL CITRATE 50 UG/ML
25-200 INJECTION, SOLUTION INTRAMUSCULAR; INTRAVENOUS
Status: DISCONTINUED | OUTPATIENT
Start: 2020-10-14 | End: 2020-10-14 | Stop reason: HOSPADM

## 2020-10-14 RX ORDER — MONTELUKAST SODIUM 10 MG/1
10 TABLET ORAL DAILY
COMMUNITY
End: 2021-05-25 | Stop reason: SDUPTHER

## 2020-10-14 RX ORDER — NALOXONE HYDROCHLORIDE 0.4 MG/ML
0.4 INJECTION, SOLUTION INTRAMUSCULAR; INTRAVENOUS; SUBCUTANEOUS
Status: DISCONTINUED | OUTPATIENT
Start: 2020-10-14 | End: 2020-10-14 | Stop reason: HOSPADM

## 2020-10-14 RX ORDER — SODIUM CHLORIDE 9 MG/ML
50 INJECTION, SOLUTION INTRAVENOUS CONTINUOUS
Status: DISCONTINUED | OUTPATIENT
Start: 2020-10-14 | End: 2020-10-14 | Stop reason: HOSPADM

## 2020-10-14 RX ORDER — SODIUM CHLORIDE 0.9 % (FLUSH) 0.9 %
5-40 SYRINGE (ML) INJECTION AS NEEDED
Status: DISCONTINUED | OUTPATIENT
Start: 2020-10-14 | End: 2020-10-14 | Stop reason: HOSPADM

## 2020-10-14 RX ORDER — SODIUM CHLORIDE, SODIUM LACTATE, POTASSIUM CHLORIDE, CALCIUM CHLORIDE 600; 310; 30; 20 MG/100ML; MG/100ML; MG/100ML; MG/100ML
INJECTION, SOLUTION INTRAVENOUS
Status: DISCONTINUED | OUTPATIENT
Start: 2020-10-14 | End: 2020-10-14 | Stop reason: HOSPADM

## 2020-10-14 RX ORDER — SULFAMETHOXAZOLE AND TRIMETHOPRIM 800; 160 MG/1; MG/1
1 TABLET ORAL 2 TIMES DAILY
COMMUNITY
End: 2021-01-27

## 2020-10-14 RX ORDER — SODIUM CHLORIDE 0.9 % (FLUSH) 0.9 %
5-40 SYRINGE (ML) INJECTION EVERY 8 HOURS
Status: DISCONTINUED | OUTPATIENT
Start: 2020-10-14 | End: 2020-10-14 | Stop reason: HOSPADM

## 2020-10-14 RX ORDER — DEXTROMETHORPHAN/PSEUDOEPHED 2.5-7.5/.8
1.2 DROPS ORAL
Status: DISCONTINUED | OUTPATIENT
Start: 2020-10-14 | End: 2020-10-14 | Stop reason: HOSPADM

## 2020-10-14 RX ORDER — MIDAZOLAM HYDROCHLORIDE 1 MG/ML
.25-5 INJECTION, SOLUTION INTRAMUSCULAR; INTRAVENOUS
Status: DISCONTINUED | OUTPATIENT
Start: 2020-10-14 | End: 2020-10-14 | Stop reason: HOSPADM

## 2020-10-14 RX ORDER — ATROPINE SULFATE 0.1 MG/ML
0.5 INJECTION INTRAVENOUS
Status: DISCONTINUED | OUTPATIENT
Start: 2020-10-14 | End: 2020-10-14 | Stop reason: HOSPADM

## 2020-10-14 RX ADMIN — PROPOFOL 30 MG: 10 INJECTION, EMULSION INTRAVENOUS at 15:44

## 2020-10-14 RX ADMIN — PROPOFOL 30 MG: 10 INJECTION, EMULSION INTRAVENOUS at 15:46

## 2020-10-14 RX ADMIN — PROPOFOL 30 MG: 10 INJECTION, EMULSION INTRAVENOUS at 15:39

## 2020-10-14 RX ADMIN — PROPOFOL 50 MG: 10 INJECTION, EMULSION INTRAVENOUS at 15:36

## 2020-10-14 RX ADMIN — PROPOFOL 80 MG: 10 INJECTION, EMULSION INTRAVENOUS at 15:34

## 2020-10-14 RX ADMIN — PROPOFOL 30 MG: 10 INJECTION, EMULSION INTRAVENOUS at 15:41

## 2020-10-14 RX ADMIN — PROPOFOL 30 MG: 10 INJECTION, EMULSION INTRAVENOUS at 15:37

## 2020-10-14 RX ADMIN — PROPOFOL 50 MG: 10 INJECTION, EMULSION INTRAVENOUS at 15:35

## 2020-10-14 RX ADMIN — PROPOFOL 30 MG: 10 INJECTION, EMULSION INTRAVENOUS at 15:48

## 2020-10-14 RX ADMIN — SODIUM CHLORIDE, SODIUM LACTATE, POTASSIUM CHLORIDE, AND CALCIUM CHLORIDE: 600; 310; 30; 20 INJECTION, SOLUTION INTRAVENOUS at 15:29

## 2020-10-14 NOTE — PROCEDURES
[address], [address] [place] [address]      Colonoscopy Operative Report    Moni Schmidt  [id number]  1955      Procedure Type:   Colonoscopy with polypectomy (hot biopsy)     Indications:    Personal history of colon polyps (screening only)       Pre-operative Diagnosis: see indication above    Post-operative Diagnosis:  See findings below    :  Cristy Grant MD    Surgical Assistant: Endoscopy Technician-1: Cade Bey  Endoscopy RN-1: Prem Shaw    Implants:  None    Referring Provider: Nandini Ram NP      Sedation:  MAC anesthesia Propofol      Procedure Details:  After informed consent was obtained with all risks and benefits of procedure explained and preoperative exam completed, the patient was taken to the endoscopy suite and placed in the left lateral decubitus position. Upon sequential sedation as per above, a digital rectal exam was performed demonstrating internal hemorrhoids. The Olympus videocolonoscope  was inserted in the rectum and carefully advanced to the cecum, which was identified by the ileocecal valve and appendiceal orifice. The cecum was identified by the ileocecal valve and appendiceal orifice. The quality of preparation was good. The colonoscope was slowly withdrawn with careful evaluation between folds. Retroflexion in the rectum was completed . Findings:   Rectum: normal  Sigmoid: normal  Descending Colon: normal  Transverse Colon: 2 sessile polyps around 1 cm in size each removed by hot biopsies  Ascending Colon: normal  Cecum: normal        Specimen Removed:  as above    Complications: None. EBL:  None. Impression:    see findings    Recommendations: --Await pathology.       Recommendation for next colonscopy in 3 years    Signed By: Cristy Grant MD     10/14/2020  3:54 PM

## 2020-10-14 NOTE — ROUTINE PROCESS
Trevorberna Payne  1955  [id number]    Situation:  Verbal report received from: Jazlyn  Procedure: Procedure(s):  COLONOSCOPY :-  ENDOSCOPIC POLYPECTOMY    Background:    Preoperative diagnosis: HISTORY OF POLYPS  Postoperative diagnosis: 1.  Transverse Colon Polyps    :  Dr. Zach Gan  Assistant(s): Endoscopy Technician-1: Elkin Villegas  Endoscopy RN-1: Jere Bernabe    Specimens:   ID Type Source Tests Collected by Time Destination   1 : Transverse Colon Polyps Preservative Colon, Transverse  Caterina Trejo MD 10/14/2020 1545 Pathology     H. Pylori      Assessment:  Intra-procedure medications     Anesthesia gave intra-procedure sedation and medications, see anesthesia flow sheet yes    Intravenous fluids: NS@ KVO     Vital signs stable yes  Abdominal assessment: round and soft yes    Recommendation:  Discharge patient per MD order   Return to floor   Family or Friend    Permission to share finding with family or friend

## 2020-10-14 NOTE — ANESTHESIA PREPROCEDURE EVALUATION
Relevant Problems   No relevant active problems       Anesthetic History   No history of anesthetic complications            Review of Systems / Medical History  Patient summary reviewed, nursing notes reviewed and pertinent labs reviewed    Pulmonary        Sleep apnea           Neuro/Psych   Within defined limits           Cardiovascular  Within defined limits  Hypertension              Exercise tolerance: >4 METS     GI/Hepatic/Renal     GERD           Endo/Other        Obesity     Other Findings   Comments: sarcoid         Physical Exam    Airway  Mallampati: II  TM Distance: > 6 cm  Neck ROM: normal range of motion   Mouth opening: Normal     Cardiovascular  Regular rate and rhythm,  S1 and S2 normal,  no murmur, click, rub, or gallop             Dental  No notable dental hx       Pulmonary  Breath sounds clear to auscultation               Abdominal  GI exam deferred       Other Findings            Anesthetic Plan    ASA: 3  Anesthesia type: general and MAC          Induction: Intravenous  Anesthetic plan and risks discussed with: Patient

## 2020-10-14 NOTE — DISCHARGE INSTRUCTIONS
[facility]    COLON DISCHARGE INSTRUCTIONS    April Almodovar  [id number]  1955    Discomfort:  Redness at IV site- apply warm compress to area; if redness or soreness persist- contact your physician  There may be a slight amount of blood passed from the rectum  Gaseous discomfort- walking, belching will help relieve any discomfort  You may not operate a vehicle for 12 hours  You may not engage in an occupation involving machinery or appliances for rest of today  You may not drink alcoholic beverages for at least 12 hours  Avoid making any critical decisions for at least 24 hour  DIET:  You may resume your regular diet - however -  remember your colon is empty and a heavy meal will produce gas. Avoid these foods:  vegetables, fried / greasy foods, carbonated drinks     ACTIVITY:  You may  resume your normal daily activities it is recommended that you spend the remainder of the day resting -  avoid any strenuous activity. CALL M.D. ANY SIGN OF:   Increasing pain, nausea, vomiting  Abdominal distension (swelling)  New increased bleeding (oral or rectal)  Fever (chills)  Pain in chest area  Bloody discharge from nose or mouth  Shortness of breath      Follow-up Instructions:   Call Dr. Cullen Cuevas for any questions or problems at [address] [place] [address]:   Your colonoscopy showed 2 small polyps removed, rest of exam was normal.  Telephone # [phone]      Signed By: Cullen Cuevas MD     10/14/2020  3:56 PM       DISCHARGE SUMMARY from Nurse    The following personal items collected during your admission are returned to you:   Dental Appliance: Dental Appliances: None  Vision: Visual Aid: Glasses  Hearing Aid:    Jewelry:    Clothing:    Other Valuables:    Valuables sent to safe:        Learning About Coronavirus (COVID-19)  Coronavirus [phone]): Overview  What is coronavirus (YJVOL-98)?   The coronavirus disease (COVID-19) is caused by a virus. It is an illness that was first found in [place], [place], in December 2019. It has since spread worldwide. The virus can cause fever, cough, and trouble breathing. In severe cases, it can cause pneumonia and make it hard to breathe without help. It can cause death. Coronaviruses are a large group of viruses. They cause the common cold. They also cause more serious illnesses like Middle East respiratory syndrome (MERS) and severe acute respiratory syndrome (SARS). COVID-19 is caused by a novel coronavirus. That means it's a new type that has not been seen in people before. This virus spreads person-to-person through droplets from coughing and sneezing. It can also spread when you are close to someone who is infected. And it can spread when you touch something that has the virus on it, such as a doorknob or a tabletop. What can you do to protect yourself from coronavirus (COVID-19)? The best way to protect yourself from getting sick is to:  · Avoid areas where there is an outbreak. · Avoid contact with people who may be infected. · Wash your hands often with soap or alcohol-based hand sanitizers. · Avoid crowds and try to stay at least 6 feet away from other people. · Wash your hands often, especially after you cough or sneeze. Use soap and water, and scrub for at least 20 seconds. If soap and water aren't available, use an alcohol-based hand . · Avoid touching your mouth, nose, and eyes. What can you do to avoid spreading the virus to others? To help avoid spreading the virus to others:  · Cover your mouth with a tissue when you cough or sneeze. Then throw the tissue in the trash. · Use a disinfectant to clean things that you touch often. · Stay home if you are sick or have been exposed to the virus. Don't go to school, work, or public areas. And don't use public transportation. · If you are sick:  ? Leave your home only if you need to get medical care.  But call the doctor's office first so they know you're coming. And wear a face mask, if you have one.  ? If you have a face mask, wear it whenever you're around other people. It can help stop the spread of the virus when you cough or sneeze. ? Clean and disinfect your home every day. Use household  and disinfectant wipes or sprays. Take special care to clean things that you grab with your hands. These include doorknobs, remote controls, phones, and handles on your refrigerator and microwave. And don't forget countertops, tabletops, bathrooms, and computer keyboards. When to call for help  Call 911 anytime you think you may need emergency care. For example, call if:  · You have severe trouble breathing. (You can't talk at all.)  · You have constant chest pain or pressure. · You are severely dizzy or lightheaded. · You are confused or can't think clearly. · Your face and lips have a blue color. · You pass out (lose consciousness) or are very hard to wake up. Call your doctor now if you develop symptoms such as:  · Shortness of breath. · Fever. · Cough. If you need to get care, call ahead to the doctor's office for instructions before you go. Make sure you wear a face mask, if you have one, to prevent exposing other people to the virus. Where can you get the latest information? The following health organizations are tracking and studying this virus. Their websites contain the most up-to-date information. [place] also learn what to do if you think you may have been exposed to the virus. · U.S. Centers for Disease Control and Prevention (CDC): The CDC provides updated news about the disease and travel advice. The website also tells you how to prevent the spread of infection. www.cdc.gov  · World Health Organization [facility]): WHO offers information about the virus outbreaks.  WHO also has travel advice. www.who.int  Current as of: April 1, 2020               Content Version: 12.4  © 0872-3114 [employer]. Care instructions adapted under license by your healthcare professional. If you have questions about a medical condition or this instruction, always ask your healthcare professional. [address] any warranty or liability for your use of this information.

## 2020-10-14 NOTE — PERIOP NOTES

## 2020-10-14 NOTE — ANESTHESIA POSTPROCEDURE EVALUATION
Post-Anesthesia Evaluation and Assessment    Patient: Mitch Vogt MRN: [id number]  SSN: [id number]    YOB: 1955  Age: 60 y.o. Sex: male      I have evaluated the patient and they are stable and ready for discharge from the PACU. Cardiovascular Function/Vital Signs  Visit Vitals  /74   Pulse (!) 54   Temp 36.7 °C (98 °F)   Resp 19   Wt 99.8 kg (220 lb)   SpO2 95%   BMI 33.45 kg/m²       Patient is status post MAC anesthesia for Procedure(s):  COLONOSCOPY :-  ENDOSCOPIC POLYPECTOMY. Nausea/Vomiting: None    Postoperative hydration reviewed and adequate. Pain:  Pain Scale 1: Numeric (0 - 10) (10/14/20 1432)  Pain Intensity 1: 0 (10/14/20 1432)   Managed    Neurological Status: At baseline    Mental Status, Level of Consciousness: Alert and  oriented to person, place, and time    Pulmonary Status:   O2 Device: CO2 nasal cannula (10/14/20 1550)   Adequate oxygenation and airway patent    Complications related to anesthesia: None    Post-anesthesia assessment completed. No concerns    Signed By: Skyler Melgar MD     October 14, 2020              Procedure(s):  COLONOSCOPY :-  ENDOSCOPIC POLYPECTOMY. general, MAC    <BSHSIANPOST>    INITIAL Post-op Vital signs:   Vitals Value Taken Time   /96 10/14/2020  4:03 PM   Temp     Pulse 57 10/14/2020  4:04 PM   Resp 11 10/14/2020  4:04 PM   SpO2 92 % 10/14/2020  3:58 PM   Vitals shown include unvalidated device data.

## 2020-10-15 ENCOUNTER — TRANSCRIBE ORDER (OUTPATIENT)
Dept: REGISTRATION | Age: 65
End: 2020-10-15

## 2020-10-15 ENCOUNTER — HOSPITAL ENCOUNTER (OUTPATIENT)
Dept: GENERAL RADIOLOGY | Age: 65
Discharge: HOME | End: 2020-10-15
Payer: MEDICARE

## 2020-10-15 DIAGNOSIS — J45.909 ASTHMA: ICD-10-CM

## 2020-10-15 DIAGNOSIS — J45.909 ASTHMA: Primary | ICD-10-CM

## 2020-10-15 PROCEDURE — 71046 X-RAY EXAM CHEST 2 VIEWS: CPT

## 2020-11-20 DIAGNOSIS — I10 ESSENTIAL HYPERTENSION: ICD-10-CM

## 2020-11-24 DIAGNOSIS — I10 ESSENTIAL HYPERTENSION: ICD-10-CM

## 2020-11-24 RX ORDER — METOPROLOL SUCCINATE 100 MG/1
TABLET, EXTENDED RELEASE ORAL
Qty: 90 TAB | Refills: 1 | Status: CANCELLED | OUTPATIENT
Start: 2020-11-24

## 2020-11-24 RX ORDER — METOPROLOL SUCCINATE 100 MG/1
TABLET, EXTENDED RELEASE ORAL
Qty: 90 TAB | Refills: 1 | Status: SHIPPED | OUTPATIENT
Start: 2020-11-24 | End: 2021-04-09 | Stop reason: SDUPTHER

## 2020-11-24 RX ORDER — LOSARTAN POTASSIUM 100 MG/1
TABLET ORAL
Qty: 90 TAB | Refills: 1 | Status: SHIPPED | OUTPATIENT
Start: 2020-11-24 | End: 2021-04-09 | Stop reason: SDUPTHER

## 2020-11-24 RX ORDER — AMLODIPINE BESYLATE 10 MG/1
TABLET ORAL
Qty: 90 TAB | Refills: 1 | Status: CANCELLED | OUTPATIENT
Start: 2020-11-24

## 2020-11-24 RX ORDER — AMLODIPINE BESYLATE 10 MG/1
TABLET ORAL
Qty: 90 TAB | Refills: 1 | Status: SHIPPED | OUTPATIENT
Start: 2020-11-24 | End: 2021-04-09 | Stop reason: SDUPTHER

## 2020-11-24 RX ORDER — LOSARTAN POTASSIUM 100 MG/1
TABLET ORAL
Qty: 90 TAB | Refills: 1 | Status: CANCELLED | OUTPATIENT
Start: 2020-11-24

## 2020-11-24 NOTE — TELEPHONE ENCOUNTER
Duplicate request - Norvasc 10 mg , Cozaar 100 mg, Toprol  mg all #90 with 1 refill was sent to [facility] on 11/24/2020.      Requested Prescriptions     Pending Prescriptions Disp Refills    amLODIPine (NORVASC) 10 mg tablet 90 Tab 1    losartan (COZAAR) 100 mg tablet 90 Tab 1    metoprolol succinate (TOPROL-XL) 100 mg tablet 90 Tab 1     Sig: TAKE 1 TABLET EVERY DAY

## 2020-11-24 NOTE — TELEPHONE ENCOUNTER
Need mail order sent in amlodipine 10 mg, losartan 100 mg and metoprolol 100 mg.       Best call back # [phone]

## 2020-12-16 ENCOUNTER — TRANSCRIBE ORDER (OUTPATIENT)
Dept: SCHEDULING | Age: 65
End: 2020-12-16

## 2020-12-16 DIAGNOSIS — R59.0 LAD (LYMPHADENOPATHY), MEDIASTINAL: Primary | ICD-10-CM

## 2021-01-27 ENCOUNTER — OFFICE VISIT (OUTPATIENT)
Dept: FAMILY MEDICINE CLINIC | Age: 66
End: 2021-01-27
Payer: MEDICARE

## 2021-01-27 VITALS
TEMPERATURE: 97.7 F | WEIGHT: 218.6 LBS | SYSTOLIC BLOOD PRESSURE: 169 MMHG | BODY MASS INDEX: 33.13 KG/M2 | DIASTOLIC BLOOD PRESSURE: 107 MMHG | HEART RATE: 69 BPM | OXYGEN SATURATION: 97 % | HEIGHT: 68 IN | RESPIRATION RATE: 16 BRPM

## 2021-01-27 DIAGNOSIS — D86.9 SARCOIDOSIS: ICD-10-CM

## 2021-01-27 DIAGNOSIS — E78.2 MIXED HYPERLIPIDEMIA: ICD-10-CM

## 2021-01-27 DIAGNOSIS — G47.33 OSA (OBSTRUCTIVE SLEEP APNEA): ICD-10-CM

## 2021-01-27 DIAGNOSIS — K21.00 GASTROESOPHAGEAL REFLUX DISEASE WITH ESOPHAGITIS WITHOUT HEMORRHAGE: ICD-10-CM

## 2021-01-27 DIAGNOSIS — I10 ESSENTIAL HYPERTENSION: Primary | ICD-10-CM

## 2021-01-27 DIAGNOSIS — J32.0 CHRONIC MAXILLARY SINUSITIS: ICD-10-CM

## 2021-01-27 DIAGNOSIS — C61 PRIMARY PROSTATE ADENOCARCINOMA (HCC): ICD-10-CM

## 2021-01-27 PROCEDURE — G8755 DIAS BP > OR = 90: HCPCS | Performed by: NURSE PRACTITIONER

## 2021-01-27 PROCEDURE — G8427 DOCREV CUR MEDS BY ELIG CLIN: HCPCS | Performed by: NURSE PRACTITIONER

## 2021-01-27 PROCEDURE — G9711 PT HX TOT COL OR COLON CA: HCPCS | Performed by: NURSE PRACTITIONER

## 2021-01-27 PROCEDURE — G8432 DEP SCR NOT DOC, RNG: HCPCS | Performed by: NURSE PRACTITIONER

## 2021-01-27 PROCEDURE — G8753 SYS BP > OR = 140: HCPCS | Performed by: NURSE PRACTITIONER

## 2021-01-27 PROCEDURE — 99214 OFFICE O/P EST MOD 30 MIN: CPT | Performed by: NURSE PRACTITIONER

## 2021-01-27 PROCEDURE — G8417 CALC BMI ABV UP PARAM F/U: HCPCS | Performed by: NURSE PRACTITIONER

## 2021-01-27 RX ORDER — AMOXICILLIN AND CLAVULANATE POTASSIUM 875; 125 MG/1; MG/1
1 TABLET, FILM COATED ORAL 2 TIMES DAILY
Qty: 20 TAB | Refills: 0 | Status: SHIPPED | OUTPATIENT
Start: 2021-01-27 | End: 2021-02-06

## 2021-01-27 RX ORDER — BENZONATATE 100 MG/1
100 CAPSULE ORAL
Qty: 20 CAP | Refills: 1 | Status: SHIPPED | OUTPATIENT
Start: 2021-01-27 | End: 2021-06-08 | Stop reason: ALTCHOICE

## 2021-01-27 NOTE — PATIENT INSTRUCTIONS
High Blood Pressure: Care Instructions Overview It's normal for blood pressure to go up and down throughout the day. But if it stays up, you have high blood pressure. Another name for high blood pressure is hypertension. Despite what a lot of people think, high blood pressure usually doesn't cause headaches or make you feel dizzy or lightheaded. It usually has no symptoms. But it does increase your risk of stroke, heart attack, and other problems. You and your doctor will talk about your risks of these problems based on your blood pressure. Your doctor will give you a goal for your blood pressure. Your goal will be based on your health and your age. Lifestyle changes, such as eating healthy and being active, are always important to help lower blood pressure. You might also take medicine to reach your blood pressure goal. 
Follow-up care is a key part of your treatment and safety. Be sure to make and go to all appointments, and call your doctor if you are having problems. It's also a good idea to know your test results and keep a list of the medicines you take. How can you care for yourself at home? Medical treatment · If you stop taking your medicine, your blood pressure will go back up. You may take one or more types of medicine to lower your blood pressure. Be safe with medicines. Take your medicine exactly as prescribed. Call your doctor if you think you are having a problem with your medicine. · Talk to your doctor before you start taking aspirin every day. Aspirin can help certain people lower their risk of a heart attack or stroke. But taking aspirin isn't right for everyone, because it can cause serious bleeding. · See your doctor regularly. You may need to see the doctor more often at first or until your blood pressure comes down. · If you are taking blood pressure medicine, talk to your doctor before you take decongestants or anti-inflammatory medicine, such as ibuprofen. Some of these medicines can raise blood pressure. · Learn how to check your blood pressure at home. Lifestyle changes · Stay at a healthy weight. This is especially important if you put on weight around the waist. Losing even 10 pounds can help you lower your blood pressure. · If your doctor recommends it, get more exercise. Walking is a good choice. Bit by bit, increase the amount you walk every day. Try for at least 30 minutes on most days of the week. You also may want to swim, bike, or do other activities. · Avoid or limit alcohol. Talk to your doctor about whether you can drink any alcohol. · Try to limit how much sodium you eat to less than 2,300 milligrams (mg) a day. Your doctor may ask you to try to eat less than 1,500 mg a day. · Eat plenty of fruits (such as bananas and oranges), vegetables, legumes, whole grains, and low-fat dairy products. · Lower the amount of saturated fat in your diet. Saturated fat is found in animal products such as milk, cheese, and meat. Limiting these foods may help you lose weight and also lower your risk for heart disease. · Do not smoke. Smoking increases your risk for heart attack and stroke. If you need help quitting, talk to your doctor about stop-smoking programs and medicines. These can increase your chances of quitting for good. When should you call for help? Call  911 anytime you think you may need emergency care. This may mean having symptoms that suggest that your blood pressure is causing a serious heart or blood vessel problem. Your blood pressure may be over 180/120. For example, call 911 if: 
  · You have symptoms of a heart attack. These may include: 
? Chest pain or pressure, or a strange feeling in the chest. 
? Sweating. ? Shortness of breath. ? Nausea or vomiting. ? Pain, pressure, or a strange feeling in the back, neck, jaw, or upper belly or in one or both shoulders or arms. 
? Lightheadedness or sudden weakness. 
? A fast or irregular heartbeat.  
  · You have symptoms of a stroke. These may include: 
? Sudden numbness, tingling, weakness, or loss of movement in your face, arm, or leg, especially on only one side of your body. 
? Sudden vision changes. 
? Sudden trouble speaking. 
? Sudden confusion or trouble understanding simple statements. 
? Sudden problems with walking or balance. 
? A sudden, severe headache that is different from past headaches.  
  · You have severe back or belly pain.  
Do not wait until your blood pressure comes down on its own. Get help right away. 
Call your doctor now or seek immediate care if: 
  · Your blood pressure is much higher than normal (such as 180/120 or higher), but you don't have symptoms.  
  · You think high blood pressure is causing symptoms, such as: 
? Severe headache. 
? Blurry vision.  
Watch closely for changes in your health, and be sure to contact your doctor if: 
  · Your blood pressure measures higher than your doctor recommends at least 2 times. That means the top number is higher or the bottom number is higher, or both.  
  · You think you may be having side effects from your blood pressure medicine.  
Where can you learn more? 
Go to https://www.[employer] 
Enter X567 in the search box to learn more about \"High Blood Pressure: Care Instructions.\" 
Current as of: December 16, 2019               Content Version: 12.6 
© 0937-0970 [employer].  
Care instructions adapted under license by [employer] (which disclaims liability or warranty for this information). If you have questions about a medical condition or this instruction, always ask your healthcare professional. [employer] disclaims any warranty or liability for your use of this information.

## 2021-01-27 NOTE — PROGRESS NOTES
[facility] Note    Mikki Ellison is a 60 y.o. male who was seen in clinic today (1/27/2021). Subjective:  Cardiovascular Review:  The patient has hypertension and hyperlipidemia. Patient has sleep apnea but not currently using CPAP. Diet and Lifestyle: generally follows a low fat low cholesterol diet, generally follows a low sodium diet, exercises regularly, nonsmoker  Home BP Monitoring: is well controlled at home, ranging 130's/90's. Pertinent ROS: taking medications as instructed, no medication side effects noted, no TIA's, no chest pain on exertion, no dyspnea on exertion, no swelling of ankles. Patient underwent lumbar laminectomy at L4-S1 in 1/2020. Patient reports improvement of bilateral leg pain. Patient had EGD in 4/2018. Patient has hiatal hernia and Schatzki ring which was dilated. Patient reports improvement of symptoms. No current medicatons taken. Patient had colonoscopy in 10/2020 with Dr. Rajan Corbin; three polyps removed. Repeat in three years.      Prostate Cancer  Patient seen by urology, Dr. Morro Jimenez for prostate cancer. He underwent a prostatectomy (10/2019) after a prostate biopsy showed an adenocarcinoma of [place] 3+3=6 in the left medial prostate. His PSA has continued to rise following his prostatectomey. He is currently seen by oncology, Dr. Opal Malone at [facility] in [place]. He has completed radiaton and hormone therapy. Sarcoidosis  CT scan of the chest by oncology showed bilateral hilar adenopathy. Patient referred to pulmonology, Dr. Jayne Sandifer for sarcoidosis and pulmonary nodules. Currently treated with Symbicort. Reports ongoing congestion and bleeding of his nose. Patient has tried Flonase, Astelin and Singulair without improvement of symptoms. Patient reports some crusting of his nares with blood. Prior to Admission medications    Medication Sig Start Date End Date Taking?  Authorizing Provider amoxicillin-clavulanate (AUGMENTIN) 875-125 mg per tablet Take 1 Tab by mouth two (2) times a day for 10 days. 1/27/21 2/6/21 Yes Ugo Brizuela NP   benzonatate (TESSALON) 100 mg capsule Take 1 Cap by mouth three (3) times daily as needed for Cough. 1/27/21  Yes Robert Ayala NP   metoprolol succinate (TOPROL-XL) 100 mg tablet TAKE 1 TABLET EVERY DAY 11/24/20  Yes Ugo Brizuela NP   losartan (COZAAR) 100 mg tablet TAKE 1 TABLET EVERY DAY FOR BLOOD PRESSURE 11/24/20  Yes Robert Ayala NP   amLODIPine (NORVASC) 10 mg tablet TAKE 1 TABLET EVERY DAY 11/24/20  Yes Ugo Brizuela NP   montelukast (SINGULAIR) 10 mg tablet Take 10 mg by mouth daily. Yes Provider, Historical   predniSONE (DELTASONE) 10 mg tablet 10 mg. 7/30/20  Yes Provider, Historical   chlorthalidone (HYGROTEN) 25 mg tablet Take 1 Tab by mouth daily. 9/15/20  Yes Ugo Brizuela NP   aspirin 81 mg chewable tablet CHEW AND SWALLOW 1 TABLET BY MOUTH DAILY. INDICATIONS: TREATMENT TO PREVENT A HEART ATTACK 8/12/20  Yes Ugo Brizuela NP   potassium chloride (KLOR-CON) 10 mEq tablet TAKE 2 TABLETS DAILY FOR LOW AMOUNT OF POTASSIUM IN THE BLOOD 8/12/20  Yes Ugo Brizuela NP   cholecalciferol (VITAMIN D3) (2,000 UNITS /50 MCG) cap capsule Take 2,000 Units by mouth daily. 1/31/20  Yes Ugo Brizuela NP   cyanocobalamin (VITAMIN B-12) 2,000 mcg TbER Take 2,000 mcg by mouth daily. 1/31/20  Yes Ugo Brizuela NP   trimethoprim-sulfamethoxazole (BACTRIM DS, SEPTRA DS) 160-800 mg per tablet Take 1 Tab by mouth two (2) times a day.   1/27/21  Provider, Historical   tamsulosin (FLOMAX) 0.4 mg capsule TAKE 2 CAPSULES EVERY DAY  FOR  PROSTATE 8/12/20 1/27/21  Robert Ayala NP   albuterol (PROVENTIL HFA, VENTOLIN HFA, PROAIR HFA) 90 mcg/actuation inhaler INHALE 2 PUFFS EVERY 6 HOURS AS NEEDED FOR WHEEZING 8/12/20 1/27/21  Robert Ayala NP   azelastine (ASTELIN) 137 mcg (0.1 %) nasal spray 1 Spray by Both Nostrils route two (2) times a day. Use in each nostril as directed 1/31/20   Kelsy Champagne NP   fluticasone propionate (FLONASE) 50 mcg/actuation nasal spray USE 2 SPRAYS IN [facility] NOSTRIL EVERY DAY 1/31/20 1/27/21  Kelsy Champagne NP   ketoconazole (NIZORAL) 2 % topical cream Apply  to affected area daily. 1/31/20 1/27/21  Kelsy Champagne NP   senna-docusate (PERICOLACE) 8.6-50 mg per tablet Take 1 Tab by mouth two (2) times a day. 1/23/20 1/27/21  Siobhan Figueroa NP   lactulose (CHRONULAC) 10 gram/15 mL solution Take 15 mL by mouth two (2) times daily as needed (constipation). 1/23/20 1/27/21  Siobhan Figueroa NP   mupirocin (BACTROBAN) 2 % ointment Apply  to affected area two (2) times a day. Apply to nose 1/8/20 1/27/21  Kelsy Champagne NP   docusate sodium (COLACE) 100 mg capsule Take 1 Cap by mouth two (2) times a day. For constipation 10/30/19 1/27/21  Kelsy Champagne NP          No Known Allergies        ROS  See HPI    Objective:   Physical Exam  Vitals signs and nursing note reviewed. Constitutional:       General: He is not in acute distress. Appearance: He is well-developed. HENT:      Right Ear: Tympanic membrane and ear canal normal.      Left Ear: Tympanic membrane and ear canal normal.      Nose: Mucosal edema present. Right Sinus: No maxillary sinus tenderness or frontal sinus tenderness. Left Sinus: No maxillary sinus tenderness or frontal sinus tenderness. Neck:      Musculoskeletal: Normal range of motion and neck supple. Thyroid: No thyromegaly. Vascular: No carotid bruit or JVD. Cardiovascular:      Rate and Rhythm: Normal rate and regular rhythm. Heart sounds: Normal heart sounds. No murmur. No friction rub. No gallop. Pulmonary:      Effort: Pulmonary effort is normal. No respiratory distress. Breath sounds: Normal breath sounds. No decreased breath sounds, wheezing or rhonchi.    Lymphadenopathy:      Cervical: No cervical adenopathy. Neurological:      Mental Status: He is alert and oriented to person, place, and time. Psychiatric:         Behavior: Behavior normal.           Visit Vitals  BP (!) 169/107 (BP 1 Location: Left arm, BP Patient Position: Sitting)   Pulse 69   Temp 97.7 °F (36.5 °C) (Temporal)   Resp 16   Ht 5' 8\" (1.727 m)   Wt 218 lb 9.6 oz (99.2 kg)   SpO2 97%   BMI 33.24 kg/m²       Assessment & Plan:  Diagnoses and all orders for this visit:    1. Essential hypertension  Needed in clinic. However patient reports improved control at home. Continue with home monitoring call for readings greater than 130/90. Referral to cardiology as needed. -     METABOLIC PANEL, COMPREHENSIVE; Future  -     CBC WITH AUTOMATED DIFF; Future    2. Mixed hyperlipidemia  -     LIPID PANEL; Future    3. Primary prostate adenocarcinoma ([facility] [address]  Imaged by urology and oncology, no changes  -     PSA W/ REFLX FREE PSA; Future    4. Sarcoidosis  Pulmonology, no changes    5. Chronic maxillary sinusitis  Cover with Augmentin. Request ENT evaluation given chronic nature of complaint. -     amoxicillin-clavulanate (AUGMENTIN) 875-125 mg per tablet; Take 1 Tab by mouth two (2) times a day for 10 days.  -     REFERRAL TO ENT-OTOLARYNGOLOGY    6. Gastroesophageal reflux disease with esophagitis without hemorrhage  Stable at present, no changes    7. NESTOR (obstructive sleep apnea)  Recommended patient discuss adding humidification to his sleep apnea machine. I have discussed the diagnosis with the patient and the intended plan as seen in the above orders. The patient has received an after-visit summary along with patient information handout. I have discussed medication side effects and warnings with the patient as well. Follow-up and Dispositions    · Return in about 6 months (around 7/27/2021) for disease management.            Simon Billy NP

## 2021-01-27 NOTE — PROGRESS NOTES
Chief Complaint   Patient presents with    Nasal Pain    Nasal Congestion     1. Have you been to the ER, urgent care clinic since your last visit? Hospitalized since your last visit? No    2. Have you seen or consulted any other health care providers outside of the [address] [place] [address] since your last visit? Include any pap smears or colon screening.  No

## 2021-01-28 LAB
ALBUMIN SERPL-MCNC: 4.1 G/DL (ref 3.5–5)
ALBUMIN/GLOB SERPL: 1.1 {RATIO} (ref 1.1–2.2)
ALP SERPL-CCNC: 111 U/L (ref 45–117)
ALT SERPL-CCNC: 23 U/L (ref 12–78)
ANION GAP SERPL CALC-SCNC: 8 MMOL/L (ref 5–15)
AST SERPL-CCNC: 17 U/L (ref 15–37)
BASOPHILS # BLD: 0.1 K/UL (ref 0–0.1)
BASOPHILS NFR BLD: 1 % (ref 0–1)
BILIRUB SERPL-MCNC: 1.1 MG/DL (ref 0.2–1)
BUN SERPL-MCNC: 14 MG/DL (ref 6–20)
BUN/CREAT SERPL: 17 (ref 12–20)
CALCIUM SERPL-MCNC: 10.2 MG/DL (ref 8.5–10.1)
CHLORIDE SERPL-SCNC: 106 MMOL/L (ref 97–108)
CHOLEST SERPL-MCNC: 270 MG/DL
CO2 SERPL-SCNC: 31 MMOL/L (ref 21–32)
CREAT SERPL-MCNC: 0.82 MG/DL (ref 0.7–1.3)
DIFFERENTIAL METHOD BLD: ABNORMAL
EOSINOPHIL # BLD: 0.1 K/UL (ref 0–0.4)
EOSINOPHIL NFR BLD: 2 % (ref 0–7)
ERYTHROCYTE [DISTWIDTH] IN BLOOD BY AUTOMATED COUNT: 13.2 % (ref 11.5–14.5)
GLOBULIN SER CALC-MCNC: 3.7 G/DL (ref 2–4)
GLUCOSE SERPL-MCNC: 98 MG/DL (ref 65–100)
HCT VFR BLD AUTO: 39.9 % (ref 36.6–50.3)
HDLC SERPL-MCNC: 51 MG/DL
HDLC SERPL: 5.3 {RATIO} (ref 0–5)
HGB BLD-MCNC: 13.7 G/DL (ref 12.1–17)
IMM GRANULOCYTES # BLD AUTO: 0 K/UL (ref 0–0.04)
IMM GRANULOCYTES NFR BLD AUTO: 0 % (ref 0–0.5)
LDLC SERPL CALC-MCNC: 194.2 MG/DL (ref 0–100)
LIPID PROFILE,FLP: ABNORMAL
LYMPHOCYTES # BLD: 0.6 K/UL (ref 0.8–3.5)
LYMPHOCYTES NFR BLD: 11 % (ref 12–49)
MCH RBC QN AUTO: 33.1 PG (ref 26–34)
MCHC RBC AUTO-ENTMCNC: 34.3 G/DL (ref 30–36.5)
MCV RBC AUTO: 96.4 FL (ref 80–99)
MONOCYTES # BLD: 0.5 K/UL (ref 0–1)
MONOCYTES NFR BLD: 9 % (ref 5–13)
NEUTS SEG # BLD: 3.9 K/UL (ref 1.8–8)
NEUTS SEG NFR BLD: 77 % (ref 32–75)
NRBC # BLD: 0 K/UL (ref 0–0.01)
NRBC BLD-RTO: 0 PER 100 WBC
PLATELET # BLD AUTO: 260 K/UL (ref 150–400)
PMV BLD AUTO: 10.7 FL (ref 8.9–12.9)
POTASSIUM SERPL-SCNC: 3.5 MMOL/L (ref 3.5–5.1)
PROT SERPL-MCNC: 7.8 G/DL (ref 6.4–8.2)
PSA SERPL-MCNC: <0.1 NG/ML (ref 0–4)
RBC # BLD AUTO: 4.14 M/UL (ref 4.1–5.7)
RBC MORPH BLD: ABNORMAL
REFLEX CRITERIA: NORMAL
SODIUM SERPL-SCNC: 145 MMOL/L (ref 136–145)
TRIGL SERPL-MCNC: 124 MG/DL (ref ?–150)
VLDLC SERPL CALC-MCNC: 24.8 MG/DL
WBC # BLD AUTO: 5.2 K/UL (ref 4.1–11.1)

## 2021-02-02 ENCOUNTER — HOSPITAL ENCOUNTER (OUTPATIENT)
Dept: CT IMAGING | Age: 66
Discharge: HOME | End: 2021-02-02
Attending: INTERNAL MEDICINE
Payer: MEDICARE

## 2021-02-02 DIAGNOSIS — R59.0 LAD (LYMPHADENOPATHY), MEDIASTINAL: ICD-10-CM

## 2021-02-02 PROCEDURE — 71250 CT THORAX DX C-: CPT

## 2021-02-09 ENCOUNTER — TELEPHONE (OUTPATIENT)
Dept: FAMILY MEDICINE CLINIC | Age: 66
End: 2021-02-09

## 2021-02-09 DIAGNOSIS — H10.45 OTHER CHRONIC ALLERGIC CONJUNCTIVITIS OF BOTH EYES: Primary | ICD-10-CM

## 2021-02-09 NOTE — TELEPHONE ENCOUNTER
Patient need referral from PCP to be seen appointment 02/09/2021 at 12:00 procedure code [id number] and H10.45 diagnosis code.      Fax # [phone]

## 2021-03-15 ENCOUNTER — TELEPHONE (OUTPATIENT)
Dept: FAMILY MEDICINE CLINIC | Age: 66
End: 2021-03-15

## 2021-03-15 NOTE — TELEPHONE ENCOUNTER
Pt called inquiring about the last time he received his shingles shot, he had tried to received it this past weekend at his pharmacy but they needed the date of his last one

## 2021-03-16 ENCOUNTER — TELEPHONE (OUTPATIENT)
Dept: FAMILY MEDICINE CLINIC | Age: 66
End: 2021-03-16

## 2021-03-16 NOTE — TELEPHONE ENCOUNTER
Patient called wants to know if it's okay for him to get covid vaccine and when was the last shingle shot. Requesting a call back.     Best call back # [phone]

## 2021-03-17 ENCOUNTER — TELEPHONE (OUTPATIENT)
Dept: FAMILY MEDICINE CLINIC | Age: 66
End: 2021-03-17

## 2021-03-17 NOTE — TELEPHONE ENCOUNTER
Patient returned call and is requesting a call back.     [facility] # [phone]

## 2021-03-18 NOTE — TELEPHONE ENCOUNTER
Returned call to patient at [phone] phone continues to ring busy. Called [phone] phone message requests to enter password. Does not allow to leave message.

## 2021-03-24 NOTE — TELEPHONE ENCOUNTER
Patient called requesting information on the covid vaccine and the shingle shot    Requesting a return call.         [facility]#[phone]

## 2021-03-25 NOTE — TELEPHONE ENCOUNTER
Patient advised he can contact [facility] to get signed up for covid vaccine     Patient inquiring if he is due for Shingles vaccine. Advised NO he has had x 3 shots per chart for Shingles. Patient had questions about blood pressure and medication advised he needs to schedule appointment to discuss med changes with provider. Verbalizes Understanding.

## 2021-04-09 DIAGNOSIS — I10 ESSENTIAL HYPERTENSION: ICD-10-CM

## 2021-04-09 NOTE — TELEPHONE ENCOUNTER
Last Visit: 1/27/21 Chata  Next Appointment: Not scheduled  Previous Refill Encounter(s): 11/24/20 90 + 1 (all)    Requested Prescriptions     Pending Prescriptions Disp Refills    amLODIPine (NORVASC) 10 mg tablet 90 Tab 1     Sig: Take 1 Tab by mouth daily.  losartan (COZAAR) 100 mg tablet 90 Tab 1     Sig: Take 1 Tab by mouth daily.  metoprolol succinate (TOPROL-XL) 100 mg tablet 90 Tab 1     Sig: Take 1 Tab by mouth daily.

## 2021-04-12 RX ORDER — AMLODIPINE BESYLATE 10 MG/1
10 TABLET ORAL DAILY
Qty: 90 TAB | Refills: 1 | Status: SHIPPED | OUTPATIENT
Start: 2021-04-12 | End: 2021-11-08 | Stop reason: SDUPTHER

## 2021-04-12 RX ORDER — METOPROLOL SUCCINATE 100 MG/1
100 TABLET, EXTENDED RELEASE ORAL DAILY
Qty: 90 TAB | Refills: 1 | Status: SHIPPED | OUTPATIENT
Start: 2021-04-12 | End: 2021-10-28 | Stop reason: SDUPTHER

## 2021-04-12 RX ORDER — LOSARTAN POTASSIUM 100 MG/1
100 TABLET ORAL DAILY
Qty: 90 TAB | Refills: 1 | Status: SHIPPED | OUTPATIENT
Start: 2021-04-12 | End: 2021-06-08 | Stop reason: ALTCHOICE

## 2021-05-25 DIAGNOSIS — E87.6 HYPOKALEMIA: ICD-10-CM

## 2021-05-25 RX ORDER — GUAIFENESIN 100 MG/5ML
LIQUID (ML) ORAL
Qty: 90 TABLET | Refills: 1 | Status: SHIPPED | OUTPATIENT
Start: 2021-05-25

## 2021-05-25 RX ORDER — MONTELUKAST SODIUM 10 MG/1
10 TABLET ORAL DAILY
Qty: 90 TABLET | Refills: 1 | Status: SHIPPED | OUTPATIENT
Start: 2021-05-25 | End: 2021-06-08 | Stop reason: ALTCHOICE

## 2021-05-25 RX ORDER — POTASSIUM CHLORIDE 750 MG/1
TABLET, EXTENDED RELEASE ORAL
Qty: 180 TABLET | Refills: 1 | Status: SHIPPED | OUTPATIENT
Start: 2021-05-25 | End: 2021-10-28 | Stop reason: SDUPTHER

## 2021-05-25 NOTE — TELEPHONE ENCOUNTER
Last Visit: 1/27/21 KENNETH Brizuela  Next Appointment: 6/8/21 KENNETH Brizuela  Previous Refill Encounter(s):   Mercedes Esteves  8/12/20 90  Montelukast - historical provider  Pot chloride  8/12/20 180 + 1    Requested Prescriptions     Pending Prescriptions Disp Refills    aspirin 81 mg chewable tablet 90 Tablet 1     Sig: CHEW AND SWALLOW 1 TABLET BY MOUTH DAILY. INDICATIONS: TREATMENT TO PREVENT A HEART ATTACK    montelukast (SINGULAIR) 10 mg tablet 90 Tablet 1     Sig: Take 1 Tablet by mouth daily.     potassium chloride (KLOR-CON) 10 mEq tablet 180 Tablet 1     Sig: TAKE 2 TABLETS DAILY FOR LOW AMOUNT OF POTASSIUM IN THE BLOOD

## 2021-06-08 ENCOUNTER — OFFICE VISIT (OUTPATIENT)
Dept: FAMILY MEDICINE CLINIC | Age: 66
End: 2021-06-08
Payer: MEDICARE

## 2021-06-08 VITALS
OXYGEN SATURATION: 99 % | SYSTOLIC BLOOD PRESSURE: 158 MMHG | DIASTOLIC BLOOD PRESSURE: 104 MMHG | RESPIRATION RATE: 16 BRPM | TEMPERATURE: 97.9 F | HEART RATE: 65 BPM | WEIGHT: 212.4 LBS | HEIGHT: 68 IN | BODY MASS INDEX: 32.19 KG/M2

## 2021-06-08 DIAGNOSIS — E78.2 MIXED HYPERLIPIDEMIA: ICD-10-CM

## 2021-06-08 DIAGNOSIS — E87.6 HYPOKALEMIA: ICD-10-CM

## 2021-06-08 DIAGNOSIS — J32.0 CHRONIC MAXILLARY SINUSITIS: ICD-10-CM

## 2021-06-08 DIAGNOSIS — C61 PRIMARY PROSTATE ADENOCARCINOMA (HCC): ICD-10-CM

## 2021-06-08 DIAGNOSIS — D86.9 SARCOIDOSIS: ICD-10-CM

## 2021-06-08 DIAGNOSIS — R73.02 IGT (IMPAIRED GLUCOSE TOLERANCE): ICD-10-CM

## 2021-06-08 DIAGNOSIS — I10 ESSENTIAL HYPERTENSION: Primary | ICD-10-CM

## 2021-06-08 PROCEDURE — G8536 NO DOC ELDER MAL SCRN: HCPCS | Performed by: NURSE PRACTITIONER

## 2021-06-08 PROCEDURE — 99214 OFFICE O/P EST MOD 30 MIN: CPT | Performed by: NURSE PRACTITIONER

## 2021-06-08 PROCEDURE — G9711 PT HX TOT COL OR COLON CA: HCPCS | Performed by: NURSE PRACTITIONER

## 2021-06-08 PROCEDURE — G8432 DEP SCR NOT DOC, RNG: HCPCS | Performed by: NURSE PRACTITIONER

## 2021-06-08 PROCEDURE — G8753 SYS BP > OR = 140: HCPCS | Performed by: NURSE PRACTITIONER

## 2021-06-08 PROCEDURE — G8755 DIAS BP > OR = 90: HCPCS | Performed by: NURSE PRACTITIONER

## 2021-06-08 PROCEDURE — 1101F PT FALLS ASSESS-DOCD LE1/YR: CPT | Performed by: NURSE PRACTITIONER

## 2021-06-08 PROCEDURE — G8427 DOCREV CUR MEDS BY ELIG CLIN: HCPCS | Performed by: NURSE PRACTITIONER

## 2021-06-08 PROCEDURE — G8417 CALC BMI ABV UP PARAM F/U: HCPCS | Performed by: NURSE PRACTITIONER

## 2021-06-08 RX ORDER — MINERAL OIL
180 ENEMA (ML) RECTAL
Qty: 90 TABLET | Refills: 3 | Status: SHIPPED | OUTPATIENT
Start: 2021-06-08 | End: 2022-07-07 | Stop reason: SDUPTHER

## 2021-06-08 RX ORDER — LOSARTAN POTASSIUM AND HYDROCHLOROTHIAZIDE 25; 100 MG/1; MG/1
1 TABLET ORAL DAILY
Qty: 90 TABLET | Refills: 1 | Status: SHIPPED | OUTPATIENT
Start: 2021-06-08 | End: 2021-11-08 | Stop reason: SDUPTHER

## 2021-06-08 NOTE — PROGRESS NOTES
[facility] Note    Nathaly Macias is a 73 y.o. male who was seen in clinic today (6/8/2021). Subjective:  Cardiovascular Review:  The patient has hypertension and hyperlipidemia. Patient has sleep apnea but not currently using CPAP. Diet and Lifestyle: generally follows a low fat low cholesterol diet, generally follows a low sodium diet, exercises regularly, nonsmoker  Home BP Monitoring: is borderline controlled at home, ranging 140's/90's. Pertinent ROS: taking medications as instructed, no medication side effects noted, no TIA's, no chest pain on exertion, no dyspnea on exertion, no swelling of ankles. Patient underwent lumbar laminectomy at L4-S1 in 1/2020. Patient reports improvement of bilateral leg pain. Patient had EGD in 4/2018. Patient has hiatal hernia and Schatzki ring which was dilated. Patient reports improvement of symptoms. No current medicatons taken. Patient had colonoscopy in 10/2020 with Dr. Abiel Ardon; three polyps removed. Repeat in three years.      Prostate Cancer  Patient seen by urology, Dr. Mundo Mccall for prostate cancer. He underwent a prostatectomy (10/2019) after a prostate biopsy showed an adenocarcinoma of [place] 3+3=6 in the left medial prostate. His PSA has continued to rise following his prostatectomey. He is currently seen by oncology, Dr. Corinna Islas at [facility] in [place]. He has completed radiaton and hormone therapy. Sarcoidosis  CT scan of the chest by oncology showed bilateral hilar adenopathy. Patient referred to pulmonology, Dr. Bob Crane for sarcoidosis and pulmonary nodules. Currently treated with Symbicort. Prior to Admission medications    Medication Sig Start Date End Date Taking? Authorizing Provider   losartan-hydroCHLOROthiazide (HYZAAR) 100-25 mg per tablet Take 1 Tablet by mouth daily.  For blood pressure 6/8/21  Yes Clarice Brizuela, NP   fexofenadine (ALLEGRA) 180 mg tablet Take 1 Tablet by mouth daily as needed for Allergies. 6/8/21  Yes Mendez Brizuela NP   aspirin 81 mg chewable tablet CHEW AND SWALLOW 1 TABLET BY MOUTH DAILY. INDICATIONS: TREATMENT TO PREVENT A HEART ATTACK 5/25/21  Yes Mary Cerda NP   potassium chloride (KLOR-CON) 10 mEq tablet TAKE 2 TABLETS DAILY FOR LOW AMOUNT OF POTASSIUM IN THE BLOOD 5/25/21  Yes Mary Cerda NP   cyclobenzaprine (FLEXERIL) 10 mg tablet TAKE 1 TABLET THREE TIMES DAILY AS NEEDED FOR MUSCLE SPASM(S) 4/15/21  Yes Mary Cerda NP   cholecalciferol (VITAMIN D3) (2,000 UNITS /50 MCG) cap capsule TAKE 1 CAPSULE EVERY DAY 4/15/21  Yes Mendez Brizuela NP   amLODIPine (NORVASC) 10 mg tablet Take 1 Tab by mouth daily. 4/12/21  Yes Mary Cerda NP   metoprolol succinate (TOPROL-XL) 100 mg tablet Take 1 Tab by mouth daily. 4/12/21  Yes Mendez Brizuela NP   valACYclovir (VALTREX) 500 mg tablet TAKE 1 TABLET EVERY DAY AS NEEDED FOR BREAKOUT UNDER EYES 2/15/21  Yes Mendez Brizuela NP   cyanocobalamin (VITAMIN B-12) 2,000 mcg TbER Take 2,000 mcg by mouth daily. 1/31/20  Yes Mendez Brizuela NP   montelukast (SINGULAIR) 10 mg tablet Take 1 Tablet by mouth daily. Patient not taking: Reported on 6/8/2021 5/25/21 6/8/21  Mary Cerda NP   fluticasone propionate (FLONASE) 50 mcg/actuation nasal spray USE 2 SPRAYS IN [facility] NOSTRIL EVERY DAY  Patient not taking: Reported on 6/8/2021 4/15/21 6/8/21  Mary Cerda NP   ketoconazole (NIZORAL) 2 % topical cream APPLY  TO AFFECTED AREA DAILY. Patient not taking: Reported on 6/8/2021 4/15/21 6/8/21  Mary Cerda NP   losartan (COZAAR) 100 mg tablet Take 1 Tab by mouth daily. 4/12/21 6/8/21  Mary Cerda NP   benzonatate (TESSALON) 100 mg capsule Take 1 Cap by mouth three (3) times daily as needed for Cough. Patient not taking: Reported on 6/8/2021 1/27/21 6/8/21  Mary Cerda NP   predniSONE (DELTASONE) 10 mg tablet 10 mg.   Patient not taking: Reported on 6/8/2021 7/30/20 6/8/21  Provider, Historical   chlorthalidone (HYGROTEN) 25 mg tablet Take 1 Tab by mouth daily. Patient not taking: Reported on 6/8/2021 9/15/20 6/8/21  Deng Allen NP   azelastine (ASTELIN) 137 mcg (0.1 %) nasal spray 1 [place] by Both Nostrils route two (2) times a day. Use in each nostril as directed  Patient not taking: Reported on 6/8/2021 1/31/20 6/8/21  Deng Allen NP          No Known Allergies        ROS  See HPI    Objective:   Physical Exam  Vitals and nursing note reviewed. Constitutional:       Appearance: He is well-developed. Neck:      Thyroid: No thyromegaly. Vascular: No carotid bruit or JVD. Cardiovascular:      Rate and Rhythm: Normal rate and regular rhythm. Heart sounds: No murmur heard. No friction rub. No gallop. Pulmonary:      Effort: Pulmonary effort is normal. No respiratory distress. Breath sounds: Normal breath sounds. Musculoskeletal:      Cervical back: Normal range of motion and neck supple. Lymphadenopathy:      Cervical: No cervical adenopathy. Neurological:      Mental Status: He is alert and oriented to person, place, and time. Psychiatric:         Behavior: Behavior normal.           Visit Vitals  BP (!) 158/104 (BP 1 Location: Left upper arm, BP Patient Position: Sitting)   Pulse 65   Temp 97.9 °F (36.6 °C) (Temporal)   Resp 16   Ht 5' 8\" (1.727 m)   Wt 212 lb 6.4 oz (96.3 kg)   SpO2 99%   BMI 32.30 kg/m²       Assessment & Plan:  Diagnoses and all orders for this visit:    1. Essential hypertension  Remains elevated. Add hydrochlorothiazide. -     losartan-hydroCHLOROthiazide (HYZAAR) 100-25 mg per tablet; Take 1 Tablet by mouth daily. For blood pressure  -     METABOLIC PANEL, COMPREHENSIVE; Future  -     CBC WITH AUTOMATED DIFF; Future    2. Mixed hyperlipidemia  Discussed adding statin therapy. Will recheck labs and calculate his CVD risk  -     LIPID PANEL; Future    3.  Primary prostate adenocarcinoma [facility])  Managed by urology, no changes. 4. Hypokalemia  Likely secondary to thiazide diuretic. Continue with potassium supplement. Will monitor potassium levels. 5. Chronic maxillary sinusitis  Request ENT evaluation  -     REFERRAL TO ENT-OTOLARYNGOLOGY    6. Sarcoidosis  Stable at present, managed by pulmonology. 7. IGT (impaired glucose tolerance)  Reviewed diet and lifestyle changes.  -     HEMOGLOBIN A1C WITH EAG; Future    Other orders  -     fexofenadine (ALLEGRA) 180 mg tablet; Take 1 Tablet by mouth daily as needed for Allergies. I have discussed the diagnosis with the patient and the intended plan as seen in the above orders. The patient has received an after-visit summary along with patient information handout. I have discussed medication side effects and warnings with the patient as well. Follow-up and Dispositions    · Return in about 3 months (around 9/8/2021) for blood pressure.            Delon Damico, NP

## 2021-06-08 NOTE — PROGRESS NOTES
Chief Complaint   Patient presents with    Complete Physical    Leg Swelling     1. Have you been to the ER, urgent care clinic since your last visit? Hospitalized since your last visit? No    2. Have you seen or consulted any other health care providers outside of the [address] [place] [address] since your last visit? Include any pap smears or colon screening.  No

## 2021-06-09 LAB
ALBUMIN SERPL-MCNC: 4.3 G/DL (ref 3.5–5)
ALBUMIN/GLOB SERPL: 1.2 {RATIO} (ref 1.1–2.2)
ALP SERPL-CCNC: 125 U/L (ref 45–117)
ALT SERPL-CCNC: 22 U/L (ref 12–78)
ANION GAP SERPL CALC-SCNC: 8 MMOL/L (ref 5–15)
AST SERPL-CCNC: 20 U/L (ref 15–37)
BASOPHILS # BLD: 0.1 K/UL (ref 0–0.1)
BASOPHILS NFR BLD: 1 % (ref 0–1)
BILIRUB SERPL-MCNC: 1.2 MG/DL (ref 0.2–1)
BUN SERPL-MCNC: 8 MG/DL (ref 6–20)
BUN/CREAT SERPL: 10 (ref 12–20)
CALCIUM SERPL-MCNC: 10.1 MG/DL (ref 8.5–10.1)
CHLORIDE SERPL-SCNC: 102 MMOL/L (ref 97–108)
CHOLEST SERPL-MCNC: 258 MG/DL
CO2 SERPL-SCNC: 32 MMOL/L (ref 21–32)
CREAT SERPL-MCNC: 0.77 MG/DL (ref 0.7–1.3)
DIFFERENTIAL METHOD BLD: ABNORMAL
EOSINOPHIL # BLD: 0.1 K/UL (ref 0–0.4)
EOSINOPHIL NFR BLD: 2 % (ref 0–7)
ERYTHROCYTE [DISTWIDTH] IN BLOOD BY AUTOMATED COUNT: 15 % (ref 11.5–14.5)
EST. AVERAGE GLUCOSE BLD GHB EST-MCNC: 94 MG/DL
GLOBULIN SER CALC-MCNC: 3.6 G/DL (ref 2–4)
GLUCOSE SERPL-MCNC: 103 MG/DL (ref 65–100)
HBA1C MFR BLD: 4.9 % (ref 4–5.6)
HCT VFR BLD AUTO: 40.7 % (ref 36.6–50.3)
HDLC SERPL-MCNC: 51 MG/DL
HDLC SERPL: 5.1 {RATIO} (ref 0–5)
HGB BLD-MCNC: 13.1 G/DL (ref 12.1–17)
IMM GRANULOCYTES # BLD AUTO: 0 K/UL (ref 0–0.04)
IMM GRANULOCYTES NFR BLD AUTO: 0 % (ref 0–0.5)
LDLC SERPL CALC-MCNC: 179.8 MG/DL (ref 0–100)
LYMPHOCYTES # BLD: 0.7 K/UL (ref 0.8–3.5)
LYMPHOCYTES NFR BLD: 14 % (ref 12–49)
MCH RBC QN AUTO: 30.8 PG (ref 26–34)
MCHC RBC AUTO-ENTMCNC: 32.2 G/DL (ref 30–36.5)
MCV RBC AUTO: 95.5 FL (ref 80–99)
MONOCYTES # BLD: 0.5 K/UL (ref 0–1)
MONOCYTES NFR BLD: 10 % (ref 5–13)
NEUTS SEG # BLD: 3.7 K/UL (ref 1.8–8)
NEUTS SEG NFR BLD: 73 % (ref 32–75)
NRBC # BLD: 0 K/UL (ref 0–0.01)
NRBC BLD-RTO: 0 PER 100 WBC
PLATELET # BLD AUTO: 253 K/UL (ref 150–400)
PMV BLD AUTO: 10.6 FL (ref 8.9–12.9)
POTASSIUM SERPL-SCNC: 3.3 MMOL/L (ref 3.5–5.1)
PROT SERPL-MCNC: 7.9 G/DL (ref 6.4–8.2)
RBC # BLD AUTO: 4.26 M/UL (ref 4.1–5.7)
RBC MORPH BLD: ABNORMAL
RBC MORPH BLD: ABNORMAL
SODIUM SERPL-SCNC: 142 MMOL/L (ref 136–145)
TRIGL SERPL-MCNC: 136 MG/DL (ref ?–150)
VLDLC SERPL CALC-MCNC: 27.2 MG/DL
WBC # BLD AUTO: 5.1 K/UL (ref 4.1–11.1)

## 2021-07-12 ENCOUNTER — TRANSCRIBE ORDER (OUTPATIENT)
Dept: SCHEDULING | Age: 66
End: 2021-07-12

## 2021-07-12 DIAGNOSIS — J34.1 CYST AND MUCOCELE OF NOSE AND NASAL SINUS: Primary | ICD-10-CM

## 2021-07-20 ENCOUNTER — HOSPITAL ENCOUNTER (OUTPATIENT)
Dept: CT IMAGING | Age: 66
Discharge: HOME | End: 2021-07-20
Attending: OTOLARYNGOLOGY
Payer: MEDICARE

## 2021-07-20 DIAGNOSIS — J34.1 CYST AND MUCOCELE OF NOSE AND NASAL SINUS: ICD-10-CM

## 2021-07-20 PROCEDURE — 70486 CT MAXILLOFACIAL W/O DYE: CPT

## 2021-07-29 ENCOUNTER — OFFICE VISIT (OUTPATIENT)
Dept: FAMILY MEDICINE CLINIC | Age: 66
End: 2021-07-29
Payer: MEDICARE

## 2021-07-29 VITALS
SYSTOLIC BLOOD PRESSURE: 126 MMHG | DIASTOLIC BLOOD PRESSURE: 69 MMHG | TEMPERATURE: 98.4 F | OXYGEN SATURATION: 98 % | RESPIRATION RATE: 16 BRPM | HEIGHT: 68 IN | BODY MASS INDEX: 32.58 KG/M2 | HEART RATE: 74 BPM | WEIGHT: 215 LBS

## 2021-07-29 DIAGNOSIS — I10 ESSENTIAL HYPERTENSION: ICD-10-CM

## 2021-07-29 DIAGNOSIS — E78.00 HYPERCHOLESTEROLEMIA: Primary | ICD-10-CM

## 2021-07-29 PROCEDURE — 1101F PT FALLS ASSESS-DOCD LE1/YR: CPT | Performed by: NURSE PRACTITIONER

## 2021-07-29 PROCEDURE — 99213 OFFICE O/P EST LOW 20 MIN: CPT | Performed by: NURSE PRACTITIONER

## 2021-07-29 PROCEDURE — G8754 DIAS BP LESS 90: HCPCS | Performed by: NURSE PRACTITIONER

## 2021-07-29 PROCEDURE — G8417 CALC BMI ABV UP PARAM F/U: HCPCS | Performed by: NURSE PRACTITIONER

## 2021-07-29 PROCEDURE — G9711 PT HX TOT COL OR COLON CA: HCPCS | Performed by: NURSE PRACTITIONER

## 2021-07-29 PROCEDURE — G8432 DEP SCR NOT DOC, RNG: HCPCS | Performed by: NURSE PRACTITIONER

## 2021-07-29 PROCEDURE — G8752 SYS BP LESS 140: HCPCS | Performed by: NURSE PRACTITIONER

## 2021-07-29 PROCEDURE — G8536 NO DOC ELDER MAL SCRN: HCPCS | Performed by: NURSE PRACTITIONER

## 2021-07-29 PROCEDURE — G8427 DOCREV CUR MEDS BY ELIG CLIN: HCPCS | Performed by: NURSE PRACTITIONER

## 2021-07-29 RX ORDER — ATORVASTATIN CALCIUM 20 MG/1
20 TABLET, FILM COATED ORAL DAILY
Qty: 90 TABLET | Refills: 0 | Status: SHIPPED | OUTPATIENT
Start: 2021-07-29 | End: 2022-01-11 | Stop reason: SDUPTHER

## 2021-07-29 NOTE — PROGRESS NOTES
HISTORY OF PRESENT ILLNESS  Jewels Gil is a 73 y.o. male. HPI  1 month follow up HTN. Seen by PCP last month, BP elevated. HCTZ added. Tolerating medication without adverse side effects. Reports he continues to have BP elevations to 150/100. Stable today in office. Reviewed last labs with patient. LDL elevated at 179. Strong FH CAD. Admits to not adhering to low fat, low cholesterol diet. Patient Active Problem List   Diagnosis Code    Vegetarian Z78.9    Allergic rhinitis J30.9    Right inguinal hernia K40.90    NESTOR (obstructive sleep apnea) G47.33    Illiteracy Z55.0    Essential hypertension I10    Obesity (BMI 30-39. 9) E66.9    Primary prostate adenocarcinoma (HCC) C61    Gastroesophageal reflux disease with esophagitis K21.00    Mixed hyperlipidemia E78.2    Spinal stenosis M48.00    Lumbar stenosis M48.061    Sarcoidosis D86.9     Current Outpatient Medications   Medication Sig    atorvastatin (LIPITOR) 20 mg tablet Take 1 Tablet by mouth daily.  losartan-hydroCHLOROthiazide (HYZAAR) 100-25 mg per tablet Take 1 Tablet by mouth daily. For blood pressure    fexofenadine (ALLEGRA) 180 mg tablet Take 1 Tablet by mouth daily as needed for Allergies.  aspirin 81 mg chewable tablet CHEW AND SWALLOW 1 TABLET BY MOUTH DAILY. INDICATIONS: TREATMENT TO PREVENT A HEART ATTACK    potassium chloride (KLOR-CON) 10 mEq tablet TAKE 2 TABLETS DAILY FOR LOW AMOUNT OF POTASSIUM IN THE BLOOD    cyclobenzaprine (FLEXERIL) 10 mg tablet TAKE 1 TABLET THREE TIMES DAILY AS NEEDED FOR MUSCLE SPASM(S)    cholecalciferol (VITAMIN D3) (2,000 UNITS /50 MCG) cap capsule TAKE 1 CAPSULE EVERY DAY    amLODIPine (NORVASC) 10 mg tablet Take 1 Tab by mouth daily.  metoprolol succinate (TOPROL-XL) 100 mg tablet Take 1 Tab by mouth daily.     valACYclovir (VALTREX) 500 mg tablet TAKE 1 TABLET EVERY DAY AS NEEDED FOR BREAKOUT UNDER EYES    cyanocobalamin (VITAMIN B-12) 2,000 mcg TbER Take 2,000 mcg by mouth daily. No current facility-administered medications for this visit. Social History     Tobacco Use    Smoking status: Never Smoker    Smokeless tobacco: Never Used   Vaping Use    Vaping Use: Never used   Substance Use Topics    Alcohol use: Not Currently     Alcohol/week: 0.0 standard drinks     Comment: OCCASIONALLY    Drug use: No     Blood pressure 126/69, pulse 74, temperature 98.4 °F (36.9 °C), temperature source Temporal, resp. rate 16, height 5' 8\" (1.727 m), weight 215 lb (97.5 kg), SpO2 98 %. Review of Systems   Respiratory: Negative for cough and shortness of breath. Cardiovascular: Negative for chest pain, palpitations and leg swelling. Neurological: Negative for dizziness and headaches. All other systems reviewed and are negative. Physical Exam  Constitutional:       General: He is not in acute distress. Comments: Overweight. Cardiovascular:      Rate and Rhythm: Normal rate and regular rhythm. Heart sounds: Normal heart sounds. Pulmonary:      Effort: Pulmonary effort is normal.      Breath sounds: Normal breath sounds. Musculoskeletal:      Cervical back: Neck supple. Right lower leg: No edema. Left lower leg: No edema. Lymphadenopathy:      Cervical: No cervical adenopathy. Skin:     General: Skin is warm and dry. Neurological:      Coordination: Coordination normal.      Gait: Gait normal.   Psychiatric:         Mood and Affect: Mood normal.         ASSESSMENT and PLAN  Diagnoses and all orders for this visit:    1. Hypercholesterolemia  -     atorvastatin (LIPITOR) 20 mg tablet; Take 1 Tablet by mouth daily. Reviewed healthy, AHA diet and exercise recommendations. Begin statin as directed. 2. Essential hypertension  Improved control. Reviewed low sodium diet. Continue current medications for now. Follow up 3 months fasting. We discussed the expected course, resolution and complications of the diagnosis in detail. Medication risks, benefits, costs, interactions and side effects were discussed. The patient was advised to contact the office for worsening of condition or FTI as anticipated. The patient expressed understanding of the diagnosis and plan.

## 2021-07-29 NOTE — PROGRESS NOTES
Chief Complaint   Patient presents with    Hypertension     follow up    Referral Request     want to see someone to figure out why Bp is going up. 1. Have you been to the ER, urgent care clinic since your last visit? Hospitalized since your last visit? No    2. Have you seen or consulted any other health care providers outside of the [address] [place] [address] since your last visit? Include any pap smears or colon screening.  No    3 most recent PHQ Screens 1/27/2021   Little interest or pleasure in doing things Not at all   Feeling down, depressed, irritable, or hopeless Nearly every day   Total Score PHQ 2 3   Trouble falling or staying asleep, or sleeping too much Nearly every day   Feeling tired or having little energy Nearly every day   Poor appetite, weight loss, or overeating Nearly every day   Feeling bad about yourself - or that you are a failure or have let yourself or your family down Nearly every day   Trouble concentrating on things such as school, work, reading, or watching TV Nearly every day   Moving or speaking so slowly that other people could have noticed; or the opposite being so fidgety that others notice More than half the days   Thoughts of being better off dead, or hurting yourself in some way Not at all   PHQ 9 Score 20   How difficult have these problems made it for you to do your work, take care of your home and get along with others Somewhat difficult

## 2021-09-10 DIAGNOSIS — H57.89 EYE SWELLING: Primary | ICD-10-CM

## 2021-10-28 DIAGNOSIS — E87.6 HYPOKALEMIA: ICD-10-CM

## 2021-10-28 DIAGNOSIS — I10 ESSENTIAL HYPERTENSION: ICD-10-CM

## 2021-10-28 RX ORDER — METOPROLOL SUCCINATE 100 MG/1
100 TABLET, EXTENDED RELEASE ORAL DAILY
Qty: 90 TABLET | Refills: 0 | Status: SHIPPED | OUTPATIENT
Start: 2021-10-28 | End: 2022-01-11 | Stop reason: SDUPTHER

## 2021-10-28 RX ORDER — POTASSIUM CHLORIDE 750 MG/1
TABLET, EXTENDED RELEASE ORAL
Qty: 180 TABLET | Refills: 0 | Status: SHIPPED | OUTPATIENT
Start: 2021-10-28 | End: 2022-01-11 | Stop reason: SDUPTHER

## 2021-10-28 NOTE — TELEPHONE ENCOUNTER
KENNETH Bradley,    Requests for new rx's from [facility]. Patient also requested montelukast which noted therapy completed. Has appt Monday. Thanks, Anne Marie    Last Visit: 7/29/21 KENNETH Bradley  Next Appointment: 11/1/21 KENNETH Bradley  Previous Refill Encounter(s):   Metoprolol  4/12/21 90 + 1  Potassium chloride  5/25/21 180 + 1    Requested Prescriptions     Pending Prescriptions Disp Refills    metoprolol succinate (TOPROL-XL) 100 mg tablet 90 Tablet 1     Sig: Take 1 Tablet by mouth daily.     potassium chloride (KLOR-CON) 10 mEq tablet 180 Tablet 1     Sig: TAKE 2 TABLETS DAILY FOR LOW AMOUNT OF POTASSIUM IN THE BLOOD

## 2021-11-01 ENCOUNTER — OFFICE VISIT (OUTPATIENT)
Dept: FAMILY MEDICINE CLINIC | Age: 66
End: 2021-11-01
Payer: MEDICARE

## 2021-11-01 VITALS
OXYGEN SATURATION: 97 % | RESPIRATION RATE: 16 BRPM | DIASTOLIC BLOOD PRESSURE: 80 MMHG | WEIGHT: 212 LBS | SYSTOLIC BLOOD PRESSURE: 140 MMHG | TEMPERATURE: 98.2 F | HEIGHT: 68 IN | BODY MASS INDEX: 32.13 KG/M2 | HEART RATE: 112 BPM

## 2021-11-01 DIAGNOSIS — R00.0 TACHYCARDIA: ICD-10-CM

## 2021-11-01 DIAGNOSIS — R31.9 HEMATURIA, UNSPECIFIED TYPE: Primary | ICD-10-CM

## 2021-11-01 LAB
BILIRUB UR QL STRIP: NEGATIVE
GLUCOSE UR-MCNC: NEGATIVE MG/DL
KETONES P FAST UR STRIP-MCNC: NEGATIVE MG/DL
PH UR STRIP: 6.5 [PH] (ref 4.6–8)
PROT UR QL STRIP: NORMAL
SP GR UR STRIP: 1.01 (ref 1–1.03)
UA UROBILINOGEN AMB POC: NORMAL (ref 0.2–1)
URINALYSIS CLARITY POC: CLEAR
URINALYSIS COLOR POC: NORMAL
URINE BLOOD POC: NORMAL
URINE LEUKOCYTES POC: NORMAL
URINE NITRITES POC: NEGATIVE

## 2021-11-01 PROCEDURE — 81003 URINALYSIS AUTO W/O SCOPE: CPT | Performed by: FAMILY MEDICINE

## 2021-11-01 PROCEDURE — G9711 PT HX TOT COL OR COLON CA: HCPCS | Performed by: FAMILY MEDICINE

## 2021-11-01 PROCEDURE — G8753 SYS BP > OR = 140: HCPCS | Performed by: FAMILY MEDICINE

## 2021-11-01 PROCEDURE — G8432 DEP SCR NOT DOC, RNG: HCPCS | Performed by: FAMILY MEDICINE

## 2021-11-01 PROCEDURE — 1101F PT FALLS ASSESS-DOCD LE1/YR: CPT | Performed by: FAMILY MEDICINE

## 2021-11-01 PROCEDURE — G8427 DOCREV CUR MEDS BY ELIG CLIN: HCPCS | Performed by: FAMILY MEDICINE

## 2021-11-01 PROCEDURE — G8754 DIAS BP LESS 90: HCPCS | Performed by: FAMILY MEDICINE

## 2021-11-01 PROCEDURE — G8536 NO DOC ELDER MAL SCRN: HCPCS | Performed by: FAMILY MEDICINE

## 2021-11-01 PROCEDURE — G8417 CALC BMI ABV UP PARAM F/U: HCPCS | Performed by: FAMILY MEDICINE

## 2021-11-01 PROCEDURE — 99214 OFFICE O/P EST MOD 30 MIN: CPT | Performed by: FAMILY MEDICINE

## 2021-11-01 NOTE — PROGRESS NOTES
Chief Complaint   Patient presents with    Urinary Burning        1. \"Have you been to the ER, urgent care clinic since your last visit? Hospitalized since your last visit? \" No    2. \"Have you seen or consulted any other health care providers outside of the [address] [place] [address] since your last visit? \" No     3. For patients aged 39-70: Has the patient had a colonoscopy? Yes, HM satisfied with blue hyperlink     If the patient is female:    4. For patients aged 41-77: Has the patient had a mammogram within the past 2 years? No    5. For patients aged 21-30: Has the patient had a pap smear?  No    3 most recent PHQ Screens 7/29/2021   Little interest or pleasure in doing things Not at all   Feeling down, depressed, irritable, or hopeless Not at all   Total Score PHQ 2 0   Trouble falling or staying asleep, or sleeping too much -   Feeling tired or having little energy -   Poor appetite, weight loss, or overeating -   Feeling bad about yourself - or that you are a failure or have let yourself or your family down -   Trouble concentrating on things such as school, work, reading, or watching TV -   Moving or speaking so slowly that other people could have noticed; or the opposite being so fidgety that others notice -   Thoughts of being better off dead, or hurting yourself in some way -   PHQ 9 Score -   How difficult have these problems made it for you to do your work, take care of your home and get along with others -

## 2021-11-01 NOTE — PROGRESS NOTES
Clarisa Jenkins  73 y.o. male  1955  [facility] [address] [facility]  [id number]     [address]IM[address]       Encounter Date: 11/1/2021           Established Patient Visit Note: Marlena Vázquez MD    Reason for Appointment:  Chief Complaint   Patient presents with    Urinary Burning       History of Present Illness:  History provided by patient    Clarisa Jenkins is a 73 y.o. male who presents to clinic today for:    Pain with Urination  Duration: yesterday  Symptoms: pain with urination, blood in urine, pelvic pain  AS: denies any flank pain or back pain; he denies any fever  He reports having hx of UTIs with last in Feb, 2020 treated with antibiotics   He has hx of prostate cancer s/p excision in Oct, 2020 with Dr. Lina Calderon    Review of Systems  Review of Systems   Constitutional: Negative for chills and fever. Respiratory: Negative for cough, shortness of breath and wheezing. Cardiovascular: Negative for chest pain and palpitations. Allergies: Patient has no known allergies. Medications: (Updated to reflect final medication list after visit)    Current Outpatient Medications:     metoprolol succinate (TOPROL-XL) 100 mg tablet, Take 1 Tablet by mouth daily. , Disp: 90 Tablet, Rfl: 0    potassium chloride (KLOR-CON) 10 mEq tablet, TAKE 2 TABLETS DAILY FOR LOW AMOUNT OF POTASSIUM IN THE BLOOD, Disp: 180 Tablet, Rfl: 0    atorvastatin (LIPITOR) 20 mg tablet, Take 1 Tablet by mouth daily. , Disp: 90 Tablet, Rfl: 0    losartan-hydroCHLOROthiazide (HYZAAR) 100-25 mg per tablet, Take 1 Tablet by mouth daily. For blood pressure, Disp: 90 Tablet, Rfl: 1    fexofenadine (ALLEGRA) 180 mg tablet, Take 1 Tablet by mouth daily as needed for Allergies. , Disp: 90 Tablet, Rfl: 3    aspirin 81 mg chewable tablet, CHEW AND SWALLOW 1 TABLET BY MOUTH DAILY.  INDICATIONS: TREATMENT TO PREVENT A HEART ATTACK, Disp: 90 Tablet, Rfl: 1    cyclobenzaprine (FLEXERIL) 10 mg tablet, TAKE 1 TABLET THREE TIMES DAILY AS NEEDED FOR MUSCLE SPASM(S), Disp: 60 Tab, Rfl: 1    cholecalciferol (VITAMIN D3) (2,000 UNITS /50 MCG) cap capsule, TAKE 1 CAPSULE EVERY DAY, Disp: 90 Cap, Rfl: 1    amLODIPine (NORVASC) 10 mg tablet, Take 1 Tab by mouth daily. , Disp: 90 Tab, Rfl: 1    valACYclovir (VALTREX) 500 mg tablet, TAKE 1 TABLET EVERY DAY AS NEEDED FOR BREAKOUT UNDER EYES, Disp: 90 Tab, Rfl: 1    cyanocobalamin (VITAMIN B-12) 2,000 mcg TbER, Take 2,000 mcg by mouth daily. , Disp: 90 Tab, Rfl: 1    History  Patient Care Team:  Myron Santos NP as PCP - General (Family Medicine)  Myron Santos NP as PCP - [address] [place] [address] [facility]  Danny Doss MD (General Surgery)  Rosalia Morales MD (Gastroenterology)  Manjinder Cardenas MD (Urology)  Galileo Shukla MD as Physician (Sleep Medicine)  Amor Collins as Paramedic    Past Medical History: he has a past medical history of Adenomatous polyp of colon (2012), Angina pectoris, Bleeding ulcer, BPH (benign prostatic hyperplasia) (2011), BPH (benign prostatic hyperplasia) (12/11/2014), Cancer ([facility] [address] (2013), Colon polyps (05/03/2017), Hypertension, Kidney infection, Right inguinal hernia (7/11/2016), Sleep apnea, and Vegetarian. He also has no past medical history of Adverse effect of anesthesia. Past Surgical History: he has a past surgical history that includes hx orthopaedic; colonoscopy (2012, 2017); hx cholecystectomy (2010); hx hernia repair (Right, 7/29/16); hx tonsillectomy; hx other surgical (2011); hx other surgical (2009); colonoscopy (N/A, 5/3/2017); hx hernia repair (Right, 02/2017); hx prostatectomy (10/22/2019); and colonoscopy (Left, 10/14/2020). Family Medical History: family history includes Alcohol abuse in his brother; Cancer in his brother; Diabetes in his brother; Heart Attack in his father; Heart Disease in his brother; Hypertension in his brother, brother, maternal grandmother, and mother.     Social History: he reports that he has never smoked. He has never used smokeless tobacco. He reports previous alcohol use. He reports that he does not use drugs. Health Maintenance Due   Topic Date Due    Flu Vaccine (1) 09/01/2021    Medicare Yearly Exam  09/16/2021       Objective:   Visit Vitals  BP (!) 140/80   Pulse (!) 112   Temp 98.2 °F (36.8 °C)   Resp 16   Ht 5' 8\" (1.727 m)   Wt 212 lb (96.2 kg)   SpO2 97%   BMI 32.23 kg/m²     Wt Readings from Last 3 Encounters:   11/01/21 212 lb (96.2 kg)   07/29/21 215 lb (97.5 kg)   06/08/21 212 lb 6.4 oz (96.3 kg)       Physical Exam  Constitutional:       Appearance: Normal appearance. HENT:      Head: Normocephalic and atraumatic. Right Ear: External ear normal.      Left Ear: External ear normal.      Nose: Nose normal.      Mouth/Throat:      Pharynx: No oropharyngeal exudate. Eyes:      General: No scleral icterus. Right eye: No discharge. Left eye: No discharge. Conjunctiva/sclera: Conjunctivae normal.      Pupils: Pupils are equal, round, and reactive to light. Neck:      Thyroid: No thyromegaly. Vascular: No JVD. Trachea: No tracheal deviation. Cardiovascular:      Rate and Rhythm: Regular rhythm. Tachycardia present. Heart sounds: Normal heart sounds. No murmur heard. No friction rub. No gallop. Pulmonary:      Effort: Pulmonary effort is normal. No respiratory distress. Breath sounds: Normal breath sounds. No stridor. No wheezing. Musculoskeletal:         General: No tenderness or deformity. Normal range of motion. Cervical back: Normal range of motion and neck supple. Lymphadenopathy:      Cervical: No cervical adenopathy. Skin:     General: Skin is warm and dry. Neurological:      Mental Status: He is alert. Cranial Nerves: No cranial nerve deficit. Coordination: Coordination normal.      Gait: Gait is intact.       Deep Tendon Reflexes: Reflexes normal.         Assessment & Plan:      ICD-10-CM ICD-9-CM 1. Hematuria, unspecified type  R31.9 599.70 AMB POC URINALYSIS DIP STICK AUTO W/O MICRO   2. Tachycardia  R00.0 785.0        Hematuria and Tachycardia in setting of hx of prostate cancer. Given tachycardia, will send to ER to evaluate for emergent etiology. Discussed 911, and patient reports that he prefers to drive, but he does agree to go to ER for further evaluation. I have discussed the diagnosis with the patient and the intended plan as seen in the above orders. The patient has received an after-visit summary along with patient information handout. I have discussed medication side effects and warnings with the patient as well. Disposition  Follow-up and Dispositions    · Return in about 2 weeks (around 11/15/2021) for ER follow up.            Malorie Vasquez MD

## 2021-11-08 DIAGNOSIS — I10 ESSENTIAL HYPERTENSION: ICD-10-CM

## 2021-11-08 RX ORDER — CYCLOBENZAPRINE HCL 10 MG
10 TABLET ORAL
Qty: 60 TABLET | Refills: 0 | Status: SHIPPED | OUTPATIENT
Start: 2021-11-08 | End: 2022-01-11 | Stop reason: SDUPTHER

## 2021-11-08 RX ORDER — LOSARTAN POTASSIUM AND HYDROCHLOROTHIAZIDE 25; 100 MG/1; MG/1
1 TABLET ORAL DAILY
Qty: 90 TABLET | Refills: 1 | Status: SHIPPED | OUTPATIENT
Start: 2021-11-08 | End: 2022-01-31 | Stop reason: DRUGHIGH

## 2021-11-08 RX ORDER — AMLODIPINE BESYLATE 10 MG/1
10 TABLET ORAL DAILY
Qty: 90 TABLET | Refills: 1 | Status: SHIPPED | OUTPATIENT
Start: 2021-11-08 | End: 2022-03-25

## 2021-11-08 NOTE — TELEPHONE ENCOUNTER
Last Visit: 11/1/21 with MD Audrey Johnson, 7/29/21 with KENNETH Bradley  Next Appointment: none  Previous Refill Encounter(s): 6/8/21 Hyzaar #90 with 1 refill, 4/12/21 Norvasc #90 with 1 refill, 4/15/21 Flexeril #60 with 1 refill    Requested Prescriptions     Pending Prescriptions Disp Refills    amLODIPine (NORVASC) 10 mg tablet 90 Tablet 1     Sig: Take 1 Tablet by mouth daily.  cyclobenzaprine (FLEXERIL) 10 mg tablet 60 Tablet 1     Sig: Take 1 Tablet by mouth three (3) times daily as needed for Muscle Spasm(s).  losartan-hydroCHLOROthiazide (HYZAAR) 100-25 mg per tablet 90 Tablet 1     Sig: Take 1 Tablet by mouth daily.  For blood pressure

## 2021-11-09 NOTE — TELEPHONE ENCOUNTER
----- Message from Vivian Dance sent at 11/8/2021  1:32 PM EST -----  Subject: Refill Request    QUESTIONS  Name of Medication? amLODIPine (NORVASC) 10 mg tablet  Patient-reported dosage and instructions? 1 a day  How many days do you have left? 4  Preferred Pharmacy? [facility] phone number (if available)? [phone]  ---------------------------------------------------------------------------  --------------,  Name of Medication? metoprolol succinate (TOPROL-XL) 100 mg tablet  Patient-reported dosage and instructions? 1 a day  How many days do you have left? 4  Preferred Pharmacy? [facility] phone number (if available)? [phone]  ---------------------------------------------------------------------------  --------------,  Name of Medication? losartan-hydroCHLOROthiazide (HYZAAR) 100-25 mg per   tablet  Patient-reported dosage and instructions? 1 a day  How many days do you have left? 4  Preferred Pharmacy? [facility] phone number (if available)? [phone]  ---------------------------------------------------------------------------  --------------,  Name of Medication? cyclobenzaprine (FLEXERIL) 10 mg tablet  Patient-reported dosage and instructions? 1 a day 3 times a day  How many days do you have left? 3  Preferred Pharmacy? [facility] phone number (if available)? [phone]  Additional Information for Provider? [place] 10 mg tablet 1 a day only   has about 4 of those.   ---------------------------------------------------------------------------  --------------  CALL BACK INFO  What is the best way for the office to contact you? OK to leave message on   voicemail  Preferred Call Back Phone Number?  [phone]

## 2021-11-12 ENCOUNTER — TELEPHONE (OUTPATIENT)
Dept: FAMILY MEDICINE CLINIC | Age: 66
End: 2021-11-12

## 2021-11-12 DIAGNOSIS — J30.2 SEASONAL ALLERGIC RHINITIS, UNSPECIFIED TRIGGER: Primary | ICD-10-CM

## 2021-11-12 RX ORDER — MONTELUKAST SODIUM 10 MG/1
10 TABLET ORAL DAILY
Qty: 90 TABLET | Refills: 1 | Status: SHIPPED | OUTPATIENT
Start: 2021-11-12 | End: 2022-07-07 | Stop reason: SDUPTHER

## 2021-12-16 ENCOUNTER — TELEPHONE (OUTPATIENT)
Dept: FAMILY MEDICINE CLINIC | Age: 66
End: 2021-12-16

## 2021-12-16 NOTE — TELEPHONE ENCOUNTER
----- Message from Nahumshilo Kang sent at 12/16/2021  9:31 AM EST -----  Subject: Message to Provider    QUESTIONS  Information for Provider? PT been coughing, and weaving in his chest wants   a call back. wanted to r/s for friday if any openings. ---------------------------------------------------------------------------  --------------  Damion PINK  What is the best way for the office to contact you? OK to leave message on   voicemail  Preferred Call Back Phone Number? [phone]  ---------------------------------------------------------------------------  --------------  SCRIPT ANSWERS  Relationship to Patient? Self  Have your symptoms changed? No  Do you have any questions for your primary care provider that need to be   answered prior to your appointment?  Yes

## 2021-12-17 ENCOUNTER — NURSE TRIAGE (OUTPATIENT)
Dept: OTHER | Facility: CLINIC | Age: 66
End: 2021-12-17

## 2021-12-17 NOTE — TELEPHONE ENCOUNTER
Received call from Bill Perez at [facility] with [employer] Complaint. Brief description of triage: sinus congestion and cough. Draining down back of throat    Triage indicates for patient to pcp today or tomorrow. [facility] [place] to [place]    Care advice provided, patient verbalizes understanding; denies any other questions or concerns; instructed to call back for any new or worsening symptoms. Writer provided warm transfer to [place] at [facility] for appointment scheduling. Attention Provider: Thank you for allowing me to participate in the care of your patient. The patient was connected to triage in response to information provided to the [facility]. Please do not respond through this encounter as the response is not directed to a shared pool. Reason for Disposition   Patient wants to be seen    Answer Assessment - Initial Assessment Questions  1. LOCATION: \"Where does it hurt? \"       Denies    2. ONSET: \"When did the sinus pain start? \"  (e.g., hours, days)       Sunday    3. SEVERITY: \"How bad is the pain? \"   (Scale 1-10; mild, moderate or severe)    - MILD (1-3): doesn't interfere with normal activities     - MODERATE (4-7): interferes with normal activities (e.g., work or school) or awakens from sleep    - SEVERE (8-10): excruciating pain and patient unable to do any normal activities         No pain just congestion    4. RECURRENT SYMPTOM: \"Have you ever had sinus problems before? \" If so, ask: \"When was the last time? \" and \"What happened that time? \"       Denies    5. NASAL CONGESTION: \"Is the nose blocked? \" If so, ask, \"Can you open it or must you breathe through the mouth? \"      Denies    6. NASAL DISCHARGE: \"Do you have discharge from your nose? \" If so ask, \"What color? \"      Not much from nose, all draining down throat    7. FEVER: \"Do you have a fever? \" If so, ask: \"What is it, how was it measured, and when did it start? \"       Denies    8. OTHER SYMPTOMS: \"Do you have any other symptoms? \" (e.g., sore throat, cough, earache, difficulty breathing)      Cough, Sob when coughing, throat scratchy      9. PREGNANCY: \"Is there any chance you are pregnant? \" \"When was your last menstrual period? \"      N/a    Protocols used: SINUS PAIN OR CONGESTION-ADULT-OH

## 2022-01-11 DIAGNOSIS — E87.6 HYPOKALEMIA: ICD-10-CM

## 2022-01-11 DIAGNOSIS — I10 ESSENTIAL HYPERTENSION: ICD-10-CM

## 2022-01-11 DIAGNOSIS — E78.00 HYPERCHOLESTEROLEMIA: ICD-10-CM

## 2022-01-11 RX ORDER — METOPROLOL SUCCINATE 100 MG/1
100 TABLET, EXTENDED RELEASE ORAL DAILY
Qty: 30 TABLET | Refills: 0 | Status: SHIPPED | OUTPATIENT
Start: 2022-01-11 | End: 2022-02-10 | Stop reason: SDUPTHER

## 2022-01-11 RX ORDER — POTASSIUM CHLORIDE 750 MG/1
TABLET, EXTENDED RELEASE ORAL
Qty: 60 TABLET | Refills: 0 | Status: SHIPPED | OUTPATIENT
Start: 2022-01-11 | End: 2022-01-31

## 2022-01-11 RX ORDER — CYCLOBENZAPRINE HCL 10 MG
10 TABLET ORAL
Qty: 60 TABLET | Refills: 0 | Status: SHIPPED | OUTPATIENT
Start: 2022-01-11 | End: 2022-02-10 | Stop reason: SDUPTHER

## 2022-01-11 RX ORDER — ATORVASTATIN CALCIUM 20 MG/1
20 TABLET, FILM COATED ORAL DAILY
Qty: 30 TABLET | Refills: 0 | Status: SHIPPED | OUTPATIENT
Start: 2022-01-11 | End: 2022-02-10 | Stop reason: SDUPTHER

## 2022-01-11 NOTE — TELEPHONE ENCOUNTER
Last Visit: 11/1/21 MD Vidya Rolon, lipid 6/2021  Next Appointment: Not scheduled  Previous Refill Encounter(s):   Cyclobenzaprine 11/8/21 60  Atorvastatin 7/29/21 90  Metoprolol 10/28/21 90   Potassium chloride 10/28/21 180    Requested Prescriptions     Pending Prescriptions Disp Refills    cyclobenzaprine (FLEXERIL) 10 mg tablet 60 Tablet 0     Sig: Take 1 Tablet by mouth three (3) times daily as needed for Muscle Spasm(s).  atorvastatin (LIPITOR) 20 mg tablet 90 Tablet 1     Sig: Take 1 Tablet by mouth daily.  metoprolol succinate (TOPROL-XL) 100 mg tablet 90 Tablet 1     Sig: Take 1 Tablet by mouth daily.     potassium chloride (KLOR-CON M10) 10 mEq tablet 180 Tablet 1     Sig: TAKE 2 TABLETS DAILY FOR LOW AMOUNT OF POTASSIUM IN THE BLOOD

## 2022-01-17 NOTE — TELEPHONE ENCOUNTER
KENNETH Bradley,    Request from [email or web address] for:    methylprednisolone 4mg dosepak,   bactrim ds 800-150mg, and   valacyclovir.       Valacyclovir the only med on current med list.  Thanks, Britt Rosen    Last Visit: 11/1/21 MD Leslie Miller  Next Appointment: Not scheduled  Previous Refill Encounter(s): 2/15/21 90 + 1    Requested Prescriptions     Pending Prescriptions Disp Refills    valACYclovir (VALTREX) 500 mg tablet 90 Tablet 1     Sig: TAKE 1 TABLET EVERY DAY AS NEEDED FOR BREAKOUT UNDER EYES

## 2022-01-18 RX ORDER — VALACYCLOVIR HYDROCHLORIDE 500 MG/1
TABLET, FILM COATED ORAL
Qty: 90 TABLET | Refills: 0 | Status: SHIPPED | OUTPATIENT
Start: 2022-01-18 | End: 2022-04-03

## 2022-01-31 ENCOUNTER — TELEPHONE (OUTPATIENT)
Dept: FAMILY MEDICINE CLINIC | Age: 67
End: 2022-01-31

## 2022-01-31 ENCOUNTER — OFFICE VISIT (OUTPATIENT)
Dept: FAMILY MEDICINE CLINIC | Age: 67
End: 2022-01-31
Payer: MEDICARE

## 2022-01-31 VITALS
HEART RATE: 58 BPM | HEIGHT: 68 IN | RESPIRATION RATE: 16 BRPM | WEIGHT: 214.6 LBS | TEMPERATURE: 98 F | DIASTOLIC BLOOD PRESSURE: 72 MMHG | SYSTOLIC BLOOD PRESSURE: 112 MMHG | BODY MASS INDEX: 32.52 KG/M2 | OXYGEN SATURATION: 96 %

## 2022-01-31 DIAGNOSIS — I10 ESSENTIAL HYPERTENSION: Primary | ICD-10-CM

## 2022-01-31 DIAGNOSIS — R31.0 GROSS HEMATURIA: ICD-10-CM

## 2022-01-31 DIAGNOSIS — J34.89 DRY NARES: ICD-10-CM

## 2022-01-31 DIAGNOSIS — D86.9 SARCOIDOSIS: ICD-10-CM

## 2022-01-31 DIAGNOSIS — E78.2 MIXED HYPERLIPIDEMIA: ICD-10-CM

## 2022-01-31 DIAGNOSIS — N30.90 CYSTITIS: ICD-10-CM

## 2022-01-31 DIAGNOSIS — Z85.46 HISTORY OF PROSTATE CANCER: ICD-10-CM

## 2022-01-31 DIAGNOSIS — E87.6 HYPOKALEMIA: ICD-10-CM

## 2022-01-31 PROBLEM — Z85.038 HISTORY OF COLON CANCER: Status: ACTIVE | Noted: 2022-01-31

## 2022-01-31 PROBLEM — C61 PRIMARY PROSTATE ADENOCARCINOMA (HCC): Status: RESOLVED | Noted: 2018-10-04 | Resolved: 2022-01-31

## 2022-01-31 LAB
ALBUMIN SERPL-MCNC: 3.9 G/DL (ref 3.5–5)
ALBUMIN/GLOB SERPL: 1.1 {RATIO} (ref 1.1–2.2)
ALP SERPL-CCNC: 108 U/L (ref 45–117)
ALT SERPL-CCNC: 18 U/L (ref 12–78)
ANION GAP SERPL CALC-SCNC: 6 MMOL/L (ref 5–15)
AST SERPL-CCNC: 12 U/L (ref 15–37)
BILIRUB SERPL-MCNC: 1.2 MG/DL (ref 0.2–1)
BILIRUB UR QL STRIP: NORMAL
BUN SERPL-MCNC: 17 MG/DL (ref 6–20)
BUN/CREAT SERPL: 13 (ref 12–20)
CALCIUM SERPL-MCNC: 9.8 MG/DL (ref 8.5–10.1)
CHLORIDE SERPL-SCNC: 103 MMOL/L (ref 97–108)
CHOLEST SERPL-MCNC: 125 MG/DL
CO2 SERPL-SCNC: 31 MMOL/L (ref 21–32)
CREAT SERPL-MCNC: 1.26 MG/DL (ref 0.7–1.3)
GLOBULIN SER CALC-MCNC: 3.7 G/DL (ref 2–4)
GLUCOSE SERPL-MCNC: 96 MG/DL (ref 65–100)
GLUCOSE UR-MCNC: NEGATIVE MG/DL
HDLC SERPL-MCNC: 44 MG/DL
HDLC SERPL: 2.8 {RATIO} (ref 0–5)
KETONES P FAST UR STRIP-MCNC: NORMAL MG/DL
LDLC SERPL CALC-MCNC: 63.8 MG/DL (ref 0–100)
PH UR STRIP: 6 [PH] (ref 4.6–8)
POTASSIUM SERPL-SCNC: 3.2 MMOL/L (ref 3.5–5.1)
PROT SERPL-MCNC: 7.6 G/DL (ref 6.4–8.2)
PROT UR QL STRIP: NORMAL
SODIUM SERPL-SCNC: 140 MMOL/L (ref 136–145)
SP GR UR STRIP: 1.02 (ref 1–1.03)
TRIGL SERPL-MCNC: 86 MG/DL (ref ?–150)
UA UROBILINOGEN AMB POC: NORMAL (ref 0.2–1)
URINALYSIS CLARITY POC: NORMAL
URINALYSIS COLOR POC: NORMAL
URINE BLOOD POC: NORMAL
URINE LEUKOCYTES POC: NORMAL
URINE NITRITES POC: NEGATIVE
VLDLC SERPL CALC-MCNC: 17.2 MG/DL

## 2022-01-31 PROCEDURE — 1101F PT FALLS ASSESS-DOCD LE1/YR: CPT | Performed by: NURSE PRACTITIONER

## 2022-01-31 PROCEDURE — G8536 NO DOC ELDER MAL SCRN: HCPCS | Performed by: NURSE PRACTITIONER

## 2022-01-31 PROCEDURE — 99214 OFFICE O/P EST MOD 30 MIN: CPT | Performed by: NURSE PRACTITIONER

## 2022-01-31 PROCEDURE — G8427 DOCREV CUR MEDS BY ELIG CLIN: HCPCS | Performed by: NURSE PRACTITIONER

## 2022-01-31 PROCEDURE — 81003 URINALYSIS AUTO W/O SCOPE: CPT | Performed by: NURSE PRACTITIONER

## 2022-01-31 PROCEDURE — G9711 PT HX TOT COL OR COLON CA: HCPCS | Performed by: NURSE PRACTITIONER

## 2022-01-31 PROCEDURE — G8417 CALC BMI ABV UP PARAM F/U: HCPCS | Performed by: NURSE PRACTITIONER

## 2022-01-31 PROCEDURE — G8754 DIAS BP LESS 90: HCPCS | Performed by: NURSE PRACTITIONER

## 2022-01-31 PROCEDURE — G8432 DEP SCR NOT DOC, RNG: HCPCS | Performed by: NURSE PRACTITIONER

## 2022-01-31 PROCEDURE — G8752 SYS BP LESS 140: HCPCS | Performed by: NURSE PRACTITIONER

## 2022-01-31 RX ORDER — LOSARTAN POTASSIUM AND HYDROCHLOROTHIAZIDE 12.5; 1 MG/1; MG/1
1 TABLET ORAL DAILY
Qty: 90 TABLET | Refills: 1 | Status: SHIPPED | OUTPATIENT
Start: 2022-01-31 | End: 2022-07-14

## 2022-01-31 RX ORDER — POTASSIUM CHLORIDE 750 MG/1
TABLET, EXTENDED RELEASE ORAL
Qty: 270 TABLET | Refills: 0 | Status: SHIPPED | OUTPATIENT
Start: 2022-01-31 | End: 2022-05-03

## 2022-01-31 RX ORDER — CIPROFLOXACIN 250 MG/1
250 TABLET, FILM COATED ORAL EVERY 12 HOURS
Qty: 14 TABLET | Refills: 0 | Status: SHIPPED | OUTPATIENT
Start: 2022-01-31 | End: 2022-02-07

## 2022-01-31 NOTE — TELEPHONE ENCOUNTER
TC to patient.  verified. Informed potassium low. Confirmed he is taking 2 Klor daily. Recommend increase to 3 tabs daily.   Will switch HCTZ in losartan HCTZ to 12.5 mg.

## 2022-01-31 NOTE — PROGRESS NOTES
Chief Complaint   Patient presents with    Hypertension    Medication Refill    Medication Evaluation    Bladder Infection    Nasal Laceration     sores in nose that wont heal       1. Have you been to the ER, urgent care clinic since your last visit? Hospitalized since your last visit? Yes When: 01/07/21 [facility]  Reason for visit: covid test, positive, quarantined for 14 days     2. Have you seen or consulted any other health care providers outside of the [address] [place] [address] since your last visit? Include any pap smears or colon screening. No    3. For patients over 45: Has the patient had a colonoscopy?  Yes - no Care Gap present        3 most recent PHQ Screens 1/31/2022   Little interest or pleasure in doing things Not at all   Feeling down, depressed, irritable, or hopeless Not at all   Total Score PHQ 2 0   Trouble falling or staying asleep, or sleeping too much -   Feeling tired or having little energy -   Poor appetite, weight loss, or overeating -   Feeling bad about yourself - or that you are a failure or have let yourself or your family down -   Trouble concentrating on things such as school, work, reading, or watching TV -   Moving or speaking so slowly that other people could have noticed; or the opposite being so fidgety that others notice -   Thoughts of being better off dead, or hurting yourself in some way -   PHQ 9 Score -   How difficult have these problems made it for you to do your work, take care of your home and get along with others -

## 2022-01-31 NOTE — PROGRESS NOTES
HISTORY OF PRESENT ILLNESS  Shelby Hector is a 73 y.o. male. HPI  Overdue follow up health problems. HTN. Taking prescribed meds. Mixed hyperlipidemia. Began atorvastatin last year. Has not had labs rechecked. Tolerating medication without adverse side effects. History of prostate cancer. Followed by urologist Dr. Chance Cobos. Recurrent hematuria. Saw urologist end of last year. Symptoms resolved. Now recurred. Seen at [facility] 1/7/22. Treated for UTI with cefuroxime. Completed med. Continues to have gross hematuria. Diagnosed with COVID 1/7/22. Continues to have mild cough, chest tightness. History of sarcoidosis. Has upcoming follow up with pulmonologist.  C/o burning irritation inside nose. Has been using bactroban with little symptom relief. Patient Active Problem List   Diagnosis Code    Vegetarian Z78.9    Allergic rhinitis J30.9    NESTOR (obstructive sleep apnea) G47.33    Illiteracy Z55.0    Essential hypertension I10    Obesity (BMI 30-39. 9) E66.9    Gastroesophageal reflux disease with esophagitis K21.00    Mixed hyperlipidemia E78.2    Spinal stenosis M48.00    Lumbar stenosis M48.061    Sarcoidosis D86.9    History of colon cancer Z85.038     Current Outpatient Medications   Medication Sig    ciprofloxacin HCl (CIPRO) 250 mg tablet Take 1 Tablet by mouth every twelve (12) hours for 7 days.  valACYclovir (VALTREX) 500 mg tablet TAKE 1 TABLET EVERY DAY AS NEEDED FOR BREAKOUT UNDER EYES    cyclobenzaprine (FLEXERIL) 10 mg tablet Take 1 Tablet by mouth three (3) times daily as needed for Muscle Spasm(s).  atorvastatin (LIPITOR) 20 mg tablet Take 1 Tablet by mouth daily. Appointment due.  metoprolol succinate (TOPROL-XL) 100 mg tablet Take 1 Tablet by mouth daily. Appointment due.  potassium chloride (KLOR-CON M10) 10 mEq tablet TAKE 2 TABLETS DAILY FOR LOW AMOUNT OF POTASSIUM IN THE BLOOD    montelukast (SINGULAIR) 10 mg tablet Take 1 Tablet by mouth daily.  amLODIPine (NORVASC) 10 mg tablet Take 1 Tablet by mouth daily.  losartan-hydroCHLOROthiazide (HYZAAR) 100-25 mg per tablet Take 1 Tablet by mouth daily. For blood pressure    fexofenadine (ALLEGRA) 180 mg tablet Take 1 Tablet by mouth daily as needed for Allergies.  aspirin 81 mg chewable tablet CHEW AND SWALLOW 1 TABLET BY MOUTH DAILY. INDICATIONS: TREATMENT TO PREVENT A HEART ATTACK    cholecalciferol (VITAMIN D3) (2,000 UNITS /50 MCG) cap capsule TAKE 1 CAPSULE EVERY DAY    cyanocobalamin (VITAMIN B-12) 2,000 mcg TbER Take 2,000 mcg by mouth daily. No current facility-administered medications for this visit. Social History     Tobacco Use    Smoking status: Never Smoker    Smokeless tobacco: Never Used   Vaping Use    Vaping Use: Never used   Substance Use Topics    Alcohol use: Not Currently     Alcohol/week: 0.0 standard drinks     Comment: OCCASIONALLY    Drug use: No     Blood pressure 112/72, pulse (!) 58, temperature 98 °F (36.7 °C), temperature source Temporal, resp. rate 16, height 5' 8\" (1.727 m), weight 214 lb 9.6 oz (97.3 kg), SpO2 96 %. Review of Systems   Constitutional: Negative for chills, fever, malaise/fatigue and weight loss. HENT: Negative for congestion and sinus pain. Respiratory: Positive for cough. Negative for hemoptysis, sputum production, shortness of breath and wheezing. Cardiovascular: Negative for chest pain, palpitations and leg swelling. Genitourinary: Positive for hematuria. Negative for dysuria, flank pain, frequency and urgency. Neurological: Negative for dizziness and headaches. All other systems reviewed and are negative. Physical Exam  Constitutional:       General: He is not in acute distress. HENT:      Nose:      Comments: Inflamed nares. No exudate. Cardiovascular:      Rate and Rhythm: Normal rate and regular rhythm. Heart sounds: Normal heart sounds.    Pulmonary:      Effort: Pulmonary effort is normal. Breath sounds: No wheezing or rales. Comments: Decreased BS right. Chest:      Chest wall: No tenderness. Musculoskeletal:      Cervical back: Neck supple. Right lower leg: No edema. Left lower leg: No edema. Lymphadenopathy:      Cervical: No cervical adenopathy. Skin:     General: Skin is warm and dry. Neurological:      Mental Status: He is alert. Coordination: Coordination normal.   Psychiatric:         Mood and Affect: Mood normal.         ASSESSMENT and PLAN  Diagnoses and all orders for this visit:    1. Essential hypertension  -     METABOLIC PANEL, COMPREHENSIVE; Future  Controlled. Continue current treatment. 2. Mixed hyperlipidemia  -     LIPID PANEL; Future  -     METABOLIC PANEL, COMPREHENSIVE; Future  Reviewed healthy diet and exercise recommendations. Non fasting labs sent. 3. Gross hematuria  -     AMB POC URINALYSIS DIP STICK AUTO W/O MICRO  -     CULTURE, URINE; Future  Symptoms recurred. Follow up with Dr. Pavithra Mendez. 4. Cystitis  -     CULTURE, URINE; Future  -     ciprofloxacin HCl (CIPRO) 250 mg tablet; Take 1 Tablet by mouth every twelve (12) hours for 7 days. Urine dip with 3+ RBC, 3+ leuks. Culture pending. Begin cipro. 5. History of prostate cancer  Follow up with Dr. Pavithra Mendez as scheduled. 6. Dry nares  Continue bactroban. Recommend adding nasal saline mist tid. Humidifier. 7. Sarcoidosis  Follow up as scheduled with pulmonologist.    Follow up 6 months. We discussed the expected course, resolution and complications of the diagnosis in detail. Medication risks, benefits, costs, interactions and side effects were discussed. The patient was advised to contact the office for worsening of condition or FTI as anticipated. The patient expressed understanding of the diagnosis and plan.

## 2022-02-01 LAB
BACTERIA SPEC CULT: NORMAL
CC UR VC: NORMAL
SERVICE CMNT-IMP: NORMAL

## 2022-02-10 DIAGNOSIS — E78.00 HYPERCHOLESTEROLEMIA: ICD-10-CM

## 2022-02-10 DIAGNOSIS — I10 ESSENTIAL HYPERTENSION: ICD-10-CM

## 2022-02-10 RX ORDER — ATORVASTATIN CALCIUM 20 MG/1
20 TABLET, FILM COATED ORAL DAILY
Qty: 90 TABLET | Refills: 1 | Status: SHIPPED | OUTPATIENT
Start: 2022-02-10 | End: 2022-07-07 | Stop reason: SDUPTHER

## 2022-02-10 RX ORDER — CYCLOBENZAPRINE HCL 10 MG
10 TABLET ORAL
Qty: 60 TABLET | Refills: 2 | Status: SHIPPED | OUTPATIENT
Start: 2022-02-10 | End: 2022-08-31

## 2022-02-10 RX ORDER — METOPROLOL SUCCINATE 100 MG/1
100 TABLET, EXTENDED RELEASE ORAL DAILY
Qty: 90 TABLET | Refills: 1 | Status: SHIPPED | OUTPATIENT
Start: 2022-02-10

## 2022-02-10 NOTE — TELEPHONE ENCOUNTER
Otis sending request for new rx's below. Thanks, Kenneth Ribera    Last Visit: 1/31/22 KENNETH Bradley, labs completed  Next Appointment: 6/13/22 KENNETH Bradley  Previous Refill Encounter(s): 1/11/22 (all)  Atorvastatin #30  Cyclobenzaprine #60  Metoprolol #30    Requested Prescriptions     Pending Prescriptions Disp Refills    atorvastatin (LIPITOR) 20 mg tablet 90 Tablet 3     Sig: Take 1 Tablet by mouth daily.  cyclobenzaprine (FLEXERIL) 10 mg tablet 60 Tablet 2     Sig: Take 1 Tablet by mouth three (3) times daily as needed for Muscle Spasm(s).  metoprolol succinate (TOPROL-XL) 100 mg tablet 90 Tablet 1     Sig: Take 1 Tablet by mouth daily.

## 2022-03-18 PROBLEM — Z85.038 HISTORY OF COLON CANCER: Status: ACTIVE | Noted: 2022-01-31

## 2022-03-19 PROBLEM — D86.9 SARCOIDOSIS: Status: ACTIVE | Noted: 2020-09-20

## 2022-03-19 PROBLEM — G47.33 OSA (OBSTRUCTIVE SLEEP APNEA): Status: ACTIVE | Noted: 2017-02-21

## 2022-03-19 PROBLEM — I10 ESSENTIAL HYPERTENSION: Status: ACTIVE | Noted: 2017-02-21

## 2022-03-19 PROBLEM — K21.00 GASTROESOPHAGEAL REFLUX DISEASE WITH ESOPHAGITIS: Status: ACTIVE | Noted: 2018-10-04

## 2022-03-19 PROBLEM — Z55.0 ILLITERACY: Status: ACTIVE | Noted: 2017-02-21

## 2022-03-19 PROBLEM — M48.061 LUMBAR STENOSIS: Status: ACTIVE | Noted: 2020-01-23

## 2022-03-19 PROBLEM — E78.2 MIXED HYPERLIPIDEMIA: Status: ACTIVE | Noted: 2018-10-04

## 2022-03-19 PROBLEM — E66.9 OBESITY (BMI 30-39.9): Status: ACTIVE | Noted: 2018-03-07

## 2022-03-20 PROBLEM — M48.00 SPINAL STENOSIS: Status: ACTIVE | Noted: 2020-01-16

## 2022-03-22 ENCOUNTER — TRANSCRIBE ORDER (OUTPATIENT)
Dept: SCHEDULING | Age: 67
End: 2022-03-22

## 2022-03-22 DIAGNOSIS — J45.909 ASTHMA WITHOUT STATUS ASTHMATICUS, UNSPECIFIED ASTHMA SEVERITY, UNCOMPLICATED: Primary | ICD-10-CM

## 2022-03-25 DIAGNOSIS — I10 ESSENTIAL HYPERTENSION: ICD-10-CM

## 2022-03-25 RX ORDER — AMLODIPINE BESYLATE 10 MG/1
TABLET ORAL
Qty: 90 TABLET | Refills: 1 | Status: SHIPPED | OUTPATIENT
Start: 2022-03-25 | End: 2022-09-28

## 2022-04-19 DIAGNOSIS — K21.00 GASTROESOPHAGEAL REFLUX DISEASE WITH ESOPHAGITIS, UNSPECIFIED WHETHER HEMORRHAGE: Primary | ICD-10-CM

## 2022-04-19 RX ORDER — OMEPRAZOLE 20 MG/1
20 CAPSULE, DELAYED RELEASE ORAL DAILY
Qty: 90 CAPSULE | Refills: 0 | Status: SHIPPED | COMMUNITY
Start: 2022-04-19 | End: 2022-07-14

## 2022-04-19 NOTE — TELEPHONE ENCOUNTER
Chief Complaint   Patient presents with    Medication Refill      omeprazole      Patient was seen on 1/31/22. Last prescribed in 2018.

## 2022-05-03 DIAGNOSIS — E87.6 HYPOKALEMIA: ICD-10-CM

## 2022-05-03 RX ORDER — POTASSIUM CHLORIDE 750 MG/1
TABLET, EXTENDED RELEASE ORAL
Qty: 270 TABLET | Refills: 0 | Status: SHIPPED | OUTPATIENT
Start: 2022-05-03 | End: 2022-10-21

## 2022-07-07 ENCOUNTER — OFFICE VISIT (OUTPATIENT)
Dept: FAMILY MEDICINE CLINIC | Age: 67
End: 2022-07-07
Payer: MEDICARE

## 2022-07-07 VITALS
OXYGEN SATURATION: 95 % | HEIGHT: 68 IN | WEIGHT: 210.4 LBS | DIASTOLIC BLOOD PRESSURE: 77 MMHG | TEMPERATURE: 98.3 F | BODY MASS INDEX: 31.89 KG/M2 | RESPIRATION RATE: 16 BRPM | SYSTOLIC BLOOD PRESSURE: 113 MMHG | HEART RATE: 84 BPM

## 2022-07-07 DIAGNOSIS — Z00.00 MEDICARE ANNUAL WELLNESS VISIT, SUBSEQUENT: Primary | ICD-10-CM

## 2022-07-07 DIAGNOSIS — J30.89 NON-SEASONAL ALLERGIC RHINITIS, UNSPECIFIED TRIGGER: ICD-10-CM

## 2022-07-07 DIAGNOSIS — E78.2 MIXED HYPERLIPIDEMIA: ICD-10-CM

## 2022-07-07 DIAGNOSIS — I10 ESSENTIAL HYPERTENSION: ICD-10-CM

## 2022-07-07 DIAGNOSIS — E87.6 HYPOKALEMIA: ICD-10-CM

## 2022-07-07 PROCEDURE — G8754 DIAS BP LESS 90: HCPCS | Performed by: NURSE PRACTITIONER

## 2022-07-07 PROCEDURE — 1101F PT FALLS ASSESS-DOCD LE1/YR: CPT | Performed by: NURSE PRACTITIONER

## 2022-07-07 PROCEDURE — 1123F ACP DISCUSS/DSCN MKR DOCD: CPT | Performed by: NURSE PRACTITIONER

## 2022-07-07 PROCEDURE — 99213 OFFICE O/P EST LOW 20 MIN: CPT | Performed by: NURSE PRACTITIONER

## 2022-07-07 PROCEDURE — G8752 SYS BP LESS 140: HCPCS | Performed by: NURSE PRACTITIONER

## 2022-07-07 PROCEDURE — G0439 PPPS, SUBSEQ VISIT: HCPCS | Performed by: NURSE PRACTITIONER

## 2022-07-07 PROCEDURE — G8432 DEP SCR NOT DOC, RNG: HCPCS | Performed by: NURSE PRACTITIONER

## 2022-07-07 PROCEDURE — G8536 NO DOC ELDER MAL SCRN: HCPCS | Performed by: NURSE PRACTITIONER

## 2022-07-07 PROCEDURE — G8427 DOCREV CUR MEDS BY ELIG CLIN: HCPCS | Performed by: NURSE PRACTITIONER

## 2022-07-07 PROCEDURE — G9711 PT HX TOT COL OR COLON CA: HCPCS | Performed by: NURSE PRACTITIONER

## 2022-07-07 PROCEDURE — G8417 CALC BMI ABV UP PARAM F/U: HCPCS | Performed by: NURSE PRACTITIONER

## 2022-07-07 RX ORDER — MONTELUKAST SODIUM 10 MG/1
10 TABLET ORAL DAILY
Qty: 90 TABLET | Refills: 1 | Status: SHIPPED | OUTPATIENT
Start: 2022-07-07

## 2022-07-07 RX ORDER — MINERAL OIL
180 ENEMA (ML) RECTAL
Qty: 90 TABLET | Refills: 1 | Status: SHIPPED | OUTPATIENT
Start: 2022-07-07

## 2022-07-07 RX ORDER — CARVEDILOL 25 MG/1
1 TABLET ORAL
COMMUNITY
Start: 2022-03-03

## 2022-07-07 RX ORDER — ATORVASTATIN CALCIUM 20 MG/1
20 TABLET, FILM COATED ORAL DAILY
Qty: 90 TABLET | Refills: 1 | Status: SHIPPED | OUTPATIENT
Start: 2022-07-07

## 2022-07-07 NOTE — PROGRESS NOTES
Chief Complaint   Patient presents with   [facility] Annual Wellness Visit     1. Have you been to the ER, urgent care clinic since your last visit? Hospitalized since your last visit? No    2. Have you seen or consulted any other health care providers outside of the [address] [place] [address] since your last visit? Include any pap smears or colon screening.  No

## 2022-07-07 NOTE — PATIENT INSTRUCTIONS
Medicare Wellness Visit, Male    The best way to live healthy is to have a lifestyle where you eat a well-balanced diet, exercise regularly, limit alcohol use, and quit all forms of tobacco/nicotine, if applicable. Regular preventive services are another way to keep healthy. Preventive services (vaccines, screening tests, monitoring & exams) can help personalize your care plan, which helps you manage your own care. Screening tests can find health problems at the earliest stages, when they are easiest to treat. [facility] follows the current, evidence-based guidelines published by the [facility] ([facility]) when recommending preventive services for our patients. Because we follow these guidelines, sometimes recommendations change over time as research supports it. (For example, a prostate screening blood test is no longer routinely recommended for men with no symptoms). Of course, you and your doctor may decide to screen more often for some diseases, based on your risk and co-morbidities (chronic disease you are already diagnosed with). Preventive services for you include:  - Medicare offers their members a free annual wellness visit, which is time for you and your primary care provider to discuss and plan for your preventive service needs. Take advantage of this benefit every year!  -All adults over age 72 should receive the recommended pneumonia vaccines. Current USPSTF guidelines recommend a series of two vaccines for the best pneumonia protection.   -All adults should have a flu vaccine yearly and tetanus vaccine every 10 years.  -All adults age 48 and older should receive the shingles vaccines (series of two vaccines).        -All adults age 38-68 who are overweight should have a diabetes screening test once every three years.   -Other screening tests & preventive services for persons with diabetes include: an eye exam to screen for diabetic retinopathy, a kidney function test, a foot exam, and stricter control over your cholesterol.   -Cardiovascular screening for adults with routine risk involves an electrocardiogram (ECG) at intervals determined by the provider.   -Colorectal cancer screening should be done for adults age 54-65 with no increased risk factors for colorectal cancer. There are a number of acceptable methods of screening for this type of cancer. Each test has its own benefits and drawbacks. Discuss with your provider what is most appropriate for you during your annual wellness visit. The different tests include: colonoscopy (considered the best screening method), a fecal occult blood test, a fecal DNA test, and sigmoidoscopy.  -All adults born between [facility] should be screened once for Hepatitis C.  -An Abdominal Aortic Aneurysm (AAA) Screening is recommended for men age 73-68 who has ever smoked in their lifetime.      Here is a list of your current Health Maintenance items (your personalized list of preventive services) with a due date:  Health Maintenance Due   Topic Date Due    COVID-19 Vaccine (3 - Booster for [place] series) 10/08/2021

## 2022-07-07 NOTE — PROGRESS NOTES
HISTORY OF PRESENT ILLNESS  Yamil Bauer is a 78 y.o. male. HPI Medicare wellness visit. Follow up health problems. HTN. Taking prescribed meds. Hypercholesterolemia. FLP at goal with last labs 6 months ago. Taking atorvastatin as prescribed. History of prostate cancer. Followed by urologist.    ELIJAH elizabeth with last labs at 3.2. Taking prescribed potassium supplements. C/o itchy eye lids, History of AR, stopped taking singulair and fexofenidine. Using flonase daily. C/o some swelling under left eye. Patient Active Problem List   Diagnosis Code    Vegetarian Z78.9    Allergic rhinitis J30.9    NESTOR (obstructive sleep apnea) G47.33    Illiteracy Z55.0    Essential hypertension I10    Obesity (BMI 30-39. 9) E66.9    Gastroesophageal reflux disease with esophagitis K21.00    Mixed hyperlipidemia E78.2    Spinal stenosis M48.00    Lumbar stenosis M48.061    Sarcoidosis D86.9    History of colon cancer Z85.038     Current Outpatient Medications   Medication Sig    carvediloL (Coreg) 25 mg tablet Take 1 Tablet by mouth.  atorvastatin (LIPITOR) 20 mg tablet Take 1 Tablet by mouth daily.  montelukast (SINGULAIR) 10 mg tablet Take 1 Tablet by mouth daily.  fexofenadine (ALLEGRA) 180 mg tablet Take 1 Tablet by mouth daily as needed for Allergies.  potassium chloride (KLOR-CON M10) 10 mEq tablet TAKE 3 TABLETS EVERY DAY    omeprazole (PRILOSEC) 20 mg capsule Take 1 Capsule by mouth daily.  valACYclovir (VALTREX) 500 mg tablet TAKE 1 TABLET EVERY DAY AS NEEDED FOR BREAKOUT UNDER EYES    amLODIPine (NORVASC) 10 mg tablet TAKE 1 TABLET EVERY DAY    cyclobenzaprine (FLEXERIL) 10 mg tablet Take 1 Tablet by mouth three (3) times daily as needed for Muscle Spasm(s).  metoprolol succinate (TOPROL-XL) 100 mg tablet Take 1 Tablet by mouth daily.  losartan-hydroCHLOROthiazide (HYZAAR) 100-12.5 mg per tablet Take 1 Tablet by mouth daily.     aspirin 81 mg chewable tablet CHEW AND SWALLOW 1 TABLET BY MOUTH DAILY. INDICATIONS: TREATMENT TO PREVENT A HEART ATTACK    cholecalciferol (VITAMIN D3) (2,000 UNITS /50 MCG) cap capsule TAKE 1 CAPSULE EVERY DAY    cyanocobalamin (VITAMIN B-12) 2,000 mcg TbER Take 2,000 mcg by mouth daily. No current facility-administered medications for this visit. Social History     Tobacco Use    Smoking status: Never Smoker    Smokeless tobacco: Never Used   Vaping Use    Vaping Use: Never used   Substance Use Topics    Alcohol use: Not Currently     Alcohol/week: 0.0 standard drinks     Comment: OCCASIONALLY    Drug use: No     Blood pressure 113/77, pulse 84, temperature 98.3 °F (36.8 °C), temperature source Temporal, resp. rate 16, height 5' 8\" (1.727 m), weight 210 lb 6.4 oz (95.4 kg), SpO2 95 %. Review of Systems   HENT: Negative for congestion, ear pain, sinus pain and sore throat. Eyes: Negative for discharge and redness. Respiratory: Negative for cough and shortness of breath. Cardiovascular: Negative for chest pain, palpitations and leg swelling. Neurological: Negative for dizziness and headaches. All other systems reviewed and are negative. Physical Exam  Constitutional:       General: He is not in acute distress. HENT:      Right Ear: Tympanic membrane and ear canal normal.      Left Ear: Tympanic membrane and ear canal normal.   Eyes:      General:         Right eye: No discharge. Left eye: No discharge. Conjunctiva/sclera: Conjunctivae normal.      Comments: Eyelids with mild erythema. No swelling. Mild swelling noted below left eye lid. Non tender to palpation. Cardiovascular:      Rate and Rhythm: Normal rate and regular rhythm. Heart sounds: Normal heart sounds. Pulmonary:      Effort: Pulmonary effort is normal.      Breath sounds: Normal breath sounds. Musculoskeletal:      Cervical back: Neck supple. Right lower leg: No edema. Left lower leg: No edema.    Skin:     General: Skin is warm and dry. Neurological:      Mental Status: He is alert. Coordination: Coordination normal.   Psychiatric:         Mood and Affect: Mood normal.         Behavior: Behavior normal.         ASSESSMENT and PLAN  Diagnoses and all orders for this visit:    1. Medicare annual wellness visit, subsequent    2. Essential hypertension  Well controlled. Continue current treatment. 3. Mixed hyperlipidemia  -     METABOLIC PANEL, COMPREHENSIVE; Future  -     atorvastatin (LIPITOR) 20 mg tablet; Take 1 Tablet by mouth daily. At goal with last FLP. Continue atorvastatin. Reviewed healthy diet and exercise recommendations. 4. Non-seasonal allergic rhinitis, unspecified trigger  -     montelukast (SINGULAIR) 10 mg tablet; Take 1 Tablet by mouth daily. -     fexofenadine (ALLEGRA) 180 mg tablet; Take 1 Tablet by mouth daily as needed for Allergies. Mild symptom exacerbation. Resume allegra and singulair. Hold flonase for now due to left maxillary swelling. 5. Hypokalemia  -     METABOLIC PANEL, COMPREHENSIVE; Future  Recheck K today. Continue potassium supplements. Follow up 6 months, pending test results. We discussed the expected course, resolution and complications of the diagnosis in detail. Medication risks, benefits, costs, interactions and side effects were discussed. The patient was advised to contact the office for worsening of condition or FTI as anticipated. The patient expressed understanding of the diagnosis and plan. This is the Subsequent Medicare Annual Wellness Exam, performed 12 months or more after the Initial AWV or the last Subsequent AWV    I have reviewed the patient's medical history in detail and updated the computerized patient record. Assessment/Plan   Education and counseling provided:  Are appropriate based on today's review and evaluation    1. Medicare annual wellness visit, subsequent  2. Essential hypertension  3.  Mixed hyperlipidemia  -     METABOLIC PANEL, COMPREHENSIVE; Future  -     atorvastatin (LIPITOR) 20 mg tablet; Take 1 Tablet by mouth daily. , Normal, Disp-90 Tablet, R-1  4. Non-seasonal allergic rhinitis, unspecified trigger  -     montelukast (SINGULAIR) 10 mg tablet; Take 1 Tablet by mouth daily. , Normal, Disp-90 Tablet, R-1  -     fexofenadine (ALLEGRA) 180 mg tablet; Take 1 Tablet by mouth daily as needed for Allergies. , Normal, Disp-90 Tablet, R-1  5. Hypokalemia  -     METABOLIC PANEL, COMPREHENSIVE; Future       Depression Risk Factor Screening     3 most recent PHQ Screens 7/7/2022   Little interest or pleasure in doing things Not at all   Feeling down, depressed, irritable, or hopeless Not at all   Total Score PHQ 2 0   Trouble falling or staying asleep, or sleeping too much Not at all   Feeling tired or having little energy Not at all   Poor appetite, weight loss, or overeating Not at all   Feeling bad about yourself - or that you are a failure or have let yourself or your family down Not at all   Trouble concentrating on things such as school, work, reading, or watching TV Not at all   Moving or speaking so slowly that other people could have noticed; or the opposite being so fidgety that others notice Not at all   Thoughts of being better off dead, or hurting yourself in some way Not at all   PHQ 9 Score 0   How difficult have these problems made it for you to do your work, take care of your home and get along with others Not difficult at all       Alcohol & Drug Abuse Risk Screen    Do you average more than 1 drink per night or more than 7 drinks a week: No    In the past three months have you have had more than 4 drinks containing alcohol on one occasion: No          Functional Ability and Level of Safety    Hearing: Hearing is good. Activities of Daily Living: The home contains: no safety equipment. Patient does total self care      Ambulation: with no difficulty     Fall Risk:  Fall Risk Assessment, last 12 mths 7/7/2022   Able to walk? Yes   Fall in past 12 months? 0   Do you feel unsteady? 0   Are you worried about falling 0      Abuse Screen:  Patient is not abused       Cognitive Screening    Has your family/caregiver stated any concerns about your memory: no     Cognitive Screening: Normal - Verbal Fluency Test    Health Maintenance Due     Health Maintenance Due   Topic Date Due    Medicare Yearly Exam  09/16/2021    COVID-19 Vaccine (3 - Booster for [employer] series) 10/08/2021       Patient Care Team   Patient Care Team:  Adama Stock NP as PCP - General (Family Medicine)  Adama Stock NP as PCP - [facility]  Analia Romero MD (General Surgery)  Danlio Lott MD (Gastroenterology)  Marielena Hall MD (Urology)  Shelby Jimenez MD as Physician (Sleep Medicine Physician)  Jeanine Scott V as Paramedic    History     Patient Active Problem List   Diagnosis Code    Vegetarian Z78.9    Allergic rhinitis J30.9    NESTOR (obstructive sleep apnea) G47.33    Illiteracy Z55.0    Essential hypertension I10    Obesity (BMI 30-39. 9) E66.9    Gastroesophageal reflux disease with esophagitis K21.00    Mixed hyperlipidemia E78.2    Spinal stenosis M48.00    Lumbar stenosis M48.061    Sarcoidosis D86.9    History of colon cancer Z85.038     Past Medical History:   Diagnosis Date    Adenomatous polyp of colon 2012    Angina pectoris     Bleeding ulcer     BPH (benign prostatic hyperplasia) 2011    BPH (benign prostatic hyperplasia) 12/11/2014    Cancer ([facility] [address] 2013    PROSTATE     Colon polyps 05/03/2017    Hypertension     Kidney infection     pt states he is unaware of having had this    Primary prostate adenocarcinoma ([facility] [address] 10/4/2018    Right inguinal hernia 7/11/2016    Right inguinal hernia 7/11/2016    Sleep apnea     uses CPAP    Vegetarian       Past Surgical History:   Procedure Laterality Date    COLONOSCOPY  2012, 2017    adenomatous polyp    COLONOSCOPY N/A 5/3/2017    COLONOSCOPY performed by [facility]edeno [facility] at [facility]. Left 10/14/2020    COLONOSCOPY :- performed by Gladys Kapoor MD at [facility] ENDOSCOPY    HX CHOLECYSTECTOMY  2010    HX HERNIA REPAIR Right 7/29/16    inguinal w/mesh by Dr. Sylvie Pulido Right 02/2017    HX ORTHOPAEDIC      Back surgery    HX OTHER SURGICAL  2011    prostate biopsy - normal/second bx 2/2017 - \"tip had cancer\" - another bx is planned    HX OTHER SURGICAL  2009    FATTY TUMOR REMOVED FROM BACK    HX PROSTATECTOMY  10/22/2019    HX TONSILLECTOMY       Current Outpatient Medications   Medication Sig Dispense Refill    carvediloL (Coreg) 25 mg tablet Take 1 Tablet by mouth.  atorvastatin (LIPITOR) 20 mg tablet Take 1 Tablet by mouth daily. 90 Tablet 1    montelukast (SINGULAIR) 10 mg tablet Take 1 Tablet by mouth daily. 90 Tablet 1    fexofenadine (ALLEGRA) 180 mg tablet Take 1 Tablet by mouth daily as needed for Allergies. 90 Tablet 1    potassium chloride (KLOR-CON M10) 10 mEq tablet TAKE 3 TABLETS EVERY  Tablet 0    omeprazole (PRILOSEC) 20 mg capsule Take 1 Capsule by mouth daily. 90 Capsule 0    valACYclovir (VALTREX) 500 mg tablet TAKE 1 TABLET EVERY DAY AS NEEDED FOR BREAKOUT UNDER EYES 90 Tablet 1    amLODIPine (NORVASC) 10 mg tablet TAKE 1 TABLET EVERY DAY 90 Tablet 1    cyclobenzaprine (FLEXERIL) 10 mg tablet Take 1 Tablet by mouth three (3) times daily as needed for Muscle Spasm(s). 60 Tablet 2    metoprolol succinate (TOPROL-XL) 100 mg tablet Take 1 Tablet by mouth daily. 90 Tablet 1    losartan-hydroCHLOROthiazide (HYZAAR) 100-12.5 mg per tablet Take 1 Tablet by mouth daily. 90 Tablet 1    aspirin 81 mg chewable tablet CHEW AND SWALLOW 1 TABLET BY MOUTH DAILY.  INDICATIONS: TREATMENT TO PREVENT A HEART ATTACK 90 Tablet 1    cholecalciferol (VITAMIN D3) (2,000 UNITS /50 MCG) cap capsule TAKE 1 CAPSULE EVERY DAY 90 Cap 1    cyanocobalamin (VITAMIN B-12) 2,000 mcg TbER Take 2,000 mcg by mouth daily.  80 Tab 1     No Known Allergies    Family History   Problem Relation Age of Onset    Hypertension Mother     Hypertension Maternal Grandmother     Hypertension Brother     Alcohol abuse Brother     Cancer Brother         pancreatic    Diabetes Brother     Hypertension Brother     Heart Disease Brother     Heart Attack Father     Anesth Problems Neg Hx      Social History     Tobacco Use    Smoking status: Never Smoker    Smokeless tobacco: Never Used   Substance Use Topics    Alcohol use: Not Currently     Alcohol/week: 0.0 standard drinks     Comment: OCCASIONALLY         Charli Elder NP

## 2022-07-08 LAB
ALBUMIN SERPL-MCNC: 4 G/DL (ref 3.5–5)
ALBUMIN/GLOB SERPL: 1.2 {RATIO} (ref 1.1–2.2)
ALP SERPL-CCNC: 103 U/L (ref 45–117)
ALT SERPL-CCNC: 17 U/L (ref 12–78)
ANION GAP SERPL CALC-SCNC: 9 MMOL/L (ref 5–15)
AST SERPL-CCNC: 11 U/L (ref 15–37)
BILIRUB SERPL-MCNC: 1 MG/DL (ref 0.2–1)
BUN SERPL-MCNC: 15 MG/DL (ref 6–20)
BUN/CREAT SERPL: 15 (ref 12–20)
CALCIUM SERPL-MCNC: 9.3 MG/DL (ref 8.5–10.1)
CHLORIDE SERPL-SCNC: 104 MMOL/L (ref 97–108)
CO2 SERPL-SCNC: 29 MMOL/L (ref 21–32)
CREAT SERPL-MCNC: 1.01 MG/DL (ref 0.7–1.3)
GLOBULIN SER CALC-MCNC: 3.3 G/DL (ref 2–4)
GLUCOSE SERPL-MCNC: 90 MG/DL (ref 65–100)
POTASSIUM SERPL-SCNC: 3.1 MMOL/L (ref 3.5–5.1)
PROT SERPL-MCNC: 7.3 G/DL (ref 6.4–8.2)
SODIUM SERPL-SCNC: 142 MMOL/L (ref 136–145)

## 2022-07-14 ENCOUNTER — TELEPHONE (OUTPATIENT)
Dept: FAMILY MEDICINE CLINIC | Age: 67
End: 2022-07-14

## 2022-07-14 DIAGNOSIS — I10 ESSENTIAL HYPERTENSION: Primary | ICD-10-CM

## 2022-07-14 RX ORDER — LOSARTAN POTASSIUM 100 MG/1
100 TABLET ORAL DAILY
Qty: 90 TABLET | Refills: 1 | Status: SHIPPED | OUTPATIENT
Start: 2022-07-14

## 2022-07-14 NOTE — TELEPHONE ENCOUNTER
TC to patient.  verified. Informed potassium still low. Will switch from losartan HCTZ to plain losartan. Take 2 potassium tablets daily. Follow up 4 weeks to recheck potassium and BP.

## 2022-08-23 ENCOUNTER — TELEPHONE (OUTPATIENT)
Dept: FAMILY MEDICINE CLINIC | Age: 67
End: 2022-08-23

## 2022-08-23 NOTE — TELEPHONE ENCOUNTER
----- Message from Chrissie Bassett sent at 8/23/2022 12:44 PM EDT -----  Subject: Medication Problem    Medication: omeprazole (PRILOSEC) 20 mg capsule  Dosage: TAKE 1 CAPSULE EVERY DAY  Ordering Provider: andrey estes    Question/Problem: Patient stated that the original prescription for the   medication was written by his Cardiologist. He explained that the dosage   should be twice a day not once a day. He is requesting another   prescription be sent to his pharmacy.    Additional Information for Provider:     [facility] ([address] [facility] [address]    ---------------------------------------------------------------------------  --------------  Deannie Sandhoff SL  [phone]; OK to leave message on voicemail  ---------------------------------------------------------------------------  --------------    SCRIPT ANSWERS  Relationship to Patient: Self

## 2022-08-24 DIAGNOSIS — K21.00 GASTROESOPHAGEAL REFLUX DISEASE WITH ESOPHAGITIS, UNSPECIFIED WHETHER HEMORRHAGE: ICD-10-CM

## 2022-08-24 RX ORDER — OMEPRAZOLE 20 MG/1
20 CAPSULE, DELAYED RELEASE ORAL 2 TIMES DAILY
Qty: 180 CAPSULE | Refills: 0 | Status: SHIPPED | OUTPATIENT
Start: 2022-08-24

## 2022-08-26 NOTE — TELEPHONE ENCOUNTER
Chief Complaint   Patient presents with    Medication Refill     Omeprazole 20 mg capsules     Prescription sent to pharmacy as written by Yves Sandhu NP .   Margarita Montanez LPN

## 2022-08-28 RX ORDER — VALACYCLOVIR HYDROCHLORIDE 500 MG/1
TABLET, FILM COATED ORAL
Qty: 90 TABLET | Refills: 1 | Status: SHIPPED | OUTPATIENT
Start: 2022-08-28

## 2022-08-31 RX ORDER — CYCLOBENZAPRINE HCL 10 MG
TABLET ORAL
Qty: 60 TABLET | Refills: 2 | Status: SHIPPED | OUTPATIENT
Start: 2022-08-31

## 2022-09-15 ENCOUNTER — OFFICE VISIT (OUTPATIENT)
Dept: FAMILY MEDICINE CLINIC | Age: 67
End: 2022-09-15
Payer: MEDICARE

## 2022-09-15 VITALS
DIASTOLIC BLOOD PRESSURE: 84 MMHG | SYSTOLIC BLOOD PRESSURE: 130 MMHG | TEMPERATURE: 97.9 F | RESPIRATION RATE: 18 BRPM | OXYGEN SATURATION: 98 % | HEIGHT: 68 IN | WEIGHT: 215 LBS | BODY MASS INDEX: 32.58 KG/M2 | HEART RATE: 74 BPM

## 2022-09-15 DIAGNOSIS — Z23 ENCOUNTER FOR IMMUNIZATION: ICD-10-CM

## 2022-09-15 DIAGNOSIS — K21.00 GASTROESOPHAGEAL REFLUX DISEASE WITH ESOPHAGITIS WITHOUT HEMORRHAGE: ICD-10-CM

## 2022-09-15 DIAGNOSIS — E78.2 MIXED HYPERLIPIDEMIA: ICD-10-CM

## 2022-09-15 DIAGNOSIS — E87.6 HYPOKALEMIA: ICD-10-CM

## 2022-09-15 DIAGNOSIS — J30.89 NON-SEASONAL ALLERGIC RHINITIS, UNSPECIFIED TRIGGER: ICD-10-CM

## 2022-09-15 DIAGNOSIS — Z85.46 HISTORY OF PROSTATE CANCER: ICD-10-CM

## 2022-09-15 DIAGNOSIS — I10 ESSENTIAL HYPERTENSION: Primary | ICD-10-CM

## 2022-09-15 PROCEDURE — G8432 DEP SCR NOT DOC, RNG: HCPCS | Performed by: NURSE PRACTITIONER

## 2022-09-15 PROCEDURE — 99214 OFFICE O/P EST MOD 30 MIN: CPT | Performed by: NURSE PRACTITIONER

## 2022-09-15 PROCEDURE — 90694 VACC AIIV4 NO PRSRV 0.5ML IM: CPT | Performed by: NURSE PRACTITIONER

## 2022-09-15 PROCEDURE — 1101F PT FALLS ASSESS-DOCD LE1/YR: CPT | Performed by: NURSE PRACTITIONER

## 2022-09-15 PROCEDURE — 1123F ACP DISCUSS/DSCN MKR DOCD: CPT | Performed by: NURSE PRACTITIONER

## 2022-09-15 PROCEDURE — G9711 PT HX TOT COL OR COLON CA: HCPCS | Performed by: NURSE PRACTITIONER

## 2022-09-15 PROCEDURE — G8417 CALC BMI ABV UP PARAM F/U: HCPCS | Performed by: NURSE PRACTITIONER

## 2022-09-15 PROCEDURE — G8427 DOCREV CUR MEDS BY ELIG CLIN: HCPCS | Performed by: NURSE PRACTITIONER

## 2022-09-15 PROCEDURE — G8754 DIAS BP LESS 90: HCPCS | Performed by: NURSE PRACTITIONER

## 2022-09-15 PROCEDURE — G8752 SYS BP LESS 140: HCPCS | Performed by: NURSE PRACTITIONER

## 2022-09-15 PROCEDURE — G8536 NO DOC ELDER MAL SCRN: HCPCS | Performed by: NURSE PRACTITIONER

## 2022-09-15 PROCEDURE — G0008 ADMIN INFLUENZA VIRUS VAC: HCPCS | Performed by: NURSE PRACTITIONER

## 2022-09-15 NOTE — PROGRESS NOTES
Patient identified with two identification factors, Name and Date of Birth. Chief Complaint   Patient presents with    Follow-up    Hypertension    Cholesterol Problem    Allergic Rhinitis       Visit Vitals  /84 (BP 1 Location: Right arm, BP Patient Position: Sitting, BP Cuff Size: Adult)   Pulse 74   Temp 97.9 °F (36.6 °C) (Oral)   Resp 18   Ht 5' 8\" (1.727 m)   Wt 215 lb (97.5 kg)   SpO2 98%   BMI 32.69 kg/m²       Health Maintenance Due   Topic    Pneumococcal 65+ years (1 - PCV)    COVID-19 Vaccine (3 - Booster for [employer] series)    Flu Vaccine (1)        1. \"Have you been to the ER, urgent care clinic since your last visit? Hospitalized since your last visit? \" No    2. \"Have you seen or consulted any other health care providers outside of the [address] [place] [address] since your last visit? \" No     3. For patients aged 39-70: Has the patient had a colonoscopy / FIT/ Cologuard? Yes - no Care Gap present      If the patient is female:    4. For patients aged 41-77: Has the patient had a mammogram within the past 2 years? NA - based on age or sex      11. For patients aged 21-65: Has the patient had a pap smear?  NA - based on age or sex

## 2022-09-15 NOTE — PROGRESS NOTES
After obtaining consent, and per orders of  Cinthia Bradley,NP  injection of Influenza FLUAD (right deltoid)  given by Erika Nava LPN. Patient instructed to remain in clinic for 20 minutes afterwards, and to report any adverse reaction to me immediately.

## 2022-09-15 NOTE — PROGRESS NOTES
HISTORY OF PRESENT ILLNESS  Glenda Coley is a 78 y.o. male. HPI  Follow up health problems. HTN. Taking prescribed meds. Hypercholesterolemia. FLP at goal with last labs. Taking atorvastatin as prescribed. History of prostate cancer. S/p surgery and radiation. Followed by urologist.    Kenrick Thorpe. Taking singulair and allegra daily. GERD. Taking PPI as prescribed. K low with last labs at 3.1. HCTZ discontinued. Reports he recently saw his urologist.  Had labs done. K still slightly low. Patient Active Problem List   Diagnosis Code    Vegetarian Z78.9    Allergic rhinitis J30.9    NESTOR (obstructive sleep apnea) G47.33    Illiteracy Z55.0    Essential hypertension I10    Obesity (BMI 30-39. 9) E66.9    Gastroesophageal reflux disease with esophagitis K21.00    Mixed hyperlipidemia E78.2    Spinal stenosis M48.00    Lumbar stenosis M48.061    Sarcoidosis D86.9    History of colon cancer Z85.038     Current Outpatient Medications   Medication Sig    cyclobenzaprine (FLEXERIL) 10 mg tablet TAKE 1 TABLET THREE TIMES DAILY AS NEEDED FOR MUSCLE SPASM(S)    omeprazole (PRILOSEC) 20 mg capsule Take 1 Capsule by mouth two (2) times a day. losartan (COZAAR) 100 mg tablet Take 1 Tablet by mouth daily. carvediloL (COREG) 25 mg tablet Take 1 Tablet by mouth. atorvastatin (LIPITOR) 20 mg tablet Take 1 Tablet by mouth daily. montelukast (SINGULAIR) 10 mg tablet Take 1 Tablet by mouth daily. fexofenadine (ALLEGRA) 180 mg tablet Take 1 Tablet by mouth daily as needed for Allergies. potassium chloride (KLOR-CON M10) 10 mEq tablet TAKE 3 TABLETS EVERY DAY (Patient taking differently: TAKE 2 TABLETS EVERY DAY)    amLODIPine (NORVASC) 10 mg tablet TAKE 1 TABLET EVERY DAY    metoprolol succinate (TOPROL-XL) 100 mg tablet Take 1 Tablet by mouth daily. aspirin 81 mg chewable tablet CHEW AND SWALLOW 1 TABLET BY MOUTH DAILY.  INDICATIONS: TREATMENT TO PREVENT A HEART ATTACK    cholecalciferol (VITAMIN D3) (2,000 UNITS /50 MCG) cap capsule TAKE 1 CAPSULE EVERY DAY    cyanocobalamin (VITAMIN B-12) 2,000 mcg TbER Take 2,000 mcg by mouth daily. valACYclovir (VALTREX) 500 mg tablet TAKE 1 TABLET EVERY DAY AS NEEDED FOR BREAKOUT UNDER EYES (Patient not taking: Reported on 9/15/2022)     No current facility-administered medications for this visit. Social History     Tobacco Use    Smoking status: Never    Smokeless tobacco: Never   Vaping Use    Vaping Use: Never used   Substance Use Topics    Alcohol use: Not Currently     Alcohol/week: 0.0 standard drinks     Comment: OCCASIONALLY    Drug use: No     Blood pressure 130/84, pulse 74, temperature 97.9 °F (36.6 °C), temperature source Oral, resp. rate 18, height 5' 8\" (1.727 m), weight 215 lb (97.5 kg), SpO2 98 %. Review of Systems   Respiratory:  Negative for cough and shortness of breath. Cardiovascular:  Negative for chest pain, palpitations and leg swelling. All other systems reviewed and are negative. Physical Exam  Constitutional:       General: He is not in acute distress. Cardiovascular:      Rate and Rhythm: Normal rate and regular rhythm. Heart sounds: Normal heart sounds. Pulmonary:      Effort: Pulmonary effort is normal.      Breath sounds: Normal breath sounds. Musculoskeletal:      Cervical back: Neck supple. Right lower leg: No edema. Left lower leg: No edema. Lymphadenopathy:      Cervical: No cervical adenopathy. Skin:     General: Skin is warm and dry. Neurological:      Mental Status: He is alert. Coordination: Coordination normal.   Psychiatric:         Mood and Affect: Mood normal.       ASSESSMENT and PLAN  Diagnoses and all orders for this visit:    1. Essential hypertension  Controlled. Continue current treatment. 2. Encounter for immunization  -     INFLUENZA, FLUAD, (AGE 65 Y+), IM, PF, 0.5 ML    3. Non-seasonal allergic rhinitis, unspecified trigger  Stable on singulair and allegra.   4. Gastroesophageal reflux disease with esophagitis without hemorrhage  Stable on PPI. GERD precautions reviewed. 5. Mixed hyperlipidemia  Stable based on last FLP. Continue statin. 6. Hypokalemia  Will obtain recent labs from urologist.  Continue potassium supplement. 7. History of prostate cancer  Condition stable. Followed by specialist.   Follow up 3 months.

## 2022-09-28 DIAGNOSIS — I10 ESSENTIAL HYPERTENSION: ICD-10-CM

## 2022-09-28 RX ORDER — AMLODIPINE BESYLATE 10 MG/1
TABLET ORAL
Qty: 90 TABLET | Refills: 1 | Status: SHIPPED | OUTPATIENT
Start: 2022-09-28

## 2022-11-28 ENCOUNTER — OFFICE VISIT (OUTPATIENT)
Dept: FAMILY MEDICINE CLINIC | Age: 67
End: 2022-11-28
Payer: MEDICARE

## 2022-11-28 VITALS
DIASTOLIC BLOOD PRESSURE: 86 MMHG | WEIGHT: 223.4 LBS | TEMPERATURE: 97.9 F | RESPIRATION RATE: 16 BRPM | HEART RATE: 87 BPM | HEIGHT: 68 IN | BODY MASS INDEX: 33.86 KG/M2 | SYSTOLIC BLOOD PRESSURE: 134 MMHG | OXYGEN SATURATION: 99 %

## 2022-11-28 DIAGNOSIS — I10 ESSENTIAL HYPERTENSION: Primary | ICD-10-CM

## 2022-11-28 DIAGNOSIS — M54.42 ACUTE LEFT-SIDED LOW BACK PAIN WITH LEFT-SIDED SCIATICA: ICD-10-CM

## 2022-11-28 DIAGNOSIS — R06.09 DYSPNEA ON EXERTION: ICD-10-CM

## 2022-11-28 DIAGNOSIS — R60.0 BILATERAL LOWER EXTREMITY EDEMA: ICD-10-CM

## 2022-11-28 DIAGNOSIS — E87.6 HYPOKALEMIA: ICD-10-CM

## 2022-11-28 PROCEDURE — 3074F SYST BP LT 130 MM HG: CPT | Performed by: NURSE PRACTITIONER

## 2022-11-28 PROCEDURE — G8427 DOCREV CUR MEDS BY ELIG CLIN: HCPCS | Performed by: NURSE PRACTITIONER

## 2022-11-28 PROCEDURE — 1123F ACP DISCUSS/DSCN MKR DOCD: CPT | Performed by: NURSE PRACTITIONER

## 2022-11-28 PROCEDURE — G8536 NO DOC ELDER MAL SCRN: HCPCS | Performed by: NURSE PRACTITIONER

## 2022-11-28 PROCEDURE — G8752 SYS BP LESS 140: HCPCS | Performed by: NURSE PRACTITIONER

## 2022-11-28 PROCEDURE — 3078F DIAST BP <80 MM HG: CPT | Performed by: NURSE PRACTITIONER

## 2022-11-28 PROCEDURE — G9711 PT HX TOT COL OR COLON CA: HCPCS | Performed by: NURSE PRACTITIONER

## 2022-11-28 PROCEDURE — 99214 OFFICE O/P EST MOD 30 MIN: CPT | Performed by: NURSE PRACTITIONER

## 2022-11-28 PROCEDURE — 1101F PT FALLS ASSESS-DOCD LE1/YR: CPT | Performed by: NURSE PRACTITIONER

## 2022-11-28 PROCEDURE — G8754 DIAS BP LESS 90: HCPCS | Performed by: NURSE PRACTITIONER

## 2022-11-28 PROCEDURE — G8417 CALC BMI ABV UP PARAM F/U: HCPCS | Performed by: NURSE PRACTITIONER

## 2022-11-28 PROCEDURE — G8432 DEP SCR NOT DOC, RNG: HCPCS | Performed by: NURSE PRACTITIONER

## 2022-11-28 RX ORDER — FUROSEMIDE 20 MG/1
TABLET ORAL
Qty: 30 TABLET | Refills: 1 | Status: SHIPPED | OUTPATIENT
Start: 2022-11-28 | End: 2022-11-30

## 2022-11-28 RX ORDER — CYCLOBENZAPRINE HCL 5 MG
TABLET ORAL
Qty: 30 TABLET | Refills: 0 | Status: SHIPPED | OUTPATIENT
Start: 2022-11-28

## 2022-11-28 RX ORDER — MELOXICAM 15 MG/1
15 TABLET ORAL DAILY
Qty: 30 TABLET | Refills: 0 | Status: SHIPPED | OUTPATIENT
Start: 2022-11-28 | End: 2022-11-30

## 2022-11-28 NOTE — PROGRESS NOTES
Chief Complaint   Patient presents with    Back Pain     Lower left and right side of back. Leg Pain     Bilateral leg and calf pain. 1. \"Have you been to the ER, urgent care clinic since your last visit? Hospitalized since your last visit? \" No    2. \"Have you seen or consulted any other health care providers outside of the [address] [place] [address] since your last visit? \" No     3. For patients aged 39-70: Has the patient had a colonoscopy / FIT/ Cologuard? Yes - no Care Gap present      If the patient is female:    4. For patients aged 41-77: Has the patient had a mammogram within the past 2 years? NA - based on age or sex      11. For patients aged 21-65: Has the patient had a pap smear?  NA - based on age or sex         3 most recent PHQ Screens 11/28/2022   Little interest or pleasure in doing things Not at all   Feeling down, depressed, irritable, or hopeless Not at all   Total Score PHQ 2 0   Trouble falling or staying asleep, or sleeping too much -   Feeling tired or having little energy -   Poor appetite, weight loss, or overeating -   Feeling bad about yourself - or that you are a failure or have let yourself or your family down -   Trouble concentrating on things such as school, work, reading, or watching TV -   Moving or speaking so slowly that other people could have noticed; or the opposite being so fidgety that others notice -   Thoughts of being better off dead, or hurting yourself in some way -   PHQ 9 Score -   How difficult have these problems made it for you to do your work, take care of your home and get along with others -       Health Maintenance Due   Topic Date Due    COVID-19 Vaccine (3 - Booster for [employer] series) 07/03/2021

## 2022-11-29 ENCOUNTER — TELEPHONE (OUTPATIENT)
Dept: FAMILY MEDICINE CLINIC | Age: 67
End: 2022-11-29

## 2022-11-29 LAB
ALBUMIN SERPL-MCNC: 4.1 G/DL (ref 3.5–5)
ALBUMIN/GLOB SERPL: 1.2 {RATIO} (ref 1.1–2.2)
ALP SERPL-CCNC: 110 U/L (ref 45–117)
ALT SERPL-CCNC: 27 U/L (ref 12–78)
ANION GAP SERPL CALC-SCNC: 7 MMOL/L (ref 5–15)
AST SERPL-CCNC: 20 U/L (ref 15–37)
BILIRUB SERPL-MCNC: 1.2 MG/DL (ref 0.2–1)
BNP SERPL-MCNC: 25 PG/ML
BUN SERPL-MCNC: 10 MG/DL (ref 6–20)
BUN/CREAT SERPL: 13 (ref 12–20)
CALCIUM SERPL-MCNC: 9.3 MG/DL (ref 8.5–10.1)
CHLORIDE SERPL-SCNC: 106 MMOL/L (ref 97–108)
CO2 SERPL-SCNC: 30 MMOL/L (ref 21–32)
CREAT SERPL-MCNC: 0.79 MG/DL (ref 0.7–1.3)
GLOBULIN SER CALC-MCNC: 3.3 G/DL (ref 2–4)
GLUCOSE SERPL-MCNC: 93 MG/DL (ref 65–100)
POTASSIUM SERPL-SCNC: 3.2 MMOL/L (ref 3.5–5.1)
PROT SERPL-MCNC: 7.4 G/DL (ref 6.4–8.2)
SODIUM SERPL-SCNC: 143 MMOL/L (ref 136–145)

## 2022-11-29 NOTE — PROGRESS NOTES
HISTORY OF PRESENT ILLNESS  Gloria Prajapati is a 78 y.o. male. HPI  Follow up HTN, hypokalemia and acute issues. Taking prescribed BP meds. Hypokalemia. Last K low at 3.1. HCTZ was discontinued. Taking potassium supplement as prescribed. BLE edema. Legs have swelled since discontinuing HCTZ. Also having some mild dyspnea with exertion. Denies CP, heart palpitations. C/o low back pain on left radiating down left leg. Denies any recent injury or unusual/strenuous activity. Aggravated by activity. Patient Active Problem List   Diagnosis Code    Vegetarian Z78.9    Allergic rhinitis J30.9    NESTOR (obstructive sleep apnea) G47.33    Illiteracy Z55.0    Essential hypertension I10    Obesity (BMI 30-39. 9) E66.9    Gastroesophageal reflux disease with esophagitis K21.00    Mixed hyperlipidemia E78.2    Spinal stenosis M48.00    Lumbar stenosis M48.061    Sarcoidosis D86.9    History of colon cancer Z85.038     Current Outpatient Medications   Medication Sig    meloxicam (MOBIC) 15 mg tablet Take 1 Tablet by mouth daily. cyclobenzaprine (FLEXERIL) 5 mg tablet Take 1-2 tablets at bedtime as needed for muscle spasm. furosemide (LASIX) 20 mg tablet Take 1 tablet daily. potassium chloride (KLOR-CON M10) 10 mEq tablet TAKE 3 TABLETS EVERY DAY. Appointment due. amLODIPine (NORVASC) 10 mg tablet TAKE 1 TABLET EVERY DAY    valACYclovir (VALTREX) 500 mg tablet TAKE 1 TABLET EVERY DAY AS NEEDED FOR BREAKOUT UNDER EYES    omeprazole (PRILOSEC) 20 mg capsule Take 1 Capsule by mouth two (2) times a day. losartan (COZAAR) 100 mg tablet Take 1 Tablet by mouth daily. carvediloL (COREG) 25 mg tablet Take 1 Tablet by mouth. atorvastatin (LIPITOR) 20 mg tablet Take 1 Tablet by mouth daily. montelukast (SINGULAIR) 10 mg tablet Take 1 Tablet by mouth daily. fexofenadine (ALLEGRA) 180 mg tablet Take 1 Tablet by mouth daily as needed for Allergies.     metoprolol succinate (TOPROL-XL) 100 mg tablet Take 1 Tablet by mouth daily. aspirin 81 mg chewable tablet CHEW AND SWALLOW 1 TABLET BY MOUTH DAILY. INDICATIONS: TREATMENT TO PREVENT A HEART ATTACK    cholecalciferol (VITAMIN D3) (2,000 UNITS /50 MCG) cap capsule TAKE 1 CAPSULE EVERY DAY    cyanocobalamin (VITAMIN B-12) 2,000 mcg TbER Take 2,000 mcg by mouth daily. No current facility-administered medications for this visit. Blood pressure 134/86, pulse 87, temperature 97.9 °F (36.6 °C), temperature source Temporal, resp. rate 16, height 5' 8\" (1.727 m), weight 223 lb 6.4 oz (101.3 kg), SpO2 99 %. Social History     Tobacco Use    Smoking status: Never     Passive exposure: Never    Smokeless tobacco: Never   Vaping Use    Vaping Use: Never used   Substance Use Topics    Alcohol use: Not Currently     Alcohol/week: 0.0 standard drinks     Comment: OCCASIONALLY    Drug use: No      Review of Systems   Constitutional:  Negative for chills, fever and malaise/fatigue. Respiratory:  Positive for shortness of breath (mild, exertional). Negative for cough. Cardiovascular:  Positive for leg swelling. Negative for chest pain, palpitations, orthopnea and PND. Musculoskeletal:  Positive for back pain. Neurological:  Negative for tingling, sensory change and focal weakness. All other systems reviewed and are negative. Physical Exam  Constitutional:       General: He is not in acute distress. Cardiovascular:      Rate and Rhythm: Normal rate and regular rhythm. Heart sounds: Normal heart sounds. Pulmonary:      Effort: Pulmonary effort is normal.      Breath sounds: Normal breath sounds. Musculoskeletal:      Cervical back: Neck supple. Lumbar back: Tenderness (left lumbosacral) present. Negative right straight leg raise test and negative left straight leg raise test.      Right lower leg: Edema (2+) present. Left lower leg: Edema (2+) present. Lymphadenopathy:      Cervical: No cervical adenopathy.    Skin:     General: Skin is warm and dry. Neurological:      Mental Status: He is alert. Sensory: Sensation is intact. Motor: Motor function is intact. Coordination: Coordination normal.      Gait: Gait is intact. Psychiatric:         Mood and Affect: Mood normal.       ASSESSMENT and PLAN  Diagnoses and all orders for this visit:    1. Essential hypertension  -     METABOLIC PANEL, COMPREHENSIVE; Future  Controlled. Continue current treatment. 2. Hypokalemia  -     METABOLIC PANEL, COMPREHENSIVE; Future  Recheck labs. 3. Bilateral lower extremity edema  -     furosemide (LASIX) 20 mg tablet; Take 1 tablet daily. Add diuretic as directed. Keep legs elevated at rest.  Compression stockings. Limit sodium intake. 4. Acute left-sided low back pain with left-sided sciatica  -     XR SPINE LUMB 2 OR 3 V; Future  -     meloxicam (MOBIC) 15 mg tablet; Take 1 Tablet by mouth daily. -     cyclobenzaprine (FLEXERIL) 5 mg tablet; Take 1-2 tablets at bedtime as needed for muscle spasm. 5. Dyspnea on exertion  -     NT-PRO BNP; Future  Consider stress echo. Follow up 1 month. We discussed the expected course, resolution and complications of the diagnosis in detail. Medication risks, benefits, costs, interactions and side effects were discussed. The patient was advised to contact the office for worsening of condition or FTI as anticipated. The patient expressed understanding of the diagnosis and plan.

## 2022-11-29 NOTE — TELEPHONE ENCOUNTER
Call and left voicemail for pt per provider [facility] can take the muscle relaxer I prescribed at night to help him sleep. Please let him know that his back xray showed arthritis. \"          QUESTIONS   Information for Provider? Seen yesterday and requested something to help   him sleep. Phar? Elisa .  Please call patient.   ---------------------------------------------------------------------------   --------------   Tanya Rogers KAREN   [phone]; OK to leave message on voicemail

## 2022-12-01 ENCOUNTER — TELEPHONE (OUTPATIENT)
Dept: FAMILY MEDICINE CLINIC | Age: 67
End: 2022-12-01

## 2022-12-01 DIAGNOSIS — E87.6 HYPOKALEMIA: ICD-10-CM

## 2022-12-01 RX ORDER — POTASSIUM CHLORIDE 750 MG/1
TABLET, EXTENDED RELEASE ORAL
Qty: 270 TABLET | Refills: 0 | Status: SHIPPED | OUTPATIENT
Start: 2022-12-01

## 2022-12-01 NOTE — TELEPHONE ENCOUNTER
TC to patient.  verified. Notified potassium still low. Recommend increase potassium supplement to 3 tabs daily. Will send rx to pharmacy. Recheck in 4 weeks.

## 2022-12-05 DIAGNOSIS — K21.00 GASTROESOPHAGEAL REFLUX DISEASE WITH ESOPHAGITIS, UNSPECIFIED WHETHER HEMORRHAGE: ICD-10-CM

## 2022-12-05 RX ORDER — CYCLOBENZAPRINE HCL 10 MG
TABLET ORAL
Qty: 60 TABLET | Refills: 0 | OUTPATIENT
Start: 2022-12-05

## 2022-12-05 RX ORDER — OMEPRAZOLE 20 MG/1
CAPSULE, DELAYED RELEASE ORAL
Qty: 180 CAPSULE | Refills: 0 | Status: SHIPPED | OUTPATIENT
Start: 2022-12-05

## 2022-12-06 DIAGNOSIS — R60.0 BILATERAL LOWER EXTREMITY EDEMA: ICD-10-CM

## 2022-12-06 DIAGNOSIS — M54.42 ACUTE LEFT-SIDED LOW BACK PAIN WITH LEFT-SIDED SCIATICA: ICD-10-CM

## 2022-12-06 RX ORDER — CYCLOBENZAPRINE HCL 5 MG
TABLET ORAL
Qty: 90 TABLET | Refills: 0 | Status: SHIPPED | OUTPATIENT
Start: 2022-12-06

## 2022-12-06 RX ORDER — MELOXICAM 15 MG/1
TABLET ORAL
Qty: 90 TABLET | Refills: 0 | Status: SHIPPED | OUTPATIENT
Start: 2022-12-06

## 2022-12-06 RX ORDER — FUROSEMIDE 20 MG/1
TABLET ORAL
Qty: 90 TABLET | Refills: 0 | Status: SHIPPED | OUTPATIENT
Start: 2022-12-06

## 2022-12-08 DIAGNOSIS — E78.2 MIXED HYPERLIPIDEMIA: ICD-10-CM

## 2022-12-08 RX ORDER — ATORVASTATIN CALCIUM 20 MG/1
TABLET, FILM COATED ORAL
Qty: 90 TABLET | Refills: 1 | Status: SHIPPED | OUTPATIENT
Start: 2022-12-08

## 2022-12-29 ENCOUNTER — OFFICE VISIT (OUTPATIENT)
Dept: FAMILY MEDICINE CLINIC | Age: 67
End: 2022-12-29
Payer: MEDICARE

## 2022-12-29 VITALS
BODY MASS INDEX: 34.04 KG/M2 | TEMPERATURE: 98.1 F | OXYGEN SATURATION: 96 % | DIASTOLIC BLOOD PRESSURE: 86 MMHG | WEIGHT: 224.6 LBS | HEIGHT: 68 IN | SYSTOLIC BLOOD PRESSURE: 136 MMHG | HEART RATE: 93 BPM | RESPIRATION RATE: 16 BRPM

## 2022-12-29 DIAGNOSIS — M51.36 DDD (DEGENERATIVE DISC DISEASE), LUMBAR: ICD-10-CM

## 2022-12-29 DIAGNOSIS — R60.9 DEPENDENT EDEMA: ICD-10-CM

## 2022-12-29 DIAGNOSIS — I10 ESSENTIAL HYPERTENSION: Primary | ICD-10-CM

## 2022-12-29 DIAGNOSIS — E78.2 MIXED HYPERLIPIDEMIA: ICD-10-CM

## 2022-12-29 DIAGNOSIS — E87.6 HYPOKALEMIA: ICD-10-CM

## 2022-12-29 LAB
ALBUMIN SERPL-MCNC: 4.2 G/DL (ref 3.5–5)
ALBUMIN/GLOB SERPL: 1.2 {RATIO} (ref 1.1–2.2)
ALP SERPL-CCNC: 126 U/L (ref 45–117)
ALT SERPL-CCNC: 22 U/L (ref 12–78)
ANION GAP SERPL CALC-SCNC: 7 MMOL/L (ref 5–15)
AST SERPL-CCNC: 18 U/L (ref 15–37)
BILIRUB SERPL-MCNC: 1.4 MG/DL (ref 0.2–1)
BUN SERPL-MCNC: 11 MG/DL (ref 6–20)
BUN/CREAT SERPL: 11 (ref 12–20)
CALCIUM SERPL-MCNC: 9.6 MG/DL (ref 8.5–10.1)
CHLORIDE SERPL-SCNC: 108 MMOL/L (ref 97–108)
CHOLEST SERPL-MCNC: 147 MG/DL
CO2 SERPL-SCNC: 30 MMOL/L (ref 21–32)
CREAT SERPL-MCNC: 0.99 MG/DL (ref 0.7–1.3)
GLOBULIN SER CALC-MCNC: 3.4 G/DL (ref 2–4)
GLUCOSE SERPL-MCNC: 96 MG/DL (ref 65–100)
HDLC SERPL-MCNC: 52 MG/DL
HDLC SERPL: 2.8 {RATIO} (ref 0–5)
LDLC SERPL CALC-MCNC: 76.2 MG/DL (ref 0–100)
POTASSIUM SERPL-SCNC: 3.1 MMOL/L (ref 3.5–5.1)
PROT SERPL-MCNC: 7.6 G/DL (ref 6.4–8.2)
SODIUM SERPL-SCNC: 145 MMOL/L (ref 136–145)
TRIGL SERPL-MCNC: 94 MG/DL (ref ?–150)
VLDLC SERPL CALC-MCNC: 18.8 MG/DL

## 2022-12-29 PROCEDURE — 1101F PT FALLS ASSESS-DOCD LE1/YR: CPT | Performed by: NURSE PRACTITIONER

## 2022-12-29 PROCEDURE — 1123F ACP DISCUSS/DSCN MKR DOCD: CPT | Performed by: NURSE PRACTITIONER

## 2022-12-29 PROCEDURE — 3074F SYST BP LT 130 MM HG: CPT | Performed by: NURSE PRACTITIONER

## 2022-12-29 PROCEDURE — G8432 DEP SCR NOT DOC, RNG: HCPCS | Performed by: NURSE PRACTITIONER

## 2022-12-29 PROCEDURE — G8536 NO DOC ELDER MAL SCRN: HCPCS | Performed by: NURSE PRACTITIONER

## 2022-12-29 PROCEDURE — G8427 DOCREV CUR MEDS BY ELIG CLIN: HCPCS | Performed by: NURSE PRACTITIONER

## 2022-12-29 PROCEDURE — 3078F DIAST BP <80 MM HG: CPT | Performed by: NURSE PRACTITIONER

## 2022-12-29 PROCEDURE — G8417 CALC BMI ABV UP PARAM F/U: HCPCS | Performed by: NURSE PRACTITIONER

## 2022-12-29 PROCEDURE — G9711 PT HX TOT COL OR COLON CA: HCPCS | Performed by: NURSE PRACTITIONER

## 2022-12-29 PROCEDURE — 99214 OFFICE O/P EST MOD 30 MIN: CPT | Performed by: NURSE PRACTITIONER

## 2022-12-29 NOTE — PROGRESS NOTES
Chief Complaint   Patient presents with    Cholesterol Problem     Follow up    Hip Pain     Bilateral hip pain, \"right hip worse then left hip\". 1. \"Have you been to the ER, urgent care clinic since your last visit? Hospitalized since your last visit? \" No    2. \"Have you seen or consulted any other health care providers outside of the [address] [place] [address] since your last visit? \" No     3. For patients aged 39-70: Has the patient had a colonoscopy / FIT/ Cologuard? Yes - no Care Gap present      If the patient is female:    4. For patients aged 41-77: Has the patient had a mammogram within the past 2 years? NA - based on age or sex      11. For patients aged 21-65: Has the patient had a pap smear?  NA - based on age or sex         3 most recent PHQ Screens 12/29/2022   Little interest or pleasure in doing things Not at all   Feeling down, depressed, irritable, or hopeless Not at all   Total Score PHQ 2 0   Trouble falling or staying asleep, or sleeping too much -   Feeling tired or having little energy -   Poor appetite, weight loss, or overeating -   Feeling bad about yourself - or that you are a failure or have let yourself or your family down -   Trouble concentrating on things such as school, work, reading, or watching TV -   Moving or speaking so slowly that other people could have noticed; or the opposite being so fidgety that others notice -   Thoughts of being better off dead, or hurting yourself in some way -   PHQ 9 Score -   How difficult have these problems made it for you to do your work, take care of your home and get along with others -       Health Maintenance Due   Topic Date Due    COVID-19 Vaccine (3 - Booster for [employer] series) 07/03/2021

## 2022-12-29 NOTE — PROGRESS NOTES
HISTORY OF PRESENT ILLNESS  Sabino Rashid is a 78 y.o. male. HPI  Follow up health problems. HTN. Taking prescribed medications. Hypokalemia. Last K at 3.2. Potassium supplement increased to 30 meq daily. Dependent edema. HCTZ discontinued when potassium dropped. Swelling worsened. Lasix prescribed with good symptom relief. Lumbar DDD. Continues to have LBP that radiates down right leg. No numbness/tingling. Patient Active Problem List   Diagnosis Code    Vegetarian Z78.9    Allergic rhinitis J30.9    NESTOR (obstructive sleep apnea) G47.33    Illiteracy Z55.0    Essential hypertension I10    Obesity (BMI 30-39. 9) E66.9    Gastroesophageal reflux disease with esophagitis K21.00    Mixed hyperlipidemia E78.2    Spinal stenosis M48.00    Lumbar stenosis M48.061    Sarcoidosis D86.9    History of colon cancer Z85.038     Current Outpatient Medications   Medication Sig    atorvastatin (LIPITOR) 20 mg tablet TAKE 1 TABLET EVERY DAY    furosemide (LASIX) 20 mg tablet TAKE 1 TABLET BY MOUTH DAILY    cyclobenzaprine (FLEXERIL) 5 mg tablet Take 1-2 tablets at bedtime as needed for muscle spasm. meloxicam (MOBIC) 15 mg tablet Take 1 tablet daily as needed for back pain. omeprazole (PRILOSEC) 20 mg capsule TAKE 1 CAPSULE TWICE DAILY    potassium chloride (KLOR-CON M10) 10 mEq tablet TAKE 3 TABLETS EVERY DAY. amLODIPine (NORVASC) 10 mg tablet TAKE 1 TABLET EVERY DAY    valACYclovir (VALTREX) 500 mg tablet TAKE 1 TABLET EVERY DAY AS NEEDED FOR BREAKOUT UNDER EYES    losartan (COZAAR) 100 mg tablet Take 1 Tablet by mouth daily. carvediloL (COREG) 25 mg tablet Take 1 Tablet by mouth.    montelukast (SINGULAIR) 10 mg tablet Take 1 Tablet by mouth daily. fexofenadine (ALLEGRA) 180 mg tablet Take 1 Tablet by mouth daily as needed for Allergies. metoprolol succinate (TOPROL-XL) 100 mg tablet Take 1 Tablet by mouth daily. aspirin 81 mg chewable tablet CHEW AND SWALLOW 1 TABLET BY MOUTH DAILY. INDICATIONS: TREATMENT TO PREVENT A HEART ATTACK    cholecalciferol (VITAMIN D3) (2,000 UNITS /50 MCG) cap capsule TAKE 1 CAPSULE EVERY DAY    cyanocobalamin (VITAMIN B-12) 2,000 mcg TbER Take 2,000 mcg by mouth daily. No current facility-administered medications for this visit. Social History     Tobacco Use    Smoking status: Never     Passive exposure: Never    Smokeless tobacco: Never   Vaping Use    Vaping Use: Never used   Substance Use Topics    Alcohol use: Not Currently     Alcohol/week: 0.0 standard drinks     Comment: OCCASIONALLY    Drug use: No     Blood pressure 136/86, pulse 93, temperature 98.1 °F (36.7 °C), temperature source Temporal, resp. rate 16, height 5' 8\" (1.727 m), weight 224 lb 9.6 oz (101.9 kg), SpO2 96 %. Review of Systems   Respiratory:  Negative for cough and shortness of breath. Cardiovascular:  Positive for leg swelling. Negative for chest pain, palpitations, orthopnea and PND. Musculoskeletal:  Positive for back pain. Neurological:  Negative for sensory change and focal weakness. All other systems reviewed and are negative. Physical Exam  Constitutional:       General: He is not in acute distress. Comments: Overweight. Cardiovascular:      Rate and Rhythm: Normal rate and regular rhythm. Heart sounds: Normal heart sounds. Pulmonary:      Effort: Pulmonary effort is normal.      Breath sounds: Normal breath sounds. Musculoskeletal:      Cervical back: Neck supple. Lumbar back: No swelling or tenderness. Negative right straight leg raise test and negative left straight leg raise test.      Right lower leg: Edema (trace) present. Left lower leg: Edema (trace) present. Lymphadenopathy:      Cervical: No cervical adenopathy. Skin:     General: Skin is warm and dry. Neurological:      Sensory: Sensation is intact. Motor: Motor function is intact.       Coordination: Coordination normal. Heel to Shin Test normal.      Gait: Gait normal.   Psychiatric:         Mood and Affect: Mood normal.       ASSESSMENT and PLAN  Diagnoses and all orders for this visit:    1. Essential hypertension  -     METABOLIC PANEL, COMPREHENSIVE; Future  Controlled. Continue current treatment. 2. Mixed hyperlipidemia  -     LIPID PANEL; Future  -     METABOLIC PANEL, COMPREHENSIVE; Future  Reviewed healthy diet and exercise recommendations. Fasting labs sent. 3. Hypokalemia  -     METABOLIC PANEL, COMPREHENSIVE; Future    4. Dependent edema  Improved on lasix. May decrease to every other day dosing. 5. DDD (degenerative disc disease), lumbar  -     REFERRAL TO PHYSICAL THERAPY  Follow up 3 months. We discussed the expected course, resolution and complications of the diagnosis in detail. Medication risks, benefits, costs, interactions and side effects were discussed. The patient was advised to contact the office for worsening of condition or FTI as anticipated. The patient expressed understanding of the diagnosis and plan.

## 2023-01-01 DIAGNOSIS — J30.89 NON-SEASONAL ALLERGIC RHINITIS, UNSPECIFIED TRIGGER: ICD-10-CM

## 2023-01-01 DIAGNOSIS — I10 ESSENTIAL HYPERTENSION: ICD-10-CM

## 2023-01-03 RX ORDER — LOSARTAN POTASSIUM 100 MG/1
TABLET ORAL
Qty: 90 TABLET | Refills: 1 | Status: SHIPPED | OUTPATIENT
Start: 2023-01-03

## 2023-01-03 RX ORDER — FEXOFENADINE HYDROCHLORIDE 180 MG/1
TABLET, FILM COATED ORAL
Qty: 90 TABLET | Refills: 1 | Status: SHIPPED | OUTPATIENT
Start: 2023-01-03

## 2023-01-04 ENCOUNTER — TELEPHONE (OUTPATIENT)
Dept: FAMILY MEDICINE CLINIC | Age: 68
End: 2023-01-04

## 2023-01-04 NOTE — TELEPHONE ENCOUNTER
----- Message from Gordon Lares sent at 1/4/2023  1:44 PM EST -----  Subject: Message to Provider    QUESTIONS  Information for Provider? Patient states he is unable to schedule with the   physical therapist provider referred him to and would like an alternative   as they were completely booked. ---------------------------------------------------------------------------  --------------  Yemi Spears [facility]  [phone]; OK to leave message on voicemail  ---------------------------------------------------------------------------  --------------  SCRIPT ANSWERS  Relationship to Patient?  Self

## 2023-01-05 ENCOUNTER — TELEPHONE (OUTPATIENT)
Dept: FAMILY MEDICINE CLINIC | Age: 68
End: 2023-01-05

## 2023-01-05 NOTE — TELEPHONE ENCOUNTER
Outbound call to patient. Name and  verified. Patient found a PT to go to and asked patient to gave office a call back with information so we can put in referral information. Pt understood and is going to give call back.

## 2023-01-05 NOTE — TELEPHONE ENCOUNTER
----- Message from Pal Saavedra sent at 1/5/2023 12:01 PM EST -----  Subject: Referral Request    Reason for referral request? Patient is requesting that he needs some   information Rehab associsites sent to them and a referral Needs   information sent over to another DrAmy there number [phone] they did   schedule him for 1/16   Provider patient wants to be referred to(if known):     Provider Phone Number(if known):     Additional Information for Provider?   ---------------------------------------------------------------------------  --------------  [email or web address]    [phone]; OK to leave message on voicemail  ---------------------------------------------------------------------------  --------------

## 2023-01-06 NOTE — TELEPHONE ENCOUNTER
Outbound call to patient. Name and  verified, the number that we have for the doctor office pt is going to does not work. Patient is going to call back with right information so we can fax over information.

## 2023-01-09 ENCOUNTER — NURSE TRIAGE (OUTPATIENT)
Dept: OTHER | Facility: CLINIC | Age: 68
End: 2023-01-09

## 2023-01-09 DIAGNOSIS — M54.41 ACUTE RIGHT-SIDED LOW BACK PAIN WITH RIGHT-SIDED SCIATICA: ICD-10-CM

## 2023-01-09 DIAGNOSIS — M51.36 DDD (DEGENERATIVE DISC DISEASE), LUMBAR: Primary | ICD-10-CM

## 2023-01-09 NOTE — TELEPHONE ENCOUNTER
[facility] [place] [facility]    Received call from [address] [facility]ianna Madrid at [facility] with [employer]. Subjective: Caller states \"I have had a cough for 2 weeks and sometimes have difficulty breathing with exertion. When I blow my nose the mucous is green. I vomited last night also. \"     Current Symptoms:     Onset: 2 weeks ago;     Associated Symptoms:     Pain Severity: denies pain;     Temperature:  denies fever    What has been tried: nothing    LMP: NA Pregnant: NA    Recommended disposition: Go to Office Now    Care advice provided, patient verbalizes understanding; denies any other questions or concerns; instructed to call back for any new or worsening symptoms. Patient/Caller agrees with recommended disposition; writer provided warm transfer to [facility] at [facility] for appointment scheduling    Attention Provider: Thank you for allowing me to participate in the care of your patient. The patient was connected to triage in response to information provided to the [facility]. Please do not respond through this encounter as the response is not directed to a shared pool.     Reason for Disposition   MILD difficulty breathing (e.g., minimal/no SOB at rest, SOB with walking, pulse < 100) of new-onset or worse than normal    Protocols used: Breathing Difficulty-ADULT-[place]

## 2023-01-19 ENCOUNTER — TELEPHONE (OUTPATIENT)
Dept: FAMILY MEDICINE CLINIC | Age: 68
End: 2023-01-19

## 2023-01-19 NOTE — TELEPHONE ENCOUNTER
----- Message from Jony White sent at 1/19/2023  9:03 AM EST -----  Subject: Message to Provider    QUESTIONS  Information for Provider? Pt would like to see if he can get some cough   medicine called in to [facility] on [address] [place] [address], pls give the pt a call   to let him know if this possible.  ---------------------------------------------------------------------------  --------------  Adrianne Ortiz INFO  [phone]; OK to leave message on voicemail  ---------------------------------------------------------------------------  --------------  SCRIPT ANSWERS  Relationship to Patient?  Self

## 2023-01-19 NOTE — TELEPHONE ENCOUNTER
Call and left voicemail for pt per provider \"Please advise him to get mucinex DM tablets. They are OTC. If his cough persists I think he should schedule an appointment to have his lungs checked\".

## 2023-02-02 ENCOUNTER — TRANSCRIBE ORDER (OUTPATIENT)
Dept: SCHEDULING | Age: 68
End: 2023-02-02

## 2023-02-02 ENCOUNTER — OFFICE VISIT (OUTPATIENT)
Dept: FAMILY MEDICINE CLINIC | Age: 68
End: 2023-02-02
Payer: MEDICARE

## 2023-02-02 VITALS
DIASTOLIC BLOOD PRESSURE: 85 MMHG | BODY MASS INDEX: 33.95 KG/M2 | HEIGHT: 68 IN | TEMPERATURE: 97.5 F | RESPIRATION RATE: 16 BRPM | SYSTOLIC BLOOD PRESSURE: 147 MMHG | OXYGEN SATURATION: 97 % | HEART RATE: 96 BPM | WEIGHT: 224 LBS

## 2023-02-02 DIAGNOSIS — D86.9 SARCOID: Primary | ICD-10-CM

## 2023-02-02 DIAGNOSIS — M54.16 LUMBAR RADICULOPATHY: Primary | ICD-10-CM

## 2023-02-02 DIAGNOSIS — M54.41 ACUTE RIGHT-SIDED LOW BACK PAIN WITH RIGHT-SIDED SCIATICA: ICD-10-CM

## 2023-02-02 PROCEDURE — G8427 DOCREV CUR MEDS BY ELIG CLIN: HCPCS | Performed by: NURSE PRACTITIONER

## 2023-02-02 PROCEDURE — G9711 PT HX TOT COL OR COLON CA: HCPCS | Performed by: NURSE PRACTITIONER

## 2023-02-02 PROCEDURE — G8536 NO DOC ELDER MAL SCRN: HCPCS | Performed by: NURSE PRACTITIONER

## 2023-02-02 PROCEDURE — 1101F PT FALLS ASSESS-DOCD LE1/YR: CPT | Performed by: NURSE PRACTITIONER

## 2023-02-02 PROCEDURE — G8417 CALC BMI ABV UP PARAM F/U: HCPCS | Performed by: NURSE PRACTITIONER

## 2023-02-02 PROCEDURE — 3079F DIAST BP 80-89 MM HG: CPT | Performed by: NURSE PRACTITIONER

## 2023-02-02 PROCEDURE — 1123F ACP DISCUSS/DSCN MKR DOCD: CPT | Performed by: NURSE PRACTITIONER

## 2023-02-02 PROCEDURE — G8432 DEP SCR NOT DOC, RNG: HCPCS | Performed by: NURSE PRACTITIONER

## 2023-02-02 PROCEDURE — 3077F SYST BP >= 140 MM HG: CPT | Performed by: NURSE PRACTITIONER

## 2023-02-02 PROCEDURE — 99213 OFFICE O/P EST LOW 20 MIN: CPT | Performed by: NURSE PRACTITIONER

## 2023-02-02 RX ORDER — PREDNISONE 10 MG/1
10 TABLET ORAL SEE ADMIN INSTRUCTIONS
Qty: 21 TABLET | Refills: 0 | Status: SHIPPED | OUTPATIENT
Start: 2023-02-02

## 2023-02-02 NOTE — PROGRESS NOTES
HISTORY OF PRESENT ILLNESS  Stephanie Castañeda is a 78 y.o. male. HPI  Follow up low back pain. Walked into office this afternoon. Seen here last month. Xrays showed lumbar DDD with anterolisthesis of L4L5. He was referred for PT. Reports he could not get an appointment with PT here so he has been going to [facility] which is a 3 hour drive. Symptoms have worsened since PT. C/o pain in right low back that radiates down his right leg to foot. Has taken mobic and flexeril without relief. Patient Active Problem List   Diagnosis Code    Vegetarian Z78.9    Allergic rhinitis J30.9    NESTOR (obstructive sleep apnea) G47.33    Illiteracy Z55.0    Essential hypertension I10    Obesity (BMI 30-39. 9) E66.9    Gastroesophageal reflux disease with esophagitis K21.00    Mixed hyperlipidemia E78.2    Spinal stenosis M48.00    Lumbar stenosis M48.061    Sarcoidosis D86.9    History of colon cancer Z85.038     Current Outpatient Medications   Medication Sig    predniSONE (STERAPRED DS) 10 mg dose pack Take 1 Tablet by mouth See Admin Instructions. See administration instruction per 10mg dose pack    montelukast (SINGULAIR) 10 mg tablet TAKE 1 TABLET EVERY DAY    losartan (COZAAR) 100 mg tablet TAKE 1 TABLET EVERY DAY    Allergy Relief, fexofenadine, 180 mg tablet TAKE 1 TABLET EVERY DAY AS NEEDED FOR ALLERGIES    atorvastatin (LIPITOR) 20 mg tablet TAKE 1 TABLET EVERY DAY    furosemide (LASIX) 20 mg tablet TAKE 1 TABLET BY MOUTH DAILY    cyclobenzaprine (FLEXERIL) 5 mg tablet Take 1-2 tablets at bedtime as needed for muscle spasm. meloxicam (MOBIC) 15 mg tablet Take 1 tablet daily as needed for back pain. omeprazole (PRILOSEC) 20 mg capsule TAKE 1 CAPSULE TWICE DAILY    potassium chloride (KLOR-CON M10) 10 mEq tablet TAKE 3 TABLETS EVERY DAY.     amLODIPine (NORVASC) 10 mg tablet TAKE 1 TABLET EVERY DAY    valACYclovir (VALTREX) 500 mg tablet TAKE 1 TABLET EVERY DAY AS NEEDED FOR BREAKOUT UNDER EYES    carvediloL (COREG) 25 mg tablet Take 1 Tablet by mouth.    metoprolol succinate (TOPROL-XL) 100 mg tablet Take 1 Tablet by mouth daily. aspirin 81 mg chewable tablet CHEW AND SWALLOW 1 TABLET BY MOUTH DAILY. INDICATIONS: TREATMENT TO PREVENT A HEART ATTACK    cholecalciferol (VITAMIN D3) (2,000 UNITS /50 MCG) cap capsule TAKE 1 CAPSULE EVERY DAY    cyanocobalamin (VITAMIN B-12) 2,000 mcg TbER Take 2,000 mcg by mouth daily. No current facility-administered medications for this visit. Social History     Tobacco Use    Smoking status: Never     Passive exposure: Never    Smokeless tobacco: Never   Vaping Use    Vaping Use: Never used   Substance Use Topics    Alcohol use: Not Currently     Alcohol/week: 0.0 standard drinks     Comment: OCCASIONALLY    Drug use: No     Blood pressure (!) 147/85, pulse 96, temperature 97.5 °F (36.4 °C), temperature source Temporal, resp. rate 16, height 5' 8\" (1.727 m), weight 224 lb (101.6 kg), SpO2 97 %. Review of Systems   Musculoskeletal:  Positive for back pain. Neurological:  Positive for weakness. Negative for sensory change. All other systems reviewed and are negative. Physical Exam  Constitutional:       General: He is in acute distress (moderate discomfort due to back symptoms). Musculoskeletal:      Lumbar back: Tenderness (right lumbosacral region) present. No swelling. Positive right straight leg raise test.   Neurological:      Sensory: Sensation is intact. Motor: Weakness (Mild decrease in right hip flexion at 4/5.) present. Comments: Patellar reflexes decreased but symmetric. Walking hunched over for pain relief. ASSESSMENT and PLAN  Diagnoses and all orders for this visit:    1. Lumbar radiculopathy  -     REFERRAL TO ORTHOPEDICS  -     predniSONE (STERAPRED DS) 10 mg dose pack; Take 1 Tablet by mouth See Admin Instructions. See administration instruction per 10mg dose pack  -     REFERRAL TO ORTHOPEDICS  Symptoms unimproved.     Therapy may be worsening symptoms due to prolonged sitting in car. Recommend hold PT. Ortho referral.  Contact info given to patient to schedule appointment. 2. Acute right-sided low back pain with right-sided sciatica  -     predniSONE (STERAPRED DS) 10 mg dose pack; Take 1 Tablet by mouth See Admin Instructions. See administration instruction per 10mg dose pack  Follow up prn. We discussed the expected course, resolution and complications of the diagnosis in detail. Medication risks, benefits, costs, interactions and side effects were discussed. The patient was advised to contact the office for worsening of condition or FTI as anticipated. The patient expressed understanding of the diagnosis and plan.

## 2023-02-02 NOTE — PROGRESS NOTES
Chief Complaint   Patient presents with    Hip Pain     Right hip pain that radiates down to right leg. 1. \"Have you been to the ER, urgent care clinic since your last visit? Hospitalized since your last visit? \" No    2. \"Have you seen or consulted any other health care providers outside of the [address] [place] [address] since your last visit? \" No     3. For patients aged 39-70: Has the patient had a colonoscopy / FIT/ Cologuard? Yes - no Care Gap present      If the patient is female:    4. For patients aged 41-77: Has the patient had a mammogram within the past 2 years? NA - based on age or sex      11. For patients aged 21-65: Has the patient had a pap smear?  NA - based on age or sex         3 most recent PHQ Screens 2/2/2023   Little interest or pleasure in doing things Not at all   Feeling down, depressed, irritable, or hopeless Not at all   Total Score PHQ 2 0   Trouble falling or staying asleep, or sleeping too much -   Feeling tired or having little energy -   Poor appetite, weight loss, or overeating -   Feeling bad about yourself - or that you are a failure or have let yourself or your family down -   Trouble concentrating on things such as school, work, reading, or watching TV -   Moving or speaking so slowly that other people could have noticed; or the opposite being so fidgety that others notice -   Thoughts of being better off dead, or hurting yourself in some way -   PHQ 9 Score -   How difficult have these problems made it for you to do your work, take care of your home and get along with others -       Health Maintenance Due   Topic Date Due    COVID-19 Vaccine (3 - Booster for [place] series) 07/03/2021

## 2023-02-20 ENCOUNTER — NURSE TRIAGE (OUTPATIENT)
Dept: OTHER | Facility: CLINIC | Age: 68
End: 2023-02-20

## 2023-02-20 DIAGNOSIS — I10 ESSENTIAL HYPERTENSION: ICD-10-CM

## 2023-02-20 RX ORDER — METOPROLOL SUCCINATE 100 MG/1
100 TABLET, EXTENDED RELEASE ORAL DAILY
Qty: 90 TABLET | Refills: 1 | OUTPATIENT
Start: 2023-02-20

## 2023-02-20 RX ORDER — AMLODIPINE BESYLATE 10 MG/1
TABLET ORAL
Qty: 90 TABLET | Refills: 1 | Status: SHIPPED | OUTPATIENT
Start: 2023-02-20

## 2023-02-20 NOTE — TELEPHONE ENCOUNTER
Patient call asking to have his BP medication refill, but was unaware of the name for this medication.     Best call back number  [phone]

## 2023-02-20 NOTE — TELEPHONE ENCOUNTER
[facility]    Received call from [facility] with [employer] Complaint. Subjective: Caller states \"SOB\"     Current Symptoms:   SOB upon exertion  Denies wheezing, cold sx, chest pain, or dizziness    Hx of sarcoidosis and sleep apnea    Onset: 1 week ago; worsening    Pain Severity: 0/10    Temperature: Denies    What has been tried: Symbicort and Spiriva    Recommended disposition: See HCP within 4 Hours (or PCP triage)    Care advice provided, patient verbalizes understanding; denies any other questions or concerns; instructed to call back for any new or worsening symptoms. Writer provided warm transfer to Colton NAVA at [facility] for 2nd level triage. Attention Provider: Thank you for allowing me to participate in the care of your patient. The patient was connected to triage in response to information provided to the ECC. Please do not respond through this encounter as the response is not directed to a shared pool.     Reason for Disposition   [1] MILD difficulty breathing (e.g., minimal/no SOB at rest, SOB with walking, pulse <100) AND [2] NEW-onset or WORSE than normal    Protocols used: Breathing Difficulty-ADULT-AH

## 2023-02-20 NOTE — TELEPHONE ENCOUNTER
Chief Complaint   Patient presents with    Medication Refill     Patient last office visit was 2/2/23

## 2023-03-09 DIAGNOSIS — M54.42 ACUTE LEFT-SIDED LOW BACK PAIN WITH LEFT-SIDED SCIATICA: ICD-10-CM

## 2023-03-09 RX ORDER — MELOXICAM 15 MG/1
TABLET ORAL
Qty: 90 TABLET | Refills: 0 | Status: SHIPPED | OUTPATIENT
Start: 2023-03-09

## 2023-03-22 DIAGNOSIS — I10 ESSENTIAL HYPERTENSION: ICD-10-CM

## 2023-03-23 RX ORDER — AMLODIPINE BESYLATE 10 MG/1
TABLET ORAL
Qty: 90 TABLET | Refills: 1 | Status: SHIPPED | OUTPATIENT
Start: 2023-03-23

## 2023-04-02 DIAGNOSIS — K21.00 GASTROESOPHAGEAL REFLUX DISEASE WITH ESOPHAGITIS, UNSPECIFIED WHETHER HEMORRHAGE: ICD-10-CM

## 2023-04-03 RX ORDER — OMEPRAZOLE 20 MG/1
CAPSULE, DELAYED RELEASE ORAL
Qty: 180 CAPSULE | Refills: 0 | Status: SHIPPED | OUTPATIENT
Start: 2023-04-03

## 2023-04-13 ENCOUNTER — OFFICE VISIT (OUTPATIENT)
Dept: ORTHOPEDIC SURGERY | Age: 68
End: 2023-04-13

## 2023-04-13 VITALS — HEIGHT: 68 IN | WEIGHT: 226 LBS | BODY MASS INDEX: 34.25 KG/M2

## 2023-04-13 DIAGNOSIS — M43.16 SPONDYLOLISTHESIS OF LUMBAR REGION: ICD-10-CM

## 2023-04-13 DIAGNOSIS — Z98.890 STATUS POST LUMBAR LAMINECTOMY: ICD-10-CM

## 2023-04-13 DIAGNOSIS — M54.50 LUMBAR BACK PAIN: Primary | ICD-10-CM

## 2023-04-13 RX ORDER — METHYLPREDNISOLONE 4 MG/1
TABLET ORAL
Qty: 1 DOSE PACK | Refills: 0 | Status: SHIPPED | OUTPATIENT
Start: 2023-04-13

## 2023-04-17 NOTE — PROGRESS NOTES
Abby Street (: 1955) is a 78 y.o. male patient here for evaluation of the following chief complaint(s):  Back Pain (Right, Low Back)         ASSESSMENT/PLAN:  Below is the assessment and plan developed based on review of pertinent history, physical exam, labs, studies, and medications. 1. Lumbar back pain  -     XR SPINE LUMB MIN 4 V; Future  -     REFERRAL TO PHYSICAL THERAPY; Future  2. Spondylolisthesis of lumbar region  -     REFERRAL TO PHYSICAL THERAPY; Future  3. Status post lumbar laminectomy  -     REFERRAL TO PHYSICAL THERAPY; Future      The patient's radiologic findings have been reviewed with him in detail today. He has an acute onset of low back pain with right lower extremity radiculopathy that has been present over the past month. He is status post prior laminectomy per Dr. Li Meyer from . We have discussed various treatment options, and I would like for him to begin treating his symptoms conservatively. We will start with formal course of outpatient physical therapy, and will start with prednisone as noted below. He has been told to avoid NSAIDs from a cardiology standpoint. He may use over-the-counter Tylenol and Voltaren gel as well. We will see him back for follow-up visit in 6 weeks, whereupon he may be a candidate for MRI with any persistent or worsening symptoms. Return in about 6 weeks (around 2023) for PT follow up, With a PA. SUBJECTIVE/OBJECTIVE:  Abby Street (: 1955) is a 78 y.o. male who presents today for the following:  Chief Complaint   Patient presents with    Back Pain     Right, Low Back        Back Pain   Associated symptoms include weakness. Mr. Kingsley Childs presents today as a previous patient to the practice. He is status post previous lumbar laminectomy per Dr. Li Meyer from .   He states that over the past month, he is a began with an acute onset of low back pain with radiation into the right buttock and along the lateral aspect of the right leg and into the plantar foot. He denies any injury prior to the onset of his symptoms. His symptoms have been constant in nature. He denies any numbness in his right leg, but does report tingling and weakness in the right leg. No bowel or bladder changes. He is able to ambulate less than half a block duration. His symptoms are worse with prolonged sitting, bending, and with walking. He does note that he has chronic left foot weakness that remains unchanged from prior to surgery. He has not had any evaluation of his recent symptoms and is not currently taking any medication. IMAGING:  XR Results (most recent):  Results from Appointment encounter on 04/13/23    XR SPINE LUMB MIN 4 V    Narrative  AP, lateral, flexion, and extension films of the lumbar spine reveal no evidence of acute fracture or lytic lesion. There is well-maintained lumbar lordosis. Prior laminectomy from L4-S1. Mild disc degeneration at L4-5 and L5-S1 with a minimal anterolisthesis noted at L4-5. No movement with flexion or extension views. MRI Results (most recent):       No Known Allergies    Current Outpatient Medications   Medication Sig    methylPREDNISolone (MEDROL DOSEPACK) 4 mg tablet Per dose pack instructions    potassium chloride (KLOR-CON M10) 10 mEq tablet TAKE 3 TABLETS EVERY DAY (APPOINTMENT DUE)    omeprazole (PRILOSEC) 20 mg capsule TAKE 1 CAPSULE TWICE DAILY    amLODIPine (NORVASC) 10 mg tablet TAKE 1 TABLET EVERY DAY    meloxicam (MOBIC) 15 mg tablet TAKE 1 TABLET DAILY AS NEEDED FOR BACK PAIN. furosemide (LASIX) 20 mg tablet TAKE 1 TABLET EVERY DAY    predniSONE (STERAPRED DS) 10 mg dose pack Take 1 Tablet by mouth See Admin Instructions.  See administration instruction per 10mg dose pack    montelukast (SINGULAIR) 10 mg tablet TAKE 1 TABLET EVERY DAY    losartan (COZAAR) 100 mg tablet TAKE 1 TABLET EVERY DAY    Allergy Relief, fexofenadine, 180 mg tablet TAKE 1 TABLET EVERY DAY AS NEEDED FOR ALLERGIES    atorvastatin (LIPITOR) 20 mg tablet TAKE 1 TABLET EVERY DAY    cyclobenzaprine (FLEXERIL) 5 mg tablet Take 1-2 tablets at bedtime as needed for muscle spasm. valACYclovir (VALTREX) 500 mg tablet TAKE 1 TABLET EVERY DAY AS NEEDED FOR BREAKOUT UNDER EYES    carvediloL (COREG) 25 mg tablet Take 1 Tablet by mouth.    metoprolol succinate (TOPROL-XL) 100 mg tablet Take 1 Tablet by mouth daily. aspirin 81 mg chewable tablet CHEW AND SWALLOW 1 TABLET BY MOUTH DAILY. INDICATIONS: TREATMENT TO PREVENT A HEART ATTACK    cholecalciferol (VITAMIN D3) (2,000 UNITS /50 MCG) cap capsule TAKE 1 CAPSULE EVERY DAY    cyanocobalamin (VITAMIN B-12) 2,000 mcg TbER Take 2,000 mcg by mouth daily. No current facility-administered medications for this visit.        Past Medical History:   Diagnosis Date    Adenomatous polyp of colon 2012    Angina pectoris     Bleeding ulcer     BPH (benign prostatic hyperplasia) 2011    BPH (benign prostatic hyperplasia) 12/11/2014    Cancer ([facility] [address] 2013    PROSTATE     Colon polyps 05/03/2017    Hypertension     Kidney infection     pt states he is unaware of having had this    Primary prostate adenocarcinoma ([facility] [address] 10/04/2018    Right inguinal hernia 07/11/2016    Right inguinal hernia 07/11/2016    Sarcoidosis of lung ([facility] [address]     Dr. Jasmyne Newell pulmonary    Sleep apnea     uses CPAP    Vegetarian         Past Surgical History:   Procedure Laterality Date    COLONOSCOPY  2012, 2017    adenomatous polyp    COLONOSCOPY N/A 5/3/2017    COLONOSCOPY performed by Juan Spicer MD at [facility] ENDOSCOPY    COLONOSCOPY Left 10/14/2020    COLONOSCOPY :- performed by Monica Rand MD at [facility] ENDOSCOPY    [address]  2010    [address] Right 7/29/16    inguinal w/mesh by Dr. Roberts Gu Right 02/2017    HX ORTHOPAEDIC      Back surgery    HX OTHER SURGICAL  2011    prostate biopsy - normal/second bx 2/2017 - \"tip had cancer\" - another bx is planned    HX OTHER SURGICAL 2009    FATTY TUMOR REMOVED FROM BACK    HX PROSTATECTOMY  10/22/2019    HX TONSILLECTOMY         Family History   Problem Relation Age of Onset    Hypertension Mother     Hypertension Maternal Grandmother     Hypertension Brother     Alcohol abuse Brother     Cancer Brother         pancreatic    Diabetes Brother     Hypertension Brother     Heart Disease Brother     Heart Attack Father     Anesth Problems Neg Hx         Social History     Tobacco Use    Smoking status: Never     Passive exposure: Never    Smokeless tobacco: Never   Substance Use Topics    Alcohol use: Not Currently     Alcohol/week: 0.0 standard drinks     Comment: OCCASIONALLY        Review of Systems   Constitutional: Negative. Respiratory: Negative. Cardiovascular: Negative. Gastrointestinal: Negative. Endocrine: Negative. Genitourinary: Negative. Musculoskeletal:  Positive for back pain, gait problem and myalgias. Skin: Negative. Allergic/Immunologic: Negative. Neurological:  Positive for weakness. Hematological: Negative. Psychiatric/Behavioral: Negative. All other systems reviewed and are negative. No flowsheet data found. Vitals:  Ht 5' 8\" (1.727 m)   Wt 226 lb (102.5 kg)   BMI 34.36 kg/m²    Body mass index is 34.36 kg/m². Physical Exam    Neurologic  Sensory  Light Touch - Intact - Globally. Overall Assessment of Muscle Strength and Tone reveals  Lower Extremities - Right Iliopsoas - 5/5. Left Iliopsoas - 5/5. Right Tibialis Anterior - 5/5. Left Tibialis Anterior - 4/5. Right Gastroc-Soleus - 5/5. Left Gastroc-Soleus - 5/5. Right EHL - 5/5. Left EHL - 4/5. General Assessment of Reflexes  Right Ankle - Clonus is not present. Left Ankle - Clonus is not present. Reflexes (Dermatomes)  2/2 Normal - Left Achilles (L5-S2), Left Knee (L2-4), Right Achilles (L5-S2) and Right Knee (L2-4).     Musculoskeletal  Global Assessment  Examination of related systems reveals - well-developed, well-nourished, in no acute distress, alert and oriented x 3. Gait and Station - normal gait and station and normal posture. Right Lower Extremity - normal strength and tone, normal range of motion without pain and no instability, subluxation or laxity. Left Lower Extremity - normal strength and tone, normal range of motion without pain and no instability, subluxation or laxity. Spine/Ribs/Pelvis  Cervical Spine - Examination of the cervical spine reveals - no tenderness to palpation, no pain, no swelling, edema or erythema, normal cervical spine movements and normal sensation. Thoracic (Dorsal) Spine - Examination of the thoracic spine reveals - no tenderness over thoracic vertebrae, no pain, normal sensation and normal thoracic spine movements. Lumbosacral Spine - Examination of the lumbosacral spine reveals - no known fractures or deformities. Inspection and Palpation - Tenderness - moderate. Assessment of pain reveals the following findings - The pain is characterized as - moderate. Location - pain refers to lower back bilaterally. ROJM - Trunk Extension - 15 degrees. Lumbar Spine Flexion - 35 °. Lumbosacral Spine - Functional Testing - Babinski Test negative, Prone Knee Bending Test negative, Slump Test negative, Straight Leg Raising Test negative. Dr. Nirav Davis was available for immediate consult during this encounter. An electronic signature was used to authenticate this note.   -- JOAO Mcfarland

## 2023-04-28 ENCOUNTER — HOSPITAL ENCOUNTER (OUTPATIENT)
Dept: CT IMAGING | Age: 68
End: 2023-04-28
Attending: INTERNAL MEDICINE
Payer: MEDICARE

## 2023-04-28 DIAGNOSIS — D86.9 SARCOID: ICD-10-CM

## 2023-04-28 LAB — CREAT BLD-MCNC: 1 MG/DL (ref 0.6–1.3)

## 2023-04-28 PROCEDURE — 71260 CT THORAX DX C+: CPT

## 2023-04-28 PROCEDURE — 74011000636 HC RX REV CODE- 636: Performed by: INTERNAL MEDICINE

## 2023-04-28 PROCEDURE — 82565 ASSAY OF CREATININE: CPT

## 2023-04-28 RX ADMIN — IOPAMIDOL 100 ML: 755 INJECTION, SOLUTION INTRAVENOUS at 16:56

## 2023-05-25 RX ORDER — CARVEDILOL 25 MG/1
25 TABLET ORAL 2 TIMES DAILY
Qty: 60 TABLET | Refills: 1 | Status: SHIPPED | OUTPATIENT
Start: 2023-05-25

## 2023-05-29 RX ORDER — CYCLOBENZAPRINE HCL 5 MG
TABLET ORAL
Qty: 90 TABLET | Refills: 0 | Status: SHIPPED | OUTPATIENT
Start: 2023-05-29

## 2023-06-07 NOTE — H&P
[address], [address] [place] [address]      History and Physical       NAME:  Toshia Almendarez   :   1955   MRN:   [id number]             History of Present Illness:  Patient is a 60 y.o. who is seen for h/o colon polyps. PMH:  Past Medical History:   Diagnosis Date    Adenomatous polyp of colon     Angina pectoris     Bleeding ulcer     BPH (benign prostatic hyperplasia)     BPH (benign prostatic hyperplasia) 2014    Cancer ([address] 2013    PROSTATE     Colon polyps 2017    Hypertension     Kidney infection     pt states he is unaware of having had this    Right inguinal hernia 2016    Sleep apnea     uses CPAP    Vegetarian        PSH:  Past Surgical History:   Procedure Laterality Date    COLONOSCOPY  ,     adenomatous polyp    COLONOSCOPY N/A 5/3/2017    [facility] ENDOSCOPY    HX CHOLECYSTECTOMY      HX HERNIA REPAIR Right 16    inguinal w/mesh by Dr. Johnny Tsai Right 2017    HX ORTHOPAEDIC      Back surgery    HX OTHER SURGICAL      prostate biopsy - normal/second bx 2017 - \"tip had cancer\" - another bx is planned    HX OTHER SURGICAL      FATTY TUMOR REMOVED FROM BACK    HX PROSTATECTOMY  10/22/2019    HX TONSILLECTOMY         Allergies:  No Known Allergies    Home Medications:  Prior to Admission Medications   Prescriptions Last Dose Informant Patient Reported? Taking? albuterol (PROVENTIL HFA, VENTOLIN HFA, PROAIR HFA) 90 mcg/actuation inhaler   No No   Sig: INHALE 2 PUFFS EVERY 6 HOURS AS NEEDED FOR WHEEZING   amLODIPine (NORVASC) 10 mg tablet 10/14/2020 at Unknown time  No Yes   Sig: TAKE 1 TABLET EVERY DAY   aspirin 81 mg chewable tablet 10/14/2020 at Unknown time  No Yes   Sig: CHEW AND SWALLOW 1 TABLET BY MOUTH DAILY.  INDICATIONS: TREATMENT TO PREVENT A HEART ATTACK   azelastine (ASTELIN) 137 mcg (0.1 %) nasal spray Not Taking at Unknown time  No No   [place] by Both Nostrils route two (2) times a day. Use in each nostril as directed   chlorthalidone (HYGROTEN) 25 mg tablet 10/14/2020 at Unknown time  No Yes   Sig: Take 1 Tab by mouth daily. cholecalciferol (VITAMIN D3) (2,000 UNITS /50 MCG) cap capsule 10/7/2020 at Unknown time  No Yes   Sig: Take 2,000 Units by mouth daily. cyanocobalamin (VITAMIN B-12) 2,000 mcg TbER 10/7/2020 at Unknown time  No Yes   Sig: Take 2,000 mcg by mouth daily. docusate sodium (COLACE) 100 mg capsule Not Taking at Unknown time  No No   Sig: Take 1 Cap by mouth two (2) times a day. For constipation   fluticasone propionate (FLONASE) 50 mcg/actuation nasal spray Not Taking at Unknown time  No No   Sig: USE 2 SPRAYS IN EACH NOSTRIL EVERY DAY   ketoconazole (NIZORAL) 2 % topical cream 10/7/2020 at Unknown time  No Yes   Sig: Apply  to affected area daily. lactulose (CHRONULAC) 10 gram/15 mL solution Not Taking at Unknown time  No No   Sig: Take 15 mL by mouth two (2) times daily as needed (constipation). losartan (COZAAR) 100 mg tablet 10/14/2020 at Unknown time  No Yes   Sig: TAKE 1 TABLET EVERY DAY FOR BLOOD PRESSURE   metoprolol succinate (TOPROL-XL) 100 mg tablet 10/14/2020 at Unknown time  No Yes   Sig: TAKE 1 TABLET BY MOUTH DAILY   montelukast (SINGULAIR) 10 mg tablet 10/14/2020 at Unknown time  Yes Yes   Sig: Take 10 mg by mouth daily. mupirocin (BACTROBAN) 2 % ointment 10/7/2020 at Unknown time  No Yes   Sig: Apply  to affected area two (2) times a day. Apply to nose   potassium chloride (KLOR-CON) 10 mEq tablet 10/13/2020 at Unknown time  No Yes   Sig: TAKE 2 TABLETS DAILY FOR LOW AMOUNT OF POTASSIUM IN THE BLOOD   predniSONE (DELTASONE) 10 mg tablet 10/14/2020 at Unknown time  Yes Yes   Sig: 10 mg.   senna-docusate (PERICOLACE) 8.6-50 mg per tablet Not Taking at Unknown time  No No   Sig: Take 1 Tab by mouth two (2) times a day.    tamsulosin (FLOMAX) 0.4 mg capsule Not Taking at Unknown time  No No   Sig: TAKE 2 CAPSULES EVERY DAY  FOR  PROSTATE   trimethoprim-sulfamethoxazole (BACTRIM DS, SEPTRA DS) 160-800 mg per tablet 10/14/2020 at Unknown time  Yes Yes   Sig: Take 1 Tab by mouth two (2) times a day. Facility-Administered Medications: None       Hospital Medications:  Current Facility-Administered Medications   Medication Dose Route Frequency    0.9% sodium chloride infusion  50 mL/hr IntraVENous CONTINUOUS    sodium chloride (NS) flush 5-40 mL  5-40 mL IntraVENous Q8H    sodium chloride (NS) flush 5-40 mL  5-40 mL IntraVENous PRN    midazolam (VERSED) injection 0.25-5 mg  0.25-5 mg IntraVENous Multiple    fentaNYL citrate (PF) injection  mcg   mcg IntraVENous Multiple    naloxone (NARCAN) injection 0.4 mg  0.4 mg IntraVENous Multiple    flumazeniL (ROMAZICON) 0.1 mg/mL injection 0.2 mg  0.2 mg IntraVENous Multiple    simethicone (MYLICON) 65YM/1.0NS oral drops 80 mg  1.2 mL Oral Multiple    atropine injection 0.5 mg  0.5 mg IntraVENous ONCE PRN    EPINEPHrine (ADRENALIN) 0.1 mg/mL syringe 1 mg  1 mg Endoscopically ONCE PRN       Social History:  Social History     Tobacco Use    Smoking status: Never Smoker    Smokeless tobacco: Never Used   Substance Use Topics    Alcohol use: Not Currently     Alcohol/week: 0.0 standard drinks     Comment: OCCASIONALLY       Family History:  Family History   Problem Relation Age of Onset    Hypertension Mother     Hypertension Maternal Grandmother     Hypertension Brother     Alcohol abuse Brother     Cancer Brother         pancreatic    Diabetes Brother     Hypertension Brother     Heart Disease Brother     Heart Attack Father     Anesth Problems Neg Hx          The patient was counseled at length about the risks of alivia Covid-19 in the freya-operative and post-operative states including the recovery window of their procedure.   The patient was made aware that alivia Covid-19 after a surgical procedure may worsen their prognosis for recovering from the virus and lend to a higher morbidity and or mortality risk. The patient was given the options of postponing their procedure. All of the risks, benefits, and alternatives were discussed. The patient does  wish to proceed with the procedure. Review of Systems:      Constitutional: negative fever, negative chills, negative weight loss  Eyes:   negative visual changes  ENT:   negative sore throat, tongue or lip swelling  Respiratory:  negative cough, negative dyspnea  Cards:  negative for chest pain, palpitations, lower extremity edema  GI:   See HPI  :  negative for frequency, dysuria  Integument:  negative for rash and pruritus  Heme:  negative for easy bruising and gum/nose bleeding  Musculoskel: negative for myalgias,  back pain and muscle weakness  Neuro: negative for headaches, dizziness, vertigo  Psych:  negative for feelings of anxiety, depression       Objective:     Patient Vitals for the past 8 hrs:   BP Temp Pulse Resp SpO2 Weight   10/14/20 1432 (!) 157/83 98 °F (36.7 °C) 66 20 99 % --   10/14/20 1420 -- -- -- -- -- 99.8 kg (220 lb)     No intake/output data recorded. No intake/output data recorded. EXAM:     NEURO-a&o   HEENT-wnl   LUNGS-clear    COR-regular rate and rhythym     ABD-soft , no tenderness, no rebound, good bs     EXT-no edema     Data Review     No results for input(s): WBC, HGB, HCT, PLT, HGBEXT, HCTEXT, PLTEXT in the last 72 hours. No results for input(s): NA, K, CL, CO2, BUN, CREA, GLU, PHOS, CA in the last 72 hours. No results for input(s): AP, TBIL, TP, ALB, GLOB, GGT, AML, LPSE in the last 72 hours. No lab exists for component: SGOT, GPT, AMYP, HLPSE  No results for input(s): INR, PTP, APTT, INREXT in the last 72 hours.        Assessment:     · H/o colon polyps     Patient Active Problem List   Diagnosis Code    Vegetarian Z78.9    Allergic rhinitis J30.9    Right inguinal hernia K40.90    NESTOR (obstructive sleep apnea) G47.33    Illiteracy Z55.0    Essential hypertension I10    Obesity (BMI 30-39. 9) E66.9    Primary prostate adenocarcinoma ([facility] [address] C61    Gastroesophageal reflux disease with esophagitis K21.00    Mixed hyperlipidemia E78.2    Spinal stenosis M48.00    Lumbar stenosis M48.061    Sarcoidosis D86.9         Plan:   ·   · Endoscopic procedure with sedation     Signed By: Benigno Dykes MD     10/14/2020  3:31 PM Mauc Instructions: By selecting yes to the question below the MAUC number will be added into the note.  This will be calculated automatically based on the diagnosis chosen, the size entered, the body zone selected (H,M,L) and the specific indications you chose. You will also have the option to override the Mohs AUC if you disagree with the automatically calculated number and this option is found in the Case Summary tab.

## 2023-07-05 RX ORDER — ATORVASTATIN CALCIUM 20 MG/1
TABLET, FILM COATED ORAL
Qty: 90 TABLET | Refills: 0 | Status: SHIPPED | OUTPATIENT
Start: 2023-07-05

## 2023-07-05 NOTE — TELEPHONE ENCOUNTER
PCP: GEMINI Kasper NP    Last appt: 2/2/2023     Future Appointments   Date Time Provider [address]   7/20/2023  3:00 PM GEMINI Morton NP [facility] BS AMB       Requested Prescriptions     Pending Prescriptions Disp Refills    atorvastatin (LIPITOR) 20 MG tablet [Pharmacy Med Name: ATORVASTATIN CALCIUM 20 MG Tablet] 90 tablet      Sig: TAKE 1 TABLET EVERY DAY       Prior labs and Blood pressures:  BP Readings from Last 3 Encounters:   02/02/23 (!) 147/85   12/29/22 136/86   11/28/22 134/86     Lab Results   Component Value Date/Time     12/29/2022 03:41 PM    K 3.1 12/29/2022 03:41 PM     12/29/2022 03:41 PM    CO2 30 12/29/2022 03:41 PM    BUN 11 12/29/2022 03:41 PM    GFRAA >60 07/07/2022 02:12 PM     No results found for: HBA1C, ZVN2TYNY  Lab Results   Component Value Date/Time    CHOL 147 12/29/2022 03:41 PM    HDL 52 12/29/2022 03:41 PM     No results found for: VITD3, VD3RIA    Lab Results   Component Value Date/Time    TSH 1.030 02/12/2020 02:12 PM

## 2023-07-20 ENCOUNTER — OFFICE VISIT (OUTPATIENT)
Age: 68
End: 2023-07-20
Payer: MEDICARE

## 2023-07-20 VITALS
TEMPERATURE: 98.2 F | RESPIRATION RATE: 16 BRPM | HEART RATE: 78 BPM | SYSTOLIC BLOOD PRESSURE: 120 MMHG | BODY MASS INDEX: 31.22 KG/M2 | WEIGHT: 206 LBS | DIASTOLIC BLOOD PRESSURE: 68 MMHG | OXYGEN SATURATION: 98 % | HEIGHT: 68 IN

## 2023-07-20 DIAGNOSIS — K21.9 GASTROESOPHAGEAL REFLUX DISEASE WITHOUT ESOPHAGITIS: ICD-10-CM

## 2023-07-20 DIAGNOSIS — I10 ESSENTIAL HYPERTENSION: ICD-10-CM

## 2023-07-20 DIAGNOSIS — R07.89 PAIN OF STERNUM: ICD-10-CM

## 2023-07-20 DIAGNOSIS — M48.061 SPINAL STENOSIS OF LUMBAR REGION, UNSPECIFIED WHETHER NEUROGENIC CLAUDICATION PRESENT: ICD-10-CM

## 2023-07-20 DIAGNOSIS — R60.0 LOWER EXTREMITY EDEMA: ICD-10-CM

## 2023-07-20 DIAGNOSIS — Z00.00 MEDICARE ANNUAL WELLNESS VISIT, SUBSEQUENT: Primary | ICD-10-CM

## 2023-07-20 DIAGNOSIS — Z12.5 SCREENING FOR PROSTATE CANCER: ICD-10-CM

## 2023-07-20 DIAGNOSIS — E87.6 HYPOKALEMIA: ICD-10-CM

## 2023-07-20 DIAGNOSIS — E78.2 MIXED HYPERLIPIDEMIA: ICD-10-CM

## 2023-07-20 PROCEDURE — 99214 OFFICE O/P EST MOD 30 MIN: CPT | Performed by: NURSE PRACTITIONER

## 2023-07-20 PROCEDURE — G8419 CALC BMI OUT NRM PARAM NOF/U: HCPCS | Performed by: NURSE PRACTITIONER

## 2023-07-20 PROCEDURE — 1123F ACP DISCUSS/DSCN MKR DOCD: CPT | Performed by: NURSE PRACTITIONER

## 2023-07-20 PROCEDURE — 3017F COLORECTAL CA SCREEN DOC REV: CPT | Performed by: NURSE PRACTITIONER

## 2023-07-20 PROCEDURE — 3078F DIAST BP <80 MM HG: CPT | Performed by: NURSE PRACTITIONER

## 2023-07-20 PROCEDURE — G0439 PPPS, SUBSEQ VISIT: HCPCS | Performed by: NURSE PRACTITIONER

## 2023-07-20 PROCEDURE — G8427 DOCREV CUR MEDS BY ELIG CLIN: HCPCS | Performed by: NURSE PRACTITIONER

## 2023-07-20 PROCEDURE — 1036F TOBACCO NON-USER: CPT | Performed by: NURSE PRACTITIONER

## 2023-07-20 PROCEDURE — 3074F SYST BP LT 130 MM HG: CPT | Performed by: NURSE PRACTITIONER

## 2023-07-20 ASSESSMENT — PATIENT HEALTH QUESTIONNAIRE - PHQ9
1. LITTLE INTEREST OR PLEASURE IN DOING THINGS: 0
SUM OF ALL RESPONSES TO PHQ QUESTIONS 1-9: 0
SUM OF ALL RESPONSES TO PHQ9 QUESTIONS 1 & 2: 0
SUM OF ALL RESPONSES TO PHQ QUESTIONS 1-9: 0
2. FEELING DOWN, DEPRESSED OR HOPELESS: 0

## 2023-07-20 ASSESSMENT — ENCOUNTER SYMPTOMS
CHEST TIGHTNESS: 0
SHORTNESS OF BREATH: 0
BACK PAIN: 1

## 2023-07-20 ASSESSMENT — LIFESTYLE VARIABLES: HOW OFTEN DO YOU HAVE A DRINK CONTAINING ALCOHOL: NEVER

## 2023-07-20 NOTE — PROGRESS NOTES
Subjective:      Patient ID: Angelika Ruiz is a 80 y.o. male. HPI  6 month follow up chronic health problems. New issue. HTN. Taking prescribed medications. Hypokalemia. Last K at 3.1. Potassium supplement increased to 30 meq daily. Dependent edema. HCTZ discontinued when potassium dropped. Swelling worsened. Lasix prescribed with good symptom relief. Lumbar DDD. Continues to have LBP that radiates down right leg. Was referred to Dr. Zo Segura. PT ordered. Patient has not been to PT. Does not think it will help. Continues to have symptoms. Taking mobic and flexeril. C/o bony knot on mid sternum. Noticed a few months ago. GERD. Taking omeprazole daily. Patient Active Problem List   Diagnosis    History of colon cancer    Lumbar stenosis    Allergic rhinitis    Gastroesophageal reflux disease with esophagitis    Mixed hyperlipidemia    Obesity (BMI 30-39. 9)    Sarcoidosis    Illiteracy    BEN (obstructive sleep apnea)    Essential hypertension    Spinal stenosis    Vegetarian    Lower extremity edema     Current Outpatient Medications   Medication Sig    atorvastatin (LIPITOR) 20 MG tablet TAKE 1 TABLET EVERY DAY    cyclobenzaprine (FLEXERIL) 5 MG tablet TAKE 1-2 TABLETS AT BEDTIME AS NEEDED FOR MUSCLE SPASM.    carvedilol (COREG) 25 MG tablet Take 1 tablet by mouth 2 times daily    amLODIPine (NORVASC) 10 MG tablet TAKE 1 TABLET EVERY DAY    aspirin 81 MG chewable tablet CHEW AND SWALLOW 1 TABLET BY MOUTH DAILY. INDICATIONS: TREATMENT TO PREVENT A HEART ATTACK    Cholecalciferol 50 MCG (2000 UT) CAPS TAKE 1 CAPSULE EVERY DAY    Cyanocobalamin ER 2000 MCG TBCR Take by mouth daily    fexofenadine (ALLEGRA) 180 MG tablet TAKE 1 TABLET EVERY DAY AS NEEDED FOR ALLERGIES    furosemide (LASIX) 20 MG tablet TAKE 1 TABLET EVERY DAY    losartan (COZAAR) 100 MG tablet TAKE 1 TABLET EVERY DAY    meloxicam (MOBIC) 15 MG tablet TAKE 1 TABLET DAILY AS NEEDED FOR BACK PAIN.     metoprolol succinate (TOPROL XL)

## 2023-07-20 NOTE — PROGRESS NOTES
Chief Complaint   Patient presents with    Medicare AWV    Mass     Knot on chest no pain 3 months    1. \"Have you been to the ER, urgent care clinic since your last visit? Hospitalized since your last visit? \" no  2. \"Have you seen or consulted any other health care providers outside of the [address] [place] [address] since your last visit? \" no  3. For patients over 45: Has the patient had a colonoscopy? Yes     If the patient is female:    4. For patients over 40: Has the patient had a mammogram? N/A    5. For patients over 21: Has the patient had a pap smear?  N/A

## 2023-07-20 NOTE — TELEPHONE ENCOUNTER
Referral processed and faxed Ketoconazole Pregnancy And Lactation Text: This medication is Pregnancy Category C and it isn't know if it is safe during pregnancy. It is also excreted in breast milk and breast feeding isn't recommended.

## 2023-07-21 LAB
ALBUMIN SERPL-MCNC: 4.2 G/DL (ref 3.5–5)
ALBUMIN/GLOB SERPL: 1.2 (ref 1.1–2.2)
ALP SERPL-CCNC: 108 U/L (ref 45–117)
ALT SERPL-CCNC: 22 U/L (ref 12–78)
ANION GAP SERPL CALC-SCNC: 5 MMOL/L (ref 5–15)
AST SERPL-CCNC: 15 U/L (ref 15–37)
BILIRUB SERPL-MCNC: 1.2 MG/DL (ref 0.2–1)
BUN SERPL-MCNC: 10 MG/DL (ref 6–20)
BUN/CREAT SERPL: 9 (ref 12–20)
CALCIUM SERPL-MCNC: 9.9 MG/DL (ref 8.5–10.1)
CHLORIDE SERPL-SCNC: 109 MMOL/L (ref 97–108)
CHOLEST SERPL-MCNC: 112 MG/DL
CO2 SERPL-SCNC: 29 MMOL/L (ref 21–32)
CREAT SERPL-MCNC: 1.11 MG/DL (ref 0.7–1.3)
ERYTHROCYTE [DISTWIDTH] IN BLOOD BY AUTOMATED COUNT: 14.1 % (ref 11.5–14.5)
GLOBULIN SER CALC-MCNC: 3.4 G/DL (ref 2–4)
GLUCOSE SERPL-MCNC: 86 MG/DL (ref 65–100)
HCT VFR BLD AUTO: 40.9 % (ref 36.6–50.3)
HDLC SERPL-MCNC: 44 MG/DL
HDLC SERPL: 2.5 (ref 0–5)
HGB BLD-MCNC: 13.4 G/DL (ref 12.1–17)
LDLC SERPL CALC-MCNC: 48.6 MG/DL (ref 0–100)
MCH RBC QN AUTO: 30.5 PG (ref 26–34)
MCHC RBC AUTO-ENTMCNC: 32.8 G/DL (ref 30–36.5)
MCV RBC AUTO: 93.2 FL (ref 80–99)
NRBC # BLD: 0 K/UL (ref 0–0.01)
NRBC BLD-RTO: 0 PER 100 WBC
PLATELET # BLD AUTO: 242 K/UL (ref 150–400)
PMV BLD AUTO: 11 FL (ref 8.9–12.9)
POTASSIUM SERPL-SCNC: 3.4 MMOL/L (ref 3.5–5.1)
PROT SERPL-MCNC: 7.6 G/DL (ref 6.4–8.2)
PSA SERPL-MCNC: 0 NG/ML (ref 0.01–4)
RBC # BLD AUTO: 4.39 M/UL (ref 4.1–5.7)
SODIUM SERPL-SCNC: 143 MMOL/L (ref 136–145)
TRIGL SERPL-MCNC: 97 MG/DL
VLDLC SERPL CALC-MCNC: 19.4 MG/DL
WBC # BLD AUTO: 6.9 K/UL (ref 4.1–11.1)

## 2023-07-26 ENCOUNTER — TELEPHONE (OUTPATIENT)
Age: 68
End: 2023-07-26

## 2023-07-26 RX ORDER — FUROSEMIDE 20 MG/1
20 TABLET ORAL DAILY
Qty: 90 TABLET | Refills: 0 | Status: SHIPPED | OUTPATIENT
Start: 2023-07-26

## 2023-07-26 RX ORDER — AMLODIPINE BESYLATE 10 MG/1
10 TABLET ORAL DAILY
Qty: 90 TABLET | Refills: 0 | Status: SHIPPED | OUTPATIENT
Start: 2023-07-26

## 2023-07-26 RX ORDER — LOSARTAN POTASSIUM 100 MG/1
100 TABLET ORAL DAILY
Qty: 90 TABLET | Refills: 0 | Status: SHIPPED | OUTPATIENT
Start: 2023-07-26

## 2023-07-26 NOTE — TELEPHONE ENCOUNTER
TC to patient. 2 identifiers confirmed. Informed that xray of sternum is normal.  Reviewed lab results. Potassium level improved. Recommend trial of taking lasix every other day. If LE edema recurs, take daily. Continue to take KCL supplement tid. Follow up 4 months.

## 2023-07-27 NOTE — PROGRESS NOTES
Primary Nurse Jose Cruz Bruno RN and Roel Sol RN performed a dual skin assessment on this patient No impairment noted  Quinton score is 20  Back s/p surgery High Dose Vitamin A Pregnancy And Lactation Text: High dose vitamin A therapy is contraindicated during pregnancy and breast feeding.

## 2023-08-18 RX ORDER — CYCLOBENZAPRINE HCL 5 MG
TABLET ORAL
Qty: 90 TABLET | Refills: 0 | Status: SHIPPED | OUTPATIENT
Start: 2023-08-18

## 2023-08-24 ENCOUNTER — HOSPITAL ENCOUNTER (OUTPATIENT)
Facility: HOSPITAL | Age: 68
Discharge: HOME | End: 2023-08-24
Payer: MEDICARE

## 2023-08-24 DIAGNOSIS — G89.29 CHRONIC BILATERAL LOW BACK PAIN WITH RIGHT-SIDED SCIATICA: ICD-10-CM

## 2023-08-24 DIAGNOSIS — M47.816 LUMBAR SPONDYLOSIS: ICD-10-CM

## 2023-08-24 DIAGNOSIS — M54.41 CHRONIC BILATERAL LOW BACK PAIN WITH RIGHT-SIDED SCIATICA: ICD-10-CM

## 2023-08-24 DIAGNOSIS — M48.062 SPINAL STENOSIS OF LUMBAR REGION WITH NEUROGENIC CLAUDICATION: ICD-10-CM

## 2023-08-24 PROCEDURE — 72148 MRI LUMBAR SPINE W/O DYE: CPT

## 2023-09-12 RX ORDER — VALACYCLOVIR HYDROCHLORIDE 500 MG/1
TABLET, FILM COATED ORAL
Qty: 90 TABLET | Refills: 0 | Status: SHIPPED | OUTPATIENT
Start: 2023-09-12

## 2023-09-13 RX ORDER — MONTELUKAST SODIUM 10 MG/1
TABLET ORAL
Qty: 90 TABLET | Refills: 0 | Status: SHIPPED | OUTPATIENT
Start: 2023-09-13

## 2023-09-13 RX ORDER — LOSARTAN POTASSIUM 100 MG/1
100 TABLET ORAL DAILY
Qty: 90 TABLET | Refills: 0 | Status: SHIPPED | OUTPATIENT
Start: 2023-09-13

## 2023-09-13 RX ORDER — POTASSIUM CHLORIDE 750 MG/1
TABLET, EXTENDED RELEASE ORAL
Qty: 270 TABLET | Refills: 0 | Status: SHIPPED | OUTPATIENT
Start: 2023-09-13

## 2023-09-13 RX ORDER — FUROSEMIDE 20 MG/1
20 TABLET ORAL DAILY
Qty: 90 TABLET | Refills: 0 | Status: SHIPPED | OUTPATIENT
Start: 2023-09-13

## 2023-09-13 RX ORDER — MELOXICAM 15 MG/1
TABLET ORAL
Qty: 90 TABLET | Refills: 0 | Status: SHIPPED | OUTPATIENT
Start: 2023-09-13

## 2023-09-13 RX ORDER — OMEPRAZOLE 20 MG/1
CAPSULE, DELAYED RELEASE ORAL
Qty: 180 CAPSULE | Refills: 0 | Status: SHIPPED | OUTPATIENT
Start: 2023-09-13

## 2023-11-01 RX ORDER — MONTELUKAST SODIUM 10 MG/1
10 TABLET ORAL DAILY
Qty: 90 TABLET | Refills: 1 | Status: SHIPPED | OUTPATIENT
Start: 2023-11-01

## 2023-11-01 NOTE — TELEPHONE ENCOUNTER
Last appointment: 7/20/23 NP Liliam  Next appointment: None  Previous refill encounter(s): 9/13/23 90     Requested Prescriptions     Pending Prescriptions Disp Refills    montelukast (SINGULAIR) 10 MG tablet 90 tablet 1     Sig: Take 1 tablet by mouth daily     For Pharmacy Admin Tracking Only    Program: Medication Refill  CPA in place:    Recommendation Provided To:   Intervention Detail: New Rx: 1, reason: Patient Preference  Intervention Accepted By:   Gap Closed?:    Time Spent (min): 5

## 2023-11-19 RX ORDER — CYCLOBENZAPRINE HCL 5 MG
TABLET ORAL
Qty: 90 TABLET | Refills: 0 | Status: SHIPPED | OUTPATIENT
Start: 2023-11-19

## 2023-12-12 RX ORDER — ATORVASTATIN CALCIUM 20 MG/1
20 TABLET, FILM COATED ORAL DAILY
Qty: 90 TABLET | Refills: 0 | Status: SHIPPED | OUTPATIENT
Start: 2023-12-12 | End: 2024-01-11 | Stop reason: SDUPTHER

## 2023-12-12 NOTE — TELEPHONE ENCOUNTER
PCP: Eileen Ricketts APRN - NP      No future appointments.    Requested Prescriptions     Pending Prescriptions Disp Refills    atorvastatin (LIPITOR) 20 MG tablet [Pharmacy Med Name: ATORVASTATIN CALCIUM 20 MG Tablet] 90 tablet 3     Sig: TAKE 1 TABLET EVERY DAY      Last seen at 02/02/2023

## 2024-01-11 ENCOUNTER — OFFICE VISIT (OUTPATIENT)
Age: 69
End: 2024-01-11
Payer: MEDICARE

## 2024-01-11 VITALS
SYSTOLIC BLOOD PRESSURE: 160 MMHG | HEIGHT: 68 IN | WEIGHT: 207 LBS | HEART RATE: 56 BPM | OXYGEN SATURATION: 98 % | TEMPERATURE: 97.1 F | RESPIRATION RATE: 16 BRPM | DIASTOLIC BLOOD PRESSURE: 91 MMHG | BODY MASS INDEX: 31.37 KG/M2

## 2024-01-11 DIAGNOSIS — Z91.148 NON COMPLIANCE W MEDICATION REGIMEN: ICD-10-CM

## 2024-01-11 DIAGNOSIS — E78.2 MIXED HYPERLIPIDEMIA: ICD-10-CM

## 2024-01-11 DIAGNOSIS — N39.0 URINARY TRACT INFECTION WITHOUT HEMATURIA, SITE UNSPECIFIED: Primary | ICD-10-CM

## 2024-01-11 DIAGNOSIS — E87.6 HYPOKALEMIA: ICD-10-CM

## 2024-01-11 DIAGNOSIS — I10 ESSENTIAL HYPERTENSION: ICD-10-CM

## 2024-01-11 DIAGNOSIS — N39.0 URINARY TRACT INFECTION WITHOUT HEMATURIA, SITE UNSPECIFIED: ICD-10-CM

## 2024-01-11 DIAGNOSIS — R60.0 BILATERAL LOWER EXTREMITY EDEMA: ICD-10-CM

## 2024-01-11 DIAGNOSIS — R30.0 DYSURIA: ICD-10-CM

## 2024-01-11 LAB
BILIRUBIN, URINE, POC: ABNORMAL
BLOOD URINE, POC: ABNORMAL
GLUCOSE URINE, POC: NEGATIVE
KETONES, URINE, POC: ABNORMAL
LEUKOCYTE ESTERASE, URINE, POC: ABNORMAL
NITRITE, URINE, POC: POSITIVE
PH, URINE, POC: 7.5 (ref 4.6–8)
PROTEIN,URINE, POC: ABNORMAL
SPECIFIC GRAVITY, URINE, POC: 1.02 (ref 1–1.03)
URINALYSIS CLARITY, POC: ABNORMAL
URINALYSIS COLOR, POC: ABNORMAL
UROBILINOGEN, POC: ABNORMAL

## 2024-01-11 PROCEDURE — 3077F SYST BP >= 140 MM HG: CPT | Performed by: NURSE PRACTITIONER

## 2024-01-11 PROCEDURE — 99214 OFFICE O/P EST MOD 30 MIN: CPT | Performed by: NURSE PRACTITIONER

## 2024-01-11 PROCEDURE — 81003 URINALYSIS AUTO W/O SCOPE: CPT | Performed by: NURSE PRACTITIONER

## 2024-01-11 PROCEDURE — G8427 DOCREV CUR MEDS BY ELIG CLIN: HCPCS | Performed by: NURSE PRACTITIONER

## 2024-01-11 PROCEDURE — 1123F ACP DISCUSS/DSCN MKR DOCD: CPT | Performed by: NURSE PRACTITIONER

## 2024-01-11 PROCEDURE — 3017F COLORECTAL CA SCREEN DOC REV: CPT | Performed by: NURSE PRACTITIONER

## 2024-01-11 PROCEDURE — 3080F DIAST BP >= 90 MM HG: CPT | Performed by: NURSE PRACTITIONER

## 2024-01-11 PROCEDURE — G8484 FLU IMMUNIZE NO ADMIN: HCPCS | Performed by: NURSE PRACTITIONER

## 2024-01-11 PROCEDURE — PBSHW AMB POC URINALYSIS DIP STICK AUTO W/O MICRO: Performed by: NURSE PRACTITIONER

## 2024-01-11 PROCEDURE — 1036F TOBACCO NON-USER: CPT | Performed by: NURSE PRACTITIONER

## 2024-01-11 PROCEDURE — G8417 CALC BMI ABV UP PARAM F/U: HCPCS | Performed by: NURSE PRACTITIONER

## 2024-01-11 RX ORDER — CIPROFLOXACIN 500 MG/1
500 TABLET, FILM COATED ORAL 2 TIMES DAILY
Qty: 20 TABLET | Refills: 0 | Status: SHIPPED | OUTPATIENT
Start: 2024-01-11 | End: 2024-01-12 | Stop reason: SDUPTHER

## 2024-01-11 RX ORDER — AMLODIPINE BESYLATE 10 MG/1
10 TABLET ORAL DAILY
Qty: 90 TABLET | Refills: 1 | Status: SHIPPED | OUTPATIENT
Start: 2024-01-11

## 2024-01-11 RX ORDER — CARVEDILOL 25 MG/1
25 TABLET ORAL 2 TIMES DAILY
Qty: 180 TABLET | Refills: 1 | Status: SHIPPED | OUTPATIENT
Start: 2024-01-11

## 2024-01-11 RX ORDER — LOSARTAN POTASSIUM 100 MG/1
100 TABLET ORAL DAILY
Qty: 90 TABLET | Refills: 1 | Status: SHIPPED | OUTPATIENT
Start: 2024-01-11

## 2024-01-11 RX ORDER — FUROSEMIDE 20 MG/1
20 TABLET ORAL DAILY
Qty: 90 TABLET | Refills: 1 | Status: SHIPPED | OUTPATIENT
Start: 2024-01-11

## 2024-01-11 RX ORDER — ATORVASTATIN CALCIUM 20 MG/1
20 TABLET, FILM COATED ORAL DAILY
Qty: 90 TABLET | Refills: 1 | Status: SHIPPED | OUTPATIENT
Start: 2024-01-11

## 2024-01-11 RX ORDER — CIPROFLOXACIN 500 MG/1
500 TABLET, FILM COATED ORAL 2 TIMES DAILY
Qty: 20 TABLET | Refills: 0 | Status: SHIPPED | OUTPATIENT
Start: 2024-01-11 | End: 2024-01-11 | Stop reason: SDUPTHER

## 2024-01-11 SDOH — ECONOMIC STABILITY: FOOD INSECURITY: WITHIN THE PAST 12 MONTHS, YOU WORRIED THAT YOUR FOOD WOULD RUN OUT BEFORE YOU GOT MONEY TO BUY MORE.: OFTEN TRUE

## 2024-01-11 SDOH — ECONOMIC STABILITY: FOOD INSECURITY: WITHIN THE PAST 12 MONTHS, THE FOOD YOU BOUGHT JUST DIDN'T LAST AND YOU DIDN'T HAVE MONEY TO GET MORE.: NEVER TRUE

## 2024-01-11 SDOH — ECONOMIC STABILITY: HOUSING INSECURITY
IN THE LAST 12 MONTHS, WAS THERE A TIME WHEN YOU DID NOT HAVE A STEADY PLACE TO SLEEP OR SLEPT IN A SHELTER (INCLUDING NOW)?: NO

## 2024-01-11 SDOH — ECONOMIC STABILITY: INCOME INSECURITY: HOW HARD IS IT FOR YOU TO PAY FOR THE VERY BASICS LIKE FOOD, HOUSING, MEDICAL CARE, AND HEATING?: VERY HARD

## 2024-01-11 ASSESSMENT — PATIENT HEALTH QUESTIONNAIRE - PHQ9
8. MOVING OR SPEAKING SO SLOWLY THAT OTHER PEOPLE COULD HAVE NOTICED. OR THE OPPOSITE, BEING SO FIGETY OR RESTLESS THAT YOU HAVE BEEN MOVING AROUND A LOT MORE THAN USUAL: 0
3. TROUBLE FALLING OR STAYING ASLEEP: 3
7. TROUBLE CONCENTRATING ON THINGS, SUCH AS READING THE NEWSPAPER OR WATCHING TELEVISION: 0
SUM OF ALL RESPONSES TO PHQ QUESTIONS 1-9: 9
SUM OF ALL RESPONSES TO PHQ9 QUESTIONS 1 & 2: 3
SUM OF ALL RESPONSES TO PHQ QUESTIONS 1-9: 9
9. THOUGHTS THAT YOU WOULD BE BETTER OFF DEAD, OR OF HURTING YOURSELF: 0
1. LITTLE INTEREST OR PLEASURE IN DOING THINGS: 0
2. FEELING DOWN, DEPRESSED OR HOPELESS: 3
5. POOR APPETITE OR OVEREATING: 0
4. FEELING TIRED OR HAVING LITTLE ENERGY: 0
10. IF YOU CHECKED OFF ANY PROBLEMS, HOW DIFFICULT HAVE THESE PROBLEMS MADE IT FOR YOU TO DO YOUR WORK, TAKE CARE OF THINGS AT HOME, OR GET ALONG WITH OTHER PEOPLE: 0
6. FEELING BAD ABOUT YOURSELF - OR THAT YOU ARE A FAILURE OR HAVE LET YOURSELF OR YOUR FAMILY DOWN: 3

## 2024-01-11 ASSESSMENT — ENCOUNTER SYMPTOMS
CHEST TIGHTNESS: 0
SHORTNESS OF BREATH: 0

## 2024-01-11 NOTE — PROGRESS NOTES
Chief Complaint   Patient presents with    Urinary Pain     Dysuria for 6 days     \"Have you been to the ER, urgent care clinic since your last visit?  Hospitalized since your last visit?\"    NO    “Have you seen or consulted any other health care providers outside of [facility] since your last visit?”    NO                   1/11/2024     1:49 PM   PHQ-9    Little interest or pleasure in doing things 0   Feeling down, depressed, or hopeless 3   Trouble falling or staying asleep, or sleeping too much 3   Feeling tired or having little energy 0   Poor appetite or overeating 0   Feeling bad about yourself - or that you are a failure or have let yourself or your family down 3   Trouble concentrating on things, such as reading the newspaper or watching television 0   Moving or speaking so slowly that other people could have noticed. Or the opposite - being so fidgety or restless that you have been moving around a lot more than usual 0   Thoughts that you would be better off dead, or of hurting yourself in some way 0   PHQ-2 Score 3   PHQ-9 Total Score 9   If you checked off any problems, how difficult have these problems made it for you to do your work, take care of things at home, or get along with other people? 0           Financial Resource Strain: High Risk (1/11/2024)    Overall Financial Resource Strain (CARDIA)     Difficulty of Paying Living Expenses: Very hard      Food Insecurity: Food Insecurity Present (1/11/2024)    Hunger Vital Sign     Worried About Running Out of Food in the Last Year: Often true     Ran Out of Food in the Last Year: Never true          Health Maintenance Due   Topic Date Due    COVID-19 Vaccine (1) Never done    Respiratory Syncytial Virus (RSV) Pregnant or age 60 yrs+ (1 - 1-dose 60+ series) Never done    Pneumococcal 65+ years Vaccine (2 - PCV) 09/15/2021    DTaP/Tdap/Td vaccine (2 - Td or Tdap) 03/19/2022    Flu vaccine (1) 08/01/2023    Annual Wellness Visit (Medicare

## 2024-01-11 NOTE — TELEPHONE ENCOUNTER
Pt called says medication was sent to the wrong [facility]. Pt says it needs to be sent to [facility]'s [address] [place] [address].Pt would like medication to be sent to the correct location today if possible. Best call back number [phone]

## 2024-01-11 NOTE — TELEPHONE ENCOUNTER
PCP: Eileen Ricketts APRN - NP      Future Appointments   Date Time Provider Department [place]   2/1/2024  2:00 PM Eileen Ricketts APRN - NP [facility] BS AMB       Requested Prescriptions     Pending Prescriptions Disp Refills    ciprofloxacin (CIPRO) 500 MG tablet 20 tablet 0     Sig: Take 1 tablet by mouth 2 times daily for 10 days

## 2024-01-11 NOTE — PROGRESS NOTES
Subjective:      Patient ID: Sarabjit Roberts is a 68 y.o. male.    Urinary Pain   Pertinent negatives include no flank pain or hematuria.       C/o burning with urinating and odor to urine x 5 days.  No fever.  Has been having some intermittent chills.  Overdue follow up chronic health problems.    HTN.  BP elevated today in office.  States he is taking prescribed meds but in review of chart many medications should have run out since last prescribed.    Hypokalemia.  Last K at 3.4. Taking prescribed potassium supplement.  Dependent edema.  HCTZ discontinued when potassium dropped. Swelling worsened.  Lasix prescribed with good symptom relief.  Patient Active Problem List   Diagnosis    History of colon cancer    Lumbar stenosis    Allergic rhinitis    Gastroesophageal reflux disease with esophagitis    Mixed hyperlipidemia    Obesity (BMI 30-39.9)    Sarcoidosis    Illiteracy    BEN (obstructive sleep apnea)    Essential hypertension    Spinal stenosis    Vegetarian    Lower extremity edema     Current Outpatient Medications   Medication Sig    amLODIPine (NORVASC) 10 MG tablet Take 1 tablet by mouth daily    atorvastatin (LIPITOR) 20 MG tablet Take 1 tablet by mouth daily    carvedilol (COREG) 25 MG tablet Take 1 tablet by mouth 2 times daily    furosemide (LASIX) 20 MG tablet Take 1 tablet by mouth daily    losartan (COZAAR) 100 MG tablet Take 1 tablet by mouth daily    cyclobenzaprine (FLEXERIL) 5 MG tablet TAKE 1 TO 2 TABLETS AT BEDTIME AS NEEDED FOR MUSCLE SPASM.    montelukast (SINGULAIR) 10 MG tablet Take 1 tablet by mouth daily    aspirin 81 MG chewable tablet Chew and swallow 1 tablet by mouth daily.  Indications: treatment to prevent a heart attack.    gabapentin (NEURONTIN) 300 MG capsule Take 1-2 capsules by mouth nightly for 120 days. Take 1 - 2 capsules at bedtime for treatment of nerve pain.  Supervising Physician: Von Carpenter MD NPI: [id number] OLENA: [id number] Max Daily Amount: 600 mg    potassium

## 2024-01-12 DIAGNOSIS — N39.0 URINARY TRACT INFECTION WITHOUT HEMATURIA, SITE UNSPECIFIED: ICD-10-CM

## 2024-01-12 LAB
ALBUMIN SERPL-MCNC: 3.9 G/DL (ref 3.5–5)
ALBUMIN/GLOB SERPL: 1.2 (ref 1.1–2.2)
ALP SERPL-CCNC: 95 U/L (ref 45–117)
ALT SERPL-CCNC: 26 U/L (ref 12–78)
ANION GAP SERPL CALC-SCNC: 6 MMOL/L (ref 5–15)
AST SERPL-CCNC: 18 U/L (ref 15–37)
BILIRUB SERPL-MCNC: 1.2 MG/DL (ref 0.2–1)
BUN SERPL-MCNC: 10 MG/DL (ref 6–20)
BUN/CREAT SERPL: 10 (ref 12–20)
CALCIUM SERPL-MCNC: 9.3 MG/DL (ref 8.5–10.1)
CHLORIDE SERPL-SCNC: 108 MMOL/L (ref 97–108)
CO2 SERPL-SCNC: 30 MMOL/L (ref 21–32)
CREAT SERPL-MCNC: 0.97 MG/DL (ref 0.7–1.3)
GLOBULIN SER CALC-MCNC: 3.2 G/DL (ref 2–4)
GLUCOSE SERPL-MCNC: 96 MG/DL (ref 65–100)
POTASSIUM SERPL-SCNC: 3.1 MMOL/L (ref 3.5–5.1)
PROT SERPL-MCNC: 7.1 G/DL (ref 6.4–8.2)
SODIUM SERPL-SCNC: 144 MMOL/L (ref 136–145)

## 2024-01-12 RX ORDER — CIPROFLOXACIN 500 MG/1
500 TABLET, FILM COATED ORAL 2 TIMES DAILY
Qty: 20 TABLET | Refills: 0 | Status: SHIPPED | OUTPATIENT
Start: 2024-01-12 | End: 2024-01-22

## 2024-01-13 DIAGNOSIS — E87.6 HYPOKALEMIA: Primary | ICD-10-CM

## 2024-01-13 RX ORDER — POTASSIUM CHLORIDE 20 MEQ/1
20 TABLET, EXTENDED RELEASE ORAL DAILY
Qty: 90 TABLET | Refills: 1 | Status: SHIPPED | OUTPATIENT
Start: 2024-01-13

## 2024-01-14 LAB
BACTERIA SPEC CULT: ABNORMAL
CC UR VC: ABNORMAL
SERVICE CMNT-IMP: ABNORMAL

## 2024-01-18 ENCOUNTER — TELEPHONE (OUTPATIENT)
Age: 69
End: 2024-01-18

## 2024-02-01 ENCOUNTER — OFFICE VISIT (OUTPATIENT)
Age: 69
End: 2024-02-01
Payer: MEDICARE

## 2024-02-01 VITALS
DIASTOLIC BLOOD PRESSURE: 71 MMHG | BODY MASS INDEX: 31.67 KG/M2 | SYSTOLIC BLOOD PRESSURE: 128 MMHG | HEART RATE: 81 BPM | WEIGHT: 209 LBS | HEIGHT: 68 IN | OXYGEN SATURATION: 95 % | RESPIRATION RATE: 14 BRPM | TEMPERATURE: 96.9 F

## 2024-02-01 DIAGNOSIS — R60.0 LOWER EXTREMITY EDEMA: ICD-10-CM

## 2024-02-01 DIAGNOSIS — Z23 ENCOUNTER FOR IMMUNIZATION: ICD-10-CM

## 2024-02-01 DIAGNOSIS — I10 ESSENTIAL HYPERTENSION: Primary | ICD-10-CM

## 2024-02-01 DIAGNOSIS — N39.0 URINARY TRACT INFECTION WITHOUT HEMATURIA, SITE UNSPECIFIED: ICD-10-CM

## 2024-02-01 DIAGNOSIS — E87.6 HYPOKALEMIA: ICD-10-CM

## 2024-02-01 LAB
APPEARANCE UR: CLEAR
BACTERIA URNS QL MICRO: NEGATIVE /HPF
BILIRUB UR QL: NEGATIVE
CAOX CRY URNS QL MICRO: ABNORMAL
COLOR UR: ABNORMAL
EPITH CASTS URNS QL MICRO: ABNORMAL /LPF
GLUCOSE UR STRIP.AUTO-MCNC: NEGATIVE MG/DL
HGB UR QL STRIP: NEGATIVE
KETONES UR QL STRIP.AUTO: ABNORMAL MG/DL
LEUKOCYTE ESTERASE UR QL STRIP.AUTO: NEGATIVE
MUCOUS THREADS URNS QL MICRO: ABNORMAL /LPF
NITRITE UR QL STRIP.AUTO: NEGATIVE
PH UR STRIP: 5.5 (ref 5–8)
PROT UR STRIP-MCNC: ABNORMAL MG/DL
RBC #/AREA URNS HPF: ABNORMAL /HPF (ref 0–5)
SP GR UR REFRACTOMETRY: 1.02 (ref 1–1.03)
URINE CULTURE IF INDICATED: ABNORMAL
UROBILINOGEN UR QL STRIP.AUTO: 0.2 EU/DL (ref 0.2–1)
WBC URNS QL MICRO: ABNORMAL /HPF (ref 0–4)

## 2024-02-01 PROCEDURE — 1123F ACP DISCUSS/DSCN MKR DOCD: CPT | Performed by: NURSE PRACTITIONER

## 2024-02-01 PROCEDURE — G8417 CALC BMI ABV UP PARAM F/U: HCPCS | Performed by: NURSE PRACTITIONER

## 2024-02-01 PROCEDURE — 99214 OFFICE O/P EST MOD 30 MIN: CPT | Performed by: NURSE PRACTITIONER

## 2024-02-01 PROCEDURE — PBSHW PBB SHADOW CHARGE: Performed by: NURSE PRACTITIONER

## 2024-02-01 PROCEDURE — 3074F SYST BP LT 130 MM HG: CPT | Performed by: NURSE PRACTITIONER

## 2024-02-01 PROCEDURE — 3078F DIAST BP <80 MM HG: CPT | Performed by: NURSE PRACTITIONER

## 2024-02-01 PROCEDURE — PBSHW PNEUMOCOCCAL, PCV20, PREVNAR 20, (AGE 6W+), IM, PF: Performed by: NURSE PRACTITIONER

## 2024-02-01 PROCEDURE — 90677 PCV20 VACCINE IM: CPT | Performed by: NURSE PRACTITIONER

## 2024-02-01 PROCEDURE — G8484 FLU IMMUNIZE NO ADMIN: HCPCS | Performed by: NURSE PRACTITIONER

## 2024-02-01 PROCEDURE — G8427 DOCREV CUR MEDS BY ELIG CLIN: HCPCS | Performed by: NURSE PRACTITIONER

## 2024-02-01 PROCEDURE — 3017F COLORECTAL CA SCREEN DOC REV: CPT | Performed by: NURSE PRACTITIONER

## 2024-02-01 PROCEDURE — 1036F TOBACCO NON-USER: CPT | Performed by: NURSE PRACTITIONER

## 2024-02-01 ASSESSMENT — PATIENT HEALTH QUESTIONNAIRE - PHQ9
SUM OF ALL RESPONSES TO PHQ QUESTIONS 1-9: 7
8. MOVING OR SPEAKING SO SLOWLY THAT OTHER PEOPLE COULD HAVE NOTICED. OR THE OPPOSITE, BEING SO FIGETY OR RESTLESS THAT YOU HAVE BEEN MOVING AROUND A LOT MORE THAN USUAL: 0
SUM OF ALL RESPONSES TO PHQ QUESTIONS 1-9: 7
5. POOR APPETITE OR OVEREATING: 0
6. FEELING BAD ABOUT YOURSELF - OR THAT YOU ARE A FAILURE OR HAVE LET YOURSELF OR YOUR FAMILY DOWN: 0
10. IF YOU CHECKED OFF ANY PROBLEMS, HOW DIFFICULT HAVE THESE PROBLEMS MADE IT FOR YOU TO DO YOUR WORK, TAKE CARE OF THINGS AT HOME, OR GET ALONG WITH OTHER PEOPLE: 0
4. FEELING TIRED OR HAVING LITTLE ENERGY: 1
SUM OF ALL RESPONSES TO PHQ9 QUESTIONS 1 & 2: 3
3. TROUBLE FALLING OR STAYING ASLEEP: 3
SUM OF ALL RESPONSES TO PHQ QUESTIONS 1-9: 7
1. LITTLE INTEREST OR PLEASURE IN DOING THINGS: 0
7. TROUBLE CONCENTRATING ON THINGS, SUCH AS READING THE NEWSPAPER OR WATCHING TELEVISION: 0
SUM OF ALL RESPONSES TO PHQ QUESTIONS 1-9: 7
9. THOUGHTS THAT YOU WOULD BE BETTER OFF DEAD, OR OF HURTING YOURSELF: 0
2. FEELING DOWN, DEPRESSED OR HOPELESS: 3

## 2024-02-01 NOTE — PROGRESS NOTES
Chief Complaint   Patient presents with    Follow-up     \"Have you been to the ER, urgent care clinic since your last visit?  Hospitalized since your last visit?\"    NO    “Have you seen or consulted any other health care providers outside of [facility] since your last visit?”    NO                   2/1/2024     1:33 PM   PHQ-9    Little interest or pleasure in doing things 0   Feeling down, depressed, or hopeless 3   Trouble falling or staying asleep, or sleeping too much 3   Feeling tired or having little energy 1   Poor appetite or overeating 0   Feeling bad about yourself - or that you are a failure or have let yourself or your family down 0   Trouble concentrating on things, such as reading the newspaper or watching television 0   Moving or speaking so slowly that other people could have noticed. Or the opposite - being so fidgety or restless that you have been moving around a lot more than usual 0   Thoughts that you would be better off dead, or of hurting yourself in some way 0   PHQ-2 Score 3   PHQ-9 Total Score 7   If you checked off any problems, how difficult have these problems made it for you to do your work, take care of things at home, or get along with other people? 0           Financial Resource Strain: High Risk (1/11/2024)    Overall Financial Resource Strain (CARDIA)     Difficulty of Paying Living Expenses: Very hard      Food Insecurity: Food Insecurity Present (1/11/2024)    Hunger Vital Sign     Worried About Running Out of Food in the Last Year: Often true     Ran Out of Food in the Last Year: Never true          Health Maintenance Due   Topic Date Due    COVID-19 Vaccine (1) Never done    Pneumococcal 65+ years Vaccine (2 - PCV) 09/15/2021    Flu vaccine (1) 08/01/2023    Annual Wellness Visit (Medicare Advantage)  01/01/2024

## 2024-02-02 LAB
ANION GAP SERPL CALC-SCNC: 5 MMOL/L (ref 5–15)
BUN SERPL-MCNC: 10 MG/DL (ref 6–20)
BUN/CREAT SERPL: 12 (ref 12–20)
CALCIUM SERPL-MCNC: 9.5 MG/DL (ref 8.5–10.1)
CHLORIDE SERPL-SCNC: 108 MMOL/L (ref 97–108)
CO2 SERPL-SCNC: 29 MMOL/L (ref 21–32)
CREAT SERPL-MCNC: 0.83 MG/DL (ref 0.7–1.3)
GLUCOSE SERPL-MCNC: 109 MG/DL (ref 65–100)
POTASSIUM SERPL-SCNC: 3.7 MMOL/L (ref 3.5–5.1)
SODIUM SERPL-SCNC: 142 MMOL/L (ref 136–145)

## 2024-02-02 ASSESSMENT — ENCOUNTER SYMPTOMS
CHEST TIGHTNESS: 0
SHORTNESS OF BREATH: 0

## 2024-02-02 NOTE — PROGRESS NOTES
Subjective:      Patient ID: Sarabjit Roberts is a 68 y.o. male.    Hypertension  Pertinent negatives include no chest pain, headaches or shortness of breath.     2 week follow up.  Treated with cipro for UTI.  Reports symptoms have resolved post treatment.  HTN.  Elevated at last OV.  Had not been taking all BP meds.  Now back on meds.  BP improved.  BLE.  Has been on amlodipine for years.  Recently began with edema.  HCTZ caused drop in potassium.  Now taking lasix and potassium supplement.  Supplement changed to 20 meq daily but patient had been taking previous rx of 10 meq tid.  Has added potassium enriched foods to diet.  Due for pneumonia vaccine.   Patient Active Problem List   Diagnosis    History of colon cancer    Lumbar stenosis    Allergic rhinitis    Gastroesophageal reflux disease with esophagitis    Mixed hyperlipidemia    Obesity (BMI 30-39.9)    Sarcoidosis    Illiteracy    BEN (obstructive sleep apnea)    Essential hypertension    Spinal stenosis    Vegetarian    Lower extremity edema     Current Outpatient Medications   Medication Sig    potassium chloride (KLOR-CON M) 20 MEQ extended release tablet Take 1 tablet by mouth daily (Patient taking differently: Take 0.5 tablets by mouth 3 times daily)    amLODIPine (NORVASC) 10 MG tablet Take 1 tablet by mouth daily    atorvastatin (LIPITOR) 20 MG tablet Take 1 tablet by mouth daily    carvedilol (COREG) 25 MG tablet Take 1 tablet by mouth 2 times daily    furosemide (LASIX) 20 MG tablet Take 1 tablet by mouth daily    losartan (COZAAR) 100 MG tablet Take 1 tablet by mouth daily    cyclobenzaprine (FLEXERIL) 5 MG tablet TAKE 1 TO 2 TABLETS AT BEDTIME AS NEEDED FOR MUSCLE SPASM.    montelukast (SINGULAIR) 10 MG tablet Take 1 tablet by mouth daily    aspirin 81 MG chewable tablet Chew and swallow 1 tablet by mouth daily.  Indications: treatment to prevent a heart attack.    omeprazole (PRILOSEC) 20 MG delayed release capsule TAKE 1 CAPSULE TWICE DAILY

## 2024-02-07 RX ORDER — OMEPRAZOLE 20 MG/1
CAPSULE, DELAYED RELEASE ORAL
Qty: 180 CAPSULE | Refills: 3 | Status: SHIPPED | OUTPATIENT
Start: 2024-02-07

## 2024-02-07 NOTE — TELEPHONE ENCOUNTER
PCP: Eileen Ricketts APRN - NP      No future appointments.    Requested Prescriptions     Pending Prescriptions Disp Refills    omeprazole (PRILOSEC) 20 MG delayed release capsule [Pharmacy Med Name: OMEPRAZOLE 20 MG Capsule Delayed Release] 180 capsule 3     Sig: TAKE 1 CAPSULE TWICE DAILY      Last seen at 02/01/2024

## 2024-02-09 RX ORDER — MELOXICAM 15 MG/1
TABLET ORAL
Qty: 90 TABLET | Refills: 0 | Status: SHIPPED | OUTPATIENT
Start: 2024-02-09

## 2024-02-09 NOTE — TELEPHONE ENCOUNTER
PCP: Eileen Ricketts APRN - NP      Future Appointments   Date Time Provider Department Center   3/7/2024 10:30 AM [facility] CT MAIN 1 [facility]       Requested Prescriptions     Pending Prescriptions Disp Refills    meloxicam (MOBIC) 15 MG tablet [Pharmacy Med Name: MELOXICAM 15 MG Tablet] 90 tablet 3     Sig: TAKE 1 TABLET DAILY AS NEEDED FOR BACK PAIN.      Last seen at 02/01/2024

## 2024-02-23 DIAGNOSIS — E78.2 MIXED HYPERLIPIDEMIA: ICD-10-CM

## 2024-02-23 RX ORDER — CYCLOBENZAPRINE HCL 5 MG
TABLET ORAL
Qty: 90 TABLET | Refills: 0 | Status: SHIPPED | OUTPATIENT
Start: 2024-02-23 | End: 2024-04-05

## 2024-02-23 RX ORDER — ATORVASTATIN CALCIUM 20 MG/1
20 TABLET, FILM COATED ORAL DAILY
Qty: 90 TABLET | Refills: 1 | Status: SHIPPED | OUTPATIENT
Start: 2024-02-23

## 2024-02-26 DIAGNOSIS — R60.0 BILATERAL LOWER EXTREMITY EDEMA: ICD-10-CM

## 2024-02-26 DIAGNOSIS — I10 ESSENTIAL HYPERTENSION: ICD-10-CM

## 2024-02-26 RX ORDER — FUROSEMIDE 20 MG/1
20 TABLET ORAL DAILY
Qty: 90 TABLET | Refills: 1 | Status: SHIPPED | OUTPATIENT
Start: 2024-02-26

## 2024-02-26 RX ORDER — AMLODIPINE BESYLATE 10 MG/1
10 TABLET ORAL DAILY
Qty: 90 TABLET | Refills: 1 | Status: SHIPPED | OUTPATIENT
Start: 2024-02-26

## 2024-03-07 ENCOUNTER — HOSPITAL ENCOUNTER (OUTPATIENT)
Facility: HOSPITAL | Age: 69
Discharge: HOME | End: 2024-03-07
Attending: INTERNAL MEDICINE
Payer: MEDICARE

## 2024-03-07 DIAGNOSIS — D86.9 SARCOID: ICD-10-CM

## 2024-03-07 PROCEDURE — 71250 CT THORAX DX C-: CPT

## 2024-03-25 DIAGNOSIS — I10 ESSENTIAL HYPERTENSION: ICD-10-CM

## 2024-03-25 RX ORDER — LOSARTAN POTASSIUM 100 MG/1
100 TABLET ORAL DAILY
Qty: 90 TABLET | Refills: 0 | Status: SHIPPED | OUTPATIENT
Start: 2024-03-25

## 2024-04-02 ENCOUNTER — OFFICE VISIT (OUTPATIENT)
Age: 69
End: 2024-04-02
Payer: MEDICARE

## 2024-04-02 VITALS
HEIGHT: 68 IN | SYSTOLIC BLOOD PRESSURE: 128 MMHG | BODY MASS INDEX: 31.98 KG/M2 | HEART RATE: 88 BPM | TEMPERATURE: 97.8 F | DIASTOLIC BLOOD PRESSURE: 78 MMHG | WEIGHT: 211 LBS | OXYGEN SATURATION: 95 %

## 2024-04-02 DIAGNOSIS — L02.219 SOFT TISSUE ABSCESS OF SUPRAPUBIC REGION: Primary | ICD-10-CM

## 2024-04-02 PROCEDURE — 3078F DIAST BP <80 MM HG: CPT

## 2024-04-02 PROCEDURE — 1123F ACP DISCUSS/DSCN MKR DOCD: CPT

## 2024-04-02 PROCEDURE — 99213 OFFICE O/P EST LOW 20 MIN: CPT

## 2024-04-02 PROCEDURE — 3074F SYST BP LT 130 MM HG: CPT

## 2024-04-02 PROCEDURE — 3017F COLORECTAL CA SCREEN DOC REV: CPT

## 2024-04-02 PROCEDURE — G8417 CALC BMI ABV UP PARAM F/U: HCPCS

## 2024-04-02 PROCEDURE — G8427 DOCREV CUR MEDS BY ELIG CLIN: HCPCS

## 2024-04-02 PROCEDURE — 1036F TOBACCO NON-USER: CPT

## 2024-04-02 NOTE — PROGRESS NOTES
Patient Name: Sarabjit Roberts   MRN: [id number]    SUBJECTIVE  Sarabjit Roberts is a 68 y.o. male who presents with the following: suprapubic blisters, symptoms started on Saturday. Denies any pain or burning. Denies any new sexual partners.       ROS negative unless specified in HPI      The patient's medications, allergies, past medical history, surgical history, family history and social history were reviewed and updated where appropriate.    Current Outpatient Medications on File Prior to Visit   Medication Sig Dispense Refill    losartan (COZAAR) 100 MG tablet TAKE 1 TABLET EVERY DAY 90 tablet 0    amLODIPine (NORVASC) 10 MG tablet TAKE 1 TABLET EVERY DAY 90 tablet 1    furosemide (LASIX) 20 MG tablet TAKE 1 TABLET EVERY DAY 90 tablet 1    atorvastatin (LIPITOR) 20 MG tablet Take 1 tablet by mouth daily 90 tablet 1    cyclobenzaprine (FLEXERIL) 5 MG tablet TAKE 1 TO 2 TABLETS AT BEDTIME AS NEEDED FOR MUSCLE SPASM. 90 tablet 0    meloxicam (MOBIC) 15 MG tablet TAKE 1 TABLET DAILY AS NEEDED FOR BACK PAIN. 90 tablet 0    omeprazole (PRILOSEC) 20 MG delayed release capsule TAKE 1 CAPSULE TWICE DAILY 180 capsule 3    potassium chloride (KLOR-CON M) 20 MEQ extended release tablet Take 1 tablet by mouth daily (Patient taking differently: Take 0.5 tablets by mouth 3 times daily) 90 tablet 1    carvedilol (COREG) 25 MG tablet Take 1 tablet by mouth 2 times daily 180 tablet 1    montelukast (SINGULAIR) 10 MG tablet Take 1 tablet by mouth daily 90 tablet 1    aspirin 81 MG chewable tablet Chew and swallow 1 tablet by mouth daily.  Indications: treatment to prevent a heart attack. 90 tablet 1    valACYclovir (VALTREX) 500 MG tablet TAKE 1 TABLET EVERY DAY AS NEEDED FOR BREAKOUT UNDER EYES 90 tablet 0    Cholecalciferol 50 MCG (2000 UT) CAPS TAKE 1 CAPSULE EVERY DAY      Cyanocobalamin ER 2000 MCG TBCR Take by mouth daily      fexofenadine (ALLEGRA) 180 MG tablet TAKE 1 TABLET EVERY DAY AS NEEDED FOR ALLERGIES      gabapentin

## 2024-04-02 NOTE — PROGRESS NOTES
Chief Complaint   Patient presents with    Rash     Has red blisters on groin area. Noticed when showering. No change in detergents or soaps. Not painful. Just one spot not spread around.     Other     States not allergic to Hydrochlorothiazide      \"Have you been to the ER, urgent care clinic since your last visit?  Hospitalized since your last visit?\"    NO    “Have you seen or consulted any other health care providers outside of [facility] since your last visit?”    NO                   2/1/2024     1:33 PM   PHQ-9    Little interest or pleasure in doing things 0   Feeling down, depressed, or hopeless 3   Trouble falling or staying asleep, or sleeping too much 3   Feeling tired or having little energy 1   Poor appetite or overeating 0   Feeling bad about yourself - or that you are a failure or have let yourself or your family down 0   Trouble concentrating on things, such as reading the newspaper or watching television 0   Moving or speaking so slowly that other people could have noticed. Or the opposite - being so fidgety or restless that you have been moving around a lot more than usual 0   Thoughts that you would be better off dead, or of hurting yourself in some way 0   PHQ-2 Score 3   PHQ-9 Total Score 7   If you checked off any problems, how difficult have these problems made it for you to do your work, take care of things at home, or get along with other people? 0           Financial Resource Strain: High Risk (1/11/2024)    Overall Financial Resource Strain (CARDIA)     Difficulty of Paying Living Expenses: Very hard      Food Insecurity: Food Insecurity Present (1/11/2024)    Hunger Vital Sign     Worried About Running Out of Food in the Last Year: Often true     Ran Out of Food in the Last Year: Never true          Health Maintenance Due   Topic Date Due    COVID-19 Vaccine (1) Never done    Annual Wellness Visit (Medicare Advantage)  01/01/2024

## 2024-04-05 RX ORDER — VALACYCLOVIR HYDROCHLORIDE 500 MG/1
TABLET, FILM COATED ORAL
Qty: 90 TABLET | Refills: 0 | Status: SHIPPED | OUTPATIENT
Start: 2024-04-05

## 2024-04-05 RX ORDER — FEXOFENADINE HCL 180 MG/1
TABLET ORAL
Qty: 90 TABLET | Refills: 1 | Status: SHIPPED | OUTPATIENT
Start: 2024-04-05

## 2024-04-05 RX ORDER — CYCLOBENZAPRINE HCL 5 MG
TABLET ORAL
Qty: 90 TABLET | Refills: 0 | Status: SHIPPED | OUTPATIENT
Start: 2024-04-05

## 2024-04-05 RX ORDER — MONTELUKAST SODIUM 10 MG/1
10 TABLET ORAL DAILY
Qty: 90 TABLET | Refills: 1 | Status: SHIPPED | OUTPATIENT
Start: 2024-04-05

## 2024-04-05 NOTE — TELEPHONE ENCOUNTER
PCP: Eileen Ricketts APRN - NP      No future appointments.    Requested Prescriptions     Pending Prescriptions Disp Refills    cyclobenzaprine (FLEXERIL) 5 MG tablet [Pharmacy Med Name: CYCLOBENZAPRINE HYDROCHLORIDE 5 MG Tablet] 90 tablet 0     Sig: TAKE 1 TO 2 TABLETS AT BEDTIME AS NEEDED FOR MUSCLE SPASM    fexofenadine (ALLEGRA) 180 MG tablet [Pharmacy Med Name: FEXOFENADINE HYDROCHLORIDE 180 MG Tablet] 90 tablet 3     Sig: TAKE 1 TABLET EVERY DAY AS NEEDED FOR ALLERGIES    montelukast (SINGULAIR) 10 MG tablet [Pharmacy Med Name: MONTELUKAST SODIUM 10 MG Tablet] 90 tablet 3     Sig: TAKE 1 TABLET EVERY DAY    valACYclovir (VALTREX) 500 MG tablet 90 tablet 0       Prior labs and Blood pressures:  BP Readings from Last 3 Encounters:   04/02/24 128/78   02/01/24 128/71   01/11/24 (!) 160/91     Lab Results   Component Value Date/Time     02/01/2024 02:25 PM    K 3.7 02/01/2024 02:25 PM     02/01/2024 02:25 PM    CO2 29 02/01/2024 02:25 PM    BUN 10 02/01/2024 02:25 PM    GFRAA >60 07/07/2022 02:12 PM     No results found for: \"HBA1C\", \"[id number]\"  Lab Results   Component Value Date/Time    CHOL 112 07/20/2023 03:49 PM    HDL 44 07/20/2023 03:49 PM     No results found for: \"VITD3\", \"VD3RIA\"    Lab Results   Component Value Date/Time    TSH 1.030 02/12/2020 02:12 PM

## 2024-04-05 NOTE — TELEPHONE ENCOUNTER
Pt called from his pharmacy stating that he need's his Valtrex filled TODAY and that he only need's (7) pill's.I informed him that I only had one provider,and that I would send back the request and see if MICHAEL Mcmahan would send in the medication.

## 2024-04-24 RX ORDER — MELOXICAM 15 MG/1
TABLET ORAL
Qty: 30 TABLET | Refills: 0 | Status: SHIPPED | OUTPATIENT
Start: 2024-04-24

## 2024-04-24 NOTE — TELEPHONE ENCOUNTER
PCP: Eileen Ricketts APRN - NP      No future appointments.    Requested Prescriptions     Pending Prescriptions Disp Refills    meloxicam (MOBIC) 15 MG tablet [Pharmacy Med Name: MELOXICAM 15 MG Tablet] 30 tablet 0     Sig: TAKE 1 TABLET DAILY AS NEEDED FOR BACK PAIN.       Prior labs and Blood pressures:  BP Readings from Last 3 Encounters:   04/02/24 128/78   02/01/24 128/71   01/11/24 (!) 160/91     Lab Results   Component Value Date/Time     02/01/2024 02:25 PM    K 3.7 02/01/2024 02:25 PM     02/01/2024 02:25 PM    CO2 29 02/01/2024 02:25 PM    BUN 10 02/01/2024 02:25 PM    GFRAA >60 07/07/2022 02:12 PM     No results found for: \"HBA1C\", \"ZOC4DFAJ\"  Lab Results   Component Value Date/Time    CHOL 112 07/20/2023 03:49 PM    HDL 44 07/20/2023 03:49 PM     No results found for: \"VITD3\", \"VD3RIA\"    Lab Results   Component Value Date/Time    TSH 1.030 02/12/2020 02:12 PM      LOV 04/02/2024

## 2024-05-13 ENCOUNTER — OFFICE VISIT (OUTPATIENT)
Age: 69
End: 2024-05-13
Payer: MEDICARE

## 2024-05-13 VITALS
TEMPERATURE: 97.5 F | HEIGHT: 68 IN | RESPIRATION RATE: 16 BRPM | DIASTOLIC BLOOD PRESSURE: 89 MMHG | SYSTOLIC BLOOD PRESSURE: 139 MMHG | OXYGEN SATURATION: 96 % | HEART RATE: 75 BPM | WEIGHT: 214.4 LBS | BODY MASS INDEX: 32.49 KG/M2

## 2024-05-13 DIAGNOSIS — R73.01 ELEVATED FASTING BLOOD SUGAR: ICD-10-CM

## 2024-05-13 DIAGNOSIS — E78.2 MIXED HYPERLIPIDEMIA: ICD-10-CM

## 2024-05-13 DIAGNOSIS — M48.062 SPINAL STENOSIS OF LUMBAR REGION WITH NEUROGENIC CLAUDICATION: ICD-10-CM

## 2024-05-13 DIAGNOSIS — R60.0 LOWER EXTREMITY EDEMA: ICD-10-CM

## 2024-05-13 DIAGNOSIS — E87.6 HYPOKALEMIA: ICD-10-CM

## 2024-05-13 DIAGNOSIS — I10 ESSENTIAL HYPERTENSION: Primary | ICD-10-CM

## 2024-05-13 LAB
ALBUMIN SERPL-MCNC: 4 G/DL (ref 3.5–5)
ALBUMIN/GLOB SERPL: 1.1 (ref 1.1–2.2)
ALP SERPL-CCNC: 113 U/L (ref 45–117)
ALT SERPL-CCNC: 22 U/L (ref 12–78)
ANION GAP SERPL CALC-SCNC: 6 MMOL/L (ref 5–15)
AST SERPL-CCNC: 15 U/L (ref 15–37)
BILIRUB SERPL-MCNC: 1.3 MG/DL (ref 0.2–1)
BUN SERPL-MCNC: 14 MG/DL (ref 6–20)
BUN/CREAT SERPL: 18 (ref 12–20)
CALCIUM SERPL-MCNC: 10.1 MG/DL (ref 8.5–10.1)
CHLORIDE SERPL-SCNC: 106 MMOL/L (ref 97–108)
CHOLEST SERPL-MCNC: 140 MG/DL
CO2 SERPL-SCNC: 30 MMOL/L (ref 21–32)
CREAT SERPL-MCNC: 0.77 MG/DL (ref 0.7–1.3)
EST. AVERAGE GLUCOSE BLD GHB EST-MCNC: 100 MG/DL
GLOBULIN SER CALC-MCNC: 3.5 G/DL (ref 2–4)
GLUCOSE SERPL-MCNC: 102 MG/DL (ref 65–100)
HBA1C MFR BLD: 5.1 % (ref 4–5.6)
HDLC SERPL-MCNC: 53 MG/DL
HDLC SERPL: 2.6 (ref 0–5)
LDLC SERPL CALC-MCNC: 65.4 MG/DL (ref 0–100)
POTASSIUM SERPL-SCNC: 2.9 MMOL/L (ref 3.5–5.1)
PROT SERPL-MCNC: 7.5 G/DL (ref 6.4–8.2)
SODIUM SERPL-SCNC: 142 MMOL/L (ref 136–145)
TRIGL SERPL-MCNC: 108 MG/DL
VLDLC SERPL CALC-MCNC: 21.6 MG/DL

## 2024-05-13 PROCEDURE — 99214 OFFICE O/P EST MOD 30 MIN: CPT | Performed by: NURSE PRACTITIONER

## 2024-05-13 PROCEDURE — 1123F ACP DISCUSS/DSCN MKR DOCD: CPT | Performed by: NURSE PRACTITIONER

## 2024-05-13 PROCEDURE — G8417 CALC BMI ABV UP PARAM F/U: HCPCS | Performed by: NURSE PRACTITIONER

## 2024-05-13 PROCEDURE — 3017F COLORECTAL CA SCREEN DOC REV: CPT | Performed by: NURSE PRACTITIONER

## 2024-05-13 PROCEDURE — 1036F TOBACCO NON-USER: CPT | Performed by: NURSE PRACTITIONER

## 2024-05-13 PROCEDURE — 3075F SYST BP GE 130 - 139MM HG: CPT | Performed by: NURSE PRACTITIONER

## 2024-05-13 PROCEDURE — 3079F DIAST BP 80-89 MM HG: CPT | Performed by: NURSE PRACTITIONER

## 2024-05-13 PROCEDURE — G8427 DOCREV CUR MEDS BY ELIG CLIN: HCPCS | Performed by: NURSE PRACTITIONER

## 2024-05-13 ASSESSMENT — ENCOUNTER SYMPTOMS
BACK PAIN: 1
CHEST TIGHTNESS: 0
SHORTNESS OF BREATH: 0

## 2024-05-13 NOTE — PROGRESS NOTES
Chief Complaint   Patient presents with    Hypertension     Follow up    Cholesterol Problem     Follow up         \"Have you been to the ER, urgent care clinic since your last visit?  Hospitalized since your last visit?\"    NO    “Have you seen or consulted any other health care providers outside of [facility] since your last visit?”    NO            Click Here for Release of Records Request           2/1/2024     1:33 PM   PHQ-9    Little interest or pleasure in doing things 0   Feeling down, depressed, or hopeless 3   Trouble falling or staying asleep, or sleeping too much 3   Feeling tired or having little energy 1   Poor appetite or overeating 0   Feeling bad about yourself - or that you are a failure or have let yourself or your family down 0   Trouble concentrating on things, such as reading the newspaper or watching television 0   Moving or speaking so slowly that other people could have noticed. Or the opposite - being so fidgety or restless that you have been moving around a lot more than usual 0   Thoughts that you would be better off dead, or of hurting yourself in some way 0   PHQ-2 Score 3   PHQ-9 Total Score 7   If you checked off any problems, how difficult have these problems made it for you to do your work, take care of things at home, or get along with other people? 0           Financial Resource Strain: High Risk (1/11/2024)    Overall Financial Resource Strain (CARDIA)     Difficulty of Paying Living Expenses: Very hard      Food Insecurity: Food Insecurity Present (1/11/2024)    Hunger Vital Sign     Worried About Running Out of Food in the Last Year: Often true     Ran Out of Food in the Last Year: Never true          Health Maintenance Due   Topic Date Due    COVID-19 Vaccine (1) Never done    Annual Wellness Visit (Medicare Advantage)  01/01/2024    Diabetes screen  06/08/2024        
tablet Chew and swallow 1 tablet by mouth daily.  Indications: treatment to prevent a heart attack.    gabapentin (NEURONTIN) 300 MG capsule Take 1-2 capsules by mouth nightly for 60 days. Supervising Physician: Von Carpenter MD NPI: [id number] OLENA: [id number] Max Daily Amount: 600 mg    Cholecalciferol 50 MCG (2000 UT) CAPS TAKE 1 CAPSULE EVERY DAY    Cyanocobalamin ER 2000 MCG TBCR Take by mouth daily    potassium chloride (KLOR-CON M) 20 MEQ extended release tablet Take 1 tablet by mouth daily (Patient not taking: Reported on 5/13/2024)    gabapentin (NEURONTIN) 300 MG capsule Take 1-2 capsules by mouth nightly for 120 days. Take 1 - 2 capsules at bedtime for treatment of nerve pain.  Supervising Physician: Von Carpenter MD NPI: [id number] OLENA: [id number] Max Daily Amount: 600 mg (Patient not taking: Reported on 4/2/2024)     No current facility-administered medications for this visit.     Social History     Tobacco Use    Smoking status: Never    Smokeless tobacco: Never   Vaping Use    Vaping Use: Never used   Substance Use Topics    Alcohol use: Not Currently     Alcohol/week: 0.0 standard drinks of alcohol    Drug use: No     Blood pressure 139/89, pulse 75, temperature 97.5 °F (36.4 °C), temperature source Temporal, resp. rate 16, height 1.727 m (5' 8\"), weight 97.3 kg (214 lb 6.4 oz), SpO2 96 %.    Review of Systems   Respiratory:  Negative for chest tightness and shortness of breath.    Cardiovascular:  Negative for chest pain and leg swelling.   Musculoskeletal:  Positive for back pain.   Neurological:  Negative for dizziness, weakness, numbness and headaches.   All other systems reviewed and are negative.        Objective:   Physical Exam  Constitutional:       General: He is not in acute distress.  Cardiovascular:      Rate and Rhythm: Normal rate and regular rhythm.      Heart sounds: Normal heart sounds.   Pulmonary:      Effort: Pulmonary effort is normal.      Breath sounds: Normal breath

## 2024-05-14 DIAGNOSIS — E87.6 HYPOKALEMIA: ICD-10-CM

## 2024-05-14 NOTE — RESULT ENCOUNTER NOTE
Please notify patient that potassium level is low.  I sent a refill for potassium to mail order yesterday.  Does he have a local pharmacy he can use to get the potassium sooner?

## 2024-05-15 DIAGNOSIS — E87.6 HYPOKALEMIA: ICD-10-CM

## 2024-05-15 RX ORDER — POTASSIUM CHLORIDE 20 MEQ/1
20 TABLET, EXTENDED RELEASE ORAL DAILY
Qty: 90 TABLET | Refills: 1 | Status: CANCELLED | OUTPATIENT
Start: 2024-05-15

## 2024-05-15 RX ORDER — POTASSIUM CHLORIDE 20 MEQ/1
20 TABLET, EXTENDED RELEASE ORAL DAILY
Qty: 90 TABLET | Refills: 1 | Status: SHIPPED | OUTPATIENT
Start: 2024-05-15 | End: 2024-05-16 | Stop reason: SDUPTHER

## 2024-05-15 NOTE — TELEPHONE ENCOUNTER
Patient called and state that the nurse called him and asked what pharmacy to send his prescription to. He need it to go to [facility] in [place] [address]   no fever/no vomiting/no nausea/no confusion

## 2024-05-15 NOTE — TELEPHONE ENCOUNTER
Called patient. Name and  verified. Discussed results written by provider. Patient verbalized understanding.    The patient request the prescription to be sent to [facility] pharmacy     at [address]  [place] [address]

## 2024-05-15 NOTE — TELEPHONE ENCOUNTER
Onset: Saturday  Location / description: Patient states on Saturday she felt lightheaded, was shaking, poor appetite. Patient states she has been coughing a lot, runny nose and sweating. No fever, no vomiting. Poor sleep due to symptoms.   Still feels a little lightheaded today and still coughing. No known exposures to any illness. Patient also having shortness of breath with exertion. Has some chest pain with coughing.   Precipitating Factors: as above.   Pain Scale (1-10), 10 highest: 0/10  What improves/worsens symptoms: Nothing improves or worsens symptoms.   Symptom specific medications: Albuterol inhaler.   LMP : Patient's last menstrual period was 10/04/2023 (within months).   Are you pregnant or breast feeding: n/a  Recent visits (last 3-4 weeks) for same reason or recent surgery: Last seen in office on 04/05/2024.    PLAN:    Urgent Care Go to Urgent Care/Immediate Care within 4 hours  Advised evaluation- no PCP appointments available within time frame. Provided address and hours to nearest urgent cares. Patient verbalized understanding.   Advised to call back or seek immediate evaluation with any new or worsening symptoms. Patient verbalized understanding.     Patient/Caller agrees to follow recommendations.    Reason for Disposition   MILD difficulty breathing (e.g., minimal/no SOB at rest, SOB with walking, pulse <100) and still present when not coughing    Protocols used: Cough-A-OH     Patient called requesting refill on his potassium medication.    Per last OV, patient wants potassium sent to mailorder ([facility]).  Nette, Fiorella    Last appointment: 5/13/24 Liliam  Next appointment: None  Previous refill encounter(s): 1/13/24 90 + 1 ([facility])    Requested Prescriptions     Pending Prescriptions Disp Refills    potassium chloride (KLOR-CON M) 20 MEQ extended release tablet 90 tablet 1     Sig: Take 1 tablet by mouth daily     For Pharmacy Admin Tracking Only    Program: Medication Refill  CPA in place:    Recommendation Provided To:   Intervention Detail: New Rx: 1, reason: Patient Preference  Intervention Accepted By:   Gap Closed?:    Time Spent (min): 5

## 2024-05-16 DIAGNOSIS — E87.6 HYPOKALEMIA: ICD-10-CM

## 2024-05-16 RX ORDER — POTASSIUM CHLORIDE 20 MEQ/1
20 TABLET, EXTENDED RELEASE ORAL DAILY
Qty: 90 TABLET | Refills: 1 | Status: SHIPPED | OUTPATIENT
Start: 2024-05-16

## 2024-05-16 NOTE — TELEPHONE ENCOUNTER
Patient called stating that his potassium chloride medication was sent to the wrong pharmacy, and is hoping to have this fixed for him as soon as possible. The pharmacy that he is hoping to have this sent to is the [facility] pharmacy located at [address] [place] [address].    Best call back number  [phone]   10

## 2024-05-23 RX ORDER — MELOXICAM 15 MG/1
TABLET ORAL
Qty: 90 TABLET | Refills: 0 | Status: SHIPPED | OUTPATIENT
Start: 2024-05-23

## 2024-05-23 NOTE — TELEPHONE ENCOUNTER
Last appointment: 05/13/2024 MICHAEL Ricketts   Next appointment: Nothing scheduled   Previous refill encounter(s):   04/24/2024 Mobic #30. Last prescribed by Dr. ALEXANDRU Ochoa.     For Pharmacy Admin Tracking Only    Program: Medication Refill  Intervention Detail: New Rx: 1, reason: Patient Preference  Time Spent (min): 5    Requested Prescriptions     Pending Prescriptions Disp Refills    meloxicam (MOBIC) 15 MG tablet 90 tablet 0     Sig: TAKE 1 TABLET DAILY AS NEEDED FOR BACK PAIN.

## 2024-05-24 NOTE — TELEPHONE ENCOUNTER
Refill request for Kathie.  Nette ravi    Last appointment: 5/13/24 Mccleary  Next appointment: None  Previous refill encounter(s): 4/5/24 90    Requested Prescriptions     Pending Prescriptions Disp Refills    cyclobenzaprine (FLEXERIL) 5 MG tablet 90 tablet 0     Sig: Take 1 to 2 tablets at bedtime as needed for muscle spasm.     For Pharmacy Admin Tracking Only    Program: Medication Refill  CPA in place:    Recommendation Provided To:   Intervention Detail: New Rx: 1, reason: Patient Preference  Intervention Accepted By:   Gap Closed?:    Time Spent (min): 5

## 2024-05-28 ENCOUNTER — TELEPHONE (OUTPATIENT)
Age: 69
End: 2024-05-28

## 2024-05-28 RX ORDER — CYCLOBENZAPRINE HCL 5 MG
TABLET ORAL
Qty: 90 TABLET | Refills: 0 | Status: SHIPPED | OUTPATIENT
Start: 2024-05-28

## 2024-05-28 NOTE — TELEPHONE ENCOUNTER
----- Message from Court Julian sent at 5/28/2024 12:11 PM EDT -----  Subject: Refill Request    QUESTIONS  Name of Medication? gabapentin (NEURONTIN) 300 MG capsule  Patient-reported dosage and instructions? unsure of dosage, taking 1 time   daily  How many days do you have left? 20  Preferred Pharmacy? [facility]  Pharmacy phone number (if available)? [phone]  ---------------------------------------------------------------------------  --------------  CALL BACK INFO  What is the best way for the office to contact you? OK to leave message on   voicemail  Preferred Call Back Phone Number? [phone]  ---------------------------------------------------------------------------  --------------  SCRIPT ANSWERS  Relationship to Patient? Self

## 2024-06-10 DIAGNOSIS — I10 ESSENTIAL HYPERTENSION: ICD-10-CM

## 2024-06-10 RX ORDER — LOSARTAN POTASSIUM 100 MG/1
100 TABLET ORAL DAILY
Qty: 90 TABLET | Refills: 1 | Status: SHIPPED | OUTPATIENT
Start: 2024-06-10

## 2024-06-10 NOTE — TELEPHONE ENCOUNTER
PCP: Eileen Ricketts APRN - NP      Future Appointments   Date Time Provider Department Center   6/13/2024  2:00 PM Eileen Ricketts APRN - NP [facility]       Requested Prescriptions     Pending Prescriptions Disp Refills    losartan (COZAAR) 100 MG tablet [Pharmacy Med Name: LOSARTAN POTASSIUM 100 MG Tablet] 90 tablet 3     Sig: TAKE 1 TABLET EVERY DAY       Prior labs and Blood pressures:  BP Readings from Last 3 Encounters:   05/13/24 139/89   04/02/24 128/78   02/01/24 128/71     Lab Results   Component Value Date/Time     05/13/2024 09:11 AM    K 2.9 05/13/2024 09:11 AM     05/13/2024 09:11 AM    CO2 30 05/13/2024 09:11 AM    BUN 14 05/13/2024 09:11 AM    GFRAA >60 07/07/2022 02:12 PM     No results found for: \"HBA1C\", \"[id number]\"  Lab Results   Component Value Date/Time    CHOL 140 05/13/2024 09:11 AM    HDL 53 05/13/2024 09:11 AM    LDL 65.4 05/13/2024 09:11 AM    LDL 48.6 07/20/2023 03:49 PM    VLDL 21.6 05/13/2024 09:11 AM     No results found for: \"VITD3\"    Lab Results   Component Value Date/Time    TSH 1.030 02/12/2020 02:12 PM

## 2024-06-11 ENCOUNTER — TELEPHONE (OUTPATIENT)
Age: 69
End: 2024-06-11

## 2024-06-11 NOTE — TELEPHONE ENCOUNTER
Called the patient. Name and  verified.  The patient requesting hand cap tag and referral to an orthopedic because of his hip pain.  He had been scheduled for follow up with PCP on 2024 at 2:00 pm.

## 2024-06-11 NOTE — TELEPHONE ENCOUNTER
----- Message from Shona Garcia sent at 6/10/2024  9:40 AM EDT -----  Subject: Referral Request    Reason for referral request? specialist for hip pain  Provider patient wants to be referred to(if known):     Provider Phone Number(if known):    Additional Information for Provider?   ---------------------------------------------------------------------------  --------------  CALL BACK INFO    [phone]; OK to leave message on voicemail  ---------------------------------------------------------------------------  --------------

## 2024-06-13 ENCOUNTER — OFFICE VISIT (OUTPATIENT)
Age: 69
End: 2024-06-13
Payer: MEDICARE

## 2024-06-13 VITALS
HEART RATE: 59 BPM | DIASTOLIC BLOOD PRESSURE: 84 MMHG | OXYGEN SATURATION: 97 % | HEIGHT: 68 IN | RESPIRATION RATE: 14 BRPM | WEIGHT: 207.6 LBS | TEMPERATURE: 97.8 F | SYSTOLIC BLOOD PRESSURE: 126 MMHG | BODY MASS INDEX: 31.46 KG/M2

## 2024-06-13 DIAGNOSIS — R60.0 BILATERAL LOWER EXTREMITY EDEMA: ICD-10-CM

## 2024-06-13 DIAGNOSIS — N20.0 KIDNEY STONE ON LEFT SIDE: Primary | ICD-10-CM

## 2024-06-13 DIAGNOSIS — E87.6 HYPOKALEMIA: ICD-10-CM

## 2024-06-13 PROCEDURE — 3017F COLORECTAL CA SCREEN DOC REV: CPT | Performed by: NURSE PRACTITIONER

## 2024-06-13 PROCEDURE — 99214 OFFICE O/P EST MOD 30 MIN: CPT | Performed by: NURSE PRACTITIONER

## 2024-06-13 PROCEDURE — 1036F TOBACCO NON-USER: CPT | Performed by: NURSE PRACTITIONER

## 2024-06-13 PROCEDURE — 1123F ACP DISCUSS/DSCN MKR DOCD: CPT | Performed by: NURSE PRACTITIONER

## 2024-06-13 PROCEDURE — 3079F DIAST BP 80-89 MM HG: CPT | Performed by: NURSE PRACTITIONER

## 2024-06-13 PROCEDURE — G8427 DOCREV CUR MEDS BY ELIG CLIN: HCPCS | Performed by: NURSE PRACTITIONER

## 2024-06-13 PROCEDURE — G8417 CALC BMI ABV UP PARAM F/U: HCPCS | Performed by: NURSE PRACTITIONER

## 2024-06-13 PROCEDURE — 3074F SYST BP LT 130 MM HG: CPT | Performed by: NURSE PRACTITIONER

## 2024-06-13 RX ORDER — FUROSEMIDE 20 MG/1
10 TABLET ORAL DAILY
Qty: 90 TABLET | Refills: 1
Start: 2024-06-13

## 2024-06-13 ASSESSMENT — PATIENT HEALTH QUESTIONNAIRE - PHQ9
2. FEELING DOWN, DEPRESSED OR HOPELESS: NOT AT ALL
SUM OF ALL RESPONSES TO PHQ QUESTIONS 1-9: 0
1. LITTLE INTEREST OR PLEASURE IN DOING THINGS: NOT AT ALL
SUM OF ALL RESPONSES TO PHQ QUESTIONS 1-9: 0
SUM OF ALL RESPONSES TO PHQ9 QUESTIONS 1 & 2: 0
SUM OF ALL RESPONSES TO PHQ QUESTIONS 1-9: 0
SUM OF ALL RESPONSES TO PHQ QUESTIONS 1-9: 0

## 2024-06-13 ASSESSMENT — ENCOUNTER SYMPTOMS
CHEST TIGHTNESS: 0
SHORTNESS OF BREATH: 0

## 2024-06-13 NOTE — PROGRESS NOTES
Chief Complaint   Patient presents with    Follow-up     Follow Up after ER visit for kidney stones     \"Have you been to the ER, urgent care clinic since your last visit?  Hospitalized since your last visit?\"    YES - When: approximately 4 days ago.  Where and Why: [facility], saw  for kidney stones.    “Have you seen or consulted any other health care providers outside of [facility] since your last visit?”    YES - When: approximately 4 days ago.  Where and Why: YES - When: approximately 4 days ago.  Where and Why: [facility], saw  for kidney stones..            Click Here for Release of Records Request             6/13/2024     1:39 PM   PHQ-9    Little interest or pleasure in doing things 0   Feeling down, depressed, or hopeless 0   PHQ-2 Score 0   PHQ-9 Total Score 0           Financial Resource Strain: High Risk (1/11/2024)    Overall Financial Resource Strain (CARDIA)     Difficulty of Paying Living Expenses: Very hard      Food Insecurity: Food Insecurity Present (1/11/2024)    Hunger Vital Sign     Worried About Running Out of Food in the Last Year: Often true     Ran Out of Food in the Last Year: Never true          Health Maintenance Due   Topic Date Due    COVID-19 Vaccine (1) Never done    Annual Wellness Visit (Medicare Advantage)  01/01/2024

## 2024-06-13 NOTE — PROGRESS NOTES
Subjective:      Patient ID: Sarabjit Roberts is a 69 y.o. male     HPI   Follow up ED visit.  Seen at [facility] in [place] with left flank pain 4 days ago.  Records reviewed and summarized as follows: CT abdomen and pelvis showed left kidney stone and small left renal cyst.  Treated with pain medication and flomax.  Discharged with oxycodone prn.  Labs showed mild hypokalemia, K replaced.  History of BLE edema on lasix and 20meq potassium daily.  Reports he has been taking his supplement as prescribed.  Edema has been stable.    Reports pain has improved, requesting refill on oxycodone.     Patient Active Problem List   Diagnosis    History of colon cancer    Lumbar stenosis    Allergic rhinitis    Gastroesophageal reflux disease with esophagitis    Mixed hyperlipidemia    Obesity (BMI 30-39.9)    Sarcoidosis    Illiteracy    BEN (obstructive sleep apnea)    Essential hypertension    Spinal stenosis    Vegetarian    Lower extremity edema     Current Outpatient Medications   Medication Sig    furosemide (LASIX) 20 MG tablet Take 0.5 tablets by mouth daily    losartan (COZAAR) 100 MG tablet TAKE 1 TABLET EVERY DAY    cyclobenzaprine (FLEXERIL) 5 MG tablet Take 1 to 2 tablets at bedtime as needed for muscle spasm.    meloxicam (MOBIC) 15 MG tablet TAKE 1 TABLET DAILY AS NEEDED FOR BACK PAIN.    potassium chloride (KLOR-CON M) 20 MEQ extended release tablet Take 1 tablet by mouth daily    fexofenadine (ALLEGRA) 180 MG tablet TAKE 1 TABLET EVERY DAY AS NEEDED FOR ALLERGIES    montelukast (SINGULAIR) 10 MG tablet TAKE 1 TABLET EVERY DAY    valACYclovir (VALTREX) 500 MG tablet TAKE 1 TABLET EVERY DAY AS NEEDED FOR BREAKOUT UNDER EYES    amLODIPine (NORVASC) 10 MG tablet TAKE 1 TABLET EVERY DAY    atorvastatin (LIPITOR) 20 MG tablet Take 1 tablet by mouth daily    omeprazole (PRILOSEC) 20 MG delayed release capsule TAKE 1 CAPSULE TWICE DAILY    carvedilol (COREG) 25 MG tablet Take 1 tablet by mouth 2 times daily    aspirin 81

## 2024-06-20 RX ORDER — VALACYCLOVIR HYDROCHLORIDE 500 MG/1
TABLET, FILM COATED ORAL
Qty: 90 TABLET | Refills: 1 | Status: SHIPPED | OUTPATIENT
Start: 2024-06-20

## 2024-06-20 NOTE — TELEPHONE ENCOUNTER
PCP: Eileen Ricketts APRN - NP      Future Appointments   Date Time Provider Department Center   7/15/2024 11:00 AM Eileen Ricketts APRN - NP [facility]       Requested Prescriptions     Pending Prescriptions Disp Refills    valACYclovir (VALTREX) 500 MG tablet [Pharmacy Med Name: VALACYCLOVIR HYDROCHLORIDE 500 MG Tablet] 90 tablet 3     Sig: TAKE 1 TABLET EVERY DAY AS NEEDED FOR BREAKOUT UNDER EYES       Prior labs and Blood pressures:  BP Readings from Last 3 Encounters:   06/13/24 126/84   05/13/24 139/89   04/02/24 128/78     Lab Results   Component Value Date/Time     05/13/2024 09:11 AM    K 2.9 05/13/2024 09:11 AM     05/13/2024 09:11 AM    CO2 30 05/13/2024 09:11 AM    BUN 14 05/13/2024 09:11 AM    GFRAA >60 07/07/2022 02:12 PM     No results found for: \"HBA1C\", \"[id number]\"  Lab Results   Component Value Date/Time    CHOL 140 05/13/2024 09:11 AM    HDL 53 05/13/2024 09:11 AM    LDL 65.4 05/13/2024 09:11 AM    LDL 48.6 07/20/2023 03:49 PM    VLDL 21.6 05/13/2024 09:11 AM     No results found for: \"VITD3\"    Lab Results   Component Value Date/Time    TSH 1.030 02/12/2020 02:12 PM

## 2024-07-08 RX ORDER — CYCLOBENZAPRINE HCL 5 MG
TABLET ORAL
Qty: 90 TABLET | Refills: 0 | Status: SHIPPED | OUTPATIENT
Start: 2024-07-08

## 2024-07-08 NOTE — TELEPHONE ENCOUNTER
PCP: Eileen Ricketts APRN - NP      Future Appointments   Date Time Provider Department Center   7/15/2024 11:00 AM Eileen Ricketts APRN - NP [facility]       Requested Prescriptions     Pending Prescriptions Disp Refills    cyclobenzaprine (FLEXERIL) 5 MG tablet [Pharmacy Med Name: CYCLOBENZAPRINE HYDROCHLORIDE 5 MG Tablet] 90 tablet 0     Sig: TAKE 1 TO 2 TABLETS AT BEDTIME AS NEEDED FOR MUSCLE SPASM.       Prior labs and Blood pressures:  BP Readings from Last 3 Encounters:   06/13/24 126/84   05/13/24 139/89   04/02/24 128/78     Lab Results   Component Value Date/Time     05/13/2024 09:11 AM    K 2.9 05/13/2024 09:11 AM     05/13/2024 09:11 AM    CO2 30 05/13/2024 09:11 AM    BUN 14 05/13/2024 09:11 AM    GFRAA >60 07/07/2022 02:12 PM     No results found for: \"HBA1C\", \"[id number]\"  Lab Results   Component Value Date/Time    CHOL 140 05/13/2024 09:11 AM    HDL 53 05/13/2024 09:11 AM    LDL 65.4 05/13/2024 09:11 AM    LDL 48.6 07/20/2023 03:49 PM    VLDL 21.6 05/13/2024 09:11 AM     No results found for: \"VITD3\"    Lab Results   Component Value Date/Time    TSH 1.030 02/12/2020 02:12 PM

## 2024-07-09 ENCOUNTER — TELEPHONE (OUTPATIENT)
Age: 69
End: 2024-07-09

## 2024-07-09 NOTE — TELEPHONE ENCOUNTER
----- Message from Laverne Sadler MA sent at 7/9/2024 11:47 AM EDT -----  Regarding: FW: ECC Appointment Request    ----- Message -----  From: Lily Jang  Sent: 7/9/2024  10:31 AM EDT  To: [facility] Clinical Staff  Subject: ECC Appointment Request                          ECC Appointment Request    Patient needs appointment for ECC Appointment Type: Existing Condition Follow Up.    Patient Requested Dates(s): 7/15/2024  Patient Requested Time: 2:00 PM  Provider Name: PRADEEP Mcneiljessica YehMaurice    Reason for Appointment Request: Established Patient - Available appointments did not meet patient need. PT wanted to have his office visit and AWV together at the same time for his convenience cause he have another doctor's appointment this date.  --------------------------------------------------------------------------------------------------------------------------    Relationship to Patient: Self     Call Back Information: OK to leave message on voicemail  Preferred Call Back Number: Phone [phone]

## 2024-07-09 NOTE — TELEPHONE ENCOUNTER
Informed pt that MICHAEL Ricketts could do his medication follow-up during his AMW Appt, the Medicare Wellness part of the appt would be covered.I did inform the pt anything else that is discussed at the visit MICHAEL Ricketts would have to bill separately,and whatever his Insurance didn't cover he would be billed for it.

## 2024-07-10 RX ORDER — DIAZEPAM 5 MG
TABLET ORAL
Refills: 0 | OUTPATIENT
Start: 2024-07-10

## 2024-07-15 ENCOUNTER — TRANSCRIBE ORDERS (OUTPATIENT)
Facility: HOSPITAL | Age: 69
End: 2024-07-15

## 2024-07-15 ENCOUNTER — HOSPITAL ENCOUNTER (OUTPATIENT)
Facility: HOSPITAL | Age: 69
Discharge: HOME | End: 2024-07-18
Payer: MEDICARE

## 2024-07-15 ENCOUNTER — OFFICE VISIT (OUTPATIENT)
Age: 69
End: 2024-07-15
Payer: MEDICARE

## 2024-07-15 VITALS
SYSTOLIC BLOOD PRESSURE: 128 MMHG | HEIGHT: 68 IN | WEIGHT: 208.4 LBS | DIASTOLIC BLOOD PRESSURE: 80 MMHG | BODY MASS INDEX: 31.58 KG/M2 | OXYGEN SATURATION: 96 % | HEART RATE: 71 BPM | RESPIRATION RATE: 16 BRPM | TEMPERATURE: 98.2 F

## 2024-07-15 DIAGNOSIS — M25.551 RIGHT HIP PAIN: ICD-10-CM

## 2024-07-15 DIAGNOSIS — I10 ESSENTIAL HYPERTENSION: ICD-10-CM

## 2024-07-15 DIAGNOSIS — E87.6 HYPOKALEMIA: ICD-10-CM

## 2024-07-15 DIAGNOSIS — Z00.00 MEDICARE ANNUAL WELLNESS VISIT, SUBSEQUENT: Primary | ICD-10-CM

## 2024-07-15 DIAGNOSIS — M54.16 RADICULOPATHY, LUMBAR REGION: ICD-10-CM

## 2024-07-15 DIAGNOSIS — M25.551 RIGHT HIP PAIN: Primary | ICD-10-CM

## 2024-07-15 DIAGNOSIS — R60.0 LOWER EXTREMITY EDEMA: ICD-10-CM

## 2024-07-15 DIAGNOSIS — Z12.5 SCREENING FOR PROSTATE CANCER: ICD-10-CM

## 2024-07-15 PROCEDURE — 3017F COLORECTAL CA SCREEN DOC REV: CPT | Performed by: NURSE PRACTITIONER

## 2024-07-15 PROCEDURE — 1123F ACP DISCUSS/DSCN MKR DOCD: CPT | Performed by: NURSE PRACTITIONER

## 2024-07-15 PROCEDURE — 3074F SYST BP LT 130 MM HG: CPT | Performed by: NURSE PRACTITIONER

## 2024-07-15 PROCEDURE — 3079F DIAST BP 80-89 MM HG: CPT | Performed by: NURSE PRACTITIONER

## 2024-07-15 PROCEDURE — G0439 PPPS, SUBSEQ VISIT: HCPCS | Performed by: NURSE PRACTITIONER

## 2024-07-15 PROCEDURE — 73502 X-RAY EXAM HIP UNI 2-3 VIEWS: CPT

## 2024-07-15 PROCEDURE — 72100 X-RAY EXAM L-S SPINE 2/3 VWS: CPT

## 2024-07-15 ASSESSMENT — PATIENT HEALTH QUESTIONNAIRE - PHQ9
SUM OF ALL RESPONSES TO PHQ QUESTIONS 1-9: 0
SUM OF ALL RESPONSES TO PHQ QUESTIONS 1-9: 0
2. FEELING DOWN, DEPRESSED OR HOPELESS: NOT AT ALL
1. LITTLE INTEREST OR PLEASURE IN DOING THINGS: NOT AT ALL
SUM OF ALL RESPONSES TO PHQ QUESTIONS 1-9: 0
SUM OF ALL RESPONSES TO PHQ9 QUESTIONS 1 & 2: 0
SUM OF ALL RESPONSES TO PHQ QUESTIONS 1-9: 0

## 2024-07-15 ASSESSMENT — LIFESTYLE VARIABLES
HOW MANY STANDARD DRINKS CONTAINING ALCOHOL DO YOU HAVE ON A TYPICAL DAY: PATIENT DOES NOT DRINK
HOW OFTEN DO YOU HAVE A DRINK CONTAINING ALCOHOL: NEVER

## 2024-07-15 NOTE — PROGRESS NOTES
Chief Complaint   Patient presents with    Medicare AWV     First AWV     \"Have you been to the ER, urgent care clinic since your last visit?  Hospitalized since your last visit?\"    NO    “Have you seen or consulted any other health care providers outside of [facility] since your last visit?”    Had Xray today, does not remember location or provider.            Click Here for Release of Records Request             7/15/2024     2:23 PM   PHQ-9    Little interest or pleasure in doing things 0   Feeling down, depressed, or hopeless 0   PHQ-2 Score 0   PHQ-9 Total Score 0           Financial Resource Strain: High Risk (1/11/2024)    Overall Financial Resource Strain (CARDIA)     Difficulty of Paying Living Expenses: Very hard      Food Insecurity: Food Insecurity Present (1/11/2024)    Hunger Vital Sign     Worried About Running Out of Food in the Last Year: Often true     Ran Out of Food in the Last Year: Never true          Health Maintenance Due   Topic Date Due    COVID-19 Vaccine (1) Never done    Annual Wellness Visit (Medicare Advantage)  01/01/2024    Prostate Specific Antigen (PSA) Screening or Monitoring  07/20/2024        
      Safety:  Do you have working smoke detectors?: (!) No  Do you have non-slip mats or non-slip surfaces or shower bars or grab bars in your shower or bathtub?: (!) No    Interventions:  Fall and safety precautions reviewed.       Advanced Directives:  Do you have a Living Will?: (!) No    Intervention:  has NO advanced directive - information provided                     Objective   Vitals:    07/15/24 1432   BP: 128/80   Site: Right Upper Arm   Position: Sitting   Cuff Size: Large Adult   Pulse: 71   Resp: 16   Temp: 98.2 °F (36.8 °C)   TempSrc: Temporal   SpO2: 96%   Weight: 94.5 kg (208 lb 6.4 oz)   Height: 1.727 m (5' 8\")      Body mass index is 31.69 kg/m².        Pulmonary/Chest: clear to auscultation bilaterally- no wheezes, rales or rhonchi, normal air movement, no respiratory distress  Cardiovascular: normal rate and normal S1 and S2  Extremities: trace edema bilaterally           Allergies   Allergen Reactions    Hydrochlorothiazide Other (See Comments)     Hypokalemia       Prior to Visit Medications    Medication Sig Taking? Authorizing Provider   cyclobenzaprine (FLEXERIL) 5 MG tablet TAKE 1 TO 2 TABLETS AT BEDTIME AS NEEDED FOR MUSCLE SPASM. Yes Eileen Ricketts APRN - NP   valACYclovir (VALTREX) 500 MG tablet TAKE 1 TABLET EVERY DAY AS NEEDED FOR BREAKOUT UNDER EYES Yes Eileen Ricketts APRN - NP   furosemide (LASIX) 20 MG tablet Take 0.5 tablets by mouth daily Yes Eileen Ricketts APRN - NP   losartan (COZAAR) 100 MG tablet TAKE 1 TABLET EVERY DAY Yes Eileen Ricketts APRN - NP   meloxicam (MOBIC) 15 MG tablet TAKE 1 TABLET DAILY AS NEEDED FOR BACK PAIN. Yes Eileen Ricketts APRN - NP   potassium chloride (KLOR-CON M) 20 MEQ extended release tablet Take 1 tablet by mouth daily Yes Eileen Ricketts APRN - NP   fexofenadine (ALLEGRA) 180 MG tablet TAKE 1 TABLET EVERY DAY AS NEEDED FOR ALLERGIES Yes Eileen Ricketts APRN - NP   montelukast (SINGULAIR) 10 MG tablet TAKE 1 TABLET

## 2024-07-16 LAB
ALBUMIN SERPL-MCNC: 4 G/DL (ref 3.5–5)
ALBUMIN/GLOB SERPL: 1.3 (ref 1.1–2.2)
ALP SERPL-CCNC: 98 U/L (ref 45–117)
ALT SERPL-CCNC: 18 U/L (ref 12–78)
ANION GAP SERPL CALC-SCNC: 7 MMOL/L (ref 5–15)
AST SERPL-CCNC: 13 U/L (ref 15–37)
BILIRUB SERPL-MCNC: 1.3 MG/DL (ref 0.2–1)
BUN SERPL-MCNC: 11 MG/DL (ref 6–20)
BUN/CREAT SERPL: 11 (ref 12–20)
CALCIUM SERPL-MCNC: 9.1 MG/DL (ref 8.5–10.1)
CHLORIDE SERPL-SCNC: 107 MMOL/L (ref 97–108)
CO2 SERPL-SCNC: 30 MMOL/L (ref 21–32)
CREAT SERPL-MCNC: 0.97 MG/DL (ref 0.7–1.3)
GLOBULIN SER CALC-MCNC: 3.2 G/DL (ref 2–4)
GLUCOSE SERPL-MCNC: 96 MG/DL (ref 65–100)
POTASSIUM SERPL-SCNC: 3.1 MMOL/L (ref 3.5–5.1)
PROT SERPL-MCNC: 7.2 G/DL (ref 6.4–8.2)
PSA SERPL-MCNC: 0 NG/ML (ref 0.01–4)
SODIUM SERPL-SCNC: 144 MMOL/L (ref 136–145)

## 2024-07-18 ENCOUNTER — TELEPHONE (OUTPATIENT)
Age: 69
End: 2024-07-18

## 2024-07-18 DIAGNOSIS — R60.0 LOWER EXTREMITY EDEMA: Primary | ICD-10-CM

## 2024-07-18 RX ORDER — SPIRONOLACTONE 25 MG/1
25 TABLET ORAL DAILY
Qty: 30 TABLET | Refills: 3 | Status: SHIPPED | OUTPATIENT
Start: 2024-07-18

## 2024-07-18 NOTE — TELEPHONE ENCOUNTER
TC to patient. 2 identifiers confirmed.  Informed potassium continues to be low.  He confirms he is taking daily potassium as prescribed and 1/2 tab lasix daily.  Recommend stop lasix.  Will switch to spironolactone.  Schedule follow up office visit in 4 weeks to recheck LE edema and potassium.

## 2024-07-22 NOTE — ANESTHESIA POSTPROCEDURE EVALUATION
Post-Anesthesia Evaluation and Assessment    Patient: Celia Bryant MRN: [id number]  SSN: [id number]    YOB: 1955  Age: 62 y.o. Sex: male       Cardiovascular Function/Vital Signs  Visit Vitals    BP (!) 136/91    Pulse (!) 51    Temp 36.2 °C (97.2 °F)    Resp 8    Ht 5' 6\" (1.676 m)    Wt 90.7 kg (200 lb)    SpO2 96%    BMI 32.28 kg/m2       Patient is status post general, total IV anesthesia anesthesia for Procedure(s):  COLONOSCOPY  ENDOSCOPIC POLYPECTOMY. Nausea/Vomiting: None    Postoperative hydration reviewed and adequate. Pain:  Pain Scale 1: Numeric (0 - 10) (05/03/17 0906)  Pain Intensity 1: 0 (05/03/17 0906)   Managed    Neurological Status: At baseline    Mental Status and Level of Consciousness: Arousable    Pulmonary Status:   O2 Device: Room air (05/03/17 0906)   Adequate oxygenation and airway patent    Complications related to anesthesia: None    Post-anesthesia assessment completed.  No concerns    Signed By: Reid Naidu MD     May 3, 2017 Never

## 2024-07-24 DIAGNOSIS — R60.0 BILATERAL LOWER EXTREMITY EDEMA: ICD-10-CM

## 2024-07-24 DIAGNOSIS — E78.2 MIXED HYPERLIPIDEMIA: ICD-10-CM

## 2024-07-24 DIAGNOSIS — I10 ESSENTIAL HYPERTENSION: ICD-10-CM

## 2024-07-24 RX ORDER — FUROSEMIDE 20 MG/1
20 TABLET ORAL DAILY
Qty: 90 TABLET | Refills: 3 | OUTPATIENT
Start: 2024-07-24

## 2024-07-24 RX ORDER — ATORVASTATIN CALCIUM 20 MG/1
20 TABLET, FILM COATED ORAL DAILY
Qty: 90 TABLET | Refills: 1 | Status: SHIPPED | OUTPATIENT
Start: 2024-07-24

## 2024-07-24 RX ORDER — AMLODIPINE BESYLATE 10 MG/1
10 TABLET ORAL DAILY
Qty: 90 TABLET | Refills: 1 | Status: SHIPPED | OUTPATIENT
Start: 2024-07-24

## 2024-07-24 NOTE — TELEPHONE ENCOUNTER
PCP: Eileen Ricketts APRN - NP      Future Appointments   Date Time Provider Department Center   8/22/2024 11:20 AM Eileen Ricketts APRN - NP [facility]       Requested Prescriptions     Pending Prescriptions Disp Refills    amLODIPine (NORVASC) 10 MG tablet [Pharmacy Med Name: AMLODIPINE BESYLATE 10 MG Tablet] 90 tablet 3     Sig: TAKE 1 TABLET EVERY DAY    atorvastatin (LIPITOR) 20 MG tablet [Pharmacy Med Name: ATORVASTATIN CALCIUM 20 MG Tablet] 90 tablet 3     Sig: TAKE 1 TABLET EVERY DAY    furosemide (LASIX) 20 MG tablet [Pharmacy Med Name: FUROSEMIDE 20 MG Tablet] 90 tablet 3     Sig: TAKE 1 TABLET EVERY DAY       Prior labs and Blood pressures:  BP Readings from Last 3 Encounters:   07/15/24 128/80   06/13/24 126/84   05/13/24 139/89     Lab Results   Component Value Date/Time     07/15/2024 03:05 PM    K 3.1 07/15/2024 03:05 PM     07/15/2024 03:05 PM    CO2 30 07/15/2024 03:05 PM    BUN 11 07/15/2024 03:05 PM    GFRAA >60 07/07/2022 02:12 PM     No results found for: \"HBA1C\", \"[id number]\"  Lab Results   Component Value Date/Time    CHOL 140 05/13/2024 09:11 AM    HDL 53 05/13/2024 09:11 AM    LDL 65.4 05/13/2024 09:11 AM    LDL 48.6 07/20/2023 03:49 PM    VLDL 21.6 05/13/2024 09:11 AM     No results found for: \"VITD3\"    Lab Results   Component Value Date/Time    TSH 1.030 02/12/2020 02:12 PM

## 2024-08-07 RX ORDER — MELOXICAM 15 MG/1
TABLET ORAL
Qty: 90 TABLET | Refills: 0 | Status: SHIPPED | OUTPATIENT
Start: 2024-08-07

## 2024-08-07 NOTE — TELEPHONE ENCOUNTER
PCP: Eileen Ricketts APRN - NP      Future Appointments   Date Time Provider Department Center   8/22/2024 11:20 AM [facility]       Requested Prescriptions     Pending Prescriptions Disp Refills    meloxicam (MOBIC) 15 MG tablet [Pharmacy Med Name: MELOXICAM 15 MG Tablet] 90 tablet 3     Sig: TAKE 1 TABLET EVERY DAY FOR BACK PAIN AS NEEDED       Prior labs and Blood pressures:  BP Readings from Last 3 Encounters:   07/15/24 128/80   06/13/24 126/84   05/13/24 139/89     Lab Results   Component Value Date/Time     07/15/2024 03:05 PM    K 3.1 07/15/2024 03:05 PM     07/15/2024 03:05 PM    CO2 30 07/15/2024 03:05 PM    BUN 11 07/15/2024 03:05 PM    GFRAA >60 07/07/2022 02:12 PM     No results found for: \"HBA1C\", \"[id number]\"  Lab Results   Component Value Date/Time    CHOL 140 05/13/2024 09:11 AM    HDL 53 05/13/2024 09:11 AM    LDL 65.4 05/13/2024 09:11 AM    LDL 48.6 07/20/2023 03:49 PM    VLDL 21.6 05/13/2024 09:11 AM     No results found for: \"VITD3\"    Lab Results   Component Value Date/Time    TSH 1.030 02/12/2020 02:12 PM

## 2024-08-19 RX ORDER — CYCLOBENZAPRINE HCL 5 MG
TABLET ORAL
Qty: 90 TABLET | Refills: 0 | Status: SHIPPED | OUTPATIENT
Start: 2024-08-19

## 2024-08-19 NOTE — TELEPHONE ENCOUNTER
PCP: Eileen Ricketts APRN - NP    Last Appt:7/15/2024    Future Appointments   Date Time Provider Department Center   8/22/2024 11:20 AM Eileen Ricketts APRN - NP [facility] DEP       Requested Prescriptions     Pending Prescriptions Disp Refills    cyclobenzaprine (FLEXERIL) 5 MG tablet [Pharmacy Med Name: CYCLOBENZAPRINE HYDROCHLORIDE 5 MG Tablet] 90 tablet 0     Sig: TAKE 1 TO 2 TABLETS AT BEDTIME AS NEEDED FOR MUSCLE SPASM.       Prior labs and Blood pressures:  BP Readings from Last 3 Encounters:   07/15/24 128/80   06/13/24 126/84   05/13/24 139/89     Lab Results   Component Value Date/Time     07/15/2024 03:05 PM    K 3.1 07/15/2024 03:05 PM     07/15/2024 03:05 PM    CO2 30 07/15/2024 03:05 PM    BUN 11 07/15/2024 03:05 PM    GFRAA >60 07/07/2022 02:12 PM     No results found for: \"HBA1C\", \"[id number]\"  Lab Results   Component Value Date/Time    CHOL 140 05/13/2024 09:11 AM    HDL 53 05/13/2024 09:11 AM    LDL 65.4 05/13/2024 09:11 AM    LDL 48.6 07/20/2023 03:49 PM    VLDL 21.6 05/13/2024 09:11 AM     No results found for: \"VITD3\"    Lab Results   Component Value Date/Time    TSH 1.030 02/12/2020 02:12 PM

## 2024-08-21 ENCOUNTER — TELEPHONE (OUTPATIENT)
Age: 69
End: 2024-08-21

## 2024-08-21 DIAGNOSIS — I10 ESSENTIAL HYPERTENSION: ICD-10-CM

## 2024-08-21 RX ORDER — LOSARTAN POTASSIUM 100 MG/1
100 TABLET ORAL DAILY
Qty: 10 TABLET | Refills: 0 | Status: SHIPPED | OUTPATIENT
Start: 2024-08-21

## 2024-08-21 RX ORDER — LOSARTAN POTASSIUM 100 MG/1
100 TABLET ORAL DAILY
Qty: 90 TABLET | Refills: 1 | Status: CANCELLED | OUTPATIENT
Start: 2024-08-21

## 2024-08-21 NOTE — TELEPHONE ENCOUNTER
Ivis from [facility] called requesting a short supply of lorsartan 100 mg that will be sent to [facility] [phone].    Requesting a call back    Best call back #[phone]

## 2024-08-21 NOTE — TELEPHONE ENCOUNTER
PCP: Eileen Ricketts APRN - NP    Last Appt:7/15/2024    Future Appointments   Date Time Provider Department Center   8/22/2024 11:20 AM Eileen Ricketts APRN - NP [facility] DEP       Requested Prescriptions     Pending Prescriptions Disp Refills    losartan (COZAAR) 100 MG tablet 90 tablet 1     Sig: Take 1 tablet by mouth daily       Prior labs and Blood pressures:  BP Readings from Last 3 Encounters:   07/15/24 128/80   06/13/24 126/84   05/13/24 139/89     Lab Results   Component Value Date/Time     07/15/2024 03:05 PM    K 3.1 07/15/2024 03:05 PM     07/15/2024 03:05 PM    CO2 30 07/15/2024 03:05 PM    BUN 11 07/15/2024 03:05 PM    GFRAA >60 07/07/2022 02:12 PM     No results found for: \"HBA1C\", \"[id number]\"  Lab Results   Component Value Date/Time    CHOL 140 05/13/2024 09:11 AM    HDL 53 05/13/2024 09:11 AM    LDL 65.4 05/13/2024 09:11 AM    LDL 48.6 07/20/2023 03:49 PM    VLDL 21.6 05/13/2024 09:11 AM     No results found for: \"VITD3\"    Lab Results   Component Value Date/Time    TSH 1.030 02/12/2020 02:12 PM

## 2024-08-22 ENCOUNTER — OFFICE VISIT (OUTPATIENT)
Age: 69
End: 2024-08-22
Payer: MEDICARE

## 2024-08-22 VITALS
HEIGHT: 68 IN | WEIGHT: 208 LBS | SYSTOLIC BLOOD PRESSURE: 136 MMHG | TEMPERATURE: 97.7 F | RESPIRATION RATE: 16 BRPM | HEART RATE: 76 BPM | OXYGEN SATURATION: 98 % | DIASTOLIC BLOOD PRESSURE: 89 MMHG | BODY MASS INDEX: 31.52 KG/M2

## 2024-08-22 DIAGNOSIS — I10 ESSENTIAL HYPERTENSION: ICD-10-CM

## 2024-08-22 DIAGNOSIS — E87.6 HYPOKALEMIA: Primary | ICD-10-CM

## 2024-08-22 DIAGNOSIS — R60.0 LOWER EXTREMITY EDEMA: ICD-10-CM

## 2024-08-22 PROCEDURE — 3075F SYST BP GE 130 - 139MM HG: CPT | Performed by: NURSE PRACTITIONER

## 2024-08-22 PROCEDURE — G8417 CALC BMI ABV UP PARAM F/U: HCPCS | Performed by: NURSE PRACTITIONER

## 2024-08-22 PROCEDURE — G8427 DOCREV CUR MEDS BY ELIG CLIN: HCPCS | Performed by: NURSE PRACTITIONER

## 2024-08-22 PROCEDURE — 1123F ACP DISCUSS/DSCN MKR DOCD: CPT | Performed by: NURSE PRACTITIONER

## 2024-08-22 PROCEDURE — 99214 OFFICE O/P EST MOD 30 MIN: CPT | Performed by: NURSE PRACTITIONER

## 2024-08-22 PROCEDURE — 3017F COLORECTAL CA SCREEN DOC REV: CPT | Performed by: NURSE PRACTITIONER

## 2024-08-22 PROCEDURE — 1036F TOBACCO NON-USER: CPT | Performed by: NURSE PRACTITIONER

## 2024-08-22 PROCEDURE — 3079F DIAST BP 80-89 MM HG: CPT | Performed by: NURSE PRACTITIONER

## 2024-08-22 RX ORDER — SPIRONOLACTONE 25 MG/1
25 TABLET ORAL DAILY
Qty: 90 TABLET | Refills: 1 | Status: SHIPPED | OUTPATIENT
Start: 2024-08-22

## 2024-08-22 RX ORDER — SPIRONOLACTONE 25 MG/1
25 TABLET ORAL DAILY
Qty: 30 TABLET | Refills: 0 | Status: SHIPPED | OUTPATIENT
Start: 2024-08-22 | End: 2024-08-22 | Stop reason: SDUPTHER

## 2024-08-22 ASSESSMENT — PATIENT HEALTH QUESTIONNAIRE - PHQ9
2. FEELING DOWN, DEPRESSED OR HOPELESS: NOT AT ALL
SUM OF ALL RESPONSES TO PHQ QUESTIONS 1-9: 0
SUM OF ALL RESPONSES TO PHQ9 QUESTIONS 1 & 2: 0
SUM OF ALL RESPONSES TO PHQ QUESTIONS 1-9: 0
SUM OF ALL RESPONSES TO PHQ QUESTIONS 1-9: 0
1. LITTLE INTEREST OR PLEASURE IN DOING THINGS: NOT AT ALL
SUM OF ALL RESPONSES TO PHQ QUESTIONS 1-9: 0

## 2024-08-22 ASSESSMENT — ENCOUNTER SYMPTOMS
CHEST TIGHTNESS: 0
SHORTNESS OF BREATH: 0

## 2024-08-22 NOTE — PROGRESS NOTES
Chief Complaint   Patient presents with    potassium check     Follow up     \"Have you been to the ER, urgent care clinic since your last visit?  Hospitalized since your last visit?\"    NO    “Have you seen or consulted any other health care providers outside of [facility] since your last visit?”    NO            Click Here for Release of Records Request             8/22/2024    10:48 AM   PHQ-9    Little interest or pleasure in doing things 0   Feeling down, depressed, or hopeless 0   PHQ-2 Score 0   PHQ-9 Total Score 0           Financial Resource Strain: High Risk (1/11/2024)    Overall Financial Resource Strain (CARDIA)     Difficulty of Paying Living Expenses: Very hard      Food Insecurity: Food Insecurity Present (1/11/2024)    Hunger Vital Sign     Worried About Running Out of Food in the Last Year: Often true     Ran Out of Food in the Last Year: Never true          Health Maintenance Due   Topic Date Due    COVID-19 Vaccine (1 - 2023-24 season) Never done    Flu vaccine (1) 08/01/2024       
Von Carpenter MD NPI: [id number] OLENA: [id number] Max Daily Amount: 600 mg    Cholecalciferol 50 MCG (2000 UT) CAPS TAKE 1 CAPSULE EVERY DAY    Cyanocobalamin ER 2000 MCG TBCR Take by mouth daily     No current facility-administered medications for this visit.     Social History     Tobacco Use    Smoking status: Never    Smokeless tobacco: Never   Vaping Use    Vaping status: Never Used   Substance Use Topics    Alcohol use: Not Currently     Alcohol/week: 0.0 standard drinks of alcohol    Drug use: No     Blood pressure 136/89, pulse 76, temperature 97.7 °F (36.5 °C), temperature source Temporal, resp. rate 16, height 1.727 m (5' 8\"), weight 94.3 kg (208 lb), SpO2 98%.    Review of Systems   Respiratory:  Negative for chest tightness and shortness of breath.    Cardiovascular:  Positive for leg swelling. Negative for chest pain.   Neurological:  Negative for dizziness and headaches.   All other systems reviewed and are negative.        Objective:   Physical Exam  Constitutional:       General: He is not in acute distress.  Cardiovascular:      Rate and Rhythm: Normal rate and regular rhythm.      Heart sounds: Normal heart sounds.   Pulmonary:      Effort: Pulmonary effort is normal.      Breath sounds: Normal breath sounds.   Musculoskeletal:      Cervical back: Neck supple.      Right lower leg: No edema (trace).      Left lower leg: No edema (trace).   Lymphadenopathy:      Cervical: No cervical adenopathy.   Skin:     General: Skin is warm and dry.   Neurological:      Mental Status: He is alert.      Coordination: Coordination normal.   Psychiatric:         Mood and Affect: Mood normal.           Assessment / Plan:      1. Hypokalemia  -     Basic Metabolic Panel; Future  Recheck lab.  Will discontinue potassium supplement if improved.    2. Essential hypertension  -     spironolactone (ALDACTONE) 25 MG tablet; Take 1 tablet by mouth daily, Disp-90 tablet, R-1Normal  Controlled. Continue current treatment.

## 2024-08-23 LAB
ANION GAP SERPL CALC-SCNC: 5 MMOL/L (ref 5–15)
BUN SERPL-MCNC: 11 MG/DL (ref 6–20)
BUN/CREAT SERPL: 11 (ref 12–20)
CALCIUM SERPL-MCNC: 9.7 MG/DL (ref 8.5–10.1)
CHLORIDE SERPL-SCNC: 109 MMOL/L (ref 97–108)
CO2 SERPL-SCNC: 27 MMOL/L (ref 21–32)
CREAT SERPL-MCNC: 0.98 MG/DL (ref 0.7–1.3)
GLUCOSE SERPL-MCNC: 99 MG/DL (ref 65–100)
POTASSIUM SERPL-SCNC: 4.2 MMOL/L (ref 3.5–5.1)
SODIUM SERPL-SCNC: 141 MMOL/L (ref 136–145)

## 2024-08-26 ENCOUNTER — TELEPHONE (OUTPATIENT)
Age: 69
End: 2024-08-26

## 2024-08-26 NOTE — TELEPHONE ENCOUNTER
Called the patient. Name and  verified.  The patient needs a referral for colonoscopy. Informed him that PCP is out of the office . She will put the order when she returns back. Patient show appreciation.

## 2024-08-26 NOTE — TELEPHONE ENCOUNTER
Patient called and asked can MICHAEL Ricketts send him to a doctor to do a colonoscopy for him.      Call back at [phone]

## 2024-08-29 DIAGNOSIS — Z12.11 SCREENING FOR COLON CANCER: Primary | ICD-10-CM

## 2024-09-11 RX ORDER — MONTELUKAST SODIUM 10 MG/1
10 TABLET ORAL DAILY
Qty: 90 TABLET | Refills: 1 | Status: SHIPPED | OUTPATIENT
Start: 2024-09-11

## 2024-09-16 ENCOUNTER — OFFICE VISIT (OUTPATIENT)
Age: 69
End: 2024-09-16
Payer: MEDICARE

## 2024-09-16 VITALS
HEART RATE: 58 BPM | TEMPERATURE: 96.9 F | OXYGEN SATURATION: 97 % | HEIGHT: 68 IN | SYSTOLIC BLOOD PRESSURE: 136 MMHG | RESPIRATION RATE: 16 BRPM | BODY MASS INDEX: 31.07 KG/M2 | DIASTOLIC BLOOD PRESSURE: 86 MMHG | WEIGHT: 205 LBS

## 2024-09-16 DIAGNOSIS — Z23 NEEDS FLU SHOT: ICD-10-CM

## 2024-09-16 DIAGNOSIS — E87.6 HYPOKALEMIA: ICD-10-CM

## 2024-09-16 DIAGNOSIS — I10 ESSENTIAL HYPERTENSION: Primary | ICD-10-CM

## 2024-09-16 DIAGNOSIS — R60.0 LOWER EXTREMITY EDEMA: ICD-10-CM

## 2024-09-16 LAB
ANION GAP SERPL CALC-SCNC: 4 MMOL/L (ref 2–12)
BUN SERPL-MCNC: 16 MG/DL (ref 6–20)
BUN/CREAT SERPL: 16 (ref 12–20)
CALCIUM SERPL-MCNC: 10 MG/DL (ref 8.5–10.1)
CHLORIDE SERPL-SCNC: 109 MMOL/L (ref 97–108)
CO2 SERPL-SCNC: 27 MMOL/L (ref 21–32)
CREAT SERPL-MCNC: 1.01 MG/DL (ref 0.7–1.3)
GLUCOSE SERPL-MCNC: 109 MG/DL (ref 65–100)
POTASSIUM SERPL-SCNC: 4.2 MMOL/L (ref 3.5–5.1)
SODIUM SERPL-SCNC: 140 MMOL/L (ref 136–145)

## 2024-09-16 PROCEDURE — 3074F SYST BP LT 130 MM HG: CPT | Performed by: NURSE PRACTITIONER

## 2024-09-16 PROCEDURE — 99214 OFFICE O/P EST MOD 30 MIN: CPT | Performed by: NURSE PRACTITIONER

## 2024-09-16 PROCEDURE — G8427 DOCREV CUR MEDS BY ELIG CLIN: HCPCS | Performed by: NURSE PRACTITIONER

## 2024-09-16 PROCEDURE — PBSHW PBB SHADOW CHARGE: Performed by: NURSE PRACTITIONER

## 2024-09-16 PROCEDURE — 1123F ACP DISCUSS/DSCN MKR DOCD: CPT | Performed by: NURSE PRACTITIONER

## 2024-09-16 PROCEDURE — 3017F COLORECTAL CA SCREEN DOC REV: CPT | Performed by: NURSE PRACTITIONER

## 2024-09-16 PROCEDURE — 1036F TOBACCO NON-USER: CPT | Performed by: NURSE PRACTITIONER

## 2024-09-16 PROCEDURE — G8417 CALC BMI ABV UP PARAM F/U: HCPCS | Performed by: NURSE PRACTITIONER

## 2024-09-16 PROCEDURE — PBSHW INFLUENZA, FLUAD TRIVALENT, (AGE 65 Y+), IM, PRESERVATIVE FREE, 0.5ML: Performed by: NURSE PRACTITIONER

## 2024-09-16 PROCEDURE — 90653 IIV ADJUVANT VACCINE IM: CPT | Performed by: NURSE PRACTITIONER

## 2024-09-16 PROCEDURE — 3078F DIAST BP <80 MM HG: CPT | Performed by: NURSE PRACTITIONER

## 2024-09-16 ASSESSMENT — PATIENT HEALTH QUESTIONNAIRE - PHQ9
SUM OF ALL RESPONSES TO PHQ QUESTIONS 1-9: 0
2. FEELING DOWN, DEPRESSED OR HOPELESS: NOT AT ALL
SUM OF ALL RESPONSES TO PHQ QUESTIONS 1-9: 0
SUM OF ALL RESPONSES TO PHQ QUESTIONS 1-9: 0
SUM OF ALL RESPONSES TO PHQ9 QUESTIONS 1 & 2: 0
1. LITTLE INTEREST OR PLEASURE IN DOING THINGS: NOT AT ALL
SUM OF ALL RESPONSES TO PHQ QUESTIONS 1-9: 0

## 2024-09-16 ASSESSMENT — ENCOUNTER SYMPTOMS
SHORTNESS OF BREATH: 0
CHEST TIGHTNESS: 0

## 2024-10-16 RX ORDER — MELOXICAM 15 MG/1
TABLET ORAL
Qty: 90 TABLET | Refills: 0 | Status: SHIPPED | OUTPATIENT
Start: 2024-10-16

## 2024-10-16 NOTE — TELEPHONE ENCOUNTER
PCP: Eileen Ricketts APRN - NP    Last Appt:9/16/2024    No future appointments.    Requested Prescriptions     Pending Prescriptions Disp Refills    meloxicam (MOBIC) 15 MG tablet [Pharmacy Med Name: Meloxicam Oral Tablet 15 MG] 90 tablet 3     Sig: TAKE 1 TABLET EVERY DAY FOR BACK PAIN AS NEEDED       Prior labs and Blood pressures:  BP Readings from Last 3 Encounters:   09/16/24 136/86   08/22/24 136/89   07/15/24 128/80     Lab Results   Component Value Date/Time     09/16/2024 10:55 AM    K 4.2 09/16/2024 10:55 AM     09/16/2024 10:55 AM    CO2 27 09/16/2024 10:55 AM    BUN 16 09/16/2024 10:55 AM    GFRAA >60 07/07/2022 02:12 PM     No results found for: \"HBA1C\", \"[id number]\"  Lab Results   Component Value Date/Time    CHOL 140 05/13/2024 09:11 AM    HDL 53 05/13/2024 09:11 AM    LDL 65.4 05/13/2024 09:11 AM    LDL 48.6 07/20/2023 03:49 PM    VLDL 21.6 05/13/2024 09:11 AM     No results found for: \"VITD3\"    Lab Results   Component Value Date/Time    TSH 1.030 02/12/2020 02:12 PM

## 2024-10-24 ENCOUNTER — TELEPHONE (OUTPATIENT)
Age: 69
End: 2024-10-24

## 2024-10-24 NOTE — TELEPHONE ENCOUNTER
----- Message from Steve JOHNSON sent at 10/23/2024 11:05 AM EDT -----  Regarding: ECC Message to Provider  ECC Message to Provider    Relationship to Patient: Self     Additional Information Patient want his provider Dr. Ricketts, GEMINI Barros - NP to fill ou the for for handicap pay permanent.   --------------------------------------------------------------------------------------------------------------------------    Call Back Information: OK to leave message on voicemail  Preferred Call Back Number: Phone [phone] (home)

## 2024-10-25 NOTE — TELEPHONE ENCOUNTER
Attempted to contact the pt,voicemail not set-up at this time.Per MICHAEL Ricketts pt will need an In office Appt with her to get his DMV Form's completed.

## 2024-11-11 ENCOUNTER — OFFICE VISIT (OUTPATIENT)
Age: 69
End: 2024-11-11
Payer: MEDICARE

## 2024-11-11 VITALS
HEART RATE: 71 BPM | WEIGHT: 213 LBS | RESPIRATION RATE: 14 BRPM | SYSTOLIC BLOOD PRESSURE: 118 MMHG | TEMPERATURE: 97.1 F | HEIGHT: 68 IN | DIASTOLIC BLOOD PRESSURE: 82 MMHG | BODY MASS INDEX: 32.28 KG/M2 | OXYGEN SATURATION: 98 %

## 2024-11-11 DIAGNOSIS — M48.062 SPINAL STENOSIS OF LUMBAR REGION WITH NEUROGENIC CLAUDICATION: Primary | ICD-10-CM

## 2024-11-11 DIAGNOSIS — Z02.89 ENCOUNTER FOR COMPLETION OF FORM WITH PATIENT: ICD-10-CM

## 2024-11-11 PROCEDURE — 99214 OFFICE O/P EST MOD 30 MIN: CPT | Performed by: NURSE PRACTITIONER

## 2024-11-11 ASSESSMENT — ENCOUNTER SYMPTOMS
BACK PAIN: 1
RESPIRATORY NEGATIVE: 1

## 2024-11-11 ASSESSMENT — PATIENT HEALTH QUESTIONNAIRE - PHQ9
SUM OF ALL RESPONSES TO PHQ QUESTIONS 1-9: 0
1. LITTLE INTEREST OR PLEASURE IN DOING THINGS: NOT AT ALL
SUM OF ALL RESPONSES TO PHQ QUESTIONS 1-9: 0
2. FEELING DOWN, DEPRESSED OR HOPELESS: NOT AT ALL
SUM OF ALL RESPONSES TO PHQ QUESTIONS 1-9: 0
SUM OF ALL RESPONSES TO PHQ9 QUESTIONS 1 & 2: 0
SUM OF ALL RESPONSES TO PHQ QUESTIONS 1-9: 0

## 2024-11-11 NOTE — PROGRESS NOTES
Chief Complaint   Patient presents with    Other     DMV form     \"Have you been to the ER, urgent care clinic since your last visit?  Hospitalized since your last visit?\"    NO    “Have you seen or consulted any other health care providers outside of [facility] since your last visit?”    NO            Click Here for Release of Records Request             11/11/2024    11:20 AM   PHQ-9    Little interest or pleasure in doing things 0   Feeling down, depressed, or hopeless 0   PHQ-2 Score 0   PHQ-9 Total Score 0           Financial Resource Strain: High Risk (1/11/2024)    Overall Financial Resource Strain (CARDIA)     Difficulty of Paying Living Expenses: Very hard      Food Insecurity: Food Insecurity Present (1/11/2024)    Hunger Vital Sign     Worried About Running Out of Food in the Last Year: Often true     Ran Out of Food in the Last Year: Never true          Health Maintenance Due   Topic Date Due    COVID-19 Vaccine (1 - 2023-24 season) Never done       
completed and given to patient.    Follow up as scheduled with pain management.

## 2024-11-18 ENCOUNTER — ANESTHESIA EVENT (OUTPATIENT)
Facility: HOSPITAL | Age: 69
End: 2024-11-18
Payer: MEDICARE

## 2024-11-18 ENCOUNTER — ANESTHESIA (OUTPATIENT)
Facility: HOSPITAL | Age: 69
End: 2024-11-18
Payer: MEDICARE

## 2024-11-18 ENCOUNTER — HOSPITAL ENCOUNTER (OUTPATIENT)
Facility: HOSPITAL | Age: 69
Setting detail: OUTPATIENT SURGERY
Discharge: HOME | End: 2024-11-18
Attending: INTERNAL MEDICINE | Admitting: INTERNAL MEDICINE
Payer: MEDICARE

## 2024-11-18 VITALS
SYSTOLIC BLOOD PRESSURE: 120 MMHG | RESPIRATION RATE: 19 BRPM | HEART RATE: 71 BPM | DIASTOLIC BLOOD PRESSURE: 80 MMHG | TEMPERATURE: 97.5 F | OXYGEN SATURATION: 97 %

## 2024-11-18 PROCEDURE — 7100000010 HC PHASE II RECOVERY - FIRST 15 MIN: Performed by: INTERNAL MEDICINE

## 2024-11-18 PROCEDURE — 3600007502: Performed by: INTERNAL MEDICINE

## 2024-11-18 PROCEDURE — 6360000002 HC RX W HCPCS

## 2024-11-18 PROCEDURE — 7100000011 HC PHASE II RECOVERY - ADDTL 15 MIN: Performed by: INTERNAL MEDICINE

## 2024-11-18 PROCEDURE — 2709999900 HC NON-CHARGEABLE SUPPLY: Performed by: INTERNAL MEDICINE

## 2024-11-18 PROCEDURE — 3700000000 HC ANESTHESIA ATTENDED CARE: Performed by: INTERNAL MEDICINE

## 2024-11-18 RX ORDER — SODIUM CHLORIDE 0.9 % (FLUSH) 0.9 %
5-40 SYRINGE (ML) INJECTION EVERY 12 HOURS SCHEDULED
Status: DISCONTINUED | OUTPATIENT
Start: 2024-11-18 | End: 2024-11-18 | Stop reason: HOSPADM

## 2024-11-18 RX ORDER — SODIUM CHLORIDE 9 MG/ML
INJECTION, SOLUTION INTRAVENOUS PRN
Status: DISCONTINUED | OUTPATIENT
Start: 2024-11-18 | End: 2024-11-18 | Stop reason: HOSPADM

## 2024-11-18 RX ORDER — SODIUM CHLORIDE 0.9 % (FLUSH) 0.9 %
5-40 SYRINGE (ML) INJECTION PRN
Status: DISCONTINUED | OUTPATIENT
Start: 2024-11-18 | End: 2024-11-18 | Stop reason: HOSPADM

## 2024-11-18 RX ORDER — SODIUM CHLORIDE 9 MG/ML
INJECTION, SOLUTION INTRAVENOUS CONTINUOUS
Status: DISCONTINUED | OUTPATIENT
Start: 2024-11-18 | End: 2024-11-18 | Stop reason: HOSPADM

## 2024-11-18 RX ADMIN — PROPOFOL 50 MG: 10 INJECTION, EMULSION INTRAVENOUS at 10:05

## 2024-11-18 RX ADMIN — PROPOFOL 50 MG: 10 INJECTION, EMULSION INTRAVENOUS at 10:10

## 2024-11-18 RX ADMIN — PROPOFOL 50 MG: 10 INJECTION, EMULSION INTRAVENOUS at 10:06

## 2024-11-18 RX ADMIN — PROPOFOL 50 MG: 10 INJECTION, EMULSION INTRAVENOUS at 10:08

## 2024-11-18 RX ADMIN — PROPOFOL 50 MG: 10 INJECTION, EMULSION INTRAVENOUS at 10:12

## 2024-11-18 NOTE — ANESTHESIA POSTPROCEDURE EVALUATION
Department of Anesthesiology  Postprocedure Note    Patient: Sarabjit Roberts  MRN: [id number]  YOB: 1955  Date of evaluation: 11/18/2024    Procedure Summary       Date: 11/18/24 Room / Location: [facility] ENDO 02 / [facility] ENDOSCOPY    Anesthesia Start: 1003 Anesthesia Stop: 1019    Procedure: COLONOSCOPY (Lower GI Region) Diagnosis:       Hx of colonic polyps      (Hx of colonic polyps [Z86.0100])    Surgeons: Adria Gutierrez MD Responsible Provider: Vicente Fowler Jr., MD    Anesthesia Type: MAC ASA Status: 3            Anesthesia Type: MAC    Demetrio Phase I: Demetrio Score: 10    Demetrio Phase II:      Anesthesia Post Evaluation    Patient location during evaluation: PACU  Patient participation: complete - patient participated  Level of consciousness: awake  Airway patency: patent  Nausea & Vomiting: no nausea  Cardiovascular status: blood pressure returned to baseline and hemodynamically stable  Respiratory status: acceptable  Hydration status: stable    No notable events documented.

## 2024-11-18 NOTE — ANESTHESIA PRE PROCEDURE
hernia 07/11/2016   • Sarcoidosis of lung (HCC)     Dr. Dent pulmonary   • Sleep apnea     does not have cpap machine   • Vegetarian        Past Surgical History:        Procedure Laterality Date   • CHOLECYSTECTOMY  2010   • COLONOSCOPY N/A 5/3/2017    COLONOSCOPY performed by Adria Gutierrez MD at [facility] ENDOSCOPY   • COLONOSCOPY Left 10/14/2020    COLONOSCOPY :- performed by Adria Gutierrez MD at [facility] ENDOSCOPY   • COLONOSCOPY  2012, 2017    adenomatous polyp   • HERNIA REPAIR Right 02/2017   • HERNIA REPAIR Right 7/29/16    inguinal w/mesh by Dr. Crocker   • ORTHOPEDIC SURGERY      Back surgery   • OTHER SURGICAL HISTORY  2009    FATTY TUMOR REMOVED FROM BACK   • OTHER SURGICAL HISTORY  2011    prostate biopsy - normal/second bx 2/2017 - \"tip had cancer\" - another bx is planned   • PROSTATECTOMY  10/22/2019   • TONSILLECTOMY         Social History:    Social History     Tobacco Use   • Smoking status: Never   • Smokeless tobacco: Never   Substance Use Topics   • Alcohol use: Not Currently     Alcohol/week: 0.0 standard drinks of alcohol                                Counseling given: Not Answered      Vital Signs (Current):   Vitals:    11/18/24 0906   BP: 123/81   Pulse: 74   Resp: 18   Temp: 97.1 °F (36.2 °C)   TempSrc: Temporal   SpO2: 97%                                              BP Readings from Last 3 Encounters:   11/18/24 123/81   11/11/24 118/82   09/16/24 136/86       NPO Status:                                                   Date of last liquid consumption: 11/17/24                        Date of last solid food consumption: 11/16/24    BMI:   Wt Readings from Last 3 Encounters:   11/11/24 96.6 kg (213 lb)   09/16/24 93 kg (205 lb)   08/22/24 94.3 kg (208 lb)     There is no height or weight on file to calculate BMI.    CBC:   Lab Results   Component Value Date/Time    WBC 6.9 07/20/2023 03:49 PM    RBC 4.39 07/20/2023 03:49 PM    HGB 13.4 07/20/2023 03:49 PM    HCT 40.9 07/20/2023 03:49 PM

## 2024-11-18 NOTE — H&P
[facility] [address] [address]      History and Physical       NAME:  Sarabjit Roberts   :   1955   MRN:   [id number]             History of Present Illness:  Patient is a 69 y.o. who is seen for h/o colon polyps .     PMH:  Past Medical History:   Diagnosis Date    Adenomatous polyp of colon     Angina pectoris (HCC)     Bleeding ulcer     BPH (benign prostatic hyperplasia) 2014    BPH (benign prostatic hyperplasia)     Cancer (HCC)     PROSTATE     Colon polyps 2017    Hypertension     Kidney infection     pt states he is unaware of having had this    Primary prostate adenocarcinoma (HCC) 10/04/2018    Right inguinal hernia 2016    Right inguinal hernia 2016    Sarcoidosis of lung (HCC)     Dr. Dent pulmonary    Sleep apnea     does not have cpap machine    Vegetarian        PSH:  Past Surgical History:   Procedure Laterality Date    CHOLECYSTECTOMY      COLONOSCOPY N/A 5/3/2017    COLONOSCOPY performed by Adria Gutierrez MD at [facility] ENDOSCOPY    COLONOSCOPY Left 10/14/2020    COLONOSCOPY :- performed by Adria Gutierrez MD at [facility] ENDOSCOPY    COLONOSCOPY  ,     adenomatous polyp    HERNIA REPAIR Right 2017    HERNIA REPAIR Right 16    inguinal w/mesh by Dr. Crocker    ORTHOPEDIC SURGERY      Back surgery    OTHER SURGICAL HISTORY      FATTY TUMOR REMOVED FROM BACK    OTHER SURGICAL HISTORY      prostate biopsy - normal/second bx 2017 - \"tip had cancer\" - another bx is planned    PROSTATECTOMY  10/22/2019    TONSILLECTOMY         Allergies:  Allergies   Allergen Reactions    Hydrochlorothiazide Other (See Comments)     Hypokalemia         Home Medications:  Prior to Admission Medications   Prescriptions Last Dose Informant Patient Reported? Taking?   Cholecalciferol 50 MCG ( UT) CAPS 2024  Yes Yes   Sig: TAKE 1 CAPSULE EVERY DAY   Cyanocobalamin ER 2000 MCG TBCR 2024  Yes Yes   Sig:

## 2024-11-18 NOTE — DISCHARGE INSTRUCTIONS
coming. And wear a face mask, if you have one.  If you have a face mask, wear it whenever you're around other people. It can help stop the spread of the virus when you cough or sneeze.  Clean and disinfect your home every day. Use household  and disinfectant wipes or sprays. Take special care to clean things that you grab with your hands. These include doorknobs, remote controls, phones, and handles on your refrigerator and microwave. And don't forget countertops, tabletops, bathrooms, and computer keyboards.  When to call for help  Call 911 anytime you think you may need emergency care. For example, call if:  You have severe trouble breathing. (You can't talk at all.)  You have constant chest pain or pressure.  You are severely dizzy or lightheaded.  You are confused or can't think clearly.  Your face and lips have a blue color.  You pass out (lose consciousness) or are very hard to wake up.  Call your doctor now if you develop symptoms such as:  Shortness of breath.  Fever.  Cough.  If you need to get care, call ahead to the doctor's office for instructions before you go. Make sure you wear a face mask, if you have one, to prevent exposing other people to the virus.  Where can you get the latest information?  The following health organizations are tracking and studying this virus. Their websites contain the most up-to-date information. You'll also learn what to do if you think you may have been exposed to the virus.  U.S. Centers for Disease Control and Prevention (CDC): The CDC provides updated news about the disease and travel advice. The website also tells you how to prevent the spread of infection. www.cdc.gov  World Health Organization (WHO): WHO offers information about the virus outbreaks. WHO also has travel advice. www.who.int  Current as of: April 1, 2020               Content Version: 12.4  © 2598-8695 Healthwise, Incorporated.   Care instructions adapted under license by your healthcare

## 2024-11-18 NOTE — OP NOTE
[address] [address]      Colonoscopy Operative Report    Sarabjit Roberts  [id number]  1955      Procedure Type:   Colonoscopy --diagnostic     Indications:    Personal history of colon polyps (screening only)       Pre-operative Diagnosis: see indication above    Post-operative Diagnosis:  See findings below    :  Adria Gutierrez MD    Surgical Assistant: Circulator: Flavia Merino RN  Endoscopy Technician: Josué Gross    Implants:  None    Referring Provider: Eileen Ricketts APRN - NP      Sedation:  MAC anesthesia Propofol      Procedure Details:  After informed consent was obtained with all risks and benefits of procedure explained and preoperative exam completed, the patient was taken to the endoscopy suite and placed in the left lateral decubitus position.  Upon sequential sedation as per above, a digital rectal exam was performed demonstrating internal hemorrhoids.  The Olympus videocolonoscope  was inserted in the rectum and carefully advanced to the cecum, which was identified by the ileocecal valve and appendiceal orifice.  The cecum was identified by the ileocecal valve and appendiceal orifice.  The quality of preparation was good.  The colonoscope was slowly withdrawn with careful evaluation between folds. Retroflexion in the rectum was completed .     Findings:   Rectum: normal  Small internal hemorrhoids    Sigmoid: normal  Descending Colon: normal  Transverse Colon: normal  Ascending Colon: normal  Cecum: normal      Specimen Removed:  none    Complications: None.     EBL:  None.    Impression:     see findings     Recommendations: --Repeat colonoscopy in 5 years.      Dicussed all that with him in recovery room       Signed By: Adria Gutierrez MD     11/18/2024  10:20 AM

## 2024-11-26 DIAGNOSIS — I10 ESSENTIAL HYPERTENSION: ICD-10-CM

## 2024-11-26 NOTE — TELEPHONE ENCOUNTER
Called the patient. Name and  verified.  He needs refill on some of his medications and to be send to [facility] pharmacy mail delivery.

## 2024-11-26 NOTE — TELEPHONE ENCOUNTER
Patient called stating that he is needing all of his medication refilled, and sent to the [facility] pharmacy. Patient is needing all 12 of them refilled at the [facility]. Patient is needing an immediate refill on the Losartan medication, as he only has two pills left. Patient is hoping to have the losartan refilled today if possible.    Best call back number  [phone]

## 2024-11-27 RX ORDER — ASPIRIN 81 MG/1
TABLET, CHEWABLE ORAL
Qty: 90 TABLET | Refills: 1 | Status: SHIPPED | OUTPATIENT
Start: 2024-11-27

## 2024-11-27 RX ORDER — LOSARTAN POTASSIUM 100 MG/1
100 TABLET ORAL DAILY
Qty: 90 TABLET | Refills: 1 | Status: SHIPPED | OUTPATIENT
Start: 2024-11-27

## 2024-11-27 RX ORDER — CARVEDILOL 25 MG/1
25 TABLET ORAL 2 TIMES DAILY
Qty: 180 TABLET | Refills: 1 | Status: SHIPPED | OUTPATIENT
Start: 2024-11-27

## 2024-11-27 RX ORDER — VALACYCLOVIR HYDROCHLORIDE 500 MG/1
TABLET, FILM COATED ORAL
Qty: 90 TABLET | Refills: 1 | Status: SHIPPED | OUTPATIENT
Start: 2024-11-27

## 2024-11-27 RX ORDER — MELOXICAM 15 MG/1
TABLET ORAL
Qty: 90 TABLET | Refills: 0 | Status: SHIPPED | OUTPATIENT
Start: 2024-11-27

## 2024-11-27 RX ORDER — FEXOFENADINE HCL 180 MG/1
180 TABLET ORAL DAILY
Qty: 90 TABLET | Refills: 1 | Status: SHIPPED | OUTPATIENT
Start: 2024-11-27

## 2024-11-27 RX ORDER — CYCLOBENZAPRINE HCL 5 MG
TABLET ORAL
Qty: 90 TABLET | Refills: 0 | Status: SHIPPED | OUTPATIENT
Start: 2024-11-27

## 2024-12-30 DIAGNOSIS — I10 ESSENTIAL HYPERTENSION: ICD-10-CM

## 2024-12-30 DIAGNOSIS — E78.2 MIXED HYPERLIPIDEMIA: ICD-10-CM

## 2024-12-30 RX ORDER — ATORVASTATIN CALCIUM 20 MG/1
20 TABLET, FILM COATED ORAL DAILY
Qty: 90 TABLET | Refills: 1 | Status: SHIPPED | OUTPATIENT
Start: 2024-12-30

## 2024-12-30 RX ORDER — AMLODIPINE BESYLATE 10 MG/1
10 TABLET ORAL DAILY
Qty: 90 TABLET | Refills: 1 | Status: SHIPPED | OUTPATIENT
Start: 2024-12-30

## 2025-01-20 RX ORDER — CYCLOBENZAPRINE HCL 5 MG
TABLET ORAL
Qty: 90 TABLET | Refills: 0 | Status: SHIPPED | OUTPATIENT
Start: 2025-01-20

## 2025-02-06 ENCOUNTER — OFFICE VISIT (OUTPATIENT)
Age: 70
End: 2025-02-06
Payer: MEDICARE

## 2025-02-06 VITALS
SYSTOLIC BLOOD PRESSURE: 113 MMHG | RESPIRATION RATE: 16 BRPM | OXYGEN SATURATION: 97 % | HEART RATE: 108 BPM | BODY MASS INDEX: 31.71 KG/M2 | WEIGHT: 209.2 LBS | HEIGHT: 68 IN | TEMPERATURE: 98.8 F | DIASTOLIC BLOOD PRESSURE: 74 MMHG

## 2025-02-06 DIAGNOSIS — I10 ESSENTIAL HYPERTENSION: ICD-10-CM

## 2025-02-06 DIAGNOSIS — K21.00 GASTROESOPHAGEAL REFLUX DISEASE WITH ESOPHAGITIS WITHOUT HEMORRHAGE: ICD-10-CM

## 2025-02-06 DIAGNOSIS — Z00.00 MEDICARE ANNUAL WELLNESS VISIT, SUBSEQUENT: Primary | ICD-10-CM

## 2025-02-06 DIAGNOSIS — E78.2 MIXED HYPERLIPIDEMIA: ICD-10-CM

## 2025-02-06 DIAGNOSIS — R60.0 LOWER EXTREMITY EDEMA: ICD-10-CM

## 2025-02-06 DIAGNOSIS — M48.062 SPINAL STENOSIS OF LUMBAR REGION WITH NEUROGENIC CLAUDICATION: ICD-10-CM

## 2025-02-06 DIAGNOSIS — R73.01 ELEVATED FASTING BLOOD SUGAR: ICD-10-CM

## 2025-02-06 PROCEDURE — 99214 OFFICE O/P EST MOD 30 MIN: CPT | Performed by: NURSE PRACTITIONER

## 2025-02-06 RX ORDER — VITAMIN E 268 MG
400 CAPSULE ORAL DAILY
COMMUNITY

## 2025-02-06 RX ORDER — ALBUTEROL SULFATE 90 UG/1
INHALANT RESPIRATORY (INHALATION)
COMMUNITY
Start: 2024-12-03

## 2025-02-06 RX ORDER — LOSARTAN POTASSIUM AND HYDROCHLOROTHIAZIDE 25; 100 MG/1; MG/1
1 TABLET ORAL DAILY
COMMUNITY
End: 2025-02-06 | Stop reason: ALTCHOICE

## 2025-02-06 RX ORDER — BUDESONIDE AND FORMOTEROL FUMARATE DIHYDRATE 160; 4.5 UG/1; UG/1
2 AEROSOL RESPIRATORY (INHALATION)
COMMUNITY

## 2025-02-06 RX ORDER — TIOTROPIUM BROMIDE INHALATION SPRAY 3.12 UG/1
SPRAY, METERED RESPIRATORY (INHALATION)
COMMUNITY

## 2025-02-06 RX ORDER — PREGABALIN 50 MG/1
CAPSULE ORAL
COMMUNITY
Start: 2024-11-01

## 2025-02-06 SDOH — ECONOMIC STABILITY: FOOD INSECURITY: WITHIN THE PAST 12 MONTHS, YOU WORRIED THAT YOUR FOOD WOULD RUN OUT BEFORE YOU GOT MONEY TO BUY MORE.: SOMETIMES TRUE

## 2025-02-06 SDOH — ECONOMIC STABILITY: FOOD INSECURITY: WITHIN THE PAST 12 MONTHS, THE FOOD YOU BOUGHT JUST DIDN'T LAST AND YOU DIDN'T HAVE MONEY TO GET MORE.: SOMETIMES TRUE

## 2025-02-06 ASSESSMENT — PATIENT HEALTH QUESTIONNAIRE - PHQ9
SUM OF ALL RESPONSES TO PHQ QUESTIONS 1-9: 2
1. LITTLE INTEREST OR PLEASURE IN DOING THINGS: NOT AT ALL
2. FEELING DOWN, DEPRESSED OR HOPELESS: MORE THAN HALF THE DAYS
SUM OF ALL RESPONSES TO PHQ QUESTIONS 1-9: 2
SUM OF ALL RESPONSES TO PHQ9 QUESTIONS 1 & 2: 2

## 2025-02-06 NOTE — PROGRESS NOTES
Chief Complaint   Patient presents with    Medicare AWV    Other     Complaints of the inside of nostril is not healing      \"Have you been to the ER, urgent care clinic since your last visit?  Hospitalized since your last visit?\"    NO    “Have you seen or consulted any other health care providers outside of [facility] since your last visit?”    NO

## 2025-02-06 NOTE — PROGRESS NOTES
Medicare Annual Wellness Visit    Sarabjit Roberts is here for Medicare AWV and Other (Complaints of the inside of nostril is not healing )    Assessment & Plan   Medicare annual wellness visit, subsequent  Essential hypertension  Controlled.  Continue current treatment.  -     Comprehensive Metabolic Panel; Future  Mixed hyperlipidemia  At goal with last FLP.  Recheck fasting labs.  Continue statin.  -     Comprehensive Metabolic Panel; Future  -     Lipid Panel; Future  Lower extremity edema  Clinically resolved.  Continue spironolactone.  Elevated fasting blood sugar  -     Hemoglobin A1C; Future  Spinal stenosis of lumbar region with neurogenic claudication  Symptoms improved.  Continue as needed meloxicam and Flexeril.  Follow-up with orthopedic as scheduled.       Follow up 6 months.       Subjective   AWV, follow up chronic conditions.    HTN.  Taking prescribed meds.   BLE edema.  Symptoms stable on aldactone.    Mixed hyperlipidemia.  Taking prescribed atorvastatin.    GERD.  Taking omeprazole with good symptom control   Lumbar DDD.  Completed PT, followed by ortho.  Reports symptoms have improved.  Using flexeril prn and meloxicam prn. Stopped gabapentin.    GERD.  Taking omeprazole with good symptom control.    Patient's complete Health Risk Assessment and screening values have been reviewed and are found in Flowsheets. The following problems were reviewed today and where indicated follow up appointments were made and/or referrals ordered.    Positive Risk Factor Screenings with Interventions:     Cognitive:   Clock Drawing Test (CDT): (!) Abnormal  Words recalled: 1 Word Recalled  Total Score: (!) 1  Total Score Interpretation: Abnormal Mini-Cog    Interventions:  Patient declines any further evaluation or treatment           General HRA Questions:  Select all that apply: (!) Loneliness    Interventions - Loneliness:  Lives by himself.  States becomes lonely sometimes.  Declines any intervention.

## 2025-02-06 NOTE — PATIENT INSTRUCTIONS
have to make tough decisions by themselves.  For more information, including forms for your state, see the [employer] website (www.caringinfo.org/planning/advance-directives/).  Follow-up care is a key part of your treatment and safety. Be sure to make and go to all appointments, and call your doctor if you are having problems. It's also a good idea to know your test results and keep a list of the medicines you take.  What should you include in an advance directive?  Many states have a unique advance directive form. (It may ask you to address specific issues.) Or you might use a universal form that's approved by many states.  If your form doesn't tell you what to address, it may be hard to know what to include in your advance directive. Use the questions below to help you get started.  Who do you want to make decisions about your medical care if you are not able to?  What life-support measures do you want if you have a serious illness that gets worse over time or can't be cured?  What are you most afraid of that might happen? (Maybe you're afraid of having pain, losing your independence, or being kept alive by machines.)  Where would you prefer to die? (Your home? A hospital? A nursing home?)  Do you want to donate your organs when you die?  Do you want certain Orthodox practices performed before you die?  When should you call for help?  Be sure to contact your doctor if you have any questions.  Where can you learn more?  Go to https://www.[employer].net/patientEd and enter R264 to learn more about \"Advance Directives: Care Instructions.\"  Current as of: November 16, 2023  Content Version: 14.3  © 2024 [employer].   Care instructions adapted under license by [employer]). If you have questions about a medical condition or this instruction, always ask your healthcare professional. [employer] disclaims any warranty or liability for your use of this information.         A Healthy Heart: Care

## 2025-02-07 LAB
ALBUMIN SERPL-MCNC: 4.1 G/DL (ref 3.5–5)
ALBUMIN/GLOB SERPL: 1.2 (ref 1.1–2.2)
ALP SERPL-CCNC: 91 U/L (ref 45–117)
ALT SERPL-CCNC: 22 U/L (ref 12–78)
ANION GAP SERPL CALC-SCNC: 8 MMOL/L (ref 2–12)
AST SERPL-CCNC: 17 U/L (ref 15–37)
BILIRUB SERPL-MCNC: 1.4 MG/DL (ref 0.2–1)
BUN SERPL-MCNC: 21 MG/DL (ref 6–20)
BUN/CREAT SERPL: 17 (ref 12–20)
CALCIUM SERPL-MCNC: 10.2 MG/DL (ref 8.5–10.1)
CHLORIDE SERPL-SCNC: 105 MMOL/L (ref 97–108)
CHOLEST SERPL-MCNC: 120 MG/DL
CO2 SERPL-SCNC: 25 MMOL/L (ref 21–32)
CREAT SERPL-MCNC: 1.23 MG/DL (ref 0.7–1.3)
EST. AVERAGE GLUCOSE BLD GHB EST-MCNC: 108 MG/DL
GLOBULIN SER CALC-MCNC: 3.5 G/DL (ref 2–4)
GLUCOSE SERPL-MCNC: 109 MG/DL (ref 65–100)
HBA1C MFR BLD: 5.4 % (ref 4–5.6)
HDLC SERPL-MCNC: 46 MG/DL
HDLC SERPL: 2.6 (ref 0–5)
LDLC SERPL CALC-MCNC: 62.2 MG/DL (ref 0–100)
POTASSIUM SERPL-SCNC: 4 MMOL/L (ref 3.5–5.1)
PROT SERPL-MCNC: 7.6 G/DL (ref 6.4–8.2)
SODIUM SERPL-SCNC: 138 MMOL/L (ref 136–145)
TRIGL SERPL-MCNC: 59 MG/DL
VLDLC SERPL CALC-MCNC: 11.8 MG/DL

## 2025-02-07 RX ORDER — MONTELUKAST SODIUM 10 MG/1
10 TABLET ORAL DAILY
Qty: 90 TABLET | Refills: 3 | OUTPATIENT
Start: 2025-02-07

## 2025-02-14 ENCOUNTER — TELEPHONE (OUTPATIENT)
Age: 70
End: 2025-02-14

## 2025-02-14 NOTE — TELEPHONE ENCOUNTER
Pt called and stated he need a refill on the 2.5 mg medication? Ask pt the name of med he didn't know just said he takes it twice a day once in morning and one at night. Pt said his next refill is 3/3/25 but only has two pills left.- [phone].-TM 2/14/25

## 2025-02-14 NOTE — TELEPHONE ENCOUNTER
Pt called asking for NP Notus for a refill on medication carvedilol 25mg till 3/3/25. Pt is asking for a call back with updates on medication.    BCBN [phone]

## 2025-02-17 DIAGNOSIS — I10 ESSENTIAL HYPERTENSION: ICD-10-CM

## 2025-02-17 RX ORDER — CARVEDILOL 25 MG/1
25 TABLET ORAL 2 TIMES DAILY
Qty: 180 TABLET | Refills: 1 | Status: SHIPPED | OUTPATIENT
Start: 2025-02-17 | End: 2025-02-17 | Stop reason: SDUPTHER

## 2025-02-17 RX ORDER — CARVEDILOL 25 MG/1
25 TABLET ORAL 2 TIMES DAILY
Qty: 180 TABLET | Refills: 1 | Status: SHIPPED | OUTPATIENT
Start: 2025-02-17

## 2025-02-18 DIAGNOSIS — I10 ESSENTIAL HYPERTENSION: ICD-10-CM

## 2025-02-18 DIAGNOSIS — R60.0 LOWER EXTREMITY EDEMA: ICD-10-CM

## 2025-02-18 RX ORDER — SPIRONOLACTONE 25 MG/1
25 TABLET ORAL DAILY
Qty: 90 TABLET | Refills: 1 | Status: SHIPPED | OUTPATIENT
Start: 2025-02-18

## 2025-02-21 DIAGNOSIS — R60.0 LOWER EXTREMITY EDEMA: ICD-10-CM

## 2025-02-21 DIAGNOSIS — I10 ESSENTIAL HYPERTENSION: ICD-10-CM

## 2025-02-24 RX ORDER — SPIRONOLACTONE 25 MG/1
25 TABLET ORAL DAILY
Qty: 90 TABLET | Refills: 3 | OUTPATIENT
Start: 2025-02-24

## 2025-03-05 ENCOUNTER — TELEPHONE (OUTPATIENT)
Age: 70
End: 2025-03-05

## 2025-03-05 NOTE — TELEPHONE ENCOUNTER
PCP: Eileen Ricketts APRN - NP    Last appt: 2/6/2025       No future appointments.    Requested Prescriptions     Pending Prescriptions Disp Refills    cyclobenzaprine (FLEXERIL) 5 MG tablet [Pharmacy Med Name: Cyclobenzaprine HCl Oral Tablet 5 MG] 90 tablet 0     Sig: TAKE 1 TO 2 TABLETS AT BEDTIME AS NEEDED FOR MUSCLE SPASM.       Prior labs and Blood pressures:  BP Readings from Last 3 Encounters:   02/06/25 113/74   11/18/24 120/80   11/11/24 118/82     Lab Results   Component Value Date/Time     02/06/2025 02:36 PM    K 4.0 02/06/2025 02:36 PM     02/06/2025 02:36 PM    CO2 25 02/06/2025 02:36 PM    BUN 21 02/06/2025 02:36 PM    GFRAA >60 07/07/2022 02:12 PM     No results found for: \"HBA1C\", \"[id number]\"  Lab Results   Component Value Date/Time    CHOL 120 02/06/2025 02:36 PM    HDL 46 02/06/2025 02:36 PM    LDL 62.2 02/06/2025 02:36 PM    LDL 48.6 07/20/2023 03:49 PM    VLDL 11.8 02/06/2025 02:36 PM     No results found for: \"VITD3\"    Lab Results   Component Value Date/Time    TSH 1.030 02/12/2020 02:12 PM

## 2025-03-10 RX ORDER — FEXOFENADINE HCL 180 MG/1
TABLET ORAL
Qty: 90 TABLET | Refills: 3 | OUTPATIENT
Start: 2025-03-10

## 2025-03-10 RX ORDER — CYCLOBENZAPRINE HCL 5 MG
TABLET ORAL
Qty: 90 TABLET | Refills: 0 | Status: SHIPPED | OUTPATIENT
Start: 2025-03-10

## 2025-03-10 NOTE — TELEPHONE ENCOUNTER
Pt called states that pharmacy has yet to received medication from pt. Pt is asking to speak nurse about what going on about medication cyclobenzaprine (FLEXERIL) 5 MG tablet .    BCBN [phone]

## 2025-03-11 DIAGNOSIS — I10 ESSENTIAL HYPERTENSION: ICD-10-CM

## 2025-03-11 DIAGNOSIS — R60.0 LOWER EXTREMITY EDEMA: ICD-10-CM

## 2025-03-12 ENCOUNTER — TELEPHONE (OUTPATIENT)
Age: 70
End: 2025-03-12

## 2025-03-12 RX ORDER — MELOXICAM 15 MG/1
TABLET ORAL
Qty: 90 TABLET | Refills: 0 | Status: SHIPPED | OUTPATIENT
Start: 2025-03-12

## 2025-03-12 RX ORDER — SPIRONOLACTONE 25 MG/1
25 TABLET ORAL DAILY
Qty: 90 TABLET | Refills: 1 | Status: SHIPPED | OUTPATIENT
Start: 2025-03-12

## 2025-03-12 NOTE — TELEPHONE ENCOUNTER
PCP: Eileen Ricketts APRN - NP    Last appt: 2/6/2025     No future appointments.    Requested Prescriptions     Pending Prescriptions Disp Refills    spironolactone (ALDACTONE) 25 MG tablet [Pharmacy Med Name: SPIRONOLACTONE  25MG TAB] 90 tablet 3     Sig: Take 1 tablet by mouth daily         Prior labs and Blood pressures:  BP Readings from Last 3 Encounters:   02/06/25 113/74   11/18/24 120/80   11/11/24 118/82     Lab Results   Component Value Date/Time     02/06/2025 02:36 PM    K 4.0 02/06/2025 02:36 PM     02/06/2025 02:36 PM    CO2 25 02/06/2025 02:36 PM    BUN 21 02/06/2025 02:36 PM    GFRAA >60 07/07/2022 02:12 PM     Lab Results   Component Value Date/Time    CHOL 120 02/06/2025 02:36 PM    HDL 46 02/06/2025 02:36 PM    LDL 62.2 02/06/2025 02:36 PM    LDL 48.6 07/20/2023 03:49 PM    VLDL 11.8 02/06/2025 02:36 PM     Lab Results   Component Value Date/Time    TSH 1.030 02/12/2020 02:12 PM

## 2025-03-13 NOTE — TELEPHONE ENCOUNTER
Pt call states that he's has yes to received medication Spironolactone 5 mg from PCP MICHAEL Ricketts. Pt is requesting a call back from nurse.    BCBN [phone]

## 2025-03-14 DIAGNOSIS — J30.2 SEASONAL ALLERGIC RHINITIS, UNSPECIFIED TRIGGER: ICD-10-CM

## 2025-03-14 NOTE — TELEPHONE ENCOUNTER
Pt called stated he  would like to speak with MICHAEL Ricketts regarding Medicaction   MONTEL[place]AST.pt stated PCP has stopped the medication and requested a call back.     Best call back number [phone]     03.14.25

## 2025-03-17 ENCOUNTER — TELEPHONE (OUTPATIENT)
Age: 70
End: 2025-03-17

## 2025-03-17 DIAGNOSIS — J30.2 SEASONAL ALLERGIC RHINITIS, UNSPECIFIED TRIGGER: Primary | ICD-10-CM

## 2025-03-17 RX ORDER — MONTELUKAST SODIUM 10 MG/1
10 TABLET ORAL DAILY
Qty: 90 TABLET | Refills: 1 | Status: SHIPPED | OUTPATIENT
Start: 2025-03-17

## 2025-03-17 RX ORDER — MONTELUKAST SODIUM 10 MG/1
10 TABLET ORAL DAILY
Qty: 90 TABLET | Refills: 1 | Status: SHIPPED | OUTPATIENT
Start: 2025-03-17 | End: 2025-03-17 | Stop reason: SDUPTHER

## 2025-03-17 NOTE — TELEPHONE ENCOUNTER
Contacted Kathie, advised Potassium Chloride was discontinued.  (Stated someone had refused request earlier today).  ThanksFiorella    For Pharmacy Admin Tracking Only    Program: Medication Refill  CPA in place:    Recommendation Provided To:   Intervention Detail: Discontinued Rx: 1, reason: Therapy Complete  Intervention Accepted By:   Gap Closed?:    Time Spent (min): 5

## 2025-03-17 NOTE — TELEPHONE ENCOUNTER
Kathie per patient is requesting refill of potassium chloride er 20mEq.      Not listed on current med list- Noted d/c on 1/13/24.    Please advise or send new rx.  Thanks, Fiorella

## 2025-03-17 NOTE — TELEPHONE ENCOUNTER
PCP: Eileen Ricketts APRN - NP    Last appt: 2/6/2025       No future appointments.    Requested Prescriptions      No prescriptions requested or ordered in this encounter       Prior labs and Blood pressures:  BP Readings from Last 3 Encounters:   02/06/25 113/74   11/18/24 120/80   11/11/24 118/82     Lab Results   Component Value Date/Time     02/06/2025 02:36 PM    K 4.0 02/06/2025 02:36 PM     02/06/2025 02:36 PM    CO2 25 02/06/2025 02:36 PM    BUN 21 02/06/2025 02:36 PM    GFRAA >60 07/07/2022 02:12 PM     No results found for: \"HBA1C\", \"[id number]\"  Lab Results   Component Value Date/Time    CHOL 120 02/06/2025 02:36 PM    HDL 46 02/06/2025 02:36 PM    LDL 62.2 02/06/2025 02:36 PM    LDL 48.6 07/20/2023 03:49 PM    VLDL 11.8 02/06/2025 02:36 PM     No results found for: \"VITD3\"    Lab Results   Component Value Date/Time    TSH 1.030 02/12/2020 02:12 PM

## 2025-03-27 RX ORDER — FUROSEMIDE 20 MG/1
TABLET ORAL
Refills: 0 | OUTPATIENT
Start: 2025-03-27

## 2025-04-18 RX ORDER — CYCLOBENZAPRINE HCL 5 MG
TABLET ORAL
Qty: 90 TABLET | Refills: 0 | Status: SHIPPED | OUTPATIENT
Start: 2025-04-18

## 2025-04-18 NOTE — TELEPHONE ENCOUNTER
PCP: Eileen Ricketts APRN - NP    Last appt: 2/6/2025   No future appointments.    Requested Prescriptions     Pending Prescriptions Disp Refills    cyclobenzaprine (FLEXERIL) 5 MG tablet [Pharmacy Med Name: Cyclobenzaprine HCl Oral Tablet 5 MG] 90 tablet 0     Sig: TAKE 1 TO 2 TABLETS AT BEDTIME AS NEEDED FOR MUSCLE SPASM.

## 2025-05-19 DIAGNOSIS — I10 ESSENTIAL HYPERTENSION: ICD-10-CM

## 2025-05-19 RX ORDER — CARVEDILOL 25 MG/1
25 TABLET ORAL 2 TIMES DAILY
Qty: 180 TABLET | Refills: 0 | Status: SHIPPED | OUTPATIENT
Start: 2025-05-19

## 2025-05-19 NOTE — TELEPHONE ENCOUNTER
PCP: Eileen Ricketts APRN - NP    Last appt: 2/6/2025     No future appointments.    Requested Prescriptions     Pending Prescriptions Disp Refills    carvedilol (COREG) 25 MG tablet [Pharmacy Med Name: Carvedilol Oral Tablet 25 MG] 180 tablet 3     Sig: TAKE 1 TABLET TWICE DAILY       Prior labs and Blood pressures:  BP Readings from Last 3 Encounters:   02/06/25 113/74   11/18/24 120/80   11/11/24 118/82     Lab Results   Component Value Date/Time     02/06/2025 02:36 PM    K 4.0 02/06/2025 02:36 PM     02/06/2025 02:36 PM    CO2 25 02/06/2025 02:36 PM    BUN 21 02/06/2025 02:36 PM    GFRAA >60 07/07/2022 02:12 PM     No results found for: \"HBA1C\", \"[id number]\"  Lab Results   Component Value Date/Time    CHOL 120 02/06/2025 02:36 PM    HDL 46 02/06/2025 02:36 PM    LDL 62.2 02/06/2025 02:36 PM    LDL 48.6 07/20/2023 03:49 PM    VLDL 11.8 02/06/2025 02:36 PM     No results found for: \"VITD3\"    Lab Results   Component Value Date/Time    TSH 1.030 02/12/2020 02:12 PM

## 2025-05-22 RX ORDER — VALACYCLOVIR HYDROCHLORIDE 500 MG/1
TABLET, FILM COATED ORAL
Qty: 90 TABLET | Refills: 1 | Status: SHIPPED | OUTPATIENT
Start: 2025-05-22

## 2025-05-22 NOTE — TELEPHONE ENCOUNTER
PCP: Eileen Ricketts APRN - NP    Last appt: 2/6/2025     No future appointments.    Requested Prescriptions     Pending Prescriptions Disp Refills    valACYclovir (VALTREX) 500 MG tablet [Pharmacy Med Name: valACYclovir HCl Oral Tablet 500 MG] 90 tablet 3     Sig: TAKE 1 TABLET EVERY DAY AS NEEDED FOR BREAKOUT UNDER EYES       Prior labs and Blood pressures:  BP Readings from Last 3 Encounters:   02/06/25 113/74   11/18/24 120/80   11/11/24 118/82     Lab Results   Component Value Date/Time     02/06/2025 02:36 PM    K 4.0 02/06/2025 02:36 PM     02/06/2025 02:36 PM    CO2 25 02/06/2025 02:36 PM    BUN 21 02/06/2025 02:36 PM    GFRAA >60 07/07/2022 02:12 PM     No results found for: \"HBA1C\", \"[id number]\"  Lab Results   Component Value Date/Time    CHOL 120 02/06/2025 02:36 PM    HDL 46 02/06/2025 02:36 PM    LDL 62.2 02/06/2025 02:36 PM    LDL 48.6 07/20/2023 03:49 PM    VLDL 11.8 02/06/2025 02:36 PM     No results found for: \"VITD3\"    Lab Results   Component Value Date/Time    TSH 1.030 02/12/2020 02:12 PM

## 2025-05-29 RX ORDER — MELOXICAM 15 MG/1
TABLET ORAL
Qty: 30 TABLET | Refills: 0 | Status: SHIPPED | OUTPATIENT
Start: 2025-05-29

## 2025-05-29 NOTE — TELEPHONE ENCOUNTER
PCP: Eileen Ricketts APRN - NP    Last appt: 2/6/2025     No future appointments.    Requested Prescriptions     Pending Prescriptions Disp Refills    meloxicam (MOBIC) 15 MG tablet [Pharmacy Med Name: Meloxicam Oral Tablet 15 MG] 30 tablet 0     Sig: TAKE 1 TABLET EVERY DAY FOR BACK PAIN AS NEEDED       Prior labs and Blood pressures:  BP Readings from Last 3 Encounters:   02/06/25 113/74   11/18/24 120/80   11/11/24 118/82     Lab Results   Component Value Date/Time     02/06/2025 02:36 PM    K 4.0 02/06/2025 02:36 PM     02/06/2025 02:36 PM    CO2 25 02/06/2025 02:36 PM    BUN 21 02/06/2025 02:36 PM    GFRAA >60 07/07/2022 02:12 PM     No results found for: \"HBA1C\", \"[id number]\"  Lab Results   Component Value Date/Time    CHOL 120 02/06/2025 02:36 PM    HDL 46 02/06/2025 02:36 PM    LDL 62.2 02/06/2025 02:36 PM    LDL 48.6 07/20/2023 03:49 PM    VLDL 11.8 02/06/2025 02:36 PM     No results found for: \"VITD3\"    Lab Results   Component Value Date/Time    TSH 1.030 02/12/2020 02:12 PM

## 2025-05-30 RX ORDER — CYCLOBENZAPRINE HCL 5 MG
TABLET ORAL
Qty: 90 TABLET | Refills: 0 | OUTPATIENT
Start: 2025-05-30

## 2025-06-20 DIAGNOSIS — I10 ESSENTIAL HYPERTENSION: ICD-10-CM

## 2025-06-20 DIAGNOSIS — E78.2 MIXED HYPERLIPIDEMIA: ICD-10-CM

## 2025-06-20 NOTE — TELEPHONE ENCOUNTER
PCP: Eileen Ricketts APRN - NP    Last appt: 2/6/2025       No future appointments.    Requested Prescriptions     Pending Prescriptions Disp Refills    cyclobenzaprine (FLEXERIL) 5 MG tablet [Pharmacy Med Name: Cyclobenzaprine HCl Oral Tablet 5 MG] 90 tablet 0     Sig: TAKE 1 TO 2 TABLETS AT BEDTIME AS NEEDED FOR MUSCLE SPASM.    amLODIPine (NORVASC) 10 MG tablet [Pharmacy Med Name: amLODIPine Besylate Oral Tablet 10 MG] 90 tablet 3     Sig: TAKE 1 TABLET EVERY DAY    atorvastatin (LIPITOR) 20 MG tablet [Pharmacy Med Name: Atorvastatin Calcium Oral Tablet 20 MG] 90 tablet 3     Sig: TAKE 1 TABLET EVERY DAY       Prior labs and Blood pressures:  BP Readings from Last 3 Encounters:   02/06/25 113/74   11/18/24 120/80   11/11/24 118/82     Lab Results   Component Value Date/Time     02/06/2025 02:36 PM    K 4.0 02/06/2025 02:36 PM     02/06/2025 02:36 PM    CO2 25 02/06/2025 02:36 PM    BUN 21 02/06/2025 02:36 PM    GFRAA >60 07/07/2022 02:12 PM     No results found for: \"HBA1C\", \"[id number]\"  Lab Results   Component Value Date/Time    CHOL 120 02/06/2025 02:36 PM    HDL 46 02/06/2025 02:36 PM    LDL 62.2 02/06/2025 02:36 PM    LDL 48.6 07/20/2023 03:49 PM    VLDL 11.8 02/06/2025 02:36 PM     No results found for: \"VITD3\"    Lab Results   Component Value Date/Time    TSH 1.030 02/12/2020 02:12 PM

## 2025-06-22 RX ORDER — AMLODIPINE BESYLATE 10 MG/1
10 TABLET ORAL DAILY
Qty: 90 TABLET | Refills: 0 | Status: SHIPPED | OUTPATIENT
Start: 2025-06-22

## 2025-06-22 RX ORDER — ATORVASTATIN CALCIUM 20 MG/1
20 TABLET, FILM COATED ORAL DAILY
Qty: 90 TABLET | Refills: 0 | Status: SHIPPED | OUTPATIENT
Start: 2025-06-22

## 2025-06-22 RX ORDER — CYCLOBENZAPRINE HCL 5 MG
TABLET ORAL
Qty: 90 TABLET | Refills: 0 | Status: SHIPPED | OUTPATIENT
Start: 2025-06-22

## 2025-06-24 ENCOUNTER — TELEPHONE (OUTPATIENT)
Age: 70
End: 2025-06-24

## 2025-06-24 NOTE — TELEPHONE ENCOUNTER
PT called stating he was at [facility] where he had lab work done and was told he may have a kidney infection and to follow up with PCP. PT did not want to take next availability (7/10 at the time of note) as it was too far. PT was told PCP would be informed to decided what to do next.

## 2025-06-24 NOTE — TELEPHONE ENCOUNTER
Call and spoke to patient, two ID confirmed. Writer informed patient to bring ER report and all of his medication to follow up appointment. Appointment scheduled. Pt verbalized understanding of information discussed w/ no further questions at this time.

## 2025-06-30 RX ORDER — MELOXICAM 15 MG/1
TABLET ORAL
Qty: 30 TABLET | Refills: 3 | Status: SHIPPED | OUTPATIENT
Start: 2025-06-30

## 2025-06-30 NOTE — TELEPHONE ENCOUNTER
PCP: Eileen Ricketts APRN - NP    Last appt: 2/6/2025     Future Appointments   Date Time Provider Department Center   7/9/2025  9:40 AM [facility]       Requested Prescriptions     Pending Prescriptions Disp Refills    meloxicam (MOBIC) 15 MG tablet [Pharmacy Med Name: Meloxicam Oral Tablet 15 MG] 30 tablet 11     Sig: TAKE 1 TABLET EVERY DAY FOR BACK PAIN AS NEEDED (CONTACT PROVIDER FOR FURTHER REFILLS)         Prior labs and Blood pressures:  BP Readings from Last 3 Encounters:   02/06/25 113/74   11/18/24 120/80   11/11/24 118/82     Lab Results   Component Value Date/Time     02/06/2025 02:36 PM    K 4.0 02/06/2025 02:36 PM     02/06/2025 02:36 PM    CO2 25 02/06/2025 02:36 PM    BUN 21 02/06/2025 02:36 PM    GFRAA >60 07/07/2022 02:12 PM     Lab Results   Component Value Date/Time    CHOL 120 02/06/2025 02:36 PM    HDL 46 02/06/2025 02:36 PM    LDL 62.2 02/06/2025 02:36 PM    LDL 48.6 07/20/2023 03:49 PM    VLDL 11.8 02/06/2025 02:36 PM     Lab Results   Component Value Date/Time    TSH 1.030 02/12/2020 02:12 PM

## 2025-07-10 ENCOUNTER — OFFICE VISIT (OUTPATIENT)
Age: 70
End: 2025-07-10
Payer: MEDICARE

## 2025-07-10 VITALS
DIASTOLIC BLOOD PRESSURE: 76 MMHG | BODY MASS INDEX: 29.95 KG/M2 | HEART RATE: 57 BPM | SYSTOLIC BLOOD PRESSURE: 114 MMHG | WEIGHT: 197.6 LBS | TEMPERATURE: 96.8 F | RESPIRATION RATE: 16 BRPM | HEIGHT: 68 IN | OXYGEN SATURATION: 98 %

## 2025-07-10 DIAGNOSIS — R79.89 ELEVATED SERUM CREATININE: ICD-10-CM

## 2025-07-10 DIAGNOSIS — N12 PYELONEPHRITIS: Primary | ICD-10-CM

## 2025-07-10 PROCEDURE — 1123F ACP DISCUSS/DSCN MKR DOCD: CPT

## 2025-07-10 PROCEDURE — 3074F SYST BP LT 130 MM HG: CPT

## 2025-07-10 PROCEDURE — 99214 OFFICE O/P EST MOD 30 MIN: CPT

## 2025-07-10 PROCEDURE — 3017F COLORECTAL CA SCREEN DOC REV: CPT

## 2025-07-10 PROCEDURE — 3078F DIAST BP <80 MM HG: CPT

## 2025-07-10 PROCEDURE — G8427 DOCREV CUR MEDS BY ELIG CLIN: HCPCS

## 2025-07-10 PROCEDURE — 1159F MED LIST DOCD IN RCRD: CPT

## 2025-07-10 PROCEDURE — G8417 CALC BMI ABV UP PARAM F/U: HCPCS

## 2025-07-10 PROCEDURE — 1036F TOBACCO NON-USER: CPT

## 2025-07-10 NOTE — PROGRESS NOTES
[facility]  [address] [place] [address]  Office [phone], Fax [phone]    Assessment/Plan:   Assessment:   70-year-old male presents today to follow-up for recent ER visit for pyelonephritis, now fully resolved.  Assessment & Plan  1. Pyelonephritis: Resolved.  Notes, labs, imaging reviewed from ER visit on 6/22/25. CT: No nephrolithiasis or obstruction, left renal cysts; UA: Infection; Urine culture: E. coli resistant to ampicillin, susceptible to third-generation cephalosporins.  - Diagnosed 06/22/2025, treated with single ceftriaxone dose followed by Keflex 500 mg QID for nine days. Adequate treatment completed.  - Slightly reduced kidney function (GFR 52, Cr 1.42) likely due to dehydration and infection    - Resume losartan 100 mg and spironolactone 25 mg.  - Follow up with PCP to discuss discontinuation of BP meds given uncontrolled BP even when off.  - Monitor BP at home.  - Repeat CMP and CBC today    2. Hypertension: Stable.  Chronic  - BP stable without medication, recent reading 118/69.  - Resume losartan 100 mg and spironolactone 25 mg, follow-up blood testing results, but would recommend discussing with PCP potential reduction in dose given stable pressures.  -Continue amlodipine 10 mg    Follow-up  - Follow up with Dr. Bar to discuss discontinuation of blood pressure medications.  - Check blood pressure regularly at home.       1. Pyelonephritis  -     Comprehensive Metabolic Panel; Future  -     CBC; Future  2. Elevated serum creatinine  -     Comprehensive Metabolic Panel; Future  -     CBC; Future        Return in about 1 month (around 8/10/2025) for PCP Follow Up for Chronic Conditions.       Subjective:     Chief Complaint   Patient presents with    Follow-up     Follow up on kidney function       HPI:    History of Present Illness  70-year-old male presents for ER follow-up.    Bladder and Left Kidney Infection  Follow-up for bladder and

## 2025-07-10 NOTE — PROGRESS NOTES
Chief Complaint   Patient presents with    Follow-up     Follow up on kidney function     \"Have you been to the ER, urgent care clinic since your last visit?  Hospitalized since your last visit?\"    YES - When: approximately 2  weeks ago.  Where and Why: [facility] due to urinary and abdominal pain.    “Have you seen or consulted any other health care providers outside of [facility] since your last visit?”    NO                   2/6/2025     2:03 PM   PHQ-9    Little interest or pleasure in doing things 0   Feeling down, depressed, or hopeless 2   PHQ-2 Score 2   PHQ-9 Total Score 2           Financial Resource Strain: High Risk (1/11/2024)    Overall Financial Resource Strain (CARDIA)     Difficulty of Paying Living Expenses: Very hard      Food Insecurity: Food Insecurity Present (2/6/2025)    Hunger Vital Sign     Worried About Running Out of Food in the Last Year: Sometimes true     Ran Out of Food in the Last Year: Sometimes true          Health Maintenance Due   Topic Date Due    COVID-19 Vaccine (2 - 2024-25 season) 09/01/2024    Prostate Specific Antigen (PSA) Screening or Monitoring  07/15/2025

## 2025-07-11 LAB
ALBUMIN SERPL-MCNC: 3.9 G/DL (ref 3.5–5)
ALBUMIN/GLOB SERPL: 1.2 (ref 1.1–2.2)
ALP SERPL-CCNC: 90 U/L (ref 45–117)
ALT SERPL-CCNC: 23 U/L (ref 12–78)
ANION GAP SERPL CALC-SCNC: 5 MMOL/L (ref 2–12)
AST SERPL-CCNC: 14 U/L (ref 15–37)
BILIRUB SERPL-MCNC: 1 MG/DL (ref 0.2–1)
BUN SERPL-MCNC: 21 MG/DL (ref 6–20)
BUN/CREAT SERPL: 21 (ref 12–20)
CALCIUM SERPL-MCNC: 9.4 MG/DL (ref 8.5–10.1)
CHLORIDE SERPL-SCNC: 108 MMOL/L (ref 97–108)
CO2 SERPL-SCNC: 26 MMOL/L (ref 21–32)
CREAT SERPL-MCNC: 1 MG/DL (ref 0.7–1.3)
ERYTHROCYTE [DISTWIDTH] IN BLOOD BY AUTOMATED COUNT: 13.3 % (ref 11.5–14.5)
GLOBULIN SER CALC-MCNC: 3.3 G/DL (ref 2–4)
GLUCOSE SERPL-MCNC: 97 MG/DL (ref 65–100)
HCT VFR BLD AUTO: 37 % (ref 36.6–50.3)
HGB BLD-MCNC: 12.1 G/DL (ref 12.1–17)
MCH RBC QN AUTO: 31.5 PG (ref 26–34)
MCHC RBC AUTO-ENTMCNC: 32.7 G/DL (ref 30–36.5)
MCV RBC AUTO: 96.4 FL (ref 80–99)
NRBC # BLD: 0 K/UL (ref 0–0.01)
NRBC BLD-RTO: 0 PER 100 WBC
PLATELET # BLD AUTO: 255 K/UL (ref 150–400)
PMV BLD AUTO: 11.2 FL (ref 8.9–12.9)
POTASSIUM SERPL-SCNC: 3.6 MMOL/L (ref 3.5–5.1)
PROT SERPL-MCNC: 7.2 G/DL (ref 6.4–8.2)
RBC # BLD AUTO: 3.84 M/UL (ref 4.1–5.7)
SODIUM SERPL-SCNC: 139 MMOL/L (ref 136–145)
WBC # BLD AUTO: 5.7 K/UL (ref 4.1–11.1)

## 2025-07-14 DIAGNOSIS — I10 ESSENTIAL HYPERTENSION: ICD-10-CM

## 2025-07-15 RX ORDER — LOSARTAN POTASSIUM 100 MG/1
100 TABLET ORAL DAILY
Qty: 90 TABLET | Refills: 1 | Status: SHIPPED | OUTPATIENT
Start: 2025-07-15

## 2025-07-15 NOTE — TELEPHONE ENCOUNTER
PCP: Eileen Ricketts APRN - NP    Last appt: 7/10/2025       Future Appointments   Date Time Provider Department Center   8/11/2025 10:20 AM Eileen Ricketts APRN - NP [facility]       Requested Prescriptions     Pending Prescriptions Disp Refills    losartan (COZAAR) 100 MG tablet [Pharmacy Med Name: Losartan Potassium Oral Tablet 100 MG] 90 tablet 3     Sig: TAKE 1 TABLET EVERY DAY       Prior labs and Blood pressures:  BP Readings from Last 3 Encounters:   07/10/25 114/76   02/06/25 113/74   11/18/24 120/80     Lab Results   Component Value Date/Time     07/10/2025 11:34 AM    K 3.6 07/10/2025 11:34 AM     07/10/2025 11:34 AM    CO2 26 07/10/2025 11:34 AM    BUN 21 07/10/2025 11:34 AM    GFRAA >60 07/07/2022 02:12 PM     No results found for: \"HBA1C\", \"[id number]\"  Lab Results   Component Value Date/Time    CHOL 120 02/06/2025 02:36 PM    HDL 46 02/06/2025 02:36 PM    LDL 62.2 02/06/2025 02:36 PM    LDL 48.6 07/20/2023 03:49 PM    VLDL 11.8 02/06/2025 02:36 PM     No results found for: \"VITD3\"    Lab Results   Component Value Date/Time    TSH 1.030 02/12/2020 02:12 PM

## 2025-08-03 DIAGNOSIS — I10 ESSENTIAL HYPERTENSION: ICD-10-CM

## 2025-08-03 DIAGNOSIS — J30.2 SEASONAL ALLERGIC RHINITIS, UNSPECIFIED TRIGGER: ICD-10-CM

## 2025-08-03 DIAGNOSIS — R60.0 LOWER EXTREMITY EDEMA: ICD-10-CM

## 2025-08-03 RX ORDER — SPIRONOLACTONE 25 MG/1
25 TABLET ORAL DAILY
Qty: 90 TABLET | Refills: 1 | Status: SHIPPED | OUTPATIENT
Start: 2025-08-03

## 2025-08-03 RX ORDER — MONTELUKAST SODIUM 10 MG/1
10 TABLET ORAL DAILY
Qty: 90 TABLET | Refills: 1 | Status: SHIPPED | OUTPATIENT
Start: 2025-08-03

## 2025-08-11 ENCOUNTER — OFFICE VISIT (OUTPATIENT)
Age: 70
End: 2025-08-11
Payer: MEDICARE

## 2025-08-11 VITALS
SYSTOLIC BLOOD PRESSURE: 110 MMHG | BODY MASS INDEX: 29.98 KG/M2 | RESPIRATION RATE: 18 BRPM | TEMPERATURE: 97.1 F | OXYGEN SATURATION: 97 % | WEIGHT: 197.8 LBS | HEART RATE: 55 BPM | HEIGHT: 68 IN | DIASTOLIC BLOOD PRESSURE: 62 MMHG

## 2025-08-11 DIAGNOSIS — I10 ESSENTIAL HYPERTENSION: Primary | ICD-10-CM

## 2025-08-11 DIAGNOSIS — K21.00 GASTROESOPHAGEAL REFLUX DISEASE WITH ESOPHAGITIS WITHOUT HEMORRHAGE: ICD-10-CM

## 2025-08-11 DIAGNOSIS — M48.062 SPINAL STENOSIS OF LUMBAR REGION WITH NEUROGENIC CLAUDICATION: ICD-10-CM

## 2025-08-11 DIAGNOSIS — N12 PYELONEPHRITIS: ICD-10-CM

## 2025-08-11 DIAGNOSIS — J45.40 MODERATE PERSISTENT ASTHMA WITHOUT COMPLICATION: ICD-10-CM

## 2025-08-11 DIAGNOSIS — G47.33 OSA (OBSTRUCTIVE SLEEP APNEA): ICD-10-CM

## 2025-08-11 DIAGNOSIS — E78.2 MIXED HYPERLIPIDEMIA: ICD-10-CM

## 2025-08-11 DIAGNOSIS — R60.0 LOWER EXTREMITY EDEMA: ICD-10-CM

## 2025-08-11 PROCEDURE — 1126F AMNT PAIN NOTED NONE PRSNT: CPT | Performed by: NURSE PRACTITIONER

## 2025-08-11 PROCEDURE — G8417 CALC BMI ABV UP PARAM F/U: HCPCS | Performed by: NURSE PRACTITIONER

## 2025-08-11 PROCEDURE — 1159F MED LIST DOCD IN RCRD: CPT | Performed by: NURSE PRACTITIONER

## 2025-08-11 PROCEDURE — 3017F COLORECTAL CA SCREEN DOC REV: CPT | Performed by: NURSE PRACTITIONER

## 2025-08-11 PROCEDURE — 3074F SYST BP LT 130 MM HG: CPT | Performed by: NURSE PRACTITIONER

## 2025-08-11 PROCEDURE — 99214 OFFICE O/P EST MOD 30 MIN: CPT | Performed by: NURSE PRACTITIONER

## 2025-08-11 PROCEDURE — G8427 DOCREV CUR MEDS BY ELIG CLIN: HCPCS | Performed by: NURSE PRACTITIONER

## 2025-08-11 PROCEDURE — 1123F ACP DISCUSS/DSCN MKR DOCD: CPT | Performed by: NURSE PRACTITIONER

## 2025-08-11 PROCEDURE — 1160F RVW MEDS BY RX/DR IN RCRD: CPT | Performed by: NURSE PRACTITIONER

## 2025-08-11 PROCEDURE — 3078F DIAST BP <80 MM HG: CPT | Performed by: NURSE PRACTITIONER

## 2025-08-11 PROCEDURE — 1036F TOBACCO NON-USER: CPT | Performed by: NURSE PRACTITIONER

## 2025-08-11 RX ORDER — LOSARTAN POTASSIUM 50 MG/1
50 TABLET ORAL DAILY
Qty: 90 TABLET | Refills: 1 | Status: SHIPPED | OUTPATIENT
Start: 2025-08-11

## 2025-08-11 ASSESSMENT — ENCOUNTER SYMPTOMS
SHORTNESS OF BREATH: 0
BACK PAIN: 1
CHEST TIGHTNESS: 0

## 2025-08-11 ASSESSMENT — PATIENT HEALTH QUESTIONNAIRE - PHQ9
SUM OF ALL RESPONSES TO PHQ QUESTIONS 1-9: 0
2. FEELING DOWN, DEPRESSED OR HOPELESS: NOT AT ALL
SUM OF ALL RESPONSES TO PHQ QUESTIONS 1-9: 0
1. LITTLE INTEREST OR PLEASURE IN DOING THINGS: NOT AT ALL
SUM OF ALL RESPONSES TO PHQ QUESTIONS 1-9: 0
SUM OF ALL RESPONSES TO PHQ QUESTIONS 1-9: 0

## (undated) DEVICE — TUBING IRRIG COMPATIBLE W ERBE MEDIVATOR PMP HYDR

## (undated) DEVICE — SOLIDIFIER FLUID 3000 CC ABSORB

## (undated) DEVICE — SET EXTN TBNG L BOR 4 W STPCOCK ST 32IN PRIMING VOL 6ML

## (undated) DEVICE — SUTURE ABSRB BRAID COAT UD OS-6 NO 1 27IN VCRL J535H

## (undated) DEVICE — CATH IV AUTOGRD BC BLU 22GA 25 -- INSYTE

## (undated) DEVICE — 1200 GUARD II KIT W/5MM TUBE W/O VAC TUBE: Brand: GUARDIAN

## (undated) DEVICE — ENDO CARRY-ON PROCEDURE KIT INCLUDES ENZYMATIC SPONGE, GAUZE, BIOHAZARD LABEL, TRAY, LUBRICANT, DIRTY SCOPE LABEL, WATER LABEL, TRAY, DRAWSTRING PAD, AND DEFENDO 4-PIECE KIT.: Brand: ENDO CARRY-ON PROCEDURE KIT

## (undated) DEVICE — SYR 5ML 1/5 GRAD LL NSAF LF --

## (undated) DEVICE — NEONATAL-ADULT SPO2 SENSOR: Brand: NELLCOR

## (undated) DEVICE — CONTAINER SPEC 20 ML LID NEUT BUFF FORMALIN 10 % POLYPR STS

## (undated) DEVICE — BAG SPEC BIOHZD LF 2MIL 6X10IN -- CONVERT TO ITEM 357326

## (undated) DEVICE — NEEDLE HYPO 18GA L1.5IN PNK S STL HUB POLYPR SHLD REG BVL

## (undated) DEVICE — STERILE POLYISOPRENE POWDER-FREE SURGICAL GLOVES WITH EMOLLIENT COATING: Brand: PROTEXIS

## (undated) DEVICE — Device: Brand: MEDICAL ACTION INDUSTRIES

## (undated) DEVICE — BW-412T DISP COMBO CLEANING BRUSH: Brand: SINGLE USE COMBINATION CLEANING BRUSH

## (undated) DEVICE — SUTURE VCRL SZ 3-0 L27IN ABSRB UD CP-2 L26MM 1/2 CIR REV J868H

## (undated) DEVICE — Z CONVERTED USE 2274305 CUFF BLD PRSS AD L SZ 12 FOR 32-43CM LIMB VLY SFT W/O TUBE

## (undated) DEVICE — TOTAL TRAY, 16FR 10ML SIL FOLEY, URN: Brand: MEDLINE

## (undated) DEVICE — FLOSEAL HEMOSTATIC MATRIX, 10 ML: Brand: FLOSEAL

## (undated) DEVICE — SOLUTION IV 250ML 0.9% SOD CHL CLR INJ FLX BG CONT PRT CLSR

## (undated) DEVICE — CANN NASAL O2 CAPNOGRAPHY AD -- FILTERLINE

## (undated) DEVICE — AIRLIFE™ U/CONNECT-IT OXYGEN TUBING 7 FEET (2.1 M) CRUSH-RESISTANT OXYGEN TUBING, VINYL TIPPED: Brand: AIRLIFE™

## (undated) DEVICE — SINGLE USE BIOPSY VALVE MAJ-210: Brand: SINGLE USE BIOPSY VALVE (STERILE)

## (undated) DEVICE — SET ADMIN 16ML TBNG L100IN 2 Y INJ SITE IV PIGGY BK DISP

## (undated) DEVICE — BIPOLAR IRRIGATOR INTEGRATED TUBING AND BIPOLAR CORD SET, DISPOSABLE

## (undated) DEVICE — POSITIONER HD REST FOAM CMFRT TCH

## (undated) DEVICE — NDL FLTR TIP 5 MIC 18GX1.5IN --

## (undated) DEVICE — SYRINGE MED 20ML STD CLR PLAS LUERLOCK TIP N CTRL DISP

## (undated) DEVICE — TRAP SURG QUAD PARABOLA SLOT DSGN SFTY SCRN TRAPEASE

## (undated) DEVICE — BAG BELONG PT PERS CLEAR HANDL

## (undated) DEVICE — TRAP,MUCUS SPECIMEN, 80CC: Brand: MEDLINE

## (undated) DEVICE — GARMENT,MEDLINE,DVT,INT,CALF,MED, GEN2: Brand: MEDLINE

## (undated) DEVICE — SYR ASSEMB INFL BLLN 60ML --

## (undated) DEVICE — ENDOBRONCHIAL ULTRASOUND TRANSBRONCHIAL ASPIRATION NEEDLE: Brand: EXPECT PULMONARY

## (undated) DEVICE — Device

## (undated) DEVICE — INFECTION CONTROL KIT SYS

## (undated) DEVICE — REM POLYHESIVE ADULT PATIENT RETURN ELECTRODE: Brand: VALLEYLAB

## (undated) DEVICE — BONE WAX WHITE: Brand: BONE WAX WHITE

## (undated) DEVICE — KENDALL RADIOLUCENT FOAM MONITORING ELECTRODE -RECTANGULAR SHAPE: Brand: KENDALL

## (undated) DEVICE — KIT EVAC 0.13IN RECT TB DIA10FR 400CC PVC 3 SPR Y CONN DRN

## (undated) DEVICE — SYRINGE MED 10CC ECC TIP W/O NDL

## (undated) DEVICE — FORCEPS BX L240CM JAW DIA2.8MM L CAP W/ NDL MIC MESH TOOTH

## (undated) DEVICE — ELECTRODE,RADIOTRANSLUCENT,FOAM,3PK: Brand: MEDLINE

## (undated) DEVICE — ADHESIVE SKIN CLOSURE 4X22 CM PREMIERPRO EXOFINFUSION DISP

## (undated) DEVICE — FCPS BX HOT RJ4 2.2MMX240CM -- RADIAL JAW 4 BX/40

## (undated) DEVICE — AIRLIFE™ ADULT CUSHION NASAL CANNULA 14 FOOT (4.3) CRUSH-RESISTANT OXYGEN TUBING, AND U/CONNECT-IT ADAPTER: Brand: AIRLIFE™

## (undated) DEVICE — PREP SKN PREVAIL 40ML APPL --

## (undated) DEVICE — KIT IV STRT W CHLORAPREP PD 1ML

## (undated) DEVICE — 3M™ CUROS™ DISINFECTING CAP FOR NEEDLELESS CONNECTORS 270/CARTON 20 CARTONS/CASE CFF1-270: Brand: CUROS™

## (undated) DEVICE — CATHETER IV 20GA L1IN FEP STR HUB INTROCAN SFTY

## (undated) DEVICE — TUBING, SUCTION, 3/16" X 6', STRAIGHT: Brand: MEDLINE

## (undated) DEVICE — BITE BLK ENDOSCP AD 54FR GRN POLYETH ENDOSCP W STRP SLD

## (undated) DEVICE — SUTURE VCRL 2-0 L27IN ABSRB UD CP-2 L26MM 1/2 CIR REV CUT J869H

## (undated) DEVICE — CONNECTOR TBNG AUX H2O JET DISP FOR OLY 160/180 SER

## (undated) DEVICE — SOL IRRIGATION INJ NACL 0.9% 500ML BTL

## (undated) DEVICE — SUTURE NONABSORBABLE MONOFILAMENT CV-6 TTC-9 24 IN GORTX 6K02B

## (undated) DEVICE — SOLUTION IV 1000ML 0.9% SOD CHL

## (undated) DEVICE — SYR LR LCK 1ML GRAD NSAF 30ML --

## (undated) DEVICE — Z DISCONTINUED NO SUB IDED SET EXTN W/ 4 W STPCOCK M SPIN LOK 36IN

## (undated) DEVICE — LAMINECTOMY RICHMOND-LF: Brand: MEDLINE INDUSTRIES, INC.

## (undated) DEVICE — SUPPLEMENT DIGESTIVE H2O SOL GI-EASE

## (undated) DEVICE — SYR 3ML LL TIP 1/10ML GRAD --

## (undated) DEVICE — BASIN SPNG 32OZ BLU STRL --

## (undated) DEVICE — SPONGE GZ W4XL4IN COT 12 PLY TYP VII WVN C FLD DSGN

## (undated) DEVICE — CATH SUC CTRL PRT TRIFLO 14FR --

## (undated) DEVICE — SEALANT FIBRIN 10 CC FRZN PRE FILLED SYR TISSEEL

## (undated) DEVICE — QUILTED PREMIUM COMFORT UNDERPAD,EXTRA HEAVY: Brand: WINGS

## (undated) DEVICE — 4-PORT MANIFOLD: Brand: NEPTUNE 2

## (undated) DEVICE — Z DISCONTINUED USE 2751540 TUBING IRRIG L10IN DISP PMP ENDOGATOR

## (undated) DEVICE — PREP SKN CHLRAPRP SNGL 1.75ML --

## (undated) DEVICE — CODMAN® SURGICAL PATTIES 1/4" X 1/4" (0.64CM X 0.64CM): Brand: CODMAN®

## (undated) DEVICE — STRAP,POSITIONING,KNEE/BODY,FOAM,4X60": Brand: MEDLINE

## (undated) DEVICE — SNARE ENDOSCP M L240CM W27MM SHTH DIA2.4MM CHN 2.8MM OVL

## (undated) DEVICE — ESOPHAGEAL/COLONIC/BILIARY WIREGUIDED BALLOON DILATATION CATHETER: Brand: CRE™ PRO

## (undated) DEVICE — GOWN,SIRUS,NONRNF,SETINSLV,2XL,18/CS: Brand: MEDLINE

## (undated) DEVICE — SINGLE USE SUCTION VALVE MAJ-209: Brand: SINGLE USE SUCTION VALVE (STERILE)

## (undated) DEVICE — SURGIFOAM SPNG SZ 100

## (undated) DEVICE — SYRINGE MED 20ML STD CLR PLAS LUERSLIP TIP N CTRL DISP